# Patient Record
Sex: FEMALE | Race: WHITE | NOT HISPANIC OR LATINO | URBAN - METROPOLITAN AREA
[De-identification: names, ages, dates, MRNs, and addresses within clinical notes are randomized per-mention and may not be internally consistent; named-entity substitution may affect disease eponyms.]

---

## 2017-03-08 ENCOUNTER — EMERGENCY (EMERGENCY)
Facility: HOSPITAL | Age: 82
LOS: 1 days | Discharge: HOME | End: 2017-03-08
Attending: EMERGENCY MEDICINE | Admitting: EMERGENCY MEDICINE
Payer: MEDICARE

## 2017-03-08 VITALS
SYSTOLIC BLOOD PRESSURE: 105 MMHG | HEART RATE: 83 BPM | TEMPERATURE: 98 F | DIASTOLIC BLOOD PRESSURE: 81 MMHG | RESPIRATION RATE: 16 BRPM | OXYGEN SATURATION: 99 %

## 2017-03-08 DIAGNOSIS — M25.512 PAIN IN LEFT SHOULDER: ICD-10-CM

## 2017-03-08 DIAGNOSIS — E11.9 TYPE 2 DIABETES MELLITUS WITHOUT COMPLICATIONS: ICD-10-CM

## 2017-03-08 DIAGNOSIS — E78.5 HYPERLIPIDEMIA, UNSPECIFIED: ICD-10-CM

## 2017-03-08 DIAGNOSIS — I10 ESSENTIAL (PRIMARY) HYPERTENSION: ICD-10-CM

## 2017-03-08 DIAGNOSIS — Z79.82 LONG TERM (CURRENT) USE OF ASPIRIN: ICD-10-CM

## 2017-03-08 PROCEDURE — 99283 EMERGENCY DEPT VISIT LOW MDM: CPT

## 2017-03-08 RX ORDER — ACETAMINOPHEN 500 MG
650 TABLET ORAL ONCE
Qty: 0 | Refills: 0 | Status: COMPLETED | OUTPATIENT
Start: 2017-03-08 | End: 2017-03-08

## 2017-03-08 RX ORDER — DIAZEPAM 5 MG
5 TABLET ORAL ONCE
Qty: 0 | Refills: 0 | Status: DISCONTINUED | OUTPATIENT
Start: 2017-03-08 | End: 2017-03-08

## 2017-03-08 RX ADMIN — Medication 650 MILLIGRAM(S): at 23:38

## 2017-03-08 RX ADMIN — Medication 5 MILLIGRAM(S): at 23:38

## 2017-03-08 NOTE — ED PROVIDER NOTE - PHYSICAL EXAMINATION
PE: CONSTITUTIONAL: Well appearing, well nourished, in no apparent distress. ENMT: Airway patent, nasal mucosa clear, mouth with normal mucosa. HEAD: NCAT EYES: R eye opacified, EOMI CARDIAC: RRR, no m/r/g, no pedal edema RESPIRATORY: CTA b/l, no adventitious sounds GI: Abdomen non-distended, non-tender MSK: Spine appears normal, range of motion is not limited, posterior L deltoid TTP, no zoster or stepoffs, +spasm NEURO: CNII-XII grossly intact, 5/5 strength, full sensation all extremities, gait intact SKIN: No rash

## 2017-03-08 NOTE — ED PROVIDER NOTE - NS ED ROS FT
ROS: GENERAL: no fevers, no chills HEENT: no epistaxis, no eye pain, no ear, no throat pain CARDIAC: no chest pain, no shortness of breath PULM: no cough, no shortness of breath GI: no nausea, no vomiting, no diarrhea,  no abdominal pain, no hematemesis, no bright red blood per rectum : no dysuria, no hematuria EXTREMITIES: no arm pain, no leg pain, +back pain SKIN: no purpura, no petechiae NEURO: no headache, no neck pain, no loss of strength/sensation HEME: no easy bruising, no easy bleeding

## 2017-03-08 NOTE — ED PROVIDER NOTE - OBJECTIVE STATEMENT
82f pmhx HTN, DM, HLD, cataracts (R eye blind) p/w back pain. started several days ago, located posterior deltoid, worse with palpation and motion, much worse at 8pm tonight, moderately improved with ibuprofen. denies recent injury, fall, or strain. denies CP/SOB, nausea/sweats.

## 2017-03-08 NOTE — ED PROVIDER NOTE - ATTENDING CONTRIBUTION TO CARE
82f pmhx HTN, DM, HLD, cataracts (R eye blind) p/w back pain. started several days ago, located posterior deltoid, worse with palpation and motion, much worse at 8pm tonight, moderately improved with ibuprofen. denies recent injury, fall, or strain. denies CP/SOB, nausea/sweats. on PE, VSS, posterior L deltoid TTP, no zoster or stepoffs, +spasm. will give valium and tylenol, reassess. no imaging at this time, will not pursue cardiac etiology (MSK etiology suspected)

## 2017-03-08 NOTE — ED PROVIDER NOTE - CARE PLAN
Principal Discharge DX:	Shoulder pain, left Principal Discharge DX:	Shoulder pain, left  Instructions for follow-up, activity and diet:	REst, increase activity as tolerated. Return to the ER for any concerns such as shortness of breath dizziness or chest pain.  Take motrin or tylenol for pain, and valium 5 mg at night for pain. Follow up with your physician in the next week.

## 2017-03-08 NOTE — ED PROVIDER NOTE - PLAN OF CARE
REst, increase activity as tolerated. Return to the ER for any concerns such as shortness of breath dizziness or chest pain.  Take motrin or tylenol for pain, and valium 5 mg at night for pain. Follow up with your physician in the next week.

## 2017-03-08 NOTE — ED PROVIDER NOTE - PMH
Cataract    Dementia    Diabetes Mellitus, Type 2    Diabetic retinopathy    Glaucoma    HTN (Hypertension)    Hyperlipidemia

## 2017-03-08 NOTE — ED ADULT NURSE NOTE - OBJECTIVE STATEMENT
81 y/o female pt c/o left shoulder/back pain no traumatic. pt is ambualtory. pt denies CP/SOB/fever/ chills/ N/V/D.

## 2017-03-09 RX ORDER — DIAZEPAM 5 MG
1 TABLET ORAL
Qty: 3 | Refills: 0 | OUTPATIENT
Start: 2017-03-09 | End: 2017-03-12

## 2017-03-09 RX ORDER — DIAZEPAM 5 MG
1 TABLET ORAL
Qty: 3 | Refills: 0
Start: 2017-03-09

## 2017-05-28 ENCOUNTER — EMERGENCY (EMERGENCY)
Facility: HOSPITAL | Age: 82
LOS: 1 days | Discharge: HOME | End: 2017-05-28
Attending: EMERGENCY MEDICINE | Admitting: EMERGENCY MEDICINE
Payer: MEDICARE

## 2017-05-28 VITALS
RESPIRATION RATE: 18 BRPM | DIASTOLIC BLOOD PRESSURE: 83 MMHG | OXYGEN SATURATION: 99 % | SYSTOLIC BLOOD PRESSURE: 204 MMHG | HEART RATE: 74 BPM

## 2017-05-28 VITALS
RESPIRATION RATE: 20 BRPM | DIASTOLIC BLOOD PRESSURE: 68 MMHG | SYSTOLIC BLOOD PRESSURE: 238 MMHG | OXYGEN SATURATION: 98 % | TEMPERATURE: 98 F | HEART RATE: 82 BPM

## 2017-05-28 DIAGNOSIS — M54.6 PAIN IN THORACIC SPINE: ICD-10-CM

## 2017-05-28 DIAGNOSIS — Z79.82 LONG TERM (CURRENT) USE OF ASPIRIN: ICD-10-CM

## 2017-05-28 DIAGNOSIS — I10 ESSENTIAL (PRIMARY) HYPERTENSION: ICD-10-CM

## 2017-05-28 DIAGNOSIS — Z79.899 OTHER LONG TERM (CURRENT) DRUG THERAPY: ICD-10-CM

## 2017-05-28 DIAGNOSIS — E78.5 HYPERLIPIDEMIA, UNSPECIFIED: ICD-10-CM

## 2017-05-28 DIAGNOSIS — Z98.49 CATARACT EXTRACTION STATUS, UNSPECIFIED EYE: ICD-10-CM

## 2017-05-28 DIAGNOSIS — Z86.69 PERSONAL HISTORY OF OTHER DISEASES OF THE NERVOUS SYSTEM AND SENSE ORGANS: ICD-10-CM

## 2017-05-28 DIAGNOSIS — Z79.4 LONG TERM (CURRENT) USE OF INSULIN: ICD-10-CM

## 2017-05-28 DIAGNOSIS — E11.319 TYPE 2 DIABETES MELLITUS WITH UNSPECIFIED DIABETIC RETINOPATHY WITHOUT MACULAR EDEMA: ICD-10-CM

## 2017-05-28 DIAGNOSIS — Z98.890 OTHER SPECIFIED POSTPROCEDURAL STATES: ICD-10-CM

## 2017-05-28 PROCEDURE — 99283 EMERGENCY DEPT VISIT LOW MDM: CPT | Mod: GC

## 2017-05-28 PROCEDURE — 99284 EMERGENCY DEPT VISIT MOD MDM: CPT

## 2017-05-28 RX ORDER — DIAZEPAM 5 MG
2 TABLET ORAL ONCE
Qty: 0 | Refills: 0 | Status: DISCONTINUED | OUTPATIENT
Start: 2017-05-28 | End: 2017-05-28

## 2017-05-28 RX ORDER — IBUPROFEN 200 MG
600 TABLET ORAL ONCE
Qty: 0 | Refills: 0 | Status: COMPLETED | OUTPATIENT
Start: 2017-05-28 | End: 2017-05-28

## 2017-05-28 RX ORDER — LIDOCAINE 4 G/100G
1 CREAM TOPICAL ONCE
Qty: 0 | Refills: 0 | Status: COMPLETED | OUTPATIENT
Start: 2017-05-28 | End: 2017-05-28

## 2017-05-28 RX ADMIN — Medication 2 MILLIGRAM(S): at 22:54

## 2017-05-28 NOTE — ED ADULT NURSE REASSESSMENT NOTE - NS ED NURSE REASSESS COMMENT FT1
Pt reports and seems much more calm and with some pain relief. Pt is smiling, speaking with stafff. BP coming down. Pt will be discharged when BP is within acceptable range as per Dr. Alcanatra,

## 2017-05-28 NOTE — ED ADULT NURSE NOTE - OBJECTIVE STATEMENT
Pt presents to ED awake and alert, accompanied by her  d/t acute onset upper/mid back pain. Pt and  state pt was sitting down when she began having severe back pain. Pt and  denies fall or injury of any kind. Pt denies dysuria or fever or ever having a pain like this before. Pt has h/o HTN, HLD, DM, and dementia. Pt is A&Ox2 at baseline and at this time. BLLE have +2 pitting edema. Respirations even and unlabored. Pt is crying, hysterical and yelling out in pain. No injury or deformity noted to upper/mid back. Pt presents to ED awake and alert, accompanied by her  d/t acute onset upper/mid back pain. Pt and  state pt was sitting down when she began having severe back pain. Pt and  denies fall or injury of any kind. Pt denies dysuria or fever or ever having a pain like this before. Pt has h/o HTN, HLD, DM, and dementia. Pt is A&Ox2 at baseline and at this time. BLLE have +2 pitting edema. Respirations even and unlabored. Pt is crying, hysterical and yelling out in pain. No injury or deformity noted to upper/mid back. Pt has been taking Motrin with no relief.  states pt has had an issue with her shoulder in the past.

## 2017-05-28 NOTE — ED PROVIDER NOTE - PHYSICAL EXAMINATION
Gen: NAD  Eyes:  sclerae white, no icterus  ENT: Moist mucous membranes. No exudates  CV: RRR. Audible S1 and S2. No murmurs, rubs, gallops, S3, nor S4  Pulm: Clear to auscultation bilaterally. No wheezes, rales, or rhonchi  Abd: BS+, nondistended, No tenderness to palpation  Musculoskeletal:  No midline spinal TTP. + cervical paraspinal muscle strain  Skin: no lesions or scars noted  Neurologic: Oriented to person and place, CN grossly intact, 5/5 strength in all extrem

## 2017-12-15 ENCOUNTER — OUTPATIENT (OUTPATIENT)
Dept: OUTPATIENT SERVICES | Facility: HOSPITAL | Age: 82
LOS: 1 days | Discharge: HOME | End: 2017-12-15
Payer: MEDICARE

## 2017-12-15 PROCEDURE — 90792 PSYCH DIAG EVAL W/MED SRVCS: CPT

## 2017-12-18 DIAGNOSIS — F30.9 MANIC EPISODE, UNSPECIFIED: ICD-10-CM

## 2018-02-02 PROCEDURE — 99213 OFFICE O/P EST LOW 20 MIN: CPT

## 2018-05-11 PROCEDURE — 99214 OFFICE O/P EST MOD 30 MIN: CPT

## 2018-11-26 ENCOUNTER — APPOINTMENT (OUTPATIENT)
Dept: NEUROLOGY | Facility: CLINIC | Age: 83
End: 2018-11-26
Payer: MEDICARE

## 2018-11-26 VITALS
DIASTOLIC BLOOD PRESSURE: 65 MMHG | HEIGHT: 59 IN | SYSTOLIC BLOOD PRESSURE: 190 MMHG | HEART RATE: 76 BPM | WEIGHT: 125 LBS | BODY MASS INDEX: 25.2 KG/M2

## 2018-11-26 DIAGNOSIS — I49.9 CARDIAC ARRHYTHMIA, UNSPECIFIED: ICD-10-CM

## 2018-11-26 DIAGNOSIS — Z78.9 OTHER SPECIFIED HEALTH STATUS: ICD-10-CM

## 2018-11-26 DIAGNOSIS — H40.9 UNSPECIFIED GLAUCOMA: ICD-10-CM

## 2018-11-26 DIAGNOSIS — H54.7 UNSPECIFIED VISUAL LOSS: ICD-10-CM

## 2018-11-26 DIAGNOSIS — I67.9 CEREBROVASCULAR DISEASE, UNSPECIFIED: ICD-10-CM

## 2018-11-26 DIAGNOSIS — Z80.51 FAMILY HISTORY OF MALIGNANT NEOPLASM OF KIDNEY: ICD-10-CM

## 2018-11-26 DIAGNOSIS — I10 ESSENTIAL (PRIMARY) HYPERTENSION: ICD-10-CM

## 2018-11-26 DIAGNOSIS — H54.40 BLINDNESS, ONE EYE, UNSPECIFIED EYE: ICD-10-CM

## 2018-11-26 DIAGNOSIS — Z81.8 FAMILY HISTORY OF OTHER MENTAL AND BEHAVIORAL DISORDERS: ICD-10-CM

## 2018-11-26 PROCEDURE — 99205 OFFICE O/P NEW HI 60 MIN: CPT

## 2018-11-26 RX ORDER — DOCUSATE SODIUM 100 MG/1
100 CAPSULE ORAL TWICE DAILY
Refills: 0 | Status: ACTIVE | COMMUNITY
Start: 2018-11-26

## 2018-11-26 RX ORDER — MEMANTINE HYDROCHLORIDE 5 MG/1
5 TABLET ORAL TWICE DAILY
Refills: 0 | Status: DISCONTINUED | COMMUNITY
Start: 2018-11-26 | End: 2018-11-26

## 2018-11-26 RX ORDER — ASPIRIN 81 MG/1
81 TABLET, CHEWABLE ORAL
Refills: 3 | Status: ACTIVE | COMMUNITY
Start: 2018-11-26

## 2018-11-26 RX ORDER — LOVASTATIN 40 MG/1
40 TABLET ORAL DAILY
Refills: 0 | Status: ACTIVE | COMMUNITY
Start: 2018-11-26

## 2018-11-26 RX ORDER — AMIODARONE HYDROCHLORIDE 100 MG/1
100 TABLET ORAL DAILY
Refills: 0 | Status: ACTIVE | COMMUNITY
Start: 2018-11-26

## 2018-11-26 RX ORDER — ERGOCALCIFEROL 1.25 MG/1
1.25 MG CAPSULE, LIQUID FILLED ORAL
Refills: 0 | Status: ACTIVE | COMMUNITY
Start: 2018-11-26

## 2018-11-26 RX ORDER — HYDROCHLOROTHIAZIDE 25 MG/1
25 TABLET ORAL DAILY
Refills: 0 | Status: ACTIVE | COMMUNITY
Start: 2018-11-26

## 2018-11-29 ENCOUNTER — APPOINTMENT (OUTPATIENT)
Dept: NEUROLOGY | Facility: CLINIC | Age: 83
End: 2018-11-29
Payer: MEDICARE

## 2018-11-29 PROCEDURE — 93880 EXTRACRANIAL BILAT STUDY: CPT

## 2018-12-06 NOTE — ED ADULT NURSE NOTE - PMH
no
Cataract    Dementia    Diabetes Mellitus, Type 2    Diabetic retinopathy    Glaucoma    HTN (Hypertension)    Hyperlipidemia
n/a

## 2019-01-31 NOTE — ED PROVIDER NOTE - EXITCARE/DISCHARGE INSTRUCTIONS
Anesthesia Transfer of Care Note    Patient: Rachel Leal    Procedure(s) Performed: Procedure(s) (LRB):  EXCISION-CONDYLOMA (N/A)    Patient location: PACU    Anesthesia Type: MAC    Transport from OR: Transported from OR on 6-10 L/min O2 by face mask with adequate spontaneous ventilation    Post pain: adequate analgesia    Post assessment: no apparent anesthetic complications and tolerated procedure well    Post vital signs: stable    Level of consciousness: sedated    Nausea/Vomiting: no nausea/vomiting    Complications: none    Transfer of care protocol was followed      Last vitals:   Visit Vitals  /86   LMP 01/17/2012   Breastfeeding? No      Launch [employer] and print the 'Prescriptions from this Visit' Report

## 2019-02-01 ENCOUNTER — TRANSCRIPTION ENCOUNTER (OUTPATIENT)
Age: 84
End: 2019-02-01

## 2019-04-16 ENCOUNTER — APPOINTMENT (OUTPATIENT)
Dept: NEUROLOGY | Facility: CLINIC | Age: 84
End: 2019-04-16

## 2019-06-05 ENCOUNTER — RX RENEWAL (OUTPATIENT)
Age: 84
End: 2019-06-05

## 2019-06-17 ENCOUNTER — APPOINTMENT (OUTPATIENT)
Dept: NEUROLOGY | Facility: CLINIC | Age: 84
End: 2019-06-17
Payer: MEDICARE

## 2019-06-17 VITALS — DIASTOLIC BLOOD PRESSURE: 62 MMHG | HEART RATE: 66 BPM | SYSTOLIC BLOOD PRESSURE: 196 MMHG

## 2019-06-17 DIAGNOSIS — M48.00 SPINAL STENOSIS, SITE UNSPECIFIED: ICD-10-CM

## 2019-06-17 PROCEDURE — 99214 OFFICE O/P EST MOD 30 MIN: CPT

## 2019-06-17 NOTE — HISTORY OF PRESENT ILLNESS
[FreeTextEntry1] : HPI-Interval  [id number]:\par pt here for follow-up.\par Her speech has worsened, more confused.\par STM very poor.\par Difficulty with names of family members. She recognizes faces.\par She has daily sundowning, asks to go home at night. Currently using Seroquel 25mg BID only.\par per daughter, she was better with Namenda.\par ADL: needs prompting and help for washing, feeding and dressing\par IADL: very poor\par CDR: 2.5\par Appetite is fine, and sleeps well, though an have erratic days where she sleeps less.\par \par PMH:\par 83yo woman, here for evaluation of Dementia.\par Pt enters the room 30 minutes late, due to being "stuck" by the police and delay in registration in our office.\par \par HPI: 83yo RH woman, with HTN, HLD, cardiomyopathy, here with daughter, son and RIK for dementia.\par \par PMH: At about age 71, she started to have some memory issues, forgetting events, losing objects, burning food on the stove. \par Around 3y ago, she presented a worsening, temporally related to her 's illness. She was then hospitalized, and she had some agitation and agitation then.\par Over the years, she has worsened to the point of losing ADLs and IADLs. \par She had MRI brain in 2015, showing initial atrophy of FTP lobes, including mild hippocampal and precuneal atrophy.\par Currently, she lives with her  and son.\par ADL limited, except basic eating and washing. Poorer hygiene.\par \par She was seen by a Neurologist in [place], started on Namenda, kept at 5mg, BID, and then on Seroquel and MIitazapine to control agitation. \par Sleep: overall ok, wakes up a few times, to go to the bathroom. Some naps during the day.\par \par Appetite: no issues, maintained. Tends to eat a lot even out of meals. \par \par Motor symptoms: tends to fall at times, had couple of falls in old house with stairs. Of note, she has LBP and PMH of falls, and MRI C-spine showing moderate-severe C-spine stenosis. She has been gradually walking more and more unsteady. She has pain in LB and knees. She also had a fall with avulsion of hamstring tendon in the past. \par \par B/B: loses urine and bowel occasionally.\par \par Psychiatric symptoms: agitation, gets feisty with family at times. Stubborn, does not want to use support.\par \par ADL: needs prompting\par IADL: very poor\par CDR: 2.5\par \par Professional status: retired. \par \par PCP and other physicians:\par -PCP: \par Dr. Edwar Dickson MD\par Internist in [place], [place]\par Address: [address] [place] [address], [place] [address]\par Phone: [phone]\par -Neuro:\par Neurologist in [place].\par

## 2019-06-17 NOTE — ASSESSMENT
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\par Agitation is now mostly sundowning-related, with profound disorientation\par Gait: likely from spinal stenosis, chronic (see MRI from 2015).\par \par Plan:\par -Given daughter's impression that she was better with Namenda, we'll try to restart 7mg ER\par -cont Aricept 5mg in 1 week\par -will consider redistributing Seroquel during the day to improve compliance\par -PT for gait and balance training-though she has refused it all along.\par \par A thorough discussion was entertained with the patient/caregiver regarding the use of psychoactive medications, their possible benefits and AE profile, including the risk of cardiovascular complications.\par We discussed the benefits of being active, physically and mentally, and the need to to establish a routine in this respect.\par Driving abilities and firearms possession and use were discussed, in relation to progression of the cognitive decline, and the need to assess them periodically.\par Patient/caregiver understand and agree with the plan.\par

## 2019-06-17 NOTE — PHYSICAL EXAM
[General Appearance - Alert] : alert [General Appearance - In No Acute Distress] : in no acute distress [Impaired Insight] : insight and judgment were intact [Affect] : the affect was normal [Person] : oriented to person [Remote Intact] : remote memory intact [Concentration Intact] : normal concentrating ability [Naming Objects] : no difficulty naming common objects [Visual Intact] : visual attention was ~T not ~L decreased [Fluency] : fluency intact [Comprehension] : comprehension intact [Cranial Nerves Oculomotor (III)] : extraocular motion intact [Vocabulary] : adequate range of vocabulary [Cranial Nerves Optic (II)] : visual acuity intact bilaterally,  visual fields full to confrontation, pupils equal round and reactive to light [Cranial Nerves Trigeminal (V)] : facial sensation intact symmetrically [Cranial Nerves Facial (VII)] : face symmetrical [Cranial Nerves Glossopharyngeal (IX)] : tongue and palate midline [Cranial Nerves Vestibulocochlear (VIII)] : hearing was intact bilaterally [Cranial Nerves Accessory (XI - Cranial And Spinal)] : head turning and shoulder shrug symmetric [Cranial Nerves Hypoglossal (XII)] : there was no tongue deviation with protrusion [Motor Strength] : muscle strength was normal in all four extremities [Involuntary Movements] : no involuntary movements were seen [No Muscle Atrophy] : normal bulk in all four extremities [Sensation Tactile Decrease] : light touch was intact [Sensation Pain / Temperature Decrease] : pain and temperature was intact [Motor Handedness Right-Handed] : the patient is right hand dominant [Romberg's Sign] : a positive Romberg's sign was present [2+] : Brachioradialis left 2+ [3+] : Ankle jerk left 3+ [Extraocular Movements] : extraocular movements were intact [PERRL With Normal Accommodation] : pupils were equal in size, round, reactive to light, with normal accommodation [Outer Ear] : the ears and nose were normal in appearance [Neck Appearance] : the appearance of the neck was normal [Oropharynx] : the oropharynx was normal [Neck Cervical Mass (___cm)] : no neck mass was observed [Jugular Venous Distention Increased] : there was no jugular-venous distention [Thyroid Nodule] : there were no palpable thyroid nodules [Thyroid Diffuse Enlargement] : the thyroid was not enlarged [Auscultation Breath Sounds / Voice Sounds] : lungs were clear to auscultation bilaterally [Heart Rate And Rhythm] : heart rate was normal and rhythm regular [Heart Sounds Gallop] : no gallops [Heart Sounds] : normal S1 and S2 [Murmurs] : no murmurs [Heart Sounds Pericardial Friction Rub] : no pericardial rub [Arterial Pulses Carotid] : carotid pulses were normal with no bruits [Full Pulse] : the pedal pulses are present [Edema] : there was no peripheral edema [Bowel Sounds] : normal bowel sounds [Abdomen Soft] : soft [Abdomen Tenderness] : non-tender [Abdomen Mass (___ Cm)] : no abdominal mass palpated [No Spinal Tenderness] : no spinal tenderness [No CVA Tenderness] : no ~M costovertebral angle tenderness [Nail Clubbing] : no clubbing  or cyanosis of the fingernails [Abnormal Walk] : normal gait [Motor Tone] : muscle strength and tone were normal [Musculoskeletal - Swelling] : no joint swelling seen [Skin Color & Pigmentation] : normal skin color and pigmentation [Skin Turgor] : normal skin turgor [] : no rash [Limited Balance] : the patient's balance was impaired [Place] : disoriented to place [Time] : disoriented to time [Registration Intact] : recent registration memory impaired [Short Term Intact] : short term memory impaired [Writing A Sentence] : difficulty writing a sentence [Repeating Phrases] : difficulty repeating a phrase [Span Intact] : the attention span was decreased [Current Events] : inadequate knowledge of current events [Reading] : difficulty reading [Past History] : inadequate knowledge of personal past history [Hyperesthesia] : no hyperesthesia [Allodynia] : no ~T allodynia present [Dysesthesia] : no dysesthesia [Plantar Reflex Right Only] : normal on the right [Tremor] : no tremor present [Past-pointing] : there was no past-pointing [FreeTextEntry5] : + glabella [FreeTextEntry4] : Mental Status Exam\par MMSE very limited, <10.\par Exam mostly limited to stereotypical phrases, saying "family is ok, all ok"\par Presidents: 0/5\par Praxis: can imitate well, ideomotor and ideational very limited. [Plantar Reflex Left Only] : normal on the left [FreeTextEntry8] : cautioned stance and gait, tends to stand and walk with wide feet, hesitating, unstable. [FreeTextEntry6] : Paratonia in UE and LE.  [FreeTextEntry7] : Sensory exam n.a. [FreeTextEntry1] : paraspinal tenderness of muscles

## 2019-06-17 NOTE — DATA REVIEWED
[de-identified] : I have reviewed the MRI brain from 2015. [de-identified] : MRI C-Spsine 2015:\par IMPRESSION: Multilevel severe degenerative disc disease in the cervical \par spine , most pronounced at C2-C3 and C3-C4. \par C2-C3: Moderate central canal stenosis and mild spinal cord compression. \par Focal signal hyperintensity in the posterior cord may reflect chronic \par myelomalacia or spinal cord edema. If clinically warranted a contrast \par enhanced study may be performed to distinguish between these 2 entities. \par Moderate left foraminal stenosis. \par C3-C4: Moderate to severe central canal stenosis and moderate spinal cord \par compression. Severe bilateral foraminal stenosis. \par C5-C6: Moderate central canal stenosis. Moderate bilateral foraminal \par stenosis. \par C6-C7: Moderate central canal stenosis. \par T1-T2: Moderate bilateral foraminal stenosis.\par \par MRI brain 2015:\par EXAM: MRI BRAIN WO CON \par PROCEDURE DATE: Mar 25 2015 \par INTERPRETATION: EXAM: MRI of the brain without contrast. \par CLINICAL INDICATION: Balance problems and dementia. Patient reports \par dizziness that started 3 years ago. \par COMPARISON: No direct comparison. CT brain of 8/26/2013 was reviewed. \par TECHNIQUE: Brain MRI without contrast was performed. The examination \par consists of: \par Sagittal T1 FLAIR, axial T2, axial T1 FLAIR, axial T2 FLAIR, axial SWI, and \par axial T2 WI with ADC mapping \par FINDINGS: \par Diffusion weighted images demonstrate no acute territorial infarct. There \par is prominence of the ventricles and sulci likely on the basis of age-related \par atrophy. Ventricles are slightly prominent proportion to sulcal size. Foci \par of periventricular and subcortical T2 prolongation are present on FLAIR \par imaging. In a patient with history of hypertension, these foci most likely \par represent chronic microvascular disease. There is no extra-axial collection, \par mass or susceptibility artifact on spoiled gradient sequence to suggest \par hemorrhage. \par The cervicomedullary junction is unremarkable.The intracranial flow voids \par are normal in appearance. Spondylosis is seen in the visualized cervical \par spine with evidence of cord flattening at the C2-3 and C3-4 interspaces. The \par orbits, sella and suprasellar regions are unremarkable. The visualized \par paranasal sinuses and mastoids are clear. \par IMPRESSION: No acute territorial infarction, intracranial hemorrhage or mass \par effect. \par Age-related atrophy and chronic microvascular disease with ventricular \par prominence. \par GILMER ROBERTSON M.D., RADIOLOGY RESIDENT \par INDY CRANE M.D., ATTENDING RADIOLOGIST \par This examination was interpreted on: Mar 26 2015 9:36AM. This document \par has been electronically signed. Mar 26 2015 11:32AM\par

## 2019-08-21 ENCOUNTER — RX CHANGE (OUTPATIENT)
Age: 84
End: 2019-08-21

## 2019-09-12 RX ORDER — MEMANTINE HYDROCHLORIDE 7 MG/1
7 CAPSULE, EXTENDED RELEASE ORAL
Qty: 90 | Refills: 2 | Status: DISCONTINUED | COMMUNITY
Start: 2019-06-17 | End: 2019-09-12

## 2019-10-03 ENCOUNTER — RX RENEWAL (OUTPATIENT)
Age: 84
End: 2019-10-03

## 2019-10-29 ENCOUNTER — APPOINTMENT (OUTPATIENT)
Dept: NEUROLOGY | Facility: CLINIC | Age: 84
End: 2019-10-29
Payer: MEDICARE

## 2019-10-29 VITALS — HEART RATE: 65 BPM | SYSTOLIC BLOOD PRESSURE: 197 MMHG | DIASTOLIC BLOOD PRESSURE: 77 MMHG

## 2019-10-29 DIAGNOSIS — R45.1 RESTLESSNESS AND AGITATION: ICD-10-CM

## 2019-10-29 PROCEDURE — 99214 OFFICE O/P EST MOD 30 MIN: CPT

## 2019-10-29 RX ORDER — DONEPEZIL HYDROCHLORIDE 5 MG/1
5 TABLET ORAL DAILY
Qty: 30 | Refills: 0 | Status: DISCONTINUED | COMMUNITY
Start: 2018-11-26 | End: 2019-10-29

## 2019-10-29 NOTE — DATA REVIEWED
[de-identified] : MRI C-Spsine 2015:\par IMPRESSION: Multilevel severe degenerative disc disease in the cervical \par spine , most pronounced at C2-C3 and C3-C4. \par C2-C3: Moderate central canal stenosis and mild spinal cord compression. \par Focal signal hyperintensity in the posterior cord may reflect chronic \par myelomalacia or spinal cord edema. If clinically warranted a contrast \par enhanced study may be performed to distinguish between these 2 entities. \par Moderate left foraminal stenosis. \par C3-C4: Moderate to severe central canal stenosis and moderate spinal cord \par compression. Severe bilateral foraminal stenosis. \par C5-C6: Moderate central canal stenosis. Moderate bilateral foraminal \par stenosis. \par C6-C7: Moderate central canal stenosis. \par T1-T2: Moderate bilateral foraminal stenosis.\par \par MRI brain 2015:\par EXAM: MRI BRAIN WO CON \par PROCEDURE DATE: Mar 25 2015 \par INTERPRETATION: EXAM: MRI of the brain without contrast. \par CLINICAL INDICATION: Balance problems and dementia. Patient reports \par dizziness that started 3 years ago. \par COMPARISON: No direct comparison. CT brain of 8/26/2013 was reviewed. \par TECHNIQUE: Brain MRI without contrast was performed. The examination \par consists of: \par Sagittal T1 FLAIR, axial T2, axial T1 FLAIR, axial T2 FLAIR, axial SWI, and \par axial T2 WI with ADC mapping \par FINDINGS: \par Diffusion weighted images demonstrate no acute territorial infarct. There \par is prominence of the ventricles and sulci likely on the basis of age-related \par atrophy. Ventricles are slightly prominent proportion to sulcal size. Foci \par of periventricular and subcortical T2 prolongation are present on FLAIR \par imaging. In a patient with history of hypertension, these foci most likely \par represent chronic microvascular disease. There is no extra-axial collection, \par mass or susceptibility artifact on spoiled gradient sequence to suggest \par hemorrhage. \par The cervicomedullary junction is unremarkable.The intracranial flow voids \par are normal in appearance. Spondylosis is seen in the visualized cervical \par spine with evidence of cord flattening at the C2-3 and C3-4 interspaces. The \par orbits, sella and suprasellar regions are unremarkable. The visualized \par paranasal sinuses and mastoids are clear. \par IMPRESSION: No acute territorial infarction, intracranial hemorrhage or mass \par effect. \par Age-related atrophy and chronic microvascular disease with ventricular \par prominence. \par GILMER ROBERTSON M.D., RADIOLOGY RESIDENT \par INDY CARNE M.D., ATTENDING RADIOLOGIST \par This examination was interpreted on: Mar 26 2015 9:36AM. This document \par has been electronically signed. Mar 26 2015 11:32AM\par  [de-identified] : I have reviewed the MRI brain from 2015.

## 2019-10-29 NOTE — ASSESSMENT
[FreeTextEntry1] : Assessment:\par 84yo RH woman, with severe dementia, gait instability.\par She has been stable since last visit.\par Sundowning and sleep asre still an issue, but overall ok.\par Appetite limited now by lack of denture, will have to modify food. 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\par Agitation is now mostly sundowning-related\par Gait: likely from spinal stenosis, chronic (see MRI from 2015).\par \par Plan:\par -will consider redistributing Seroquel during the day to improve compliance, more needed at evenings, using 1 tab at 11am, 1 tab at 5pm and 1 tabQHS PRN\par -PT for gait and balance training-though she has refused it all along\par -continue Trazodone 50mg at night.\par \par A thorough discussion was entertained with the patient/caregiver regarding the use of psychoactive medications, their possible benefits and AE profile, including the risk of cardiovascular complications.\par We discussed the benefits of being active, physically and mentally, and the need to to establish a routine in this respect.\par Driving abilities and firearms possession and use were discussed, in relation to progression of the cognitive decline, and the need to assess them periodically.\par Patient/caregiver understand and agree with the plan.\par

## 2019-10-29 NOTE — PHYSICAL EXAM
[General Appearance - Alert] : alert [General Appearance - In No Acute Distress] : in no acute distress [Impaired Insight] : insight and judgment were intact [Affect] : the affect was normal [Person] : oriented to person [Remote Intact] : remote memory intact [Concentration Intact] : normal concentrating ability [Visual Intact] : visual attention was ~T not ~L decreased [Naming Objects] : no difficulty naming common objects [Fluency] : fluency intact [Comprehension] : comprehension intact [Vocabulary] : adequate range of vocabulary [Cranial Nerves Optic (II)] : visual acuity intact bilaterally,  visual fields full to confrontation, pupils equal round and reactive to light [Cranial Nerves Oculomotor (III)] : extraocular motion intact [Cranial Nerves Trigeminal (V)] : facial sensation intact symmetrically [Cranial Nerves Facial (VII)] : face symmetrical [Cranial Nerves Vestibulocochlear (VIII)] : hearing was intact bilaterally [Cranial Nerves Glossopharyngeal (IX)] : tongue and palate midline [Cranial Nerves Accessory (XI - Cranial And Spinal)] : head turning and shoulder shrug symmetric [Cranial Nerves Hypoglossal (XII)] : there was no tongue deviation with protrusion [Motor Strength] : muscle strength was normal in all four extremities [Involuntary Movements] : no involuntary movements were seen [No Muscle Atrophy] : normal bulk in all four extremities [Motor Handedness Right-Handed] : the patient is right hand dominant [Sensation Tactile Decrease] : light touch was intact [Sensation Pain / Temperature Decrease] : pain and temperature was intact [Romberg's Sign] : a positive Romberg's sign was present [Limited Balance] : the patient's balance was impaired [2+] : Brachioradialis left 2+ [3+] : Ankle jerk left 3+ [PERRL With Normal Accommodation] : pupils were equal in size, round, reactive to light, with normal accommodation [Extraocular Movements] : extraocular movements were intact [Outer Ear] : the ears and nose were normal in appearance [Oropharynx] : the oropharynx was normal [Neck Appearance] : the appearance of the neck was normal [Neck Cervical Mass (___cm)] : no neck mass was observed [Jugular Venous Distention Increased] : there was no jugular-venous distention [Thyroid Diffuse Enlargement] : the thyroid was not enlarged [Thyroid Nodule] : there were no palpable thyroid nodules [Auscultation Breath Sounds / Voice Sounds] : lungs were clear to auscultation bilaterally [Heart Rate And Rhythm] : heart rate was normal and rhythm regular [Heart Sounds] : normal S1 and S2 [Heart Sounds Gallop] : no gallops [Murmurs] : no murmurs [Heart Sounds Pericardial Friction Rub] : no pericardial rub [Arterial Pulses Carotid] : carotid pulses were normal with no bruits [Full Pulse] : the pedal pulses are present [Edema] : there was no peripheral edema [Bowel Sounds] : normal bowel sounds [Abdomen Soft] : soft [Abdomen Tenderness] : non-tender [Abdomen Mass (___ Cm)] : no abdominal mass palpated [No CVA Tenderness] : no ~M costovertebral angle tenderness [No Spinal Tenderness] : no spinal tenderness [Abnormal Walk] : normal gait [Nail Clubbing] : no clubbing  or cyanosis of the fingernails [Musculoskeletal - Swelling] : no joint swelling seen [Motor Tone] : muscle strength and tone were normal [Skin Color & Pigmentation] : normal skin color and pigmentation [Skin Turgor] : normal skin turgor [] : no rash [Place] : disoriented to place [Time] : disoriented to time [Short Term Intact] : short term memory impaired [Registration Intact] : recent registration memory impaired [Span Intact] : the attention span was decreased [Repeating Phrases] : difficulty repeating a phrase [Writing A Sentence] : difficulty writing a sentence [Reading] : difficulty reading [Current Events] : inadequate knowledge of current events [Past History] : inadequate knowledge of personal past history [Allodynia] : no ~T allodynia present [Dysesthesia] : no dysesthesia [Hyperesthesia] : no hyperesthesia [Past-pointing] : there was no past-pointing [Tremor] : no tremor present [Plantar Reflex Right Only] : normal on the right [Plantar Reflex Left Only] : normal on the left [FreeTextEntry4] : Mental Status Exam\par MMSE very limited, <10.\par Exam mostly limited to stereotypical phrases, saying "family is ok, all ok"\par Presidents: 0/5\par Praxis: can imitate well, ideomotor and ideational very limited. [FreeTextEntry5] : + glabella [FreeTextEntry6] : Paratonia in UE and LE.  [FreeTextEntry7] : Sensory exam n.a. [FreeTextEntry8] : cautioned stance and gait, tends to stand and walk with wide feet, hesitating, unstable. [FreeTextEntry1] : paraspinal tenderness of muscles Wound Care: Vaseline

## 2019-10-29 NOTE — HISTORY OF PRESENT ILLNESS
[FreeTextEntry1] : HPI-Interval Hx 20191029:\par Pt has been off Aricept and Namenda since September, due to weight loss.\par Also, she has refused to keep the denture, ans has reduced food intake.\par She has lost ore weight now and is around 10lbs.\par Sleep is mostly ok, on and off, but mostly fine. Uses Trazodone 50mg. \par No falls, gait still very cautioned. \par Quetiapine working well when agitated.\par \par HPI-Interval Hx 20190617:\par pt here for follow-up.\par Her speech has worsened, more confused.\par STM very poor.\par Difficulty with names of family members. She recognizes faces.\par She has daily sundowning, asks to go home at night. Currently using Seroquel 25mg BID only.\par per daughter, she was better with Namenda.\par ADL: needs prompting and help for washing, feeding and dressing\par IADL: very poor\par CDR: 2.5\par Appetite is fine, and sleeps well, though an have erratic days where she sleeps less.\par \par PMH:\par 83yo woman, here for evaluation of Dementia.\par Pt enters the room 30 minutes late, due to being "stuck" by the police and delay in registration in our office.\par \par HPI: 83yo RH woman, with HTN, HLD, cardiomyopathy, here with daughter, son and RIK for dementia.\par \par PMH: At about age 71, she started to have some memory issues, forgetting events, losing objects, burning food on the stove. \par Around 3y ago, she presented a worsening, temporally related to her 's illness. She was then hospitalized, and she had some agitation and agitation then.\par Over the years, she has worsened to the point of losing ADLs and IADLs. \par She had MRI brain in 2015, showing initial atrophy of FTP lobes, including mild hippocampal and precuneal atrophy.\par Currently, she lives with her  and son.\par ADL limited, except basic eating and washing. Poorer hygiene.\par \par She was seen by a Neurologist in [place], started on Namenda, kept at 5mg, BID, and then on Seroquel and MIitazapine to control agitation. \par Sleep: overall ok, wakes up a few times, to go to the bathroom. Some naps during the day.\par \par Appetite: no issues, maintained. Tends to eat a lot even out of meals. \par \par Motor symptoms: tends to fall at times, had couple of falls in old house with stairs. Of note, she has LBP and PMH of falls, and MRI C-spine showing moderate-severe C-spine stenosis. She has been gradually walking more and more unsteady. She has pain in LB and knees. She also had a fall with avulsion of hamstring tendon in the past. \par \par B/B: loses urine and bowel occasionally.\par \par Psychiatric symptoms: agitation, gets feisty with family at times. Stubborn, does not want to use support.\par \par ADL: needs prompting\par IADL: very poor\par CDR: 2.5\par \par Professional status: retired. \par \par PCP and other physicians:\par -PCP: \par Dr. Edwar Dickson MD\par Internist in [place], [place]\par Address: [address] [place] [address], [place] [address]\par Phone: [phone]\par -Neuro:\par Neurologist in [place].\par

## 2020-01-08 ENCOUNTER — RX RENEWAL (OUTPATIENT)
Age: 85
End: 2020-01-08

## 2020-03-12 NOTE — ED ADULT TRIAGE NOTE - CCCP TRG CHIEF CMPLNT
03/12/20 1139   Events/Summary/Plan   Events/Summary/Plan SpO2 check, IS   Vital Signs   Pulse 64   Respiration 16   Pulse Oximetry 91 %   $ Pulse Oximetry (Spot Check) Yes   Breath Sounds   RUL Breath Sounds Clear   RML Breath Sounds Crackles   RLL Breath Sounds Crackles   FARRUKH Breath Sounds Clear   LLL Breath Sounds Crackles   Secretions   Cough Congested;Non Productive   Oxygen   O2 Delivery Device Room air w/o2 available      pain, mid back

## 2020-05-13 ENCOUNTER — RX RENEWAL (OUTPATIENT)
Age: 85
End: 2020-05-13

## 2020-11-02 ENCOUNTER — RX RENEWAL (OUTPATIENT)
Age: 85
End: 2020-11-02

## 2020-12-01 ENCOUNTER — APPOINTMENT (OUTPATIENT)
Dept: NEUROLOGY | Facility: CLINIC | Age: 85
End: 2020-12-01

## 2020-12-08 ENCOUNTER — RX RENEWAL (OUTPATIENT)
Age: 85
End: 2020-12-08

## 2021-03-09 PROBLEM — Z00.00 ENCOUNTER FOR PREVENTIVE HEALTH EXAMINATION: Status: ACTIVE | Noted: 2021-03-09

## 2021-07-13 ENCOUNTER — RX RENEWAL (OUTPATIENT)
Age: 86
End: 2021-07-13

## 2021-10-19 ENCOUNTER — RX RENEWAL (OUTPATIENT)
Age: 86
End: 2021-10-19

## 2021-12-07 DIAGNOSIS — E11.9 TYPE 2 DIABETES MELLITUS W/OUT COMPLICATIONS: ICD-10-CM

## 2021-12-07 DIAGNOSIS — E09.43 DRUG OR CHEMICAL INDUCED DIABETES MELLITUS WITH NEUROLOGICAL COMPLICATIONS WITH DIABETIC AUTONOMIC (POLY)NEUROPATHY: ICD-10-CM

## 2021-12-09 ENCOUNTER — APPOINTMENT (OUTPATIENT)
Dept: NEPHROLOGY | Facility: CLINIC | Age: 86
End: 2021-12-09

## 2022-01-01 ENCOUNTER — TRANSCRIPTION ENCOUNTER (OUTPATIENT)
Age: 87
End: 2022-01-01

## 2022-01-01 ENCOUNTER — APPOINTMENT (OUTPATIENT)
Dept: NEUROLOGY | Facility: CLINIC | Age: 87
End: 2022-01-01

## 2022-01-01 ENCOUNTER — RX RENEWAL (OUTPATIENT)
Age: 87
End: 2022-01-01

## 2022-01-01 ENCOUNTER — INPATIENT (INPATIENT)
Facility: HOSPITAL | Age: 87
LOS: 3 days | Discharge: HOME HEALTH | DRG: 689 | End: 2022-08-10
Attending: STUDENT IN AN ORGANIZED HEALTH CARE EDUCATION/TRAINING PROGRAM | Admitting: STUDENT IN AN ORGANIZED HEALTH CARE EDUCATION/TRAINING PROGRAM
Payer: MEDICARE

## 2022-01-01 VITALS
DIASTOLIC BLOOD PRESSURE: 61 MMHG | TEMPERATURE: 98 F | OXYGEN SATURATION: 96 % | RESPIRATION RATE: 18 BRPM | SYSTOLIC BLOOD PRESSURE: 168 MMHG | HEART RATE: 65 BPM

## 2022-01-01 VITALS
HEART RATE: 89 BPM | RESPIRATION RATE: 20 BRPM | SYSTOLIC BLOOD PRESSURE: 146 MMHG | DIASTOLIC BLOOD PRESSURE: 109 MMHG | OXYGEN SATURATION: 96 %

## 2022-01-01 DIAGNOSIS — Z29.9 ENCOUNTER FOR PROPHYLACTIC MEASURES, UNSPECIFIED: ICD-10-CM

## 2022-01-01 DIAGNOSIS — I10 ESSENTIAL (PRIMARY) HYPERTENSION: ICD-10-CM

## 2022-01-01 DIAGNOSIS — N39.0 URINARY TRACT INFECTION, SITE NOT SPECIFIED: ICD-10-CM

## 2022-01-01 DIAGNOSIS — R91.8 OTHER NONSPECIFIC ABNORMAL FINDING OF LUNG FIELD: ICD-10-CM

## 2022-01-01 DIAGNOSIS — F03.90 UNSPECIFIED DEMENTIA, UNSPECIFIED SEVERITY, WITHOUT BEHAVIORAL DISTURBANCE, PSYCHOTIC DISTURBANCE, MOOD DISTURBANCE, AND ANXIETY: ICD-10-CM

## 2022-01-01 DIAGNOSIS — E04.2 NONTOXIC MULTINODULAR GOITER: ICD-10-CM

## 2022-01-01 DIAGNOSIS — E11.9 TYPE 2 DIABETES MELLITUS WITHOUT COMPLICATIONS: ICD-10-CM

## 2022-01-01 DIAGNOSIS — R41.82 ALTERED MENTAL STATUS, UNSPECIFIED: ICD-10-CM

## 2022-01-01 DIAGNOSIS — R29.810 FACIAL WEAKNESS: ICD-10-CM

## 2022-01-01 LAB
-  STREPTOCOCCUS SP. (NOT GRP A, B OR S PNEUMONIAE): SIGNIFICANT CHANGE UP
A1C WITH ESTIMATED AVERAGE GLUCOSE RESULT: 7.4 % — HIGH (ref 4–5.6)
A1C WITH ESTIMATED AVERAGE GLUCOSE RESULT: 7.4 % — HIGH (ref 4–5.6)
ALBUMIN SERPL ELPH-MCNC: 3.6 G/DL — SIGNIFICANT CHANGE UP (ref 3.3–5)
ALP SERPL-CCNC: 76 U/L — SIGNIFICANT CHANGE UP (ref 40–120)
ALT FLD-CCNC: 10 U/L — SIGNIFICANT CHANGE UP (ref 10–45)
ANION GAP SERPL CALC-SCNC: 11 MMOL/L — SIGNIFICANT CHANGE UP (ref 5–17)
ANION GAP SERPL CALC-SCNC: 11 MMOL/L — SIGNIFICANT CHANGE UP (ref 5–17)
ANION GAP SERPL CALC-SCNC: 12 MMOL/L — SIGNIFICANT CHANGE UP (ref 5–17)
ANION GAP SERPL CALC-SCNC: 13 MMOL/L — SIGNIFICANT CHANGE UP (ref 5–17)
APPEARANCE UR: CLEAR — SIGNIFICANT CHANGE UP
APTT BLD: 25 SEC — LOW (ref 27.5–35.5)
AST SERPL-CCNC: 20 U/L — SIGNIFICANT CHANGE UP (ref 10–40)
BACTERIA # UR AUTO: ABNORMAL
BASE EXCESS BLDV CALC-SCNC: 1.5 MMOL/L — SIGNIFICANT CHANGE UP (ref -2–2)
BASOPHILS # BLD AUTO: 0.04 K/UL — SIGNIFICANT CHANGE UP (ref 0–0.2)
BASOPHILS NFR BLD AUTO: 0.6 % — SIGNIFICANT CHANGE UP (ref 0–2)
BILIRUB SERPL-MCNC: 0.2 MG/DL — SIGNIFICANT CHANGE UP (ref 0.2–1.2)
BILIRUB UR-MCNC: NEGATIVE — SIGNIFICANT CHANGE UP
BUN SERPL-MCNC: 21 MG/DL — SIGNIFICANT CHANGE UP (ref 7–23)
BUN SERPL-MCNC: 26 MG/DL — HIGH (ref 7–23)
BUN SERPL-MCNC: 28 MG/DL — HIGH (ref 7–23)
BUN SERPL-MCNC: 30 MG/DL — HIGH (ref 7–23)
CA-I SERPL-SCNC: 1.19 MMOL/L — SIGNIFICANT CHANGE UP (ref 1.15–1.33)
CALCIUM SERPL-MCNC: 8.4 MG/DL — SIGNIFICANT CHANGE UP (ref 8.4–10.5)
CALCIUM SERPL-MCNC: 8.6 MG/DL — SIGNIFICANT CHANGE UP (ref 8.4–10.5)
CALCIUM SERPL-MCNC: 8.9 MG/DL — SIGNIFICANT CHANGE UP (ref 8.4–10.5)
CALCIUM SERPL-MCNC: 9.1 MG/DL — SIGNIFICANT CHANGE UP (ref 8.4–10.5)
CHLORIDE BLDV-SCNC: 105 MMOL/L — SIGNIFICANT CHANGE UP (ref 96–108)
CHLORIDE SERPL-SCNC: 102 MMOL/L — SIGNIFICANT CHANGE UP (ref 96–108)
CHLORIDE SERPL-SCNC: 103 MMOL/L — SIGNIFICANT CHANGE UP (ref 96–108)
CHOLEST SERPL-MCNC: 216 MG/DL — HIGH
CHOLEST SERPL-MCNC: 235 MG/DL — HIGH
CK MB BLD-MCNC: 2.7 % — SIGNIFICANT CHANGE UP (ref 0–3.5)
CK MB BLD-MCNC: 2.7 % — SIGNIFICANT CHANGE UP (ref 0–3.5)
CK MB CFR SERPL CALC: 5.6 NG/ML — HIGH (ref 0–3.8)
CK MB CFR SERPL CALC: 5.7 NG/ML — HIGH (ref 0–3.8)
CK SERPL-CCNC: 205 U/L — HIGH (ref 25–170)
CK SERPL-CCNC: 214 U/L — HIGH (ref 25–170)
CO2 BLDV-SCNC: 30 MMOL/L — HIGH (ref 22–26)
CO2 SERPL-SCNC: 22 MMOL/L — SIGNIFICANT CHANGE UP (ref 22–31)
CO2 SERPL-SCNC: 24 MMOL/L — SIGNIFICANT CHANGE UP (ref 22–31)
CO2 SERPL-SCNC: 26 MMOL/L — SIGNIFICANT CHANGE UP (ref 22–31)
CO2 SERPL-SCNC: 27 MMOL/L — SIGNIFICANT CHANGE UP (ref 22–31)
COLOR SPEC: COLORLESS — SIGNIFICANT CHANGE UP
CREAT SERPL-MCNC: 0.91 MG/DL — SIGNIFICANT CHANGE UP (ref 0.5–1.3)
CREAT SERPL-MCNC: 1.07 MG/DL — SIGNIFICANT CHANGE UP (ref 0.5–1.3)
CREAT SERPL-MCNC: 1.21 MG/DL — SIGNIFICANT CHANGE UP (ref 0.5–1.3)
CREAT SERPL-MCNC: 1.3 MG/DL — SIGNIFICANT CHANGE UP (ref 0.5–1.3)
CULTURE RESULTS: SIGNIFICANT CHANGE UP
DIFF PNL FLD: ABNORMAL
EGFR: 40 ML/MIN/1.73M2 — LOW
EGFR: 44 ML/MIN/1.73M2 — LOW
EGFR: 51 ML/MIN/1.73M2 — LOW
EGFR: 61 ML/MIN/1.73M2 — SIGNIFICANT CHANGE UP
EOSINOPHIL # BLD AUTO: 0.06 K/UL — SIGNIFICANT CHANGE UP (ref 0–0.5)
EOSINOPHIL NFR BLD AUTO: 0.9 % — SIGNIFICANT CHANGE UP (ref 0–6)
EPI CELLS # UR: 1 /HPF — SIGNIFICANT CHANGE UP
ESTIMATED AVERAGE GLUCOSE: 166 MG/DL — HIGH (ref 68–114)
ESTIMATED AVERAGE GLUCOSE: 166 MG/DL — HIGH (ref 68–114)
FLUAV AG NPH QL: SIGNIFICANT CHANGE UP
FLUBV AG NPH QL: SIGNIFICANT CHANGE UP
GAS PNL BLDV: 137 MMOL/L — SIGNIFICANT CHANGE UP (ref 136–145)
GAS PNL BLDV: SIGNIFICANT CHANGE UP
GAS PNL BLDV: SIGNIFICANT CHANGE UP
GLUCOSE BLDC GLUCOMTR-MCNC: 121 MG/DL — HIGH (ref 70–99)
GLUCOSE BLDC GLUCOMTR-MCNC: 141 MG/DL — HIGH (ref 70–99)
GLUCOSE BLDC GLUCOMTR-MCNC: 148 MG/DL — HIGH (ref 70–99)
GLUCOSE BLDC GLUCOMTR-MCNC: 148 MG/DL — HIGH (ref 70–99)
GLUCOSE BLDC GLUCOMTR-MCNC: 150 MG/DL — HIGH (ref 70–99)
GLUCOSE BLDC GLUCOMTR-MCNC: 170 MG/DL — HIGH (ref 70–99)
GLUCOSE BLDC GLUCOMTR-MCNC: 173 MG/DL — HIGH (ref 70–99)
GLUCOSE BLDC GLUCOMTR-MCNC: 179 MG/DL — HIGH (ref 70–99)
GLUCOSE BLDC GLUCOMTR-MCNC: 180 MG/DL — HIGH (ref 70–99)
GLUCOSE BLDC GLUCOMTR-MCNC: 186 MG/DL — HIGH (ref 70–99)
GLUCOSE BLDC GLUCOMTR-MCNC: 192 MG/DL — HIGH (ref 70–99)
GLUCOSE BLDC GLUCOMTR-MCNC: 203 MG/DL — HIGH (ref 70–99)
GLUCOSE BLDC GLUCOMTR-MCNC: 241 MG/DL — HIGH (ref 70–99)
GLUCOSE BLDV-MCNC: 240 MG/DL — HIGH (ref 70–99)
GLUCOSE SERPL-MCNC: 140 MG/DL — HIGH (ref 70–99)
GLUCOSE SERPL-MCNC: 189 MG/DL — HIGH (ref 70–99)
GLUCOSE SERPL-MCNC: 189 MG/DL — HIGH (ref 70–99)
GLUCOSE SERPL-MCNC: 203 MG/DL — HIGH (ref 70–99)
GLUCOSE UR QL: ABNORMAL
GRAM STN FLD: SIGNIFICANT CHANGE UP
HCO3 BLDV-SCNC: 28 MMOL/L — SIGNIFICANT CHANGE UP (ref 22–29)
HCT VFR BLD CALC: 33.8 % — LOW (ref 34.5–45)
HCT VFR BLD CALC: 34.8 % — SIGNIFICANT CHANGE UP (ref 34.5–45)
HCT VFR BLD CALC: 36.6 % — SIGNIFICANT CHANGE UP (ref 34.5–45)
HCT VFR BLD CALC: 38.9 % — SIGNIFICANT CHANGE UP (ref 34.5–45)
HCT VFR BLDA CALC: 35 % — SIGNIFICANT CHANGE UP (ref 34.5–46.5)
HDLC SERPL-MCNC: 49 MG/DL — LOW
HDLC SERPL-MCNC: 55 MG/DL — SIGNIFICANT CHANGE UP
HGB BLD CALC-MCNC: 11.7 G/DL — SIGNIFICANT CHANGE UP (ref 11.7–16.1)
HGB BLD-MCNC: 10.9 G/DL — LOW (ref 11.5–15.5)
HGB BLD-MCNC: 11.1 G/DL — LOW (ref 11.5–15.5)
HGB BLD-MCNC: 12 G/DL — SIGNIFICANT CHANGE UP (ref 11.5–15.5)
HGB BLD-MCNC: 12.3 G/DL — SIGNIFICANT CHANGE UP (ref 11.5–15.5)
HOROWITZ INDEX BLDV+IHG-RTO: SIGNIFICANT CHANGE UP
HYALINE CASTS # UR AUTO: 0 /LPF — SIGNIFICANT CHANGE UP (ref 0–2)
IMM GRANULOCYTES NFR BLD AUTO: 0.3 % — SIGNIFICANT CHANGE UP (ref 0–1.5)
INR BLD: 1.14 RATIO — SIGNIFICANT CHANGE UP (ref 0.88–1.16)
KETONES UR-MCNC: NEGATIVE — SIGNIFICANT CHANGE UP
LACTATE BLDV-MCNC: 1.5 MMOL/L — SIGNIFICANT CHANGE UP (ref 0.7–2)
LACTATE SERPL-SCNC: 1.1 MMOL/L — SIGNIFICANT CHANGE UP (ref 0.7–2)
LEGIONELLA AG UR QL: NEGATIVE — SIGNIFICANT CHANGE UP
LEUKOCYTE ESTERASE UR-ACNC: ABNORMAL
LIPID PNL WITH DIRECT LDL SERPL: 151 MG/DL — HIGH
LIPID PNL WITH DIRECT LDL SERPL: 162 MG/DL — HIGH
LYMPHOCYTES # BLD AUTO: 2.13 K/UL — SIGNIFICANT CHANGE UP (ref 1–3.3)
LYMPHOCYTES # BLD AUTO: 31.2 % — SIGNIFICANT CHANGE UP (ref 13–44)
MAGNESIUM SERPL-MCNC: 2.1 MG/DL — SIGNIFICANT CHANGE UP (ref 1.6–2.6)
MCHC RBC-ENTMCNC: 28.9 PG — SIGNIFICANT CHANGE UP (ref 27–34)
MCHC RBC-ENTMCNC: 28.9 PG — SIGNIFICANT CHANGE UP (ref 27–34)
MCHC RBC-ENTMCNC: 29.3 PG — SIGNIFICANT CHANGE UP (ref 27–34)
MCHC RBC-ENTMCNC: 29.4 PG — SIGNIFICANT CHANGE UP (ref 27–34)
MCHC RBC-ENTMCNC: 31.6 GM/DL — LOW (ref 32–36)
MCHC RBC-ENTMCNC: 31.9 GM/DL — LOW (ref 32–36)
MCHC RBC-ENTMCNC: 32.2 GM/DL — SIGNIFICANT CHANGE UP (ref 32–36)
MCHC RBC-ENTMCNC: 32.8 GM/DL — SIGNIFICANT CHANGE UP (ref 32–36)
MCV RBC AUTO: 89.5 FL — SIGNIFICANT CHANGE UP (ref 80–100)
MCV RBC AUTO: 89.7 FL — SIGNIFICANT CHANGE UP (ref 80–100)
MCV RBC AUTO: 91.3 FL — SIGNIFICANT CHANGE UP (ref 80–100)
MCV RBC AUTO: 92.1 FL — SIGNIFICANT CHANGE UP (ref 80–100)
METHOD TYPE: SIGNIFICANT CHANGE UP
MONOCYTES # BLD AUTO: 0.48 K/UL — SIGNIFICANT CHANGE UP (ref 0–0.9)
MONOCYTES NFR BLD AUTO: 7 % — SIGNIFICANT CHANGE UP (ref 2–14)
MRSA PCR RESULT.: SIGNIFICANT CHANGE UP
NEUTROPHILS # BLD AUTO: 4.1 K/UL — SIGNIFICANT CHANGE UP (ref 1.8–7.4)
NEUTROPHILS NFR BLD AUTO: 60 % — SIGNIFICANT CHANGE UP (ref 43–77)
NITRITE UR-MCNC: NEGATIVE — SIGNIFICANT CHANGE UP
NON HDL CHOLESTEROL: 167 MG/DL — HIGH
NON HDL CHOLESTEROL: 180 MG/DL — HIGH
NRBC # BLD: 0 /100 WBCS — SIGNIFICANT CHANGE UP (ref 0–0)
ORGANISM # SPEC MICROSCOPIC CNT: SIGNIFICANT CHANGE UP
ORGANISM # SPEC MICROSCOPIC CNT: SIGNIFICANT CHANGE UP
PCO2 BLDV: 52 MMHG — HIGH (ref 39–42)
PH BLDV: 7.34 — SIGNIFICANT CHANGE UP (ref 7.32–7.43)
PH UR: 7.5 — SIGNIFICANT CHANGE UP (ref 5–8)
PHOSPHATE SERPL-MCNC: 3.9 MG/DL — SIGNIFICANT CHANGE UP (ref 2.5–4.5)
PLATELET # BLD AUTO: 242 K/UL — SIGNIFICANT CHANGE UP (ref 150–400)
PLATELET # BLD AUTO: 249 K/UL — SIGNIFICANT CHANGE UP (ref 150–400)
PLATELET # BLD AUTO: 281 K/UL — SIGNIFICANT CHANGE UP (ref 150–400)
PLATELET # BLD AUTO: 299 K/UL — SIGNIFICANT CHANGE UP (ref 150–400)
PO2 BLDV: >20 MMHG — SIGNIFICANT CHANGE UP (ref 25–45)
POTASSIUM BLDV-SCNC: 4.4 MMOL/L — SIGNIFICANT CHANGE UP (ref 3.5–5.1)
POTASSIUM SERPL-MCNC: 3.7 MMOL/L — SIGNIFICANT CHANGE UP (ref 3.5–5.3)
POTASSIUM SERPL-MCNC: 4 MMOL/L — SIGNIFICANT CHANGE UP (ref 3.5–5.3)
POTASSIUM SERPL-MCNC: 4.2 MMOL/L — SIGNIFICANT CHANGE UP (ref 3.5–5.3)
POTASSIUM SERPL-MCNC: 4.6 MMOL/L — SIGNIFICANT CHANGE UP (ref 3.5–5.3)
POTASSIUM SERPL-SCNC: 3.7 MMOL/L — SIGNIFICANT CHANGE UP (ref 3.5–5.3)
POTASSIUM SERPL-SCNC: 4 MMOL/L — SIGNIFICANT CHANGE UP (ref 3.5–5.3)
POTASSIUM SERPL-SCNC: 4.2 MMOL/L — SIGNIFICANT CHANGE UP (ref 3.5–5.3)
POTASSIUM SERPL-SCNC: 4.6 MMOL/L — SIGNIFICANT CHANGE UP (ref 3.5–5.3)
PROCALCITONIN SERPL-MCNC: 0.07 NG/ML — SIGNIFICANT CHANGE UP (ref 0.02–0.1)
PROT SERPL-MCNC: 6.8 G/DL — SIGNIFICANT CHANGE UP (ref 6–8.3)
PROT UR-MCNC: ABNORMAL
PROTHROM AB SERPL-ACNC: 13.1 SEC — SIGNIFICANT CHANGE UP (ref 10.5–13.4)
RBC # BLD: 3.77 M/UL — LOW (ref 3.8–5.2)
RBC # BLD: 3.78 M/UL — LOW (ref 3.8–5.2)
RBC # BLD: 4.09 M/UL — SIGNIFICANT CHANGE UP (ref 3.8–5.2)
RBC # BLD: 4.26 M/UL — SIGNIFICANT CHANGE UP (ref 3.8–5.2)
RBC # FLD: 14.3 % — SIGNIFICANT CHANGE UP (ref 10.3–14.5)
RBC # FLD: 14.6 % — HIGH (ref 10.3–14.5)
RBC # FLD: 14.8 % — HIGH (ref 10.3–14.5)
RBC # FLD: 14.9 % — HIGH (ref 10.3–14.5)
RBC CASTS # UR COMP ASSIST: 4 /HPF — SIGNIFICANT CHANGE UP (ref 0–4)
RSV RNA NPH QL NAA+NON-PROBE: SIGNIFICANT CHANGE UP
S AUREUS DNA NOSE QL NAA+PROBE: SIGNIFICANT CHANGE UP
SAO2 % BLDV: 20 % — LOW (ref 67–88)
SARS-COV-2 RNA SPEC QL NAA+PROBE: SIGNIFICANT CHANGE UP
SODIUM SERPL-SCNC: 137 MMOL/L — SIGNIFICANT CHANGE UP (ref 135–145)
SODIUM SERPL-SCNC: 137 MMOL/L — SIGNIFICANT CHANGE UP (ref 135–145)
SODIUM SERPL-SCNC: 140 MMOL/L — SIGNIFICANT CHANGE UP (ref 135–145)
SODIUM SERPL-SCNC: 141 MMOL/L — SIGNIFICANT CHANGE UP (ref 135–145)
SP GR SPEC: 1.04 — HIGH (ref 1.01–1.02)
SPECIMEN SOURCE: SIGNIFICANT CHANGE UP
T4 FREE SERPL-MCNC: 1 NG/DL — SIGNIFICANT CHANGE UP (ref 0.9–1.8)
TRIGL SERPL-MCNC: 80 MG/DL — SIGNIFICANT CHANGE UP
TRIGL SERPL-MCNC: 88 MG/DL — SIGNIFICANT CHANGE UP
TROPONIN T, HIGH SENSITIVITY RESULT: 34 NG/L — SIGNIFICANT CHANGE UP (ref 0–51)
TROPONIN T, HIGH SENSITIVITY RESULT: 45 NG/L — SIGNIFICANT CHANGE UP (ref 0–51)
TROPONIN T, HIGH SENSITIVITY RESULT: 54 NG/L — HIGH (ref 0–51)
TSH SERPL-MCNC: 2.59 UIU/ML — SIGNIFICANT CHANGE UP (ref 0.27–4.2)
UROBILINOGEN FLD QL: NEGATIVE — SIGNIFICANT CHANGE UP
WBC # BLD: 6.47 K/UL — SIGNIFICANT CHANGE UP (ref 3.8–10.5)
WBC # BLD: 6.83 K/UL — SIGNIFICANT CHANGE UP (ref 3.8–10.5)
WBC # BLD: 7.69 K/UL — SIGNIFICANT CHANGE UP (ref 3.8–10.5)
WBC # BLD: 9.69 K/UL — SIGNIFICANT CHANGE UP (ref 3.8–10.5)
WBC # FLD AUTO: 6.47 K/UL — SIGNIFICANT CHANGE UP (ref 3.8–10.5)
WBC # FLD AUTO: 6.83 K/UL — SIGNIFICANT CHANGE UP (ref 3.8–10.5)
WBC # FLD AUTO: 7.69 K/UL — SIGNIFICANT CHANGE UP (ref 3.8–10.5)
WBC # FLD AUTO: 9.69 K/UL — SIGNIFICANT CHANGE UP (ref 3.8–10.5)
WBC UR QL: 16 /HPF — HIGH (ref 0–5)

## 2022-01-01 PROCEDURE — 99232 SBSQ HOSP IP/OBS MODERATE 35: CPT

## 2022-01-01 PROCEDURE — 99223 1ST HOSP IP/OBS HIGH 75: CPT

## 2022-01-01 PROCEDURE — 93010 ELECTROCARDIOGRAM REPORT: CPT

## 2022-01-01 PROCEDURE — 99239 HOSP IP/OBS DSCHRG MGMT >30: CPT

## 2022-01-01 PROCEDURE — 70498 CT ANGIOGRAPHY NECK: CPT | Mod: 26,MA

## 2022-01-01 PROCEDURE — 99233 SBSQ HOSP IP/OBS HIGH 50: CPT

## 2022-01-01 PROCEDURE — 70496 CT ANGIOGRAPHY HEAD: CPT | Mod: 26,MA

## 2022-01-01 PROCEDURE — 0042T: CPT | Mod: MA

## 2022-01-01 PROCEDURE — 71045 X-RAY EXAM CHEST 1 VIEW: CPT | Mod: 26

## 2022-01-01 PROCEDURE — 99285 EMERGENCY DEPT VISIT HI MDM: CPT | Mod: CS,GC

## 2022-01-01 PROCEDURE — 70551 MRI BRAIN STEM W/O DYE: CPT | Mod: 26

## 2022-01-01 PROCEDURE — 71250 CT THORAX DX C-: CPT | Mod: 26,MA

## 2022-01-01 RX ORDER — QUETIAPINE FUMARATE 200 MG/1
25 TABLET, FILM COATED ORAL DAILY
Refills: 0 | Status: DISCONTINUED | OUTPATIENT
Start: 2022-01-01 | End: 2022-01-01

## 2022-01-01 RX ORDER — LOSARTAN POTASSIUM 100 MG/1
1 TABLET, FILM COATED ORAL
Qty: 60 | Refills: 0
Start: 2022-01-01 | End: 2022-01-01

## 2022-01-01 RX ORDER — DEXTROSE 50 % IN WATER 50 %
25 SYRINGE (ML) INTRAVENOUS ONCE
Refills: 0 | Status: DISCONTINUED | OUTPATIENT
Start: 2022-01-01 | End: 2022-01-01

## 2022-01-01 RX ORDER — DIAZEPAM 5 MG
2 TABLET ORAL ONCE
Refills: 0 | Status: DISCONTINUED | OUTPATIENT
Start: 2022-01-01 | End: 2022-01-01

## 2022-01-01 RX ORDER — SODIUM CHLORIDE 9 MG/ML
1000 INJECTION INTRAMUSCULAR; INTRAVENOUS; SUBCUTANEOUS ONCE
Refills: 0 | Status: COMPLETED | OUTPATIENT
Start: 2022-01-01 | End: 2022-01-01

## 2022-01-01 RX ORDER — CEFTRIAXONE 500 MG/1
1000 INJECTION, POWDER, FOR SOLUTION INTRAMUSCULAR; INTRAVENOUS ONCE
Refills: 0 | Status: COMPLETED | OUTPATIENT
Start: 2022-01-01 | End: 2022-01-01

## 2022-01-01 RX ORDER — HEPARIN SODIUM 5000 [USP'U]/ML
5000 INJECTION INTRAVENOUS; SUBCUTANEOUS EVERY 12 HOURS
Refills: 0 | Status: DISCONTINUED | OUTPATIENT
Start: 2022-01-01 | End: 2022-01-01

## 2022-01-01 RX ORDER — SODIUM CHLORIDE 9 MG/ML
1000 INJECTION INTRAMUSCULAR; INTRAVENOUS; SUBCUTANEOUS
Refills: 0 | Status: DISCONTINUED | OUTPATIENT
Start: 2022-01-01 | End: 2022-01-01

## 2022-01-01 RX ORDER — ESCITALOPRAM OXALATE 10 MG/1
10 TABLET, FILM COATED ORAL DAILY
Refills: 0 | Status: DISCONTINUED | OUTPATIENT
Start: 2022-01-01 | End: 2022-01-01

## 2022-01-01 RX ORDER — ATORVASTATIN CALCIUM 80 MG/1
1 TABLET, FILM COATED ORAL
Qty: 30 | Refills: 0
Start: 2022-01-01 | End: 2022-01-01

## 2022-01-01 RX ORDER — GLUCAGON INJECTION, SOLUTION 0.5 MG/.1ML
1 INJECTION, SOLUTION SUBCUTANEOUS ONCE
Refills: 0 | Status: DISCONTINUED | OUTPATIENT
Start: 2022-01-01 | End: 2022-01-01

## 2022-01-01 RX ORDER — DIAZEPAM 5 MG
1 TABLET ORAL ONCE
Refills: 0 | Status: DISCONTINUED | OUTPATIENT
Start: 2022-01-01 | End: 2022-01-01

## 2022-01-01 RX ORDER — CEFTRIAXONE 500 MG/1
1000 INJECTION, POWDER, FOR SOLUTION INTRAMUSCULAR; INTRAVENOUS EVERY 24 HOURS
Refills: 0 | Status: DISCONTINUED | OUTPATIENT
Start: 2022-01-01 | End: 2022-01-01

## 2022-01-01 RX ORDER — QUETIAPINE FUMARATE 200 MG/1
50 TABLET, FILM COATED ORAL AT BEDTIME
Refills: 0 | Status: DISCONTINUED | OUTPATIENT
Start: 2022-01-01 | End: 2022-01-01

## 2022-01-01 RX ORDER — DEXTROSE 50 % IN WATER 50 %
15 SYRINGE (ML) INTRAVENOUS ONCE
Refills: 0 | Status: DISCONTINUED | OUTPATIENT
Start: 2022-01-01 | End: 2022-01-01

## 2022-01-01 RX ORDER — LINAGLIPTIN 5 MG/1
1 TABLET, FILM COATED ORAL
Qty: 30 | Refills: 0
Start: 2022-01-01 | End: 2022-01-01

## 2022-01-01 RX ORDER — ACETAMINOPHEN 500 MG
650 TABLET ORAL EVERY 6 HOURS
Refills: 0 | Status: DISCONTINUED | OUTPATIENT
Start: 2022-01-01 | End: 2022-01-01

## 2022-01-01 RX ORDER — HYDRALAZINE HCL 50 MG
10 TABLET ORAL ONCE
Refills: 0 | Status: COMPLETED | OUTPATIENT
Start: 2022-01-01 | End: 2022-01-01

## 2022-01-01 RX ORDER — SODIUM CHLORIDE 9 MG/ML
1000 INJECTION, SOLUTION INTRAVENOUS
Refills: 0 | Status: DISCONTINUED | OUTPATIENT
Start: 2022-01-01 | End: 2022-01-01

## 2022-01-01 RX ORDER — ASPIRIN/CALCIUM CARB/MAGNESIUM 324 MG
81 TABLET ORAL DAILY
Refills: 0 | Status: DISCONTINUED | OUTPATIENT
Start: 2022-01-01 | End: 2022-01-01

## 2022-01-01 RX ORDER — LANOLIN ALCOHOL/MO/W.PET/CERES
10 CREAM (GRAM) TOPICAL AT BEDTIME
Refills: 0 | Status: DISCONTINUED | OUTPATIENT
Start: 2022-01-01 | End: 2022-01-01

## 2022-01-01 RX ORDER — HYDRALAZINE HCL 50 MG
5 TABLET ORAL ONCE
Refills: 0 | Status: COMPLETED | OUTPATIENT
Start: 2022-01-01 | End: 2022-01-01

## 2022-01-01 RX ORDER — LOSARTAN POTASSIUM 100 MG/1
25 TABLET, FILM COATED ORAL ONCE
Refills: 0 | Status: COMPLETED | OUTPATIENT
Start: 2022-01-01 | End: 2022-01-01

## 2022-01-01 RX ORDER — INSULIN LISPRO 100/ML
VIAL (ML) SUBCUTANEOUS AT BEDTIME
Refills: 0 | Status: DISCONTINUED | OUTPATIENT
Start: 2022-01-01 | End: 2022-01-01

## 2022-01-01 RX ORDER — LOSARTAN POTASSIUM 100 MG/1
50 TABLET, FILM COATED ORAL
Refills: 0 | Status: DISCONTINUED | OUTPATIENT
Start: 2022-01-01 | End: 2022-01-01

## 2022-01-01 RX ORDER — INSULIN GLARGINE 100 [IU]/ML
6 INJECTION, SOLUTION SUBCUTANEOUS AT BEDTIME
Refills: 0 | Status: DISCONTINUED | OUTPATIENT
Start: 2022-01-01 | End: 2022-01-01

## 2022-01-01 RX ORDER — AMLODIPINE BESYLATE 2.5 MG/1
10 TABLET ORAL DAILY
Refills: 0 | Status: DISCONTINUED | OUTPATIENT
Start: 2022-01-01 | End: 2022-01-01

## 2022-01-01 RX ORDER — DEXTROSE 50 % IN WATER 50 %
12.5 SYRINGE (ML) INTRAVENOUS ONCE
Refills: 0 | Status: DISCONTINUED | OUTPATIENT
Start: 2022-01-01 | End: 2022-01-01

## 2022-01-01 RX ORDER — HYDRALAZINE HCL 50 MG
10 TABLET ORAL THREE TIMES A DAY
Refills: 0 | Status: DISCONTINUED | OUTPATIENT
Start: 2022-01-01 | End: 2022-01-01

## 2022-01-01 RX ORDER — AZITHROMYCIN 500 MG/1
500 TABLET, FILM COATED ORAL ONCE
Refills: 0 | Status: COMPLETED | OUTPATIENT
Start: 2022-01-01 | End: 2022-01-01

## 2022-01-01 RX ORDER — AMLODIPINE BESYLATE 2.5 MG/1
1 TABLET ORAL
Qty: 30 | Refills: 0
Start: 2022-01-01 | End: 2022-01-01

## 2022-01-01 RX ORDER — AZITHROMYCIN 500 MG/1
500 TABLET, FILM COATED ORAL EVERY 24 HOURS
Refills: 0 | Status: DISCONTINUED | OUTPATIENT
Start: 2022-01-01 | End: 2022-01-01

## 2022-01-01 RX ORDER — ATORVASTATIN CALCIUM 80 MG/1
80 TABLET, FILM COATED ORAL AT BEDTIME
Refills: 0 | Status: DISCONTINUED | OUTPATIENT
Start: 2022-01-01 | End: 2022-01-01

## 2022-01-01 RX ORDER — GLUCOSAMINE/MSM/CHONDROIT SULF 500-166.6
10 TABLET ORAL
Qty: 30 | Refills: 0 | Status: DISCONTINUED | COMMUNITY
Start: 2022-03-15 | End: 2022-01-01

## 2022-01-01 RX ORDER — QUETIAPINE 50 MG/1
50 TABLET, FILM COATED, EXTENDED RELEASE ORAL DAILY
Qty: 90 | Refills: 3 | Status: ACTIVE | COMMUNITY
Start: 2022-03-22 | End: 1900-01-01

## 2022-01-01 RX ORDER — INSULIN LISPRO 100/ML
VIAL (ML) SUBCUTANEOUS
Refills: 0 | Status: DISCONTINUED | OUTPATIENT
Start: 2022-01-01 | End: 2022-01-01

## 2022-01-01 RX ORDER — LOSARTAN POTASSIUM 100 MG/1
25 TABLET, FILM COATED ORAL DAILY
Refills: 0 | Status: DISCONTINUED | OUTPATIENT
Start: 2022-01-01 | End: 2022-01-01

## 2022-01-01 RX ORDER — HALOPERIDOL DECANOATE 100 MG/ML
0.5 INJECTION INTRAMUSCULAR ONCE
Refills: 0 | Status: COMPLETED | OUTPATIENT
Start: 2022-01-01 | End: 2022-01-01

## 2022-01-01 RX ORDER — LOSARTAN POTASSIUM 100 MG/1
25 TABLET, FILM COATED ORAL
Refills: 0 | Status: DISCONTINUED | OUTPATIENT
Start: 2022-01-01 | End: 2022-01-01

## 2022-01-01 RX ADMIN — ESCITALOPRAM OXALATE 10 MILLIGRAM(S): 10 TABLET, FILM COATED ORAL at 16:41

## 2022-01-01 RX ADMIN — AZITHROMYCIN 255 MILLIGRAM(S): 500 TABLET, FILM COATED ORAL at 21:16

## 2022-01-01 RX ADMIN — Medication 2: at 09:06

## 2022-01-01 RX ADMIN — SODIUM CHLORIDE 75 MILLILITER(S): 9 INJECTION INTRAMUSCULAR; INTRAVENOUS; SUBCUTANEOUS at 09:30

## 2022-01-01 RX ADMIN — QUETIAPINE FUMARATE 50 MILLIGRAM(S): 200 TABLET, FILM COATED ORAL at 21:33

## 2022-01-01 RX ADMIN — SODIUM CHLORIDE 75 MILLILITER(S): 9 INJECTION INTRAMUSCULAR; INTRAVENOUS; SUBCUTANEOUS at 05:09

## 2022-01-01 RX ADMIN — LOSARTAN POTASSIUM 50 MILLIGRAM(S): 100 TABLET, FILM COATED ORAL at 05:12

## 2022-01-01 RX ADMIN — AMLODIPINE BESYLATE 10 MILLIGRAM(S): 2.5 TABLET ORAL at 09:33

## 2022-01-01 RX ADMIN — Medication 1: at 11:32

## 2022-01-01 RX ADMIN — Medication 5 MILLIGRAM(S): at 20:42

## 2022-01-01 RX ADMIN — ATORVASTATIN CALCIUM 80 MILLIGRAM(S): 80 TABLET, FILM COATED ORAL at 21:32

## 2022-01-01 RX ADMIN — ESCITALOPRAM OXALATE 10 MILLIGRAM(S): 10 TABLET, FILM COATED ORAL at 12:05

## 2022-01-01 RX ADMIN — Medication 1: at 08:04

## 2022-01-01 RX ADMIN — ATORVASTATIN CALCIUM 80 MILLIGRAM(S): 80 TABLET, FILM COATED ORAL at 21:21

## 2022-01-01 RX ADMIN — HALOPERIDOL DECANOATE 0.5 MILLIGRAM(S): 100 INJECTION INTRAMUSCULAR at 03:36

## 2022-01-01 RX ADMIN — LOSARTAN POTASSIUM 25 MILLIGRAM(S): 100 TABLET, FILM COATED ORAL at 05:08

## 2022-01-01 RX ADMIN — LOSARTAN POTASSIUM 25 MILLIGRAM(S): 100 TABLET, FILM COATED ORAL at 18:43

## 2022-01-01 RX ADMIN — Medication 1: at 13:13

## 2022-01-01 RX ADMIN — Medication 10 MILLIGRAM(S): at 21:21

## 2022-01-01 RX ADMIN — Medication 1: at 16:48

## 2022-01-01 RX ADMIN — Medication 81 MILLIGRAM(S): at 11:14

## 2022-01-01 RX ADMIN — CEFTRIAXONE 100 MILLIGRAM(S): 500 INJECTION, POWDER, FOR SOLUTION INTRAMUSCULAR; INTRAVENOUS at 21:30

## 2022-01-01 RX ADMIN — CEFTRIAXONE 100 MILLIGRAM(S): 500 INJECTION, POWDER, FOR SOLUTION INTRAMUSCULAR; INTRAVENOUS at 20:43

## 2022-01-01 RX ADMIN — Medication 1 MILLIGRAM(S): at 17:02

## 2022-01-01 RX ADMIN — Medication 81 MILLIGRAM(S): at 11:34

## 2022-01-01 RX ADMIN — Medication 10 MILLIGRAM(S): at 20:48

## 2022-01-01 RX ADMIN — Medication 81 MILLIGRAM(S): at 12:05

## 2022-01-01 RX ADMIN — HEPARIN SODIUM 5000 UNIT(S): 5000 INJECTION INTRAVENOUS; SUBCUTANEOUS at 05:12

## 2022-01-01 RX ADMIN — Medication 10 MILLIGRAM(S): at 15:08

## 2022-01-01 RX ADMIN — QUETIAPINE FUMARATE 50 MILLIGRAM(S): 200 TABLET, FILM COATED ORAL at 21:21

## 2022-01-01 RX ADMIN — HEPARIN SODIUM 5000 UNIT(S): 5000 INJECTION INTRAVENOUS; SUBCUTANEOUS at 17:52

## 2022-01-01 RX ADMIN — Medication 1: at 16:29

## 2022-01-01 RX ADMIN — Medication 81 MILLIGRAM(S): at 16:41

## 2022-01-01 RX ADMIN — Medication 10 MILLIGRAM(S): at 05:12

## 2022-01-01 RX ADMIN — Medication 10 MILLIGRAM(S): at 21:32

## 2022-01-01 RX ADMIN — HEPARIN SODIUM 5000 UNIT(S): 5000 INJECTION INTRAVENOUS; SUBCUTANEOUS at 05:09

## 2022-01-01 RX ADMIN — ESCITALOPRAM OXALATE 10 MILLIGRAM(S): 10 TABLET, FILM COATED ORAL at 11:34

## 2022-01-01 RX ADMIN — LOSARTAN POTASSIUM 25 MILLIGRAM(S): 100 TABLET, FILM COATED ORAL at 10:19

## 2022-01-01 RX ADMIN — Medication 2 MILLIGRAM(S): at 17:03

## 2022-01-01 RX ADMIN — Medication 5 MILLIGRAM(S): at 09:31

## 2022-01-01 RX ADMIN — HEPARIN SODIUM 5000 UNIT(S): 5000 INJECTION INTRAVENOUS; SUBCUTANEOUS at 05:08

## 2022-01-01 RX ADMIN — ESCITALOPRAM OXALATE 10 MILLIGRAM(S): 10 TABLET, FILM COATED ORAL at 11:14

## 2022-01-01 RX ADMIN — AZITHROMYCIN 255 MILLIGRAM(S): 500 TABLET, FILM COATED ORAL at 22:24

## 2022-01-01 RX ADMIN — LOSARTAN POTASSIUM 25 MILLIGRAM(S): 100 TABLET, FILM COATED ORAL at 10:45

## 2022-01-01 RX ADMIN — HEPARIN SODIUM 5000 UNIT(S): 5000 INJECTION INTRAVENOUS; SUBCUTANEOUS at 16:40

## 2022-01-01 RX ADMIN — QUETIAPINE FUMARATE 50 MILLIGRAM(S): 200 TABLET, FILM COATED ORAL at 21:16

## 2022-01-01 RX ADMIN — CEFTRIAXONE 100 MILLIGRAM(S): 500 INJECTION, POWDER, FOR SOLUTION INTRAMUSCULAR; INTRAVENOUS at 21:27

## 2022-01-01 RX ADMIN — SODIUM CHLORIDE 1000 MILLILITER(S): 9 INJECTION INTRAMUSCULAR; INTRAVENOUS; SUBCUTANEOUS at 21:49

## 2022-01-01 RX ADMIN — HEPARIN SODIUM 5000 UNIT(S): 5000 INJECTION INTRAVENOUS; SUBCUTANEOUS at 05:04

## 2022-01-01 RX ADMIN — ATORVASTATIN CALCIUM 80 MILLIGRAM(S): 80 TABLET, FILM COATED ORAL at 21:16

## 2022-01-01 RX ADMIN — HEPARIN SODIUM 5000 UNIT(S): 5000 INJECTION INTRAVENOUS; SUBCUTANEOUS at 18:33

## 2022-01-01 RX ADMIN — Medication 10 MILLIGRAM(S): at 21:16

## 2022-01-01 RX ADMIN — LOSARTAN POTASSIUM 50 MILLIGRAM(S): 100 TABLET, FILM COATED ORAL at 17:52

## 2022-02-25 ENCOUNTER — NON-APPOINTMENT (OUTPATIENT)
Age: 87
End: 2022-02-25

## 2022-02-25 RX ORDER — LOSARTAN POTASSIUM 50 MG/1
50 TABLET, FILM COATED ORAL DAILY
Refills: 0 | Status: DISCONTINUED | COMMUNITY
Start: 2018-11-26 | End: 2022-02-25

## 2022-02-25 RX ORDER — QUETIAPINE FUMARATE 25 MG/1
25 TABLET ORAL
Qty: 90 | Refills: 3 | Status: DISCONTINUED | COMMUNITY
Start: 2018-11-26 | End: 2022-02-25

## 2022-03-08 RX ORDER — TRAZODONE HYDROCHLORIDE 50 MG/1
50 TABLET ORAL AT BEDTIME
Qty: 30 | Refills: 2 | Status: DISCONTINUED | COMMUNITY
Start: 2018-11-26 | End: 2022-03-08

## 2022-03-11 ENCOUNTER — NON-APPOINTMENT (OUTPATIENT)
Age: 87
End: 2022-03-11

## 2022-03-11 RX ORDER — OLANZAPINE 5 MG/1
5 TABLET, FILM COATED ORAL
Qty: 60 | Refills: 3 | Status: DISCONTINUED | COMMUNITY
Start: 2022-02-25 | End: 2022-03-11

## 2022-03-15 ENCOUNTER — APPOINTMENT (OUTPATIENT)
Dept: NEUROLOGY | Facility: CLINIC | Age: 87
End: 2022-03-15
Payer: MEDICARE

## 2022-03-15 PROCEDURE — 99443: CPT | Mod: 95

## 2022-03-22 ENCOUNTER — APPOINTMENT (OUTPATIENT)
Dept: NEUROLOGY | Facility: CLINIC | Age: 87
End: 2022-03-22
Payer: MEDICARE

## 2022-03-22 VITALS
HEIGHT: 59 IN | DIASTOLIC BLOOD PRESSURE: 92 MMHG | SYSTOLIC BLOOD PRESSURE: 187 MMHG | WEIGHT: 125 LBS | HEART RATE: 79 BPM | TEMPERATURE: 98.6 F | OXYGEN SATURATION: 98 % | BODY MASS INDEX: 25.2 KG/M2

## 2022-03-22 DIAGNOSIS — G47.00 INSOMNIA, UNSPECIFIED: ICD-10-CM

## 2022-03-22 DIAGNOSIS — F41.9 ANXIETY DISORDER, UNSPECIFIED: ICD-10-CM

## 2022-03-22 DIAGNOSIS — F32.A ANXIETY DISORDER, UNSPECIFIED: ICD-10-CM

## 2022-03-22 DIAGNOSIS — R27.0 ATAXIA, UNSPECIFIED: ICD-10-CM

## 2022-03-22 DIAGNOSIS — F02.80 ALZHEIMER'S DISEASE, UNSPECIFIED: ICD-10-CM

## 2022-03-22 DIAGNOSIS — Z87.898 PERSONAL HISTORY OF OTHER SPECIFIED CONDITIONS: ICD-10-CM

## 2022-03-22 DIAGNOSIS — G30.9 ALZHEIMER'S DISEASE, UNSPECIFIED: ICD-10-CM

## 2022-03-22 PROCEDURE — 99214 OFFICE O/P EST MOD 30 MIN: CPT

## 2022-03-22 RX ORDER — QUETIAPINE FUMARATE 25 MG/1
25 TABLET ORAL
Qty: 90 | Refills: 0 | Status: DISCONTINUED | COMMUNITY
Start: 2022-03-11 | End: 2022-03-22

## 2022-03-22 NOTE — DATA REVIEWED
[de-identified] : MRI C-Spsine 2015:\par IMPRESSION: Multilevel severe degenerative disc disease in the cervical \par spine , most pronounced at C2-C3 and C3-C4. \par C2-C3: Moderate central canal stenosis and mild spinal cord compression. \par Focal signal hyperintensity in the posterior cord may reflect chronic \par myelomalacia or spinal cord edema. If clinically warranted a contrast \par enhanced study may be performed to distinguish between these 2 entities. \par Moderate left foraminal stenosis. \par C3-C4: Moderate to severe central canal stenosis and moderate spinal cord \par compression. Severe bilateral foraminal stenosis. \par C5-C6: Moderate central canal stenosis. Moderate bilateral foraminal \par stenosis. \par C6-C7: Moderate central canal stenosis. \par T1-T2: Moderate bilateral foraminal stenosis.\par \par MRI brain 2015:\par EXAM: MRI BRAIN WO CON \par PROCEDURE DATE: Mar 25 2015 \par INTERPRETATION: EXAM: MRI of the brain without contrast. \par CLINICAL INDICATION: Balance problems and dementia. Patient reports \par dizziness that started 3 years ago. \par COMPARISON: No direct comparison. CT brain of 8/26/2013 was reviewed. \par TECHNIQUE: Brain MRI without contrast was performed. The examination \par consists of: \par Sagittal T1 FLAIR, axial T2, axial T1 FLAIR, axial T2 FLAIR, axial SWI, and \par axial T2 WI with ADC mapping \par FINDINGS: \par Diffusion weighted images demonstrate no acute territorial infarct. There \par is prominence of the ventricles and sulci likely on the basis of age-related \par atrophy. Ventricles are slightly prominent proportion to sulcal size. Foci \par of periventricular and subcortical T2 prolongation are present on FLAIR \par imaging. In a patient with history of hypertension, these foci most likely \par represent chronic microvascular disease. There is no extra-axial collection, \par mass or susceptibility artifact on spoiled gradient sequence to suggest \par hemorrhage. \par The cervicomedullary junction is unremarkable.The intracranial flow voids \par are normal in appearance. Spondylosis is seen in the visualized cervical \par spine with evidence of cord flattening at the C2-3 and C3-4 interspaces. The \par orbits, sella and suprasellar regions are unremarkable. The visualized \par paranasal sinuses and mastoids are clear. \par IMPRESSION: No acute territorial infarction, intracranial hemorrhage or mass \par effect. \par Age-related atrophy and chronic microvascular disease with ventricular \par prominence. \par GILMER ROBERTSON M.D., RADIOLOGY RESIDENT \par INDY CRANE M.D., ATTENDING RADIOLOGIST \par This examination was interpreted on: Mar 26 2015 9:36AM. This document \par has been electronically signed. Mar 26 2015 11:32AM\par  [de-identified] : I have reviewed the MRI brain from 2015.

## 2022-03-22 NOTE — PHYSICAL EXAM
[General Appearance - Alert] : alert [General Appearance - In No Acute Distress] : in no acute distress [Impaired Insight] : insight and judgment were intact [Affect] : the affect was normal [Cranial Nerves Optic (II)] : visual acuity intact bilaterally,  visual fields full to confrontation, pupils equal round and reactive to light [Cranial Nerves Oculomotor (III)] : extraocular motion intact [Cranial Nerves Trigeminal (V)] : facial sensation intact symmetrically [Cranial Nerves Facial (VII)] : face symmetrical [Cranial Nerves Vestibulocochlear (VIII)] : hearing was intact bilaterally [Cranial Nerves Glossopharyngeal (IX)] : tongue and palate midline [Cranial Nerves Accessory (XI - Cranial And Spinal)] : head turning and shoulder shrug symmetric [Cranial Nerves Hypoglossal (XII)] : there was no tongue deviation with protrusion [Motor Strength] : muscle strength was normal in all four extremities [Involuntary Movements] : no involuntary movements were seen [No Muscle Atrophy] : normal bulk in all four extremities [Motor Handedness Right-Handed] : the patient is right hand dominant [Sensation Tactile Decrease] : light touch was intact [Sensation Pain / Temperature Decrease] : pain and temperature was intact [Romberg's Sign] : a positive Romberg's sign was present [Limited Balance] : the patient's balance was impaired [2+] : Brachioradialis left 2+ [3+] : Ankle jerk left 3+ [PERRL With Normal Accommodation] : pupils were equal in size, round, reactive to light, with normal accommodation [Extraocular Movements] : extraocular movements were intact [Outer Ear] : the ears and nose were normal in appearance [Oropharynx] : the oropharynx was normal [Neck Appearance] : the appearance of the neck was normal [Neck Cervical Mass (___cm)] : no neck mass was observed [Jugular Venous Distention Increased] : there was no jugular-venous distention [Thyroid Diffuse Enlargement] : the thyroid was not enlarged [Thyroid Nodule] : there were no palpable thyroid nodules [Auscultation Breath Sounds / Voice Sounds] : lungs were clear to auscultation bilaterally [Heart Rate And Rhythm] : heart rate was normal and rhythm regular [Heart Sounds] : normal S1 and S2 [Heart Sounds Gallop] : no gallops [Murmurs] : no murmurs [Heart Sounds Pericardial Friction Rub] : no pericardial rub [Arterial Pulses Carotid] : carotid pulses were normal with no bruits [Full Pulse] : the pedal pulses are present [Edema] : there was no peripheral edema [Bowel Sounds] : normal bowel sounds [Abdomen Soft] : soft [Abdomen Tenderness] : non-tender [Abdomen Mass (___ Cm)] : no abdominal mass palpated [No CVA Tenderness] : no ~M costovertebral angle tenderness [No Spinal Tenderness] : no spinal tenderness [Nail Clubbing] : no clubbing  or cyanosis of the fingernails [Musculoskeletal - Swelling] : no joint swelling seen [Motor Tone] : muscle strength and tone were normal [Skin Color & Pigmentation] : normal skin color and pigmentation [Skin Turgor] : normal skin turgor [] : no rash [Person] : disoriented to person [Place] : disoriented to place [Time] : disoriented to time [Short Term Intact] : short term memory impaired [Remote Intact] : remote memory impaired [Registration Intact] : recent registration memory impaired [Span Intact] : the attention span was decreased [Concentration Intact] : a decrease in concentrating ability was observed [Visual Intact] : visual attention was ~T ~L decreased [Naming Objects] : difficulty naming common objects [Repeating Phrases] : difficulty repeating a phrase [Writing A Sentence] : difficulty writing a sentence [Fluency] : fluency not intact [Comprehension] : comprehension not intact [Reading] : difficulty reading [Current Events] : inadequate knowledge of current events [Past History] : inadequate knowledge of personal past history [Vocabulary] : inadequate range of vocabulary [Allodynia] : no ~T allodynia present [Dysesthesia] : no dysesthesia [Hyperesthesia] : no hyperesthesia [Past-pointing] : there was no past-pointing [Tremor] : no tremor present [Plantar Reflex Right Only] : normal on the right [Plantar Reflex Left Only] : normal on the left [FreeTextEntry4] : Mental Status Exam very limited, pt speaks non-sensical, mostly complaining. \par MMSE very limited, <10.\par Exam mostly limited to stereotypical phrases, saying "family is ok, all ok"\par Praxis: can imitate well, ideomotor and ideational very limited. [FreeTextEntry5] : + glabella [FreeTextEntry6] : Paratonia in UE and LE.  [FreeTextEntry7] : Sensory exam n.a. [FreeTextEntry8] : Cautioned stance and gait, can stand and walk only with support; circumduction of RLE and wide base. 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

## 2022-03-22 NOTE — HISTORY OF PRESENT ILLNESS
[FreeTextEntry1] : NO COVID.\par COVID VACCINE FULL.\par \par HPI-Interval Hx 20220322:\par Pt here with family.\par Since last seen, she has gradually worsened.\par She is more erratic, agitated, sleep is also erratic and she wants to eat all the times. \par Gait is more difficult, cannot walk by herself, needs support. \par \par HPI-Interval Hx 20191029:\par Pt has been off Aricept and Namenda since September, due to weight loss.\par Also, she has refused to keep the denture, ans has reduced food intake.\par She has lost ore weight now and is around 10lbs.\par Sleep is mostly ok, on and off, but mostly fine. Uses Trazodone 50mg. \par No falls, gait still very cautioned. \par Quetiapine working well when agitated.\par \par HPI-Interval Hx 20190617:\par pt here for follow-up.\par Her speech has worsened, more confused.\par STM very poor.\par Difficulty with names of family members. She recognizes faces.\par She has daily sundowning, asks to go home at night. Currently using Seroquel 25mg BID only.\par per daughter, she was better with Namenda.\par ADL: needs prompting and help for washing, feeding and dressing\par IADL: very poor\par CDR: 2.5\par Appetite is fine, and sleeps well, though an have erratic days where she sleeps less.\par \par PMH:\par 81yo woman, here for evaluation of Dementia.\par Pt enters the room 30 minutes late, due to being "stuck" by the police and delay in registration in our office.\par \par HPI: 81yo RH woman, with HTN, HLD, cardiomyopathy, here with daughter, son and RIK for dementia.\par \par PMH: At about age 71, she started to have some memory issues, forgetting events, losing objects, burning food on the stove. \par Around 3y ago, she presented a worsening, temporally related to her 's illness. She was then hospitalized, and she had some agitation and agitation then.\par Over the years, she has worsened to the point of losing ADLs and IADLs. \par She had MRI brain in 2015, showing initial atrophy of FTP lobes, including mild hippocampal and precuneal atrophy.\par Currently, she lives with her  and son.\par ADL limited, except basic eating and washing. Poorer hygiene.\par \par She was seen by a Neurologist in [place], started on Namenda, kept at 5mg, BID, and then on Seroquel and MIitazapine to control agitation. \par Sleep: overall ok, wakes up a few times, to go to the bathroom. Some naps during the day.\par \par Appetite: no issues, maintained. Tends to eat a lot even out of meals. \par \par Motor symptoms: tends to fall at times, had couple of falls in old house with stairs. Of note, she has LBP and PMH of falls, and MRI C-spine showing moderate-severe C-spine stenosis. She has been gradually walking more and more unsteady. She has pain in LB and knees. She also had a fall with avulsion of hamstring tendon in the past. \par \par B/B: loses urine and bowel occasionally.\par \par Psychiatric symptoms: agitation, gets feisty with family at times. Stubborn, does not want to use support.\par \par ADL: needs prompting\par IADL: very poor\par CDR: 2.5\par \par Professional status: retired. \par \par PCP and other physicians:\par -PCP: \par Dr. Edwar Dickson MD\par Internist in [place], [place]\par Address: [address] [place] [address], [place] [address]\par Phone: [phone]\par -Neuro:\par Neurologist in [place].\par

## 2022-03-22 NOTE — ASSESSMENT
[FreeTextEntry1] : Assessment:\par 85yo RH woman, with severe dementia, gait instability.\par Progression since last seen, very erratic.\par Gait suggests that she may have had a stroke in the interim.\par Pt would not go for a CTh.\par \par Impression:\par -LOAD, severe dementia\par -agitation, insomnia.\par \par Plan:\par -Try Belsomra 10mg\par -Seroquel 100mg ER daily\par \par A thorough discussion was entertained with the patient/caregiver regarding the use of psychoactive medications, their possible benefits and AE profile, including the risk of cardiovascular complications.\par We discussed the benefits of being active, physically and mentally, and the need to to establish a routine in this respect.\par Driving abilities and firearms possession and use were discussed, in relation to progression of the cognitive decline, and the need to assess them periodically.\par Patient/caregiver understand and agree with the plan.\par

## 2022-03-29 ENCOUNTER — RX RENEWAL (OUTPATIENT)
Age: 87
End: 2022-03-29

## 2022-03-29 RX ORDER — ESCITALOPRAM OXALATE 10 MG/1
10 TABLET ORAL DAILY
Qty: 90 | Refills: 3 | Status: ACTIVE | COMMUNITY
Start: 2022-03-08 | End: 1900-01-01

## 2022-04-18 RX ORDER — SUVOREXANT 10 MG/1
10 TABLET, FILM COATED ORAL
Qty: 30 | Refills: 0 | Status: DISCONTINUED | COMMUNITY
Start: 2022-03-22 | End: 2022-04-18

## 2022-08-06 NOTE — ED PROVIDER NOTE - ATTENDING CONTRIBUTION TO CARE
I, Manisha Zapata, performed a history and physical exam of the patient and discussed their management with the resident and /or advanced care provider. I reviewed the resident and /or ACP's note and agree with the documented findings and plan of care. I was present and available for all procedures.

## 2022-08-06 NOTE — ED PROVIDER NOTE - CARE PLAN
Principal Discharge DX:	Altered mental status  Secondary Diagnosis:	Interstitial pneumonia of both lungs  Secondary Diagnosis:	Acute UTI   1

## 2022-08-06 NOTE — H&P ADULT - ASSESSMENT
Patient is a 87y Female with PMH of DM, HTN, HLD, dementia (AAOx0, nonverbal) presenting to the ED w/ right sided facial droop now resolved, admitted to medicine for UTI and possible PNA.

## 2022-08-06 NOTE — H&P ADULT - NSHPPHYSICALEXAM_GEN_ALL_CORE
T(C): 37.7 (08-06-22 @ 21:54), Max: 37.7 (08-06-22 @ 21:54)  HR: 82 (08-06-22 @ 21:54) (82 - 89)  BP: 146/109 (08-06-22 @ 19:29) (146/109 - 146/109)  RR: 18 (08-06-22 @ 21:54) (18 - 20)  SpO2: 96% (08-06-22 @ 21:54) (96% - 96%)    Constitutional: laying in bed, mildly agitated, AAOx0, nonverbal  ENMT: atraumatic, normocephalic, no lymphadenopathy  Eyes: pupils equally round and reactive to light, extraocular muscles intact, no conjunctival injection  Cardio: regular rate and rhythm, normal S1/S2, no murmurs, rubs, or gallops  Respiratory: lungs clear to auscultation bilaterally, no rales, rhonchi, or wheezes  GI: soft, nontender, nondistended, BSx4  MSK: extremities atraumatic, no cyanosis or clubbing  Skin: warm, dry, no rashes or lesions  Neuro: not following commands, moving all extremities  Psych: alert and oriented x0, normal behavior, appropriate mood and affect

## 2022-08-06 NOTE — CONSULT NOTE ADULT - SUBJECTIVE AND OBJECTIVE BOX
Neurology  Consult Note  08-06-22    Name:  BRENNEN ISBELL; 87y (1935)    Chief Complaint: code stroke  HPI: 86 y/o with DM, HTN, dementia presented as code stroke for reported right hemiparesis and right facial droop. mRS at least a 3. Unknown LKW, no family at bedside, but per EMS, family told them couple of hours ago. NIHSS 18. On exam, although unable to follow commands, does move extremeties symmetrically with obvious focal deficits. Per EMS, family was concerned that patient was unresponsive, but in the ED, patient was awake, however not oriented, which EMS was told this is baseline for patient. Unable to obtain further hx given no family at bedside and patient is nonverbal.     Review of Systems:  As states in HPI.    Medications:  MEDICATIONS  (STANDING):  azithromycin  IVPB 500 milliGRAM(s) IV Intermittent once  cefTRIAXone   IVPB 1000 milliGRAM(s) IV Intermittent Once  sodium chloride 0.9% Bolus 1000 milliLiter(s) IV Bolus once    MEDICATIONS  (PRN):    Vitals:  T(C): --  HR: 89 (08-06-22 @ 19:29) (89 - 89)  BP: 146/109 (08-06-22 @ 19:29) (146/109 - 146/109)  RR: 20 (08-06-22 @ 19:29) (20 - 20)  SpO2: 96% (08-06-22 @ 19:29) (96% - 96%)    Labs:                        11.1   6.83  )-----------( 281      ( 06 Aug 2022 18:41 )             34.8     08-06    140  |  103  |  28<H>  ----------------------------<  189<H>  4.6   |  26  |  1.30    Ca    8.9      06 Aug 2022 18:41    TPro  6.8  /  Alb  3.6  /  TBili  0.2  /  DBili  x   /  AST  20  /  ALT  10  /  AlkPhos  76  08-06    CAPILLARY BLOOD GLUCOSE  210 (06 Aug 2022 18:43)      POCT Blood Glucose.: 210 mg/dL (06 Aug 2022 18:29)    LIVER FUNCTIONS - ( 06 Aug 2022 18:41 )  Alb: 3.6 g/dL / Pro: 6.8 g/dL / ALK PHOS: 76 U/L / ALT: 10 U/L / AST: 20 U/L / GGT: x             PT/INR - ( 06 Aug 2022 18:41 )   PT: 13.1 sec;   INR: 1.14 ratio         PTT - ( 06 Aug 2022 18:41 )  PTT:25.0 sec    PHYSICAL EXAMINATION:  General: Elderly confused female   Eyes: Conjunctiva and sclera clear.  Neurologic:  - Mental Status:  Awake, but not following commands. Responds to tactile and noxious stimuli. Keeps trying to get out of bed.  - Cranial Nerves II-XII:    II:  Visual fields are full to confrontation. blink to thread intact. Right surgical pupil. Left pupil 3 mm reactive   III, IV, VI:  Extraocular movements are intact without nystagmus.  V:  Not following commands   VII:  Face has a very subtle right nasolabial flattening.  VIII:  Not following commands   IX, X:  Uvula is midline   XI: Not following commands   XII:  Tongue protudes in the midline.  - Motor:  Strength grossly symmetric.  Normal muscle bulk and tone throughout.  - Reflexes:  3+ and symmetric at the biceps, triceps, brachioradialis, knees, and ankles.  Plantar responses flexor.  - Sensory:  Intact throughout to pin prick  - Coordination:  Not following commands   - Gait:   unable to assess given concern for fall     Radiology:  < from: CT Brain Stroke Protocol (08.06.22 @ 19:14) >  HEAD CT: No acute intracranial hemorrhage or acute territorial   infarction. Moderately large ventricles which may be secondary to Central   predominant volume loss versus communicating hydrocephalus. Chronic   microvascular ischemic changes.    CT PERFUSION demonstrated: No asymmetric or territorial perfusion   abnormality.    If symptoms persist consider follow up head CT or MRI, MRA  if no   contraindication.    CTA COW:  Patent intracranial circulation without flow limiting stenosis   or large vessel occlusion.    CTA NECK: Moderate stenosis at the right ICA origin.  Multinodular thyroid gland.  Partially imaged small right pleural effusion. Patchy groundglass   opacities in the right lung which may represent pulmonary edema. Other   infectious or inflammatory etiology is not entirely excluded.    < end of copied text >

## 2022-08-06 NOTE — H&P ADULT - PROBLEM SELECTOR PLAN 3
- CT chest: Nonspecific groundglass opacities and interlobular septal thickening. Findings may represent pulmonary edema, infection, or inflammation.  - RVP negative, COVID negative  - no hx of choking w/ food as per dtr  - c/w ceftriaxone and azithromycin  - f/u urine legionella  - f/u blood cx  - aspiration precautions  - bedside dysphagia screen before eating  - speech and swallow eval ordered - CT chest: Nonspecific groundglass opacities and interlobular septal thickening. Findings may represent pulmonary edema, infection, or inflammation.  - RVP negative, COVID negative  - no hx of choking w/ food as per dtr  - c/w ceftriaxone and azithromycin  - f/u urine legionella  - f/u blood cx  - aspiration precautions  - speech and swallow eval ordered  - NPO for now  - gentle IVF while NPO

## 2022-08-06 NOTE — ED PROVIDER NOTE - PROGRESS NOTE DETAILS
Manisha Zapata MD (Attending Physician):    87 year old female with PMHx HTN, Dementia aox1 presents for ALOC per family a "couple of hours" ago. Unknown LSN. Patient was in bed and not responsive and then unable to move right side and had slight right sided facial droop. Per family, normally very alert as her baseline. FSBS 139. On exam, moves left side and right lower extremity. No facial droop. Does not follow commands, awakens to sternal rub and some pain stimulation and hypertensive to 230s. Concern for ICH, malignancy, electrolyte abnormalities, infection (UTI, pneumonia, COVID), ACS, CVA, TIA. Plan: CT Head, CT A, labs, VBG, UA CRISTAL Blood MD - PGY-4: patient moving all limbs spontaneously, climbing out of bed requiring reorientation but seems to somewhat be near baseline of A&Ox1.

## 2022-08-06 NOTE — ED PROVIDER NOTE - OBJECTIVE STATEMENT
86yo F w/ history of DM, HTN, HLD coming in w/ decreased responsiveness and right sided weaknes.s LKM was "a couple of hours ago". History limited by EMS. Awaiting further collateral

## 2022-08-06 NOTE — ED PROVIDER NOTE - PHYSICAL EXAMINATION
GENERAL: ill appearing  HEAD: normocephalic, atraumatic  HEENT: normal conjunctiva, oral mucosa moist, uvula midline, no tonsilar exudates, neck supple, no JVD  CARDIAC: regular rate and rhythm, normal S1S2, no appreciable murmurs, 2+ pulses in UE/LE b/l  PULM: normal breath sounds, clear to ascultation bilaterally, no rales, rhonchi, wheezing  GI: abdomen nondistended, soft, nontender, no guarding, rebound tenderness  NEURO: responsive to pain; moving all four extremities spontaneously;  MSK: no peripheral edema  SKIN: well-perfused, extremities warm, no visible rashes

## 2022-08-06 NOTE — H&P ADULT - HISTORY OF PRESENT ILLNESS
Patient is a 87y Female with PMH of DM, HTN, HLD, dementia (AAOx0, nonverbal) presenting to the ED w/ right sided facial droop. History obtained from daughter at bedside. Patient lives with her  and an aide, is AAOx0 and nonverbal at baseline. This morning the aide found her with a right-sided facial droop and drooling out of the right side of her mouth. She also appeared to not be moving her right side as much as usual. She was reportedly normal a few hours before this. The daughter denies any unresponsiveness, and states the droop has now resolved and the patient is now at her mental baseline.    In the ED, Tmax 99.8, HR 82, /109, SpO2 96% on RA. Labs significant for WBC 6.8, Hgb 11.1, BUn 28, Cr 1.3, trop 34, lactate 2.3. UA w/ bacteria, LE, WBC. CXR shows b/l reticular opacities. CT head and CTA head/neck unrevealing. CT chest w/ diffuse GGO and septal thickening. Patient received ceftriaxone, azithromycin, and 1L NS. [employer] Counseling:  I discussed with the patient the risks of mycophenolate mofetil including but not limited to infection/immunosuppression, GI upset, hypokalemia, hypercholesterolemia, bone marrow suppression, lymphoproliferative disorders, malignancy, GI ulceration/bleed/perforation, colitis, interstitial lung disease, kidney failure, progressive multifocal leukoencephalopathy, and birth defects.  The patient understands that monitoring is required including a baseline creatinine and regular CBC testing. In addition, patient must alert us immediately if symptoms of infection or other concerning signs are noted.

## 2022-08-06 NOTE — H&P ADULT - PROBLEM SELECTOR PLAN 1
- resolved, patient at mental baseline as per daughter  - CT head: No acute intracranial hemorrhage or acute territorial infarction. Moderately large ventricles which may be secondary to Central predominant volume loss versus communicating hydrocephalus. Chronic microvascular ischemic changes.  - CT perfusion: No asymmetric or territorial perfusion abnormality.  - CTA Head/neck: CTA COW:  Patent intracranial circulation without flow limiting stenosis or large vessel occlusion. Moderate stenosis at the right ICA origin. Multinodular thyroid gland.  - neuro recs appreciated  - low grade-fever: treatment of UTI +/- PNA as below  - C/w aspirin and statin  - Telemetry   - MRI brain wo ordered

## 2022-08-06 NOTE — ED PROVIDER NOTE - CCCP TRG CHIEF CMPLNT
Rt side weak unresponsive The patient presents for a colonoscopy.  Today, he says his father had colon polyps age 35. His father had a colon in the first place due to rectal bleeding and a family history through his father ( of colon cancer age 40-45). The patient has a 25 year old brother

## 2022-08-06 NOTE — ED PROVIDER NOTE - CLINICAL SUMMARY MEDICAL DECISION MAKING FREE TEXT BOX
88yo F w/ history of DM, HTN, HLD coming in w/ decreased responsiveness and right sided weakness; stroke code activated

## 2022-08-06 NOTE — H&P ADULT - PROBLEM SELECTOR PLAN 4
- AAOx0, nonverbal at baseline. Currently at baseline as per daughter  - c/w escitalopram 10mg qd  - c/w home seroquel 50mg ER qd  - seroquel 25mg po qd PRN agitation  - on home rozerem 8mg po qhs. Not available as per pharmacy. Daughter will need to bring in

## 2022-08-06 NOTE — CONSULT NOTE ADULT - ASSESSMENT
86 y/o with DM, HTN, dementia presented as code stroke for reported right hemiparesis and right facial droop. mRS at least a 3. Unknown LKW, no family at bedside, but per EMS, family told them couple of hours ago. NIHSS 18. On exam, although unable to follow commands, does move extremeties symmetrically with obvious focal deficits. Per EMS, family was concerned that patient was unresponsive, but in the ED, patient was awake, however not oriented, which EMS was told this is baseline for patient. Unable to obtain further hx given no family at bedside and patient is nonverbal. Not a tpa candidate given no LKW. Not a thrombectomy candidate given no LVO.     Impression: AMS with unknown baseline likely 2/2 toxic metabolic encephalopathy. Could also have waxing/waning episodes iso dementia.     Recommendations:   F/u toxic metabolic work up  R/o infectious causes   Please obtain HgA1C and lipid panel  Lipitor 80 and titrate to LDL  <70  ASA 81 mg   Telemetry   MRI brain wo     Case discussed with stroke fellow under supervision of stroke attending    86 y/o with DM, HTN, dementia presented as code stroke for reported right hemiparesis and right facial droop. mRS at least a 3. Unknown LKW, no family at bedside, but per EMS, family told them couple of hours ago. NIHSS 18. On exam, although unable to follow commands, does move extremeties symmetrically with obvious focal deficits. Per EMS, family was concerned that patient was unresponsive, but in the ED, patient was awake, however not oriented, which EMS was told this is baseline for patient. Unable to obtain further hx given no family at bedside and patient is nonverbal. Not a tpa candidate given no LKW. Not a thrombectomy candidate given no LVO.     Impression: AMS with unknown baseline likely 2/2 toxic metabolic encephalopathy. Could also have waxing/waning episodes iso dementia.     Recommendations:   F/u toxic metabolic work up  R/o infectious causes   Please obtain HgA1C and lipid panel  Lipitor 80 and titrate to LDL  <70  ASA 81 mg   Telemetry   MRI brain wo     Case discussed with stroke fellow under supervision of stroke attending   Case discussed with attending Dr. Saul in AM who will attest.

## 2022-08-06 NOTE — H&P ADULT - PROBLEM SELECTOR PLAN 7
Diet: CC/DASH/Kosher  DVT Prophylaxis: HSQ  Full code Diet: NPO for now, CC/DASH/Kosher once approved by S&S  DVT Prophylaxis: HSQ  Full code Diet: NPO for now, CC/DASH/Kosher once approved by S&S  DVT Prophylaxis: HSQ  Full code    Discussed w/ ACP Sebas

## 2022-08-06 NOTE — H&P ADULT - PROBLEM SELECTOR PLAN 6
- not on meds  - low dose admelog correction scale TIDQAC and QHS  - check FSG before meals and QHS, or q6h while NPO  - F/u HgbA1c  - CC diet - not on meds  - low dose admelog correction scale TIDQAC and QHS  - check FSG before meals and QHS, or q6h while NPO  - F/u HgbA1c

## 2022-08-06 NOTE — ED PROVIDER NOTE - CONVERSATION DETAILS
I discussed with daughter and other family on the phone in regards to goals of care extensively. I discussed risks and benefits of intubation as well as resuscitation and at this time, patient is FULL CODE.

## 2022-08-06 NOTE — ED PROVIDER NOTE - SEVERE SEPSIS ALERT DETAILS
CRISTAL Blood MD:  I have seen and evaluated this patient and do not believe the patient is septic at this time.  I will continue to evaluate.

## 2022-08-07 NOTE — SWALLOW BEDSIDE ASSESSMENT ADULT - PHARYNGEAL PHASE
suspected; audible deglutition w/ less viscous textures which subsided w/ moderately thick liquids/Delayed pharyngeal swallow suspected; audible deglutition w/ less viscous textures which subsided w/ moderately thick liquids- delayed throat clearing w/ mildly thick liquids/Delayed pharyngeal swallow

## 2022-08-07 NOTE — SWALLOW BEDSIDE ASSESSMENT ADULT - DATE
07-Aug-2022 [Fever] : no fever [Chills] : no chills [Feeling Poorly] : not feeling poorly [Recent Weight Gain (___ Lbs)] : no recent weight gain [Recent Weight Loss (___ Lbs)] : no recent weight loss [Negative] : Gastrointestinal [[id number]] : Chronic Back pain

## 2022-08-07 NOTE — SWALLOW BEDSIDE ASSESSMENT ADULT - SWALLOW EVAL: DIAGNOSIS
Pt p/w AMS likely 2/2 toxic metabolic encephalopathy. Could also have waxing/waning episodes iso dementia. Oropharyngeal swallow profile p/w deficits in presence of dementia; reduced control of bolus w/ overt s/sx of aspiration w/ less viscous consistencies. Notable good appetite throughout this evaluation. Pt presents to the ED w/ right sided facial droop now resolved, admitted to medicine for UTI and possible PNA. Oropharyngeal swallow profile p/w deficits in presence of dementia; reduced control of bolus w/ overt s/sx of aspiration w/ less viscous consistencies. Notable good appetite throughout this evaluation.

## 2022-08-07 NOTE — SWALLOW BEDSIDE ASSESSMENT ADULT - NS SPL SWALLOW CLINIC TRIAL FT
Pt consumed approximately 8 oz of thick liquids- 4 oz of moderately thick and mildly thick; 1 ounce of puree via tsp. Did not offer cup or straw given impulsivity and confusion as pt attempting to place SLP hand in oral cavity as well as placing her blanket in oral cavity w/ attempts to masticate. Pt consumed approximately 6 oz of thick liquids- 4 oz of moderately thick and 2oz mildly thick; 1 ounce of puree via tsp. Did not offer cup or straw given impulsivity and confusion as pt attempting to place SLP hand in oral cavity as well as placing her blanket in oral cavity w/ attempts to masticate.

## 2022-08-07 NOTE — SWALLOW BEDSIDE ASSESSMENT ADULT - MUCOSAL QUALITY
did not appreciate mucositis or xerostomia on lingual surface   no active drooling or pooling of secretions

## 2022-08-07 NOTE — SWALLOW BEDSIDE ASSESSMENT ADULT - H & P REVIEW
yes Ms. Connor is an 87y Farsi speaking Female with PMH of DM, HTN, HLD, dementia presenting to the ED w/ right sided facial droop. Patient lives with her  and an aide, is AAOx0 and nonverbal at baseline. This morning the aide found her with a right-sided facial droop and drooling out of the right side of her mouth. She also appeared to not be moving her right side as much as usual. She was reportedly normal a few hours before this. The daughter denies any unresponsiveness and states the droop has now resolved and the patient is now at her mental baseline./yes

## 2022-08-07 NOTE — ED ADULT NURSE REASSESSMENT NOTE - NS ED NURSE REASSESS COMMENT FT1
attempting to contact MAR [place] [phone] twice regarding need for an alternative to 25 mg of Seroquel R/T pt's failed dysphagia and NPO status, no answer

## 2022-08-07 NOTE — PROGRESS NOTE ADULT - ASSESSMENT
87yoM w/ PMHx of DM, HTN, HLD, dementia (AAOx0, nonverbal at baseline) presenting to the ED w/ right sided facial droop (now resolved), admitted to medicine for UTI and possible PNA; r/o CVA workup underway, neuro following.

## 2022-08-07 NOTE — PROGRESS NOTE ADULT - SUBJECTIVE AND OBJECTIVE BOX
INCOMPLETE NOTE    Liliane Lopes M.D.  Division of Hospital Medicine  Available on TEAMS    Patient is a 87y old  Female who presents with a chief complaint of UTI (06 Aug 2022 23:45)    SUBJECTIVE / OVERNIGHT EVENTS: Patient seen and examined. AM home HTN medications held per nursing; SBPs elevated to 235; D/Bird IVF, requested home antihypertensive to be administered as patient is NPO except medications.    OBJECTIVE:  Vital Signs Last 24 Hrs  T(F): 98.8 (07 Aug 2022 08:01), Max: 99.8 (06 Aug 2022 21:54)  HR: 78 (07 Aug 2022 08:01) (75 - 89)  BP: 235/67 (07 Aug 2022 08:01) (117/77 - 235/67)  RR: 18 (07 Aug 2022 08:) (18 - 20)  SpO2: 95% (07 Aug 2022 08:01) (95% - 96%)    Parameters below as of 07 Aug 2022 08:01  Patient On (Oxygen Delivery Method): room air    Physical Examination:  GEN: thin man, laying in stretcher in NAD  PSYCH: A&Ox3, mood and affect appear appropriate   SKIN: intact, no e/o rash  NEURO: no focal neurologic deficits appreciated; CN II-XII intact, sensation intact B/L, motor 5/5 in all mm groups  EYES: PERRL, anicteric, EOMI, no vertical/horizontal nystagmus  HEAD: NC, AT  NECK: supple  RESPI: no accessory muscle use, B/L air entry, CTAB   CARDIO: regular rate/rhythm, no LE edema B/L  ABD: soft, NT, ND, +BS  EXT: patient able to move all extremities spontaneously  VASC: peripheral pulses palpated    Labs:  CAPILLARY BLOOD GLUCOSE  210 (06 Aug 2022 18:43)  POCT Blood Glucose.: 121 mg/dL (07 Aug 2022 09:03)  POCT Blood Glucose.: 210 mg/dL (06 Aug 2022 18:29)                        11.1   6.83  )-----------( 281      ( 06 Aug 2022 18:41 )             34.8     08-06  140  |  103  |  28<H>  ----------------------------<  189<H>  4.6   |  26  |  1.30    Ca    8.9      06 Aug 2022 18:41    TPro  6.8  /  Alb  3.6  /  TBili  0.2  /  DBili  x   /  AST  20  /  ALT  10  /  AlkPhos  76  08-    PT/INR - ( 06 Aug 2022 18:41 )   PT: 13.1 sec;   INR: 1.14 ratio    PTT - ( 06 Aug 2022 18:41 )  PTT:25.0 sec    Urinalysis Basic - ( 06 Aug 2022 22:03 )  Color: Colorless / Appearance: Clear / S.038 / pH: x  Gluc: x / Ketone: Negative  / Bili: Negative / Urobili: Negative   Blood: x / Protein: 100 mg/dL / Nitrite: Negative   Leuk Esterase: Small / RBC: 4 /hpf / WBC 16 /HPF   Sq Epi: x / Non Sq Epi: 1 /hpf / Bacteria: Moderate      Imaging Personally Reviewed:    Consultant(s) Notes Reviewed:   Care Discussed with Consultants/Other Providers:    MEDICATIONS  (STANDING):  aspirin enteric coated 81 milliGRAM(s) Oral daily  atorvastatin 80 milliGRAM(s) Oral at bedtime  azithromycin  IVPB 500 milliGRAM(s) IV Intermittent every 24 hours  cefTRIAXone   IVPB 1000 milliGRAM(s) IV Intermittent every 24 hours  dextrose 5%. 1000 milliLiter(s) (50 mL/Hr) IV Continuous <Continuous>  dextrose 5%. 1000 milliLiter(s) (100 mL/Hr) IV Continuous <Continuous>  dextrose 50% Injectable 25 Gram(s) IV Push once  dextrose 50% Injectable 12.5 Gram(s) IV Push once  dextrose 50% Injectable 25 Gram(s) IV Push once  escitalopram 10 milliGRAM(s) Oral daily  glucagon  Injectable 1 milliGRAM(s) IntraMuscular once  heparin   Injectable 5000 Unit(s) SubCutaneous every 12 hours  hydrALAZINE Injectable 5 milliGRAM(s) IV Push once  hydrochlorothiazide 12.5 milliGRAM(s) Oral daily  insulin lispro (ADMELOG) corrective regimen sliding scale   SubCutaneous three times a day before meals  insulin lispro (ADMELOG) corrective regimen sliding scale   SubCutaneous at bedtime  melatonin 10 milliGRAM(s) Oral at bedtime  QUEtiapine 50 milliGRAM(s) Oral at bedtime    MEDICATIONS  (PRN):  acetaminophen     Tablet .. 650 milliGRAM(s) Oral every 6 hours PRN Temp greater or equal to 38C (100.4F)  dextrose Oral Gel 15 Gram(s) Oral once PRN Blood Glucose LESS THAN 70 milliGRAM(s)/deciliter  QUEtiapine 25 milliGRAM(s) Oral daily PRN agitation   INCOMPLETE NOTE    Liliane Lopes M.D.  Division of Hospital Medicine  Available on TEAMS    Patient is a 87y old  Female who presents with a chief complaint of UTI (06 Aug 2022 23:45)    SUBJECTIVE / OVERNIGHT EVENTS: Patient seen and examined. AM home HTN medications held per nursing; SBPs elevated to 235 (from 114, 4hours prior); D/Bird IVF, requested home antihypertensive to be administered as patient is NPO except medications. Close hemodynamic monitoring in setting of r/o CVA and labile HTN -- avoid use IV Hydralazine given patient's age and already labile HTN.     OBJECTIVE:  Vital Signs Last 24 Hrs  T(F): 98.8 (07 Aug 2022 08:01), Max: 99.8 (06 Aug 2022 21:54)  HR: 78 (07 Aug 2022 08:01) (75 - 89)  BP: 235/67 (07 Aug 2022 08:01) (117/77 - 235/67)  RR: 18 (07 Aug 2022 08:01) (18 - 20)  SpO2: 95% (07 Aug 2022 08:01) (95% - 96%)    Parameters below as of 07 Aug 2022 08:01  Patient On (Oxygen Delivery Method): room air    Physical Examination:  GEN: thin man, laying in stretcher in NAD  PSYCH: A&Ox3, mood and affect appear appropriate   SKIN: intact, no e/o rash  NEURO: no focal neurologic deficits appreciated; CN II-XII intact, sensation intact B/L, motor 5/5 in all mm groups  EYES: PERRL, anicteric, EOMI, no vertical/horizontal nystagmus  HEAD: NC, AT  NECK: supple  RESPI: no accessory muscle use, B/L air entry, CTAB   CARDIO: regular rate/rhythm, no LE edema B/L  ABD: soft, NT, ND, +BS  EXT: patient able to move all extremities spontaneously  VASC: peripheral pulses palpated    Labs:  CAPILLARY BLOOD GLUCOSE  210 (06 Aug 2022 18:43)  POCT Blood Glucose.: 121 mg/dL (07 Aug 2022 09:03)  POCT Blood Glucose.: 210 mg/dL (06 Aug 2022 18:29)                        11.1   6.83  )-----------( 281      ( 06 Aug 2022 18:41 )             34.8     08-06  140  |  103  |  28<H>  ----------------------------<  189<H>  4.6   |  26  |  1.30    Ca    8.9      06 Aug 2022 18:41    TPro  6.8  /  Alb  3.6  /  TBili  0.2  /  DBili  x   /  AST  20  /  ALT  10  /  AlkPhos  76  08-06    PT/INR - ( 06 Aug 2022 18:41 )   PT: 13.1 sec;   INR: 1.14 ratio    PTT - ( 06 Aug 2022 18:41 )  PTT:25.0 sec    Urinalysis Basic - ( 06 Aug 2022 22:03 )  Color: Colorless / Appearance: Clear / S.038 / pH: x  Gluc: x / Ketone: Negative  / Bili: Negative / Urobili: Negative   Blood: x / Protein: 100 mg/dL / Nitrite: Negative   Leuk Esterase: Small / RBC: 4 /hpf / WBC 16 /HPF   Sq Epi: x / Non Sq Epi: 1 /hpf / Bacteria: Moderate      Imaging Personally Reviewed:    Consultant(s) Notes Reviewed:   Care Discussed with Consultants/Other Providers:    MEDICATIONS  (STANDING):  aspirin enteric coated 81 milliGRAM(s) Oral daily  atorvastatin 80 milliGRAM(s) Oral at bedtime  azithromycin  IVPB 500 milliGRAM(s) IV Intermittent every 24 hours  cefTRIAXone   IVPB 1000 milliGRAM(s) IV Intermittent every 24 hours  dextrose 5%. 1000 milliLiter(s) (50 mL/Hr) IV Continuous <Continuous>  dextrose 5%. 1000 milliLiter(s) (100 mL/Hr) IV Continuous <Continuous>  dextrose 50% Injectable 25 Gram(s) IV Push once  dextrose 50% Injectable 12.5 Gram(s) IV Push once  dextrose 50% Injectable 25 Gram(s) IV Push once  escitalopram 10 milliGRAM(s) Oral daily  glucagon  Injectable 1 milliGRAM(s) IntraMuscular once  heparin   Injectable 5000 Unit(s) SubCutaneous every 12 hours  hydrALAZINE Injectable 5 milliGRAM(s) IV Push once  hydrochlorothiazide 12.5 milliGRAM(s) Oral daily  insulin lispro (ADMELOG) corrective regimen sliding scale   SubCutaneous three times a day before meals  insulin lispro (ADMELOG) corrective regimen sliding scale   SubCutaneous at bedtime  melatonin 10 milliGRAM(s) Oral at bedtime  QUEtiapine 50 milliGRAM(s) Oral at bedtime    MEDICATIONS  (PRN):  acetaminophen     Tablet .. 650 milliGRAM(s) Oral every 6 hours PRN Temp greater or equal to 38C (100.4F)  dextrose Oral Gel 15 Gram(s) Oral once PRN Blood Glucose LESS THAN 70 milliGRAM(s)/deciliter  QUEtiapine 25 milliGRAM(s) Oral daily PRN agitation   INCOMPLETE NOTE    Liliane Lopes M.D.  Division of Hospital Medicine  Available on TEAMS    Patient is a 87y old  Female who presents with a chief complaint of UTI (06 Aug 2022 23:45)    SUBJECTIVE / OVERNIGHT EVENTS: Patient seen and examined. AM home HTN medications held per nursing; SBPs elevated to 235 (from 114, 4hours prior); D/Bird IVF, requested home antihypertensive to be administered as patient is NPO except medications. Close hemodynamic monitoring in setting of r/o CVA and labile HTN -- AVOID use IV Hydralazine given patient's age and already labile HTN.     OBJECTIVE:  Vital Signs Last 24 Hrs  T(F): 98.8 (07 Aug 2022 08:01), Max: 99.8 (06 Aug 2022 21:54)  HR: 78 (07 Aug 2022 08:01) (75 - 89)  BP: 235/67 (07 Aug 2022 08:01) (117/77 - 235/67)  RR: 18 (07 Aug 2022 08:01) (18 - 20)  SpO2: 95% (07 Aug 2022 08:01) (95% - 96%)    Parameters below as of 07 Aug 2022 08:01  Patient On (Oxygen Delivery Method): room air    Physical Examination:  GEN: thin man, laying in stretcher in NAD  PSYCH: A&Ox3, mood and affect appear appropriate   SKIN: intact, no e/o rash  NEURO: no focal neurologic deficits appreciated; CN II-XII intact, sensation intact B/L, motor 5/5 in all mm groups  EYES: PERRL, anicteric, EOMI, no vertical/horizontal nystagmus  HEAD: NC, AT  NECK: supple  RESPI: no accessory muscle use, B/L air entry, CTAB   CARDIO: regular rate/rhythm, no LE edema B/L  ABD: soft, NT, ND, +BS  EXT: patient able to move all extremities spontaneously  VASC: peripheral pulses palpated    Labs:  CAPILLARY BLOOD GLUCOSE  210 (06 Aug 2022 18:43)  POCT Blood Glucose.: 121 mg/dL (07 Aug 2022 09:03)  POCT Blood Glucose.: 210 mg/dL (06 Aug 2022 18:29)                        11.1   6.83  )-----------( 281      ( 06 Aug 2022 18:41 )             34.8     08-06  140  |  103  |  28<H>  ----------------------------<  189<H>  4.6   |  26  |  1.30    Ca    8.9      06 Aug 2022 18:41    TPro  6.8  /  Alb  3.6  /  TBili  0.2  /  DBili  x   /  AST  20  /  ALT  10  /  AlkPhos  76  08-06    PT/INR - ( 06 Aug 2022 18:41 )   PT: 13.1 sec;   INR: 1.14 ratio    PTT - ( 06 Aug 2022 18:41 )  PTT:25.0 sec    Urinalysis Basic - ( 06 Aug 2022 22:03 )  Color: Colorless / Appearance: Clear / S.038 / pH: x  Gluc: x / Ketone: Negative  / Bili: Negative / Urobili: Negative   Blood: x / Protein: 100 mg/dL / Nitrite: Negative   Leuk Esterase: Small / RBC: 4 /hpf / WBC 16 /HPF   Sq Epi: x / Non Sq Epi: 1 /hpf / Bacteria: Moderate      Imaging Personally Reviewed:    Consultant(s) Notes Reviewed:   Care Discussed with Consultants/Other Providers:    MEDICATIONS  (STANDING):  aspirin enteric coated 81 milliGRAM(s) Oral daily  atorvastatin 80 milliGRAM(s) Oral at bedtime  azithromycin  IVPB 500 milliGRAM(s) IV Intermittent every 24 hours  cefTRIAXone   IVPB 1000 milliGRAM(s) IV Intermittent every 24 hours  dextrose 5%. 1000 milliLiter(s) (50 mL/Hr) IV Continuous <Continuous>  dextrose 5%. 1000 milliLiter(s) (100 mL/Hr) IV Continuous <Continuous>  dextrose 50% Injectable 25 Gram(s) IV Push once  dextrose 50% Injectable 12.5 Gram(s) IV Push once  dextrose 50% Injectable 25 Gram(s) IV Push once  escitalopram 10 milliGRAM(s) Oral daily  glucagon  Injectable 1 milliGRAM(s) IntraMuscular once  heparin   Injectable 5000 Unit(s) SubCutaneous every 12 hours  hydrALAZINE Injectable 5 milliGRAM(s) IV Push once  hydrochlorothiazide 12.5 milliGRAM(s) Oral daily  insulin lispro (ADMELOG) corrective regimen sliding scale   SubCutaneous three times a day before meals  insulin lispro (ADMELOG) corrective regimen sliding scale   SubCutaneous at bedtime  melatonin 10 milliGRAM(s) Oral at bedtime  QUEtiapine 50 milliGRAM(s) Oral at bedtime    MEDICATIONS  (PRN):  acetaminophen     Tablet .. 650 milliGRAM(s) Oral every 6 hours PRN Temp greater or equal to 38C (100.4F)  dextrose Oral Gel 15 Gram(s) Oral once PRN Blood Glucose LESS THAN 70 milliGRAM(s)/deciliter  QUEtiapine 25 milliGRAM(s) Oral daily PRN agitation Liliane Lopes M.D.  Division of Hospital Medicine  Available on TEAMS    Patient is a 87y old  Female who presents with a chief complaint of UTI (06 Aug 2022 23:45)    SUBJECTIVE / OVERNIGHT EVENTS: Patient seen and examined. AM home HTN medications held per nursing; SBPs elevated to 235 (from 114, 4hours prior); D/Bird IVF, requested home antihypertensive to be administered as patient is NPO except medications. Close hemodynamic monitoring in setting of r/o CVA and labile HTN -- AVOID use IV Hydralazine given patient's age and already labile HTN.     OBJECTIVE:  Vital Signs Last 24 Hrs  T(F): 98.8 (07 Aug 2022 08:01), Max: 99.8 (06 Aug 2022 21:54)  HR: 78 (07 Aug 2022 08:01) (75 - 89)  BP: 235/67 (07 Aug 2022 08:01) (117/77 - 235/67)  RR: 18 (07 Aug 2022 08:01) (18 - 20)  SpO2: 95% (07 Aug 2022 08:01) (95% - 96%)    Parameters below as of 07 Aug 2022 08:01  Patient On (Oxygen Delivery Method): room air    Physical Examination:  GEN: elderly woman, laying in stretcher in NAD  PSYCH: A&Ox0, nonverbal at baseline, mood and affect appear mildly agitated  NEURO: unable to follow commands  EYES: PERRL, anicteric  NECK: supple, no e/o elevated JVP  RESPI: no accessory muscle use, B/L air entry, CTAB   CARDIO: regular rate/rhythm, no LE edema B/L  ABD: soft, NT, ND  EXT: patient able to move all extremities spontaneously  VASC: peripheral pulses palpated    Labs:  CAPILLARY BLOOD GLUCOSE  210 (06 Aug 2022 18:43)  POCT Blood Glucose.: 121 mg/dL (07 Aug 2022 09:03)  POCT Blood Glucose.: 210 mg/dL (06 Aug 2022 18:29)                        11.1   6.83  )-----------( 281      ( 06 Aug 2022 18:41 )             34.8     08-06  140  |  103  |  28<H>  ----------------------------<  189<H>  4.6   |  26  |  1.30    Ca    8.9      06 Aug 2022 18:41    TPro  6.8  /  Alb  3.6  /  TBili  0.2  /  DBili  x   /  AST  20  /  ALT  10  /  AlkPhos  76  08-06    PT/INR - ( 06 Aug 2022 18:41 )   PT: 13.1 sec;   INR: 1.14 ratio    PTT - ( 06 Aug 2022 18:41 )  PTT:25.0 sec    Urinalysis Basic - ( 06 Aug 2022 22:03 )  Color: Colorless / Appearance: Clear / S.038 / pH: x  Gluc: x / Ketone: Negative  / Bili: Negative / Urobili: Negative   Blood: x / Protein: 100 mg/dL / Nitrite: Negative   Leuk Esterase: Small / RBC: 4 /hpf / WBC 16 /HPF   Sq Epi: x / Non Sq Epi: 1 /hpf / Bacteria: Moderate    Consultant(s) Notes Reviewed: Neurology  Care Discussed with Consultants/Other Providers: KELLY Vargas    MEDICATIONS  (STANDING):  aspirin enteric coated 81 milliGRAM(s) Oral daily  atorvastatin 80 milliGRAM(s) Oral at bedtime  azithromycin  IVPB 500 milliGRAM(s) IV Intermittent every 24 hours  cefTRIAXone   IVPB 1000 milliGRAM(s) IV Intermittent every 24 hours  dextrose 5%. 1000 milliLiter(s) (50 mL/Hr) IV Continuous <Continuous>  dextrose 5%. 1000 milliLiter(s) (100 mL/Hr) IV Continuous <Continuous>  dextrose 50% Injectable 25 Gram(s) IV Push once  dextrose 50% Injectable 12.5 Gram(s) IV Push once  dextrose 50% Injectable 25 Gram(s) IV Push once  escitalopram 10 milliGRAM(s) Oral daily  glucagon  Injectable 1 milliGRAM(s) IntraMuscular once  heparin   Injectable 5000 Unit(s) SubCutaneous every 12 hours  hydrALAZINE Injectable 5 milliGRAM(s) IV Push once  hydrochlorothiazide 12.5 milliGRAM(s) Oral daily  insulin lispro (ADMELOG) corrective regimen sliding scale   SubCutaneous three times a day before meals  insulin lispro (ADMELOG) corrective regimen sliding scale   SubCutaneous at bedtime  melatonin 10 milliGRAM(s) Oral at bedtime  QUEtiapine 50 milliGRAM(s) Oral at bedtime    MEDICATIONS  (PRN):  acetaminophen     Tablet .. 650 milliGRAM(s) Oral every 6 hours PRN Temp greater or equal to 38C (100.4F)  dextrose Oral Gel 15 Gram(s) Oral once PRN Blood Glucose LESS THAN 70 milliGRAM(s)/deciliter  QUEtiapine 25 milliGRAM(s) Oral daily PRN agitation

## 2022-08-07 NOTE — SWALLOW BEDSIDE ASSESSMENT ADULT - SLP GENERAL OBSERVATIONS
Encountered in bed in ED, attempting to get OOB with legs over side rails. Repositioned for po trials. NAD. RA. Pt able to orient to feeding task w/ notable voracious appetite. Vocal quality functional.

## 2022-08-07 NOTE — SWALLOW BEDSIDE ASSESSMENT ADULT - COMMENTS
Continued history:     Swallow history: No family present. Pt is not a reliable historian. Continued history:   -Labs significant for WBC 6.8, Hgb 11.1, BUn 28, Cr 1.3, trop 34, lactate 2.3. UA w/ bacteria, LE, WBC.   -CXR shows b/l reticular opacities.     8/6:   -HEAD CT: No acute intracranial hemorrhage or acute territorial   infarction. 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 Chronic microvascular ischemic changes.    -CT PERFUSION demonstrated: No asymmetric or territorial perfusion abnormality.  -CTA COW:  Patent intracranial circulation without flow limiting stenosis or large vessel occlusion.  -CTA NECK: Moderate stenosis at the right ICA origin.Multinodular thyroid gland.Partially imaged small right pleural effusion. Patchy groundglass   opacities in the right lung which may represent pulmonary edema. Other infectious or inflammatory etiology is not entirely excluded.  -CT head and CTA head/neck unrevealing.   -CT chest w/ diffuse GGO and septal thickening.   -Patient received ceftriaxone, azithromycin, and 1L NS.    Swallow history: No family present. Pt is not a reliable historian. Continued history:   -Labs significant for WBC 6.8, Hgb 11.1, BUn 28, Cr 1.3, trop 34, lactate 2.3. UA w/ bacteria, LE, WBC.   -CXR shows b/l reticular opacities.   -Not a tpa candidate given no LKW. Not a thrombectomy candidate given no LVO.   8/6:   -HEAD CT: No acute intracranial hemorrhage or acute territorial   infarction. 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 Chronic microvascular ischemic changes.    -CT PERFUSION demonstrated: No asymmetric or territorial perfusion abnormality.  -CTA COW:  Patent intracranial circulation without flow limiting stenosis or large vessel occlusion.  -CTA NECK: Moderate stenosis at the right ICA origin.Multinodular thyroid gland.Partially imaged small right pleural effusion. Patchy groundglass   opacities in the right lung which may represent pulmonary edema. Other infectious or inflammatory etiology is not entirely excluded.  -CT head and CTA head/neck unrevealing.   -CT chest w/ diffuse GGO and septal thickening.   -Patient received ceftriaxone, azithromycin, and 1L NS.    Swallow history: No family present. Pt is not a reliable historian.

## 2022-08-08 NOTE — CONSULT NOTE ADULT - SUBJECTIVE AND OBJECTIVE BOX
CHIEF COMPLAINT:    HISTORY OF PRESENT ILLNESS:  87y Female well known to me from office with PMH of DM, HTN, HLD, dementia (AAOx0, nonverbal) presenting to the ED w/ right sided facial droop. History obtained from daughter by phone. Patient lives with her  and an aide, is AAOx0 and nonverbal at baseline. This morning the aide found her with a right-sided facial droop and drooling out of the right side of her mouth. She also appeared to not be moving her right side as much as usual. She was reportedly normal a few hours before this. The daughter denies any unresponsiveness, and states the droop has now resolved and the patient is now at her mental baseline.    In the ED, Tmax 99.8, HR 82, /109, SpO2 96% on RA. Labs significant for WBC 6.8, Hgb 11.1, BUn 28, Cr 1.3, trop 34, lactate 2.3. UA w/ bacteria, LE, WBC. CXR shows b/l reticular opacities. CT head and CTA head/neck unrevealing. CT chest w/ diffuse GGO and septal thickening. Patient received ceftriaxone, azithromycin, and 1L NS.      PAST MEDICAL & SURGICAL HISTORY:  Diabetes mellitus      HTN (hypertension)      HLD (hyperlipidemia)      No significant past surgical history              MEDICATIONS:  aspirin enteric coated 81 milliGRAM(s) Oral daily  heparin   Injectable 5000 Unit(s) SubCutaneous every 12 hours  hydrochlorothiazide 12.5 milliGRAM(s) Oral daily    azithromycin  IVPB 500 milliGRAM(s) IV Intermittent every 24 hours  cefTRIAXone   IVPB 1000 milliGRAM(s) IV Intermittent every 24 hours      acetaminophen     Tablet .. 650 milliGRAM(s) Oral every 6 hours PRN  escitalopram 10 milliGRAM(s) Oral daily  melatonin 10 milliGRAM(s) Oral at bedtime  QUEtiapine 25 milliGRAM(s) Oral daily PRN  QUEtiapine 50 milliGRAM(s) Oral at bedtime      atorvastatin 80 milliGRAM(s) Oral at bedtime  dextrose 50% Injectable 25 Gram(s) IV Push once  dextrose 50% Injectable 12.5 Gram(s) IV Push once  dextrose 50% Injectable 25 Gram(s) IV Push once  dextrose Oral Gel 15 Gram(s) Oral once PRN  glucagon  Injectable 1 milliGRAM(s) IntraMuscular once  insulin lispro (ADMELOG) corrective regimen sliding scale   SubCutaneous three times a day before meals  insulin lispro (ADMELOG) corrective regimen sliding scale   SubCutaneous at bedtime    dextrose 5%. 1000 milliLiter(s) IV Continuous <Continuous>  dextrose 5%. 1000 milliLiter(s) IV Continuous <Continuous>      FAMILY HISTORY:  No pertinent family history in first degree relatives        SOCIAL HISTORY:    [ ] Non-smoker  [ ] Smoker  [ ] Alcohol    Allergies    Allergy Status Unknown    Intolerances    	    REVIEW OF SYSTEMS:    [ ] All others negative	  [ XX] Unable to obtain    PHYSICAL EXAM:  T(C): 36.7 (08-08-22 @ 04:59), Max: 37.1 (08-07-22 @ 11:54)  HR: 64 (08-08-22 @ 04:59) (64 - 74)  BP: 183/61 (08-08-22 @ 04:59) (164/86 - 201/88)  RR: 18 (08-08-22 @ 04:59) (18 - 18)  SpO2: 96% (08-08-22 @ 04:59) (95% - 98%)  Wt(kg): --  I&O's Summary    07 Aug 2022 07:01  -  08 Aug 2022 07:00  --------------------------------------------------------  IN: 450 mL / OUT: 2150 mL / NET: -1700 mL        Appearance: NAD  HEENT:   dry oral mucosa, PERRL, EOMI	  Lymphatic: No lymphadenopathy  Cardiovascular: Normal S1 S2, No JVD, No murmurs, No edema  Respiratory: decreased bs  Psychiatry: A & O x 0  Gastrointestinal:  Soft, Non-tender, + BS	  Skin: No rashes, No ecchymoses, No cyanosis	  Neurologic: Non-focal  Extremities: Normal range of motion, No clubbing, cyanosis or edema  Vascular: Peripheral pulses palpable 2+ bilaterally    TELEMETRY: 	    ECG:  	  RADIOLOGY:    < from: CT Chest No Cont (08.06.22 @ 22:15) >    ACC: [id number] EXAM:  CT CHEST                          PROCEDURE DATE:  08/06/2022          INTERPRETATION:  CLINICAL INFORMATION: weakness, Pulmonary fibrosis   versus interstitial pneumonia.    COMPARISON: None.    CONTRAST/COMPLICATIONS:  IV Contrast: NONE  Oral Contrast: NONE  Complications: None reported at time of study completion    PROCEDURE:  CT scan of the chest was obtained without intravenous contrast.    FINDINGS:  Motion limited exam.    LYMPH NODES: No lymphadenopathy.    HEART/VASCULATURE: The heart is normal in size. No pericardial effusion.   There are aortic, aortic valve and mitral annular and coronary artery   calcifications.    AIRWAYS/LUNGS/PLEURA: Predominantly central lower lobe groundglass   opacities. Scattered interlobular septal thickening. Trace right pleural   effusion.    UPPER ABDOMEN: Nonobstructing bilateral renal calculi. Nonspecific   perinephric fat stranding.    BONES/SOFT TISSUES: Degenerative changes.    IMPRESSION:  Severely motion limited exam.    Nonspecific groundglass opacities and interlobular septal thickening.    Findings may represent pulmonary edema, infection, or inflammation.    --- End of Report ---           ROGER DAVIDSON MD; Resident Radiologist  This document has been electronically signed.  EVELIA MENDOZA MD; Attending Radiologist  This document has been electronically signed. Aug  6 2022 11:30PM    < end of copied text >      < from: Xray Chest 1 View- PORTABLE-Urgent (Xray Chest 1 View- PORTABLE-Urgent .) (08.06.22 @ 20:17) >  ACC: [id number] EXAM:  XR CHEST PORTABLE URGENT 1V                          PROCEDURE DATE:  08/06/2022          INTERPRETATION:  EXAMINATION: XR CHEST URGENT    CLINICAL INDICATION: Evaluate for pneumothorax    TECHNIQUE: Single frontal portable view of the chest from 8/6/2022 8:17 PM    COMPARISON: None.    FINDINGS:    The heart size is normal.  Bilateral reticular opacities.  There is no pneumothorax or pleural effusion.    IMPRESSION:  Bilateral reticular opacities, nonspecific    --- End ofReport ---          < end of copied text >    OTHER: 	  	  LABS:	 	    CARDIAC MARKERS:    Urinalysis (08.06.22 @ 22:03)   Glucose Qualitative, Urine: Trace   Blood, Urine: Small   pH Urine: 7.5   Color: Colorless   Urine Appearance: Clear   Bilirubin: Negative   Ketone - Urine: Negative   Specific Gravity: 1.038   Protein, Urine: 100 mg/dL   Urobilinogen: Negative   Nitrite: Negative   Leukocyte Esterase Concentration: Small                         12.0   9.69  )-----------( 299      ( 07 Aug 2022 09:39 )             36.6     08-07    141  |  102  |  21  ----------------------------<  140<H>  4.2   |  27  |  0.91    Ca    9.1      07 Aug 2022 09:39    TPro  6.8  /  Alb  3.6  /  TBili  0.2  /  DBili  x   /  AST  20  /  ALT  10  /  AlkPhos  76  08-06    proBNP:   Lipid Profile:   HgA1c:   TSH:

## 2022-08-08 NOTE — CONSULT NOTE ADULT - ASSESSMENT
Assessment  DMT2: 87y Female with DM T2 with hyperglycemia, A1C 7.4%, was not on any DM meds, now on insulin coverage, blood sugars trending within overall acceptable range, no hypoglycemic episodes, on puree diet.  Multinodular Thyroid: finding on imaging  Facial Droop: resolved, monitored, FU neuro  UTI: On IV ABx, monitored.        Marianne Ortiz MD  Cell: [phone]  Office: [phone]               Assessment  DMT2: 87y Female with DM T2 with hyperglycemia, A1C 7.4%, was not on any DM meds, now on insulin coverage, blood sugars trending within overall acceptable range, no hypoglycemic  episodes, on puree diet.  Multinodular Thyroid: finding on imaging  Facial Droop: resolved, monitored, FU neuro  UTI: On IV ABx, monitored.        Marianne Ortiz MD  Cell: [phone]  Office: [phone]

## 2022-08-08 NOTE — CONSULT NOTE ADULT - PROBLEM SELECTOR RECOMMENDATION 9
Will continue current insulin regimen for now. Will continue monitoring FS, log, and FU.   for compliance with consistent low-carb diet.
DC HCTZ   Start losartan 25 bid

## 2022-08-08 NOTE — CONSULT NOTE ADULT - TIME BILLING
Advanced care planning was discussed with family.  Advanced care planning forms were reviewed and discussed as appropriate.  Differential diagnosis and plan of care discussed with family after the evaluation.   Pain assessed and judicious use of narcotics when appropriate was discussed.  Importance of Fall prevention discussed.  Counseling on Smoking and Alcohol cessation was offered when appropriate.  Counseling on Diet, exercise, and medication compliance was done.

## 2022-08-08 NOTE — PROGRESS NOTE ADULT - SUBJECTIVE AND OBJECTIVE BOX
[facility] Division of Hospital Medicine  Chico Fisher MD  Available via MS Teams      SUBJECTIVE / OVERNIGHT EVENTS:  no acute events - unable to obtain ROS 2/2 baseline dementia     ADDITIONAL REVIEW OF SYSTEMS:  unable to obtain 2/2 baseline dementia     MEDICATIONS  (STANDING):  aspirin enteric coated 81 milliGRAM(s) Oral daily  atorvastatin 80 milliGRAM(s) Oral at bedtime  azithromycin  IVPB 500 milliGRAM(s) IV Intermittent every 24 hours  cefTRIAXone   IVPB 1000 milliGRAM(s) IV Intermittent every 24 hours  dextrose 5%. 1000 milliLiter(s) (50 mL/Hr) IV Continuous <Continuous>  dextrose 5%. 1000 milliLiter(s) (100 mL/Hr) IV Continuous <Continuous>  dextrose 50% Injectable 25 Gram(s) IV Push once  dextrose 50% Injectable 12.5 Gram(s) IV Push once  dextrose 50% Injectable 25 Gram(s) IV Push once  diazepam  Injectable 2 milliGRAM(s) IV Push once  escitalopram 10 milliGRAM(s) Oral daily  glucagon  Injectable 1 milliGRAM(s) IntraMuscular once  heparin   Injectable 5000 Unit(s) SubCutaneous every 12 hours  insulin lispro (ADMELOG) corrective regimen sliding scale   SubCutaneous three times a day before meals  insulin lispro (ADMELOG) corrective regimen sliding scale   SubCutaneous at bedtime  losartan 25 milliGRAM(s) Oral daily  melatonin 10 milliGRAM(s) Oral at bedtime  QUEtiapine 50 milliGRAM(s) Oral at bedtime    MEDICATIONS  (PRN):  acetaminophen     Tablet .. 650 milliGRAM(s) Oral every 6 hours PRN Temp greater or equal to 38C (100.4F)  dextrose Oral Gel 15 Gram(s) Oral once PRN Blood Glucose LESS THAN 70 milliGRAM(s)/deciliter  QUEtiapine 25 milliGRAM(s) Oral daily PRN agitation      I&O's Summary    07 Aug 2022 07:  -  08 Aug 2022 07:00  --------------------------------------------------------  IN: 450 mL / OUT: 2150 mL / NET: -1700 mL    08 Aug 2022 07:01  -  08 Aug 2022 12:32  --------------------------------------------------------  IN: 0 mL / OUT: 195 mL / NET: -195 mL        PHYSICAL EXAM:  Vital Signs Last 24 Hrs  T(C): 36.7 (08 Aug 2022 10:12), Max: 36.7 (08 Aug 2022 04:59)  T(F): 98 (08 Aug 2022 10:12), Max: 98 (08 Aug 2022 04:59)  HR: 64 (08 Aug 2022 10:12) (64 - 69)  BP: 154/62 (08 Aug 2022 10:19) (154/62 - 201/88)  BP(mean): --  RR: 18 (08 Aug 2022 10:12) (18 - 18)  SpO2: 97% (08 Aug 2022 10:12) (95% - 98%)    Parameters below as of 08 Aug 2022 10:12  Patient On (Oxygen Delivery Method): room air      PHYSICAL EXAM:  GENERAL: NAD, well-developed, awakes to name   HEAD:  Atraumatic, normocephalic  EYES:  R eye opacified, L sclera/conjunctiva clear   NECK: Supple, no JVD  CHEST/LUNG: Clear to auscultation bilaterally; no wheezing or rales  HEART: Regular rate and rhythm; no murmurs, no LE edema   ABDOMEN: Soft, nontender, nondistended; bowel sounds present  EXTREMITIES:  2+ Peripheral Pulses, no clubbing, cyanosis  PSYCH: alerts/awakes to name, unable to assess further   MUSCULOSKELETAL: no joint swelling or tenderness     LABS:                        12.0   9.69  )-----------( 299      ( 07 Aug 2022 09:39 )             36.6     08-    141  |  102  |  21  ----------------------------<  140<H>  4.2   |  27  |  0.91    Ca    9.1      07 Aug 2022 09:39    TPro  6.8  /  Alb  3.6  /  TBili  0.2  /  DBili  x   /  AST  20  /  ALT  10  /  AlkPhos  76  08-06    PT/INR - ( 06 Aug 2022 18:41 )   PT: 13.1 sec;   INR: 1.14 ratio         PTT - ( 06 Aug 2022 18:41 )  PTT:25.0 sec  CARDIAC MARKERS ( 07 Aug 2022 15:46 )  x     / x     / 214 U/L / x     / 5.7 ng/mL  CARDIAC MARKERS ( 07 Aug 2022 09:39 )  x     / x     / 205 U/L / x     / 5.6 ng/mL      Urinalysis Basic - ( 06 Aug 2022 22:03 )    Color: Colorless / Appearance: Clear / S.038 / pH: x  Gluc: x / Ketone: Negative  / Bili: Negative / Urobili: Negative   Blood: x / Protein: 100 mg/dL / Nitrite: Negative   Leuk Esterase: Small / RBC: 4 /hpf / WBC 16 /HPF   Sq Epi: x / Non Sq Epi: 1 /hpf / Bacteria: Moderate        Culture - Blood (collected 06 Aug 2022 21:45)  Source: .Blood Blood-Peripheral  Preliminary Report (08 Aug 2022 06:01):    No growth to date.    Culture - Blood (collected 06 Aug 2022 21:30)  Source: .Blood Blood-Peripheral  Preliminary Report (08 Aug 2022 06:01):    No growth to date.          RADIOLOGY & ADDITIONAL TESTS:  New Imaging Personally Reviewed Today:  New Electrocardiogram Personally Reviewed Today:  Other Results Reviewed Today:   Prior or Outpatient Records Reviewed Today with Summary:    COORDINATION OF CARE:  Consultant Communication and Details of Discussion (where applicable):

## 2022-08-08 NOTE — CONSULT NOTE ADULT - PROBLEM SELECTOR RECOMMENDATION 3
Chronic   on meds   aspiration and fall precautions
Continue management per Neuro/Primary team recommendations.

## 2022-08-08 NOTE — CONSULT NOTE ADULT - SUBJECTIVE AND OBJECTIVE BOX
HPI:  Patient is a 87y Female with PMH of DM, HTN, HLD, dementia (AAOx0, nonverbal) presenting to the ED w/ right sided facial droop. History obtained from daughter at bedside. Patient lives with her  and an aide, is AAOx0 and nonverbal at baseline. This morning the aide found her with a right-sided facial droop and drooling out of the right side of her mouth. She also appeared to not be moving her right side as much as usual. She was reportedly normal a few hours before this. The daughter denies any unresponsiveness, and states the droop has now resolved and the patient is now at her mental baseline.    In the ED, Tmax 99.8, HR 82, /109, SpO2 96% on RA. Labs significant for WBC 6.8, Hgb 11.1, BUn 28, Cr 1.3, trop 34, lactate 2.3. UA w/ bacteria, LE, WBC. CXR shows b/l reticular opacities. CT head and CTA head/neck unrevealing. CT chest w/ diffuse GGO and septal thickening. Patient received ceftriaxone, azithromycin, and 1L NS. (06 Aug 2022 23:45)    Patient has history of diabetes, A1C 7.4%, per chart-review was not taking any hypoglycemic agents.  Endo was consulted for glycemic control by her outpatient Endo Dr Dickson.    PAST MEDICAL & SURGICAL HISTORY:  Diabetes mellitus      HTN (hypertension)      HLD (hyperlipidemia)      No significant past surgical history          FAMILY HISTORY:  No pertinent family history in first degree relatives        Social History:    Outpatient Medications:    MEDICATIONS  (STANDING):  aspirin enteric coated 81 milliGRAM(s) Oral daily  atorvastatin 80 milliGRAM(s) Oral at bedtime  cefTRIAXone   IVPB 1000 milliGRAM(s) IV Intermittent every 24 hours  dextrose 5%. 1000 milliLiter(s) (50 mL/Hr) IV Continuous <Continuous>  dextrose 5%. 1000 milliLiter(s) (100 mL/Hr) IV Continuous <Continuous>  dextrose 50% Injectable 25 Gram(s) IV Push once  dextrose 50% Injectable 12.5 Gram(s) IV Push once  dextrose 50% Injectable 25 Gram(s) IV Push once  diazepam  Injectable 2 milliGRAM(s) IV Push once  escitalopram 10 milliGRAM(s) Oral daily  glucagon  Injectable 1 milliGRAM(s) IntraMuscular once  heparin   Injectable 5000 Unit(s) SubCutaneous every 12 hours  insulin lispro (ADMELOG) corrective regimen sliding scale   SubCutaneous three times a day before meals  insulin lispro (ADMELOG) corrective regimen sliding scale   SubCutaneous at bedtime  losartan 25 milliGRAM(s) Oral two times a day  melatonin 10 milliGRAM(s) Oral at bedtime  QUEtiapine 50 milliGRAM(s) Oral at bedtime    MEDICATIONS  (PRN):  acetaminophen     Tablet .. 650 milliGRAM(s) Oral every 6 hours PRN Temp greater or equal to 38C (100.4F)  dextrose Oral Gel 15 Gram(s) Oral once PRN Blood Glucose LESS THAN 70 milliGRAM(s)/deciliter  QUEtiapine 25 milliGRAM(s) Oral daily PRN agitation      Allergies    Allergy Status Unknown    Intolerances      Review of Systems:  UNABLE TO OBTAIN    PHYSICAL EXAM:  VITALS: T(C): 36.8 (08-08-22 @ 13:33)  T(F): 98.2 (08-08-22 @ 13:33), Max: 98.2 (08-08-22 @ 13:33)  HR: 60 (08-08-22 @ 13:33) (60 - 64)  BP: 141/77 (08-08-22 @ 13:33) (141/77 - 201/88)  RR:  (18 - 18)  SpO2:  (96% - 98%)  Wt(kg): --  GENERAL: NAD, well-groomed, well-developed  EYES: No proptosis, no lid lag  HEENT:  Atraumatic, Normocephalic  THYROID: Normal size, no palpable nodules  RESPIRATORY: Clear to auscultation bilaterally; No rales, rhonchi, wheezing  CARDIOVASCULAR: Si S2, No murmurs;  GI: Soft, non distended, normal bowel sounds  SKIN: Dry, intact, No rashes or lesions  MUSCULOSKELETAL: Has BL lower extremity edema.  NEURO:  no tremor, sensation decreased in feet BL,    POCT Blood Glucose.: 186 mg/dL (08-08-22 @ 11:29)  POCT Blood Glucose.: 148 mg/dL (08-08-22 @ 07:36)  POCT Blood Glucose.: 150 mg/dL (08-07-22 @ 21:21)  POCT Blood Glucose.: 173 mg/dL (08-07-22 @ 16:45)  POCT Blood Glucose.: 121 mg/dL (08-07-22 @ 09:03)  POCT Blood Glucose.: 210 mg/dL (08-06-22 @ 18:29)                            12.0   9.69  )-----------( 299      ( 07 Aug 2022 09:39 )             36.6       08-07    141  |  102  |  21  ----------------------------<  140<H>  4.2   |  27  |  0.91    eGFR: 61    Ca    9.1      08-07    TPro  6.8  /  Alb  3.6  /  TBili  0.2  /  DBili  x   /  AST  20  /  ALT  10  /  AlkPhos  76  08-06      Thyroid Function Tests:          08-08 Chol 216<H> Direct LDL -- LDL calculated 151<H> HDL 49<L> Trig 80, 08-07 Chol 235<H> Direct LDL -- LDL calculated 162<H> HDL 55 Trig 88    Radiology:              HPI:  Patient is a 87y Female with PMH of DM, HTN, HLD, dementia (AAOx0, nonverbal) presenting to the ED w/ right sided facial droop. History obtained from daughter at bedside. Patient lives with her  and an aide, is AAOx0 and nonverbal at baseline. This morning the aide found her with a right-sided facial droop and drooling out of the right side of her mouth. She also appeared to not be moving her right side as much as usual. She was reportedly normal a few hours before this. The daughter denies any unresponsiveness, and states the droop has now resolved and the patient is now at her mental baseline.    In the ED, Tmax 99.8, HR 82, /109, SpO2 96% on RA. Labs significant for WBC 6.8, Hgb 11.1, BUn 28, Cr 1.3, trop 34, lactate 2.3. UA w/ bacteria, LE, WBC. CXR shows b/l reticular opacities. CT head and CTA head/neck unrevealing. CT chest w/ diffuse GGO and septal thickening. Patient received ceftriaxone, azithromycin, and 1L NS. (06 Aug 2022 23:45)    Patient has history of diabetes, A1C 7.4%, per chart-review was not taking any hypoglycemic agents.  Endo was consulted for glycemic control by her outpatient Endo Dr Dickson.    PAST MEDICAL & SURGICAL HISTORY:  Diabetes mellitus      HTN (hypertension)      HLD (hyperlipidemia)      No significant past surgical history          FAMILY HISTORY:  No pertinent family history in first degree relatives        Social History:    Outpatient Medications:    MEDICATIONS  (STANDING):  aspirin enteric coated 81 milliGRAM(s) Oral daily  atorvastatin 80 milliGRAM(s) Oral at bedtime  cefTRIAXone   IVPB 1000 milliGRAM(s) IV Intermittent every 24 hours  dextrose 5%. 1000 milliLiter(s) (50 mL/Hr) IV Continuous <Continuous>  dextrose 5%. 1000 milliLiter(s) (100 mL/Hr) IV Continuous <Continuous>  dextrose 50% Injectable 25 Gram(s) IV Push once  dextrose 50% Injectable 12.5 Gram(s) IV Push once  dextrose 50% Injectable 25 Gram(s) IV Push once  diazepam  Injectable 2 milliGRAM(s) IV Push once  escitalopram 10 milliGRAM(s) Oral daily  glucagon  Injectable 1 milliGRAM(s) IntraMuscular once  heparin   Injectable 5000 Unit(s) SubCutaneous every 12 hours  insulin lispro (ADMELOG) corrective regimen sliding scale   SubCutaneous three times a day before meals  insulin lispro (ADMELOG) corrective regimen sliding scale   SubCutaneous at bedtime  losartan 25 milliGRAM(s) Oral two times a day  melatonin 10 milliGRAM(s) Oral at bedtime  QUEtiapine 50 milliGRAM(s) Oral at bedtime    MEDICATIONS  (PRN):  acetaminophen     Tablet .. 650 milliGRAM(s) Oral every 6 hours PRN Temp greater or equal to 38C (100.4F)  dextrose Oral Gel 15 Gram(s) Oral once PRN Blood Glucose LESS THAN 70 milliGRAM(s)/deciliter  QUEtiapine 25 milliGRAM(s) Oral daily PRN agitation      Allergies    Allergy Status Unknown    Intolerances      Review of Systems:  UNABLE TO OBTAIN    PHYSICAL EXAM:  VITALS: T(C): 36.8 (08-08-22 @ 13:33)  T(F): 98.2 (08-08-22 @ 13:33), Max: 98.2 (08-08-22 @ 13:33)  HR: 60 (08-08-22 @ 13:33) (60 - 64)  BP: 141/77 (08-08-22 @ 13:33) (141/77 - 201/88)  RR:  (18 - 18)  SpO2:  (96% - 98%)  Wt(kg): --  GENERAL: NAD, well-groomed, well-developed  EYES: No proptosis, no lid lag  HEENT:  Atraumatic, Normocephalic  THYROID: Normal size, no palpable nodules  RESPIRATORY: Clear to auscultation bilaterally; No rales, rhonchi, wheezing  CARDIOVASCULAR: Si S2, No murmurs;  GI: Soft, non distended, normal bowel sounds  SKIN: Dry, intact, No rashes or lesions  MUSCULOSKELETAL: Has BL lower extremity edema.  NEURO:  no tremor, sensation decreased in feet BL,    POCT Blood Glucose.: 186 mg/dL (08-08-22 @ 11:29)  POCT Blood Glucose.: 148 mg/dL (08-08-22 @ 07:36)  POCT Blood Glucose.: 150 mg/dL (08-07-22 @ 21:21)  POCT Blood Glucose.: 173 mg/dL (08-07-22 @ 16:45)  POCT Blood Glucose.: 121 mg/dL (08-07-22 @ 09:03)  POCT Blood Glucose.: 210 mg/dL (08-06-22 @ 18:29)                            12.0   9.69  )-----------( 299      ( 07 Aug 2022 09:39 )             36.6       08-07    141  |  102  |  21  ----------------------------<  140<H>  4.2   |  27  |  0.91    eGFR: 61    Ca    9.1      08-07    TPro  6.8  /  Alb  3.6  /  TBili  0.2  /  DBili  x   /  AST  20  /  ALT  10  /  AlkPhos  76  08-06      Thyroid Function Tests:          08-08 Chol 216<H> Direct LDL -- LDL calculated 151<H> HDL 49<L> Trig 80, 08-07 Chol 235<H> Direct LDL -- LDL calculated 162<H> HDL 55 Trig 88    Radiology:

## 2022-08-08 NOTE — CONSULT NOTE ADULT - ASSESSMENT
87y Female with PMH of DM, HTN, HLD, dementia (AAOx0, nonverbal) presenting to the ED w/ right sided facial droop now resolved, admitted to medicine for UTI and possible PNA.  Has been hypertensive

## 2022-08-09 NOTE — CONSULT NOTE ADULT - ASSESSMENT
86 y/o F PMHx dementia (AAOx0, non-verbal), DM, HTN, and HLD, presented with R sided facial droop. ID consulted for positive blood culture.     Afebrile, HD stable  WBC 7K today    Urinalysis (8/6):  16 WBCs  Blood Cultures (8/6):  strep spp 1/4 bottles (48 hours growth time)  CT Chest NC (8/6): lower lobe GGOs and interlobular septal thickening    Impression:  #Positive Blood Culture - strep spp in 1 bottle, represents procurement contaminant  #Abnormal CT Chest - GGOs on CT, but no clinical evidence of pneumonia  #Pyuria - no systemic signs of infection, unable to provide history     Recs:  INCOMPLETE NOTE. PENDING FINAL RECS 88 y/o F PMHx dementia (AAOx0, non-verbal), DM, HTN, and HLD, presented with R sided facial droop. ID consulted for positive blood culture.     Afebrile, HD stable  WBC 7K today    Urinalysis (8/6):  16 WBCs  Blood Cultures (8/6):  strep spp 1/4 bottles (48 hours growth time)  CT Chest NC (8/6): lower lobe GGOs and interlobular septal thickening    Impression:  #Positive Blood Culture - strep spp in 1 bottle, represents procurement contaminant  #Abnormal CT Chest - GGOs on CT, but no clinical evidence of pneumonia  #Pyuria - no systemic signs of infection, unable to provide history     Recs:  - d/c ceftriaxone. Monitor off antibiotics, low suspicion for active infection  - monitor for signs/symptoms of infection      Amol Nguyen MD  Infectious Disease Fellow  Available on [employer] Teams  Before 9AM or after 5PM: [phone]  88 y/o F PMHx dementia (AAOx0, non-verbal), DM, HTN, and HLD, presented with R sided facial droop. ID consulted for positive blood culture.     Afebrile, HD stable  WBC 7K today    Urinalysis (8/6):  16 WBCs  Blood Cultures (8/6):  strep spp 1/4 bottles (48 hours growth time)  CT Chest NC (8/6): lower lobe GGOs and interlobular septal thickening    Impression:  #Positive Blood Culture - strep spp in 1 bottle, represents procurement contaminant  #Abnormal CT Chest - GGOs on CT, but no clinical evidence of pneumonia  #Pyuria - no systemic signs of infection, unable to provide history; s/p 3 days of Ceftriaxone     Recs:  - d/c ceftriaxone. Monitor off antibiotics, low suspicion for active infection  - monitor for signs/symptoms of infection    Amol Nguyen MD  Infectious Disease Fellow  Available on [employer] Teams  Before 9AM or after 5PM: [phone]

## 2022-08-09 NOTE — DISCHARGE NOTE PROVIDER - CARE PROVIDERS DIRECT ADDRESSES
[email or web address],DirectAddress_Unknown,DirectAddress_Unknown

## 2022-08-09 NOTE — DISCHARGE NOTE PROVIDER - CARE PROVIDER_API CALL
Quinn Whitaker)  Neurology  [address], [address]  [place] [address]  Phone: [phone]  Fax: [phone]  Follow Up Time: 2 weeks    Edwar Dickson)  EndocrinologyMetabDiabetes; Internal Medicine  [address], [address]  [place] [address]  Phone: [phone]  Fax: [phone]  Follow Up Time: 2 weeks    Tuan Rico)  Cardiology; Internal Medicine  [address], [address]  [place] [address]  Phone: [phone]  Fax: [phone]  Follow Up Time: 1 week

## 2022-08-09 NOTE — PROVIDER CONTACT NOTE (OTHER) - ASSESSMENT
Patient nonverbal, agitated at times
elevated blood pressure, B/P 194/68 hr 62
pt is alert., calm, B/P 186/60 hr 60, repeat 184/61 hr 61.

## 2022-08-09 NOTE — CONSULT NOTE ADULT - ATTENDING COMMENTS
Patient seen and examined. Agree with above.
Patient presenting with facial droop  Blood culture with alpha hemolytic streptococcus   No leukocytosis, fever and procalcitonin not elevated  Favor procurement contaminant  Would monitor off of antibiotics  Repeat blood cultures sent today    I will continue to follow. Please feel free to contact me with any further questions.    Reynaldo Gambino M.D.  [facility] Division of Infectious Disease  8AM-5PM Monday - Friday: Available on [employer] Teams  After Hours and Holidays (or if no response on Microsoft Teams): Please contact the Infectious Diseases Office at [phone]

## 2022-08-09 NOTE — PROGRESS NOTE ADULT - SUBJECTIVE AND OBJECTIVE BOX
[facility] Division of Hospital Medicine  Chico Fisher MD  Available via MS Teams    SUBJECTIVE / OVERNIGHT EVENTS:  Attempted to speak to pt using Farsi  - alerts/awakens to name, but does not answer any questions. Unable to obtain ROS 2/2 baseline mental status     ADDITIONAL REVIEW OF SYSTEMS:  Unable to obtain ROS 2/2 baseline mental status     MEDICATIONS  (STANDING):  aspirin enteric coated 81 milliGRAM(s) Oral daily  atorvastatin 80 milliGRAM(s) Oral at bedtime  cefTRIAXone   IVPB 1000 milliGRAM(s) IV Intermittent every 24 hours  dextrose 5%. 1000 milliLiter(s) (50 mL/Hr) IV Continuous <Continuous>  dextrose 5%. 1000 milliLiter(s) (100 mL/Hr) IV Continuous <Continuous>  dextrose 50% Injectable 25 Gram(s) IV Push once  dextrose 50% Injectable 12.5 Gram(s) IV Push once  dextrose 50% Injectable 25 Gram(s) IV Push once  escitalopram 10 milliGRAM(s) Oral daily  glucagon  Injectable 1 milliGRAM(s) IntraMuscular once  heparin   Injectable 5000 Unit(s) SubCutaneous every 12 hours  insulin lispro (ADMELOG) corrective regimen sliding scale   SubCutaneous three times a day before meals  insulin lispro (ADMELOG) corrective regimen sliding scale   SubCutaneous at bedtime  losartan 50 milliGRAM(s) Oral two times a day  melatonin 10 milliGRAM(s) Oral at bedtime  QUEtiapine 50 milliGRAM(s) Oral at bedtime  sodium chloride 0.9%. 1000 milliLiter(s) (75 mL/Hr) IV Continuous <Continuous>    MEDICATIONS  (PRN):  acetaminophen     Tablet .. 650 milliGRAM(s) Oral every 6 hours PRN Temp greater or equal to 38C (100.4F)  dextrose Oral Gel 15 Gram(s) Oral once PRN Blood Glucose LESS THAN 70 milliGRAM(s)/deciliter  QUEtiapine 25 milliGRAM(s) Oral daily PRN agitation      I&O's Summary    08 Aug 2022 07:01  -  09 Aug 2022 07:00  --------------------------------------------------------  IN: 680 mL / OUT: 1960 mL / NET: -1280 mL    09 Aug 2022 07:01  -  09 Aug 2022 11:53  --------------------------------------------------------  IN: 665 mL / OUT: 0 mL / NET: 665 mL        PHYSICAL EXAM:  Vital Signs Last 24 Hrs  T(C): 36.6 (09 Aug 2022 09:34), Max: 36.8 (08 Aug 2022 13:33)  T(F): 97.8 (09 Aug 2022 09:34), Max: 98.2 (08 Aug 2022 13:33)  HR: 61 (09 Aug 2022 09:45) (59 - 74)  BP: 184/61 (09 Aug 2022 09:45) (141/77 - 189/67)  BP(mean): --  RR: 18 (09 Aug 2022 09:34) (17 - 18)  SpO2: 97% (09 Aug 2022 09:34) (95% - 98%)    Parameters below as of 09 Aug 2022 09:34  Patient On (Oxygen Delivery Method): room air      PHYSICAL EXAM:  GENERAL: NAD, well-developed  HEAD:  Atraumatic, normocephalic  EYES: conjunctiva and sclera clear  NECK: Supple, no JVD  CHEST/LUNG: Clear to auscultation bilaterally; no wheezing or rales  HEART: Regular rate and rhythm; no murmurs, no LE edema   ABDOMEN: Soft, nontender, nondistended; bowel sounds present  EXTREMITIES:  2+ Peripheral Pulses, no clubbing, cyanosis  PSYCH: alerts/awakes to name, unable to assess further     LABS:                        12.3   7.69  )-----------( 249      ( 09 Aug 2022 06:20 )             38.9     08-09    137  |  102  |  26<H>  ----------------------------<  189<H>  4.0   |  22  |  1.07    Ca    8.6      09 Aug 2022 06:20  Phos  3.9     08-09  Mg     2.1     08-09        CARDIAC MARKERS ( 07 Aug 2022 15:46 )  x     / x     / 214 U/L / x     / 5.7 ng/mL          Culture - Blood (collected 06 Aug 2022 21:45)  Source: .Blood Blood-Peripheral  Preliminary Report (08 Aug 2022 06:01):    No growth to date.    Culture - Blood (collected 06 Aug 2022 21:30)  Source: .Blood Blood-Peripheral  Gram Stain (08 Aug 2022 18:35):    Growth in anaerobic bottle: Gram Positive Cocci in Pairs and Chains  Preliminary Report (08 Aug 2022 18:36):    Growth in anaerobic bottle: Gram Positive Cocci in Pairs and Chains    ***Blood Panel PCR results on this specimen are available    approximately 3 hours after the Gram stain result.***    Gram stain, PCR, and/or culture results may not always    correspond due to difference in methodologies.    ************************************************************    This PCR assay was performed by multiplex PCR. This    Assay tests for 66 bacterial and resistance gene targets.    Please refer to the [facility] test directory    at [email or web address] for details.  Organism: Blood Culture PCR (08 Aug 2022 20:47)  Organism: Blood Culture PCR (08 Aug 2022 20:47)          RADIOLOGY & ADDITIONAL TESTS:  New Imaging Personally Reviewed Today:  New Electrocardiogram Personally Reviewed Today:  Other Results Reviewed Today:   Prior or Outpatient Records Reviewed Today with Summary:    COORDINATION OF CARE:  Consultant Communication and Details of Discussion (where applicable):

## 2022-08-09 NOTE — DISCHARGE NOTE PROVIDER - PROVIDER TOKENS
PROVIDER:[TOKEN:[[id number]],FOLLOWUP:[2 weeks]],PROVIDER:[TOKEN:[[id number]],FOLLOWUP:[2 weeks]],PROVIDER:[TOKEN:[[id number]],FOLLOWUP:[1 week]]

## 2022-08-09 NOTE — PATIENT PROFILE ADULT - FALL HARM RISK - HARM RISK INTERVENTIONS

## 2022-08-09 NOTE — PHYSICAL THERAPY INITIAL EVALUATION ADULT - RANGE OF MOTION EXAMINATION, REHAB EVAL
except bilateral shoulder flexion at least 90deg/bilateral upper extremity ROM was WFL (within functional limits)/bilateral lower extremity ROM was WFL (within functional limits)

## 2022-08-09 NOTE — PROGRESS NOTE ADULT - ASSESSMENT
Assessment  DMT2: 87y Female with DM T2 with hyperglycemia, A1C 7.4%, was not on any DM meds, now on insulin coverage, blood sugars trending within overall acceptable range, no hypoglycemic episodes, on puree diet, no acute events, DC planning.  Multinodular Thyroid: finding on imaging, TFTs WNL.  Facial Droop: resolved, monitored, FU neuro  UTI: On IV ABx, monitored.        Marianne Ortiz MD  Cell: [phone]  Office: [phone]               Assessment  DMT2: 87y Female with DM T2 with hyperglycemia, A1C 7.4%, was not on any DM meds, now on insulin coverage, blood sugars  trending within overall acceptable range, no hypoglycemic episodes, on puree diet, no acute events, DC planning.  Multinodular Thyroid: finding on imaging, TFTs WNL.  Facial Droop: resolved, monitored, FU neuro  UTI: On IV ABx, monitored.        Marianne Ortiz MD  Cell: [phone]  Office: [phone]

## 2022-08-09 NOTE — DISCHARGE NOTE PROVIDER - NSDCMRMEDTOKEN_GEN_ALL_CORE_FT
aspirin 81 mg oral delayed release tablet: 1 tab(s) orally once a day  escitalopram 10 mg oral tablet: 1 tab(s) orally once a day  hydroCHLOROthiazide 12.5 mg oral capsule: 1 cap(s) orally once a day  Melatonin 10 mg oral capsule: 1 cap(s) orally once a day (at bedtime)  Rozerem 8 mg oral tablet: 1 tab(s) orally once a day (at bedtime)  SEROquel XR 50 mg oral tablet, extended release: 1 tab(s) orally once a day   amLODIPine 10 mg oral tablet: 1 tab(s) orally once a day  aspirin 81 mg oral delayed release tablet: 1 tab(s) orally once a day  atorvastatin 80 mg oral tablet: 1 tab(s) orally once a day (at bedtime)  escitalopram 10 mg oral tablet: 1 tab(s) orally once a day  losartan 50 mg oral tablet: 1 tab(s) orally 2 times a day  Melatonin 10 mg oral capsule: 1 cap(s) orally once a day (at bedtime)  SEROquel XR 50 mg oral tablet, extended release: 1 tab(s) orally once a day   amLODIPine 10 mg oral tablet: 1 tab(s) orally once a day  aspirin 81 mg oral delayed release tablet: 1 tab(s) orally once a day  atorvastatin 80 mg oral tablet: 1 tab(s) orally once a day (at bedtime)  escitalopram 10 mg oral tablet: 1 tab(s) orally once a day  losartan 50 mg oral tablet: 1 tab(s) orally 2 times a day  Melatonin 10 mg oral capsule: 1 cap(s) orally once a day (at bedtime)  SEROquel XR 50 mg oral tablet, extended release: 1 tab(s) orally once a day  Tradjenta 5 mg oral tablet: 1 tab(s) orally once a day    amLODIPine 10 mg oral tablet: 1 tab(s) orally once a day  aspirin 81 mg oral delayed release tablet: 1 tab(s) orally once a day  atorvastatin 80 mg oral tablet: 1 tab(s) orally once a day (at bedtime)  escitalopram 10 mg oral tablet: 1 tab(s) orally once a day  losartan 50 mg oral tablet: 1 tab(s) orally 2 times a day  Melatonin 10 mg oral capsule: 1 cap(s) orally once a day (at bedtime)  SEROquel XR 50 mg oral tablet, extended release: 1 tab(s) orally once a day  Tradjenta 5 mg oral tablet: 1 tab(s) orally once a day   Tradjenta 5 mg oral tablet: 1 tab(s) orally once a day

## 2022-08-09 NOTE — PROGRESS NOTE ADULT - SUBJECTIVE AND OBJECTIVE BOX
Chief complaint  Patient is a 87y old  Female who presents with a chief complaint of UTI (09 Aug 2022 11:53)   Review of systems  NAD, no hypoglycemic episodes.    Labs and Fingersticks  CAPILLARY BLOOD GLUCOSE      POCT Blood Glucose.: 141 mg/dL (09 Aug 2022 11:33)  POCT Blood Glucose.: 179 mg/dL (09 Aug 2022 07:33)  POCT Blood Glucose.: 192 mg/dL (08 Aug 2022 21:01)  POCT Blood Glucose.: 180 mg/dL (08 Aug 2022 16:09)      Anion Gap, Serum: 13 (08-09 @ 06:20)      Calcium, Total Serum: 8.6 (08-09 @ 06:20)          08-09    137  |  102  |  26<H>  ----------------------------<  189<H>  4.0   |  22  |  1.07    Ca    8.6      09 Aug 2022 06:20  Phos  3.9     08-09  Mg     2.1     08-09                          12.3   7.69  )-----------( 249      ( 09 Aug 2022 06:20 )             38.9     Medications  MEDICATIONS  (STANDING):  aspirin enteric coated 81 milliGRAM(s) Oral daily  atorvastatin 80 milliGRAM(s) Oral at bedtime  dextrose 5%. 1000 milliLiter(s) (50 mL/Hr) IV Continuous <Continuous>  dextrose 5%. 1000 milliLiter(s) (100 mL/Hr) IV Continuous <Continuous>  dextrose 50% Injectable 25 Gram(s) IV Push once  dextrose 50% Injectable 12.5 Gram(s) IV Push once  dextrose 50% Injectable 25 Gram(s) IV Push once  escitalopram 10 milliGRAM(s) Oral daily  glucagon  Injectable 1 milliGRAM(s) IntraMuscular once  heparin   Injectable 5000 Unit(s) SubCutaneous every 12 hours  hydrALAZINE 10 milliGRAM(s) Oral three times a day  insulin lispro (ADMELOG) corrective regimen sliding scale   SubCutaneous three times a day before meals  insulin lispro (ADMELOG) corrective regimen sliding scale   SubCutaneous at bedtime  losartan 50 milliGRAM(s) Oral two times a day  melatonin 10 milliGRAM(s) Oral at bedtime  QUEtiapine 50 milliGRAM(s) Oral at bedtime      Physical Exam  Vital Signs Last 12 Hrs  T(F): 97.6 (08-09-22 @ 14:05), Max: 98.1 (08-09-22 @ 05:14)  HR: 62 (08-09-22 @ 14:05) (60 - 74)  BP: 194/68 (08-09-22 @ 14:05) (146/60 - 194/68)  BP(mean): --  RR: 18 (08-09-22 @ 14:05) (18 - 18)  SpO2: 99% (08-09-22 @ 14:05) (95% - 99%)           Chief complaint  Patient is a 87y old  Female who presents with a chief complaint of UTI (09 Aug 2022 11:53)   Review of systems  NAD, no hypoglycemic episodes.    Labs and Fingersticks  CAPILLARY BLOOD GLUCOSE      POCT Blood Glucose.: 141 mg/dL (09 Aug 2022 11:33)  POCT Blood Glucose.: 179 mg/dL (09 Aug 2022 07:33)  POCT Blood Glucose.: 192 mg/dL (08 Aug 2022 21:01)  POCT Blood Glucose.: 180 mg/dL (08 Aug 2022 16:09)      Anion Gap, Serum: 13 (08-09 @ 06:20)      Calcium, Total Serum: 8.6 (08-09 @ 06:20)          08-09    137  |  102  |  26<H>  ----------------------------<  189<H>  4.0   |  22  |  1.07    Ca    8.6      09 Aug 2022 06:20  Phos  3.9     08-09  Mg     2.1     08-09                          12.3   7.69  )-----------( 249      ( 09 Aug 2022 06:20 )             38.9     Medications  MEDICATIONS  (STANDING):  aspirin enteric coated 81 milliGRAM(s) Oral daily  atorvastatin 80 milliGRAM(s) Oral at bedtime  dextrose 5%. 1000 milliLiter(s) (50 mL/Hr) IV Continuous <Continuous>  dextrose 5%. 1000 milliLiter(s) (100 mL/Hr) IV Continuous <Continuous>  dextrose 50% Injectable 25 Gram(s) IV Push once  dextrose 50% Injectable 12.5 Gram(s) IV Push once  dextrose 50% Injectable 25 Gram(s) IV Push once  escitalopram 10 milliGRAM(s) Oral daily  glucagon  Injectable 1 milliGRAM(s) IntraMuscular once  heparin   Injectable 5000 Unit(s) SubCutaneous every 12 hours  hydrALAZINE 10 milliGRAM(s) Oral three times a day  insulin lispro (ADMELOG) corrective regimen sliding scale   SubCutaneous three times a day before meals  insulin lispro (ADMELOG) corrective regimen sliding scale   SubCutaneous at bedtime  losartan 50 milliGRAM(s) Oral two times a day  melatonin 10 milliGRAM(s) Oral at bedtime  QUEtiapine 50 milliGRAM(s) Oral at bedtime      Physical Exam  Vital Signs Last 12 Hrs  T(F): 97.6 (08-09-22 @ 14:05), Max: 98.1 (08-09-22 @ 05:14)  HR: 62 (08-09-22 @ 14:05) (60 - 74)  BP: 194/68 (08-09-22 @ 14:05) (146/60 - 194/68)  BP(mean): --  RR: 18 (08-09-22 @ 14:05) (18 - 18)  SpO2: 99% (08-09-22 @ 14:05) (95% - 99%)

## 2022-08-09 NOTE — CONSULT NOTE ADULT - SUBJECTIVE AND OBJECTIVE BOX
Patient is a 87y old  Female who presents with a chief complaint of UTI (09 Aug 2022 10:58)    HPI:  86 y/o F PMHx dementia (AAOx0, non-verbal), DM, HTN, and HLD, presented with R sided facial droop. Patient lives with her  and an aide, is AAOx0 and nonverbal at baseline. This morning the aide found her with a right-sided facial droop and drooling out of the right side of her mouth. She also appeared to not be moving her right side as much as usual. She was reportedly normal a few hours before this. The daughter denies any unresponsiveness, and states the droop has now resolved and the patient is now at her mental baseline.    In the ED, Tmax 99.8, HR 82, /109, SpO2 96% on RA. Labs significant for WBC 6.8, Hgb 11.1, BUn 28, Cr 1.3, trop 34, lactate 2.3. UA w/ bacteria, LE, WBC. CXR shows b/l reticular opacities. CT head and CTA head/neck unrevealing. CT chest w/ diffuse GGO and septal thickening. Patient received ceftriaxone, azithromycin, and 1L NS. (06 Aug 2022 23:45)    REVIEW OF SYSTEMS  [ x ] ROS unobtainable because:  unable to provide due to dementia  [  ] All other systems negative except as noted below    Constitutional:  [ ] fever [ ] chills  [ ] weight loss  [ ]night sweat  [ ]poor appetite/PO intake [ ]fatigue   Skin:  [ ] rash [ ] phlebitis	  Eyes: [ ] icterus [ ] pain  [ ] discharge	  ENMT: [ ] sore throat  [ ] thrush [ ] ulcers [ ] exudates [ ]anosmia  Respiratory: [ ] dyspnea [ ] hemoptysis [ ] cough [ ] sputum	  Cardiovascular:  [ ] chest pain [ ] palpitations [ ] edema	  Gastrointestinal:  [ ] nausea [ ] vomiting [ ] diarrhea [ ] constipation [ ] pain	  Genitourinary:  [ ] dysuria [ ] frequency [ ] hematuria [ ] discharge [ ] flank pain  [ ] incontinence  Musculoskeletal:  [ ] myalgias [ ] arthralgias [ ] arthritis  [ ] back pain  Neurological:  [ ] headache [ ] weakness [ ] seizures  [ ] confusion/altered mental status    prior hospital charts reviewed [V]  primary team notes reviewed [V]  other consultant notes reviewed [V]    PAST MEDICAL & SURGICAL HISTORY:  Diabetes mellitus  HTN (hypertension)  HLD (hyperlipidemia)    No significant past surgical history    FAMILY HISTORY:  No pertinent family history in first degree relatives    SOCIAL HISTORY:  Unable to provide due to dementia    Allergies  Allergy Status Unknown    ANTIMICROBIALS:  cefTRIAXone   IVPB 1000 every 24 hours    ANTIMICROBIALS (past 90 days):   MEDICATIONS  (STANDING):  azithromycin  IVPB   255 mL/Hr IV Intermittent (08-06-22 @ 22:24)    azithromycin  IVPB   255 mL/Hr IV Intermittent (08-07-22 @ 21:16)    cefTRIAXone   IVPB   100 mL/Hr IV Intermittent (08-08-22 @ 21:27)   100 mL/Hr IV Intermittent (08-07-22 @ 20:43)    cefTRIAXone   IVPB   100 mL/Hr IV Intermittent (08-06-22 @ 21:30)    MEDICATIONS  (STANDING):  acetaminophen     Tablet .. 650 every 6 hours PRN  aspirin enteric coated 81 daily  atorvastatin 80 at bedtime  dextrose 50% Injectable 25 once  dextrose 50% Injectable 12.5 once  dextrose 50% Injectable 25 once  dextrose Oral Gel 15 once PRN  escitalopram 10 daily  glucagon  Injectable 1 once  heparin   Injectable 5000 every 12 hours  insulin lispro (ADMELOG) corrective regimen sliding scale  three times a day before meals  insulin lispro (ADMELOG) corrective regimen sliding scale  at bedtime  losartan 50 two times a day  melatonin 10 at bedtime  QUEtiapine 25 daily PRN  QUEtiapine 50 at bedtime    VITALS:  Vital Signs Last 24 Hrs  T(F): 97.8 (08-09-22 @ 09:34), Max: 99.8 (08-06-22 @ 21:54)  Vital Signs Last 24 Hrs  HR: 61 (08-09-22 @ 09:45) (59 - 74)  BP: 184/61 (08-09-22 @ 09:45) (141/77 - 189/67)  RR: 18 (08-09-22 @ 09:34)  SpO2: 97% (08-09-22 @ 09:34) (95% - 98%)  Wt(kg): --    PHYSICAL EXAM:        Labs:                        12.3   7.69  )-----------( 249      ( 09 Aug 2022 06:20 )             38.9     08-09    137  |  102  |  26<H>  ----------------------------<  189<H>  4.0   |  22  |  1.07    Ca    8.6      09 Aug 2022 06:20  Phos  3.9     08-09  Mg     2.1     08-09      WBC Trend:  WBC Count: 7.69 (08-09-22 @ 06:20)  WBC Count: 9.69 (08-07-22 @ 09:39)  WBC Count: 6.83 (08-06-22 @ 18:41)    Auto Neutrophil #: 4.10 K/uL (08-06-22 @ 18:41)    Auto Eosinophil %: 0.9 % (08-06-22 @ 18:41)      MICROBIOLOGY:  MRSA PCR Result.: NotDetec (08-06-22 @ 23:06)    Culture - Blood (collected 06 Aug 2022 21:45)  Source: .Blood Blood-Peripheral  Preliminary Report:    No growth to date.    Culture - Blood (collected 06 Aug 2022 21:30)  Source: .Blood Blood-Peripheral  Preliminary Report:    Growth in anaerobic bottle: Gram Positive Cocci in Pairs and Chains    ***Blood Panel PCR results on this specimen are available    approximately 3 hours after the Gram stain result.***    Gram stain, PCR, and/or culture results may not always    correspond due to difference in methodologies.    ************************************************************    This PCR assay was performed by multiplex PCR. This    Assay tests for 66 bacterial and resistance gene targets.    Please refer to the [facility] test directory    at [email or web address] for details.  Organism: Blood Culture PCR  Organism: Blood Culture PCR    Sensitivities:      -  Streptococcus sp. (Not Grp A, B or S pneumoniae): Detec      Method Type: PCR    Legionella Antigen, Urine: Negative (08-06 @ 22:03)    RADIOLOGY:  imaging below personally reviewed    < from: CT Chest No Cont (08.06.22 @ 22:15) >  IMPRESSION:  Severely motion limited exam.  Nonspecific groundglass opacities and interlobular septal thickening.    Findings may represent pulmonary edema, infection, or inflammation.  < end of copied text >    < from: MR Head No Cont (08.08.22 @ 18:29) >  IMPRESSION:  There is no mass, intracranial hemorrhage, or acute infarction.  Central predominant volume loss versus communicating hydrocephalus.  Extensive chronic microvascular ischemic changes.  < end of copied text >     Patient is a 87y old  Female who presents with a chief complaint of UTI (09 Aug 2022 10:58)    HPI:  86 y/o F PMHx dementia (AAOx0, non-verbal), DM, HTN, and HLD, presented with R sided facial droop. Patient lives with her  and an aide, is AAOx0 and nonverbal at baseline. This morning the aide found her with a right-sided facial droop and drooling out of the right side of her mouth. She also appeared to not be moving her right side as much as usual. She was reportedly normal a few hours before this. The daughter denies any unresponsiveness, and states the droop has now resolved and the patient is now at her mental baseline.    In the ED, Tmax 99.8, HR 82, /109, SpO2 96% on RA. Labs significant for WBC 6.8, Hgb 11.1, BUn 28, Cr 1.3, trop 34, lactate 2.3. UA w/ bacteria, LE, WBC. CXR shows b/l reticular opacities. CT head and CTA head/neck unrevealing. CT chest w/ diffuse GGO and septal thickening. Patient received ceftriaxone, azithromycin, and 1L NS. (06 Aug 2022 23:45)    ID consulted for positive bcx growing strep spp in 1 bottle. Patient confused, unable to provide history.       REVIEW OF SYSTEMS  [ x ] ROS unobtainable because:  unable to provide due to dementia  [  ] All other systems negative except as noted below    Constitutional:  [ ] fever [ ] chills  [ ] weight loss  [ ]night sweat  [ ]poor appetite/PO intake [ ]fatigue   Skin:  [ ] rash [ ] phlebitis	  Eyes: [ ] icterus [ ] pain  [ ] discharge	  ENMT: [ ] sore throat  [ ] thrush [ ] ulcers [ ] exudates [ ]anosmia  Respiratory: [ ] dyspnea [ ] hemoptysis [ ] cough [ ] sputum	  Cardiovascular:  [ ] chest pain [ ] palpitations [ ] edema	  Gastrointestinal:  [ ] nausea [ ] vomiting [ ] diarrhea [ ] constipation [ ] pain	  Genitourinary:  [ ] dysuria [ ] frequency [ ] hematuria [ ] discharge [ ] flank pain  [ ] incontinence  Musculoskeletal:  [ ] myalgias [ ] arthralgias [ ] arthritis  [ ] back pain  Neurological:  [ ] headache [ ] weakness [ ] seizures  [ ] confusion/altered mental status    prior hospital charts reviewed [V]  primary team notes reviewed [V]  other consultant notes reviewed [V]    PAST MEDICAL & SURGICAL HISTORY:  Diabetes mellitus  HTN (hypertension)  HLD (hyperlipidemia)    No significant past surgical history    FAMILY HISTORY:  No pertinent family history in first degree relatives    SOCIAL HISTORY:  Unable to provide due to dementia    Allergies  Allergy Status Unknown    ANTIMICROBIALS:  cefTRIAXone   IVPB 1000 every 24 hours    ANTIMICROBIALS (past 90 days):   MEDICATIONS  (STANDING):  azithromycin  IVPB   255 mL/Hr IV Intermittent (08-06-22 @ 22:24)    azithromycin  IVPB   255 mL/Hr IV Intermittent (08-07-22 @ 21:16)    cefTRIAXone   IVPB   100 mL/Hr IV Intermittent (08-08-22 @ 21:27)   100 mL/Hr IV Intermittent (08-07-22 @ 20:43)    cefTRIAXone   IVPB   100 mL/Hr IV Intermittent (08-06-22 @ 21:30)    MEDICATIONS  (STANDING):  acetaminophen     Tablet .. 650 every 6 hours PRN  aspirin enteric coated 81 daily  atorvastatin 80 at bedtime  dextrose 50% Injectable 25 once  dextrose 50% Injectable 12.5 once  dextrose 50% Injectable 25 once  dextrose Oral Gel 15 once PRN  escitalopram 10 daily  glucagon  Injectable 1 once  heparin   Injectable 5000 every 12 hours  insulin lispro (ADMELOG) corrective regimen sliding scale  three times a day before meals  insulin lispro (ADMELOG) corrective regimen sliding scale  at bedtime  losartan 50 two times a day  melatonin 10 at bedtime  QUEtiapine 25 daily PRN  QUEtiapine 50 at bedtime    VITALS:  Vital Signs Last 24 Hrs  T(F): 97.8 (08-09-22 @ 09:34), Max: 99.8 (08-06-22 @ 21:54)  Vital Signs Last 24 Hrs  HR: 61 (08-09-22 @ 09:45) (59 - 74)  BP: 184/61 (08-09-22 @ 09:45) (141/77 - 189/67)  RR: 18 (08-09-22 @ 09:34)  SpO2: 97% (08-09-22 @ 09:34) (95% - 98%)  Wt(kg): --    PHYSICAL EXAM:  Constitutional: non-toxic, no distress  HEAD/EYES: anicteric  ENT:  supple, no mucositis  Cardiovascular:   +S1, S2  Respiratory:  clear BS bilaterally  GI:  soft, non-tender, +bowel sounds  :  no castillo, no CVA tenderness  Musculoskeletal:  no joint swelling  Neurologic: awake, AAOx0  Skin:  no rash, no sacral decub  Heme/Onc:  no cervical lymphadenopathy  Psychiatric:  calm      Labs:                        12.3   7.69  )-----------( 249      ( 09 Aug 2022 06:20 )             38.9     08-09    137  |  102  |  26<H>  ----------------------------<  189<H>  4.0   |  22  |  1.07    Ca    8.6      09 Aug 2022 06:20  Phos  3.9     08-09  Mg     2.1     08-09      WBC Trend:  WBC Count: 7.69 (08-09-22 @ 06:20)  WBC Count: 9.69 (08-07-22 @ 09:39)  WBC Count: 6.83 (08-06-22 @ 18:41)    Auto Neutrophil #: 4.10 K/uL (08-06-22 @ 18:41)    Auto Eosinophil %: 0.9 % (08-06-22 @ 18:41)      MICROBIOLOGY:  MRSA PCR Result.: NotDetec (08-06-22 @ 23:06)    Culture - Blood (collected 06 Aug 2022 21:45)  Source: .Blood Blood-Peripheral  Preliminary Report:    No growth to date.    Culture - Blood (collected 06 Aug 2022 21:30)  Source: .Blood Blood-Peripheral  Preliminary Report:    Growth in anaerobic bottle: Gram Positive Cocci in Pairs and Chains    ***Blood Panel PCR results on this specimen are available    approximately 3 hours after the Gram stain result.***    Gram stain, PCR, and/or culture results may not always    correspond due to difference in methodologies.    ************************************************************    This PCR assay was performed by multiplex PCR. This    Assay tests for 66 bacterial and resistance gene targets.    Please refer to the [facility] test directory    at [email or web address] for details.  Organism: Blood Culture PCR  Organism: Blood Culture PCR    Sensitivities:      -  Streptococcus sp. (Not Grp A, B or S pneumoniae): Detec      Method Type: PCR    Legionella Antigen, Urine: Negative (08-06 @ 22:03)    RADIOLOGY:  imaging below personally reviewed    < from: CT Chest No Cont (08.06.22 @ 22:15) >  IMPRESSION:  Severely motion limited exam.  Nonspecific groundglass opacities and interlobular septal thickening.    Findings may represent pulmonary edema, infection, or inflammation.  < end of copied text >    < from: MR Head No Cont (08.08.22 @ 18:29) >  IMPRESSION:  There is no mass, intracranial hemorrhage, or acute infarction.  Central predominant volume loss versus communicating hydrocephalus.  Extensive chronic microvascular ischemic changes.  < end of copied text >

## 2022-08-09 NOTE — PROGRESS NOTE ADULT - SUBJECTIVE AND OBJECTIVE BOX
Subjective: Patient seen and examined. No new events except as noted.     REVIEW OF SYSTEMS:    CONSTITUTIONAL: + weakness, fevers or chills  EYES/ENT: No visual changes;  No vertigo or throat pain   NECK: No pain or stiffness  RESPIRATORY: No cough, wheezing, hemoptysis; No shortness of breath  CARDIOVASCULAR: No chest pain or palpitations  GASTROINTESTINAL: No abdominal or epigastric pain. No nausea, vomiting, or hematemesis; No diarrhea or constipation. No melena or hematochezia.  GENITOURINARY: No dysuria, frequency or hematuria  NEUROLOGICAL: No numbness or weakness  SKIN: No itching, burning, rashes, or lesions   All other review of systems is negative unless indicated above.    MEDICATIONS:  MEDICATIONS  (STANDING):  aspirin enteric coated 81 milliGRAM(s) Oral daily  atorvastatin 80 milliGRAM(s) Oral at bedtime  cefTRIAXone   IVPB 1000 milliGRAM(s) IV Intermittent every 24 hours  dextrose 5%. 1000 milliLiter(s) (50 mL/Hr) IV Continuous <Continuous>  dextrose 5%. 1000 milliLiter(s) (100 mL/Hr) IV Continuous <Continuous>  dextrose 50% Injectable 25 Gram(s) IV Push once  dextrose 50% Injectable 12.5 Gram(s) IV Push once  dextrose 50% Injectable 25 Gram(s) IV Push once  escitalopram 10 milliGRAM(s) Oral daily  glucagon  Injectable 1 milliGRAM(s) IntraMuscular once  heparin   Injectable 5000 Unit(s) SubCutaneous every 12 hours  insulin lispro (ADMELOG) corrective regimen sliding scale   SubCutaneous three times a day before meals  insulin lispro (ADMELOG) corrective regimen sliding scale   SubCutaneous at bedtime  losartan 50 milliGRAM(s) Oral two times a day  melatonin 10 milliGRAM(s) Oral at bedtime  QUEtiapine 50 milliGRAM(s) Oral at bedtime  sodium chloride 0.9%. 1000 milliLiter(s) (75 mL/Hr) IV Continuous <Continuous>    PHYSICAL EXAM:  T(C): 36.6 (08-09-22 @ 09:34), Max: 36.8 (08-08-22 @ 13:33)  HR: 61 (08-09-22 @ 09:45) (59 - 74)  BP: 184/61 (08-09-22 @ 09:45) (141/77 - 189/67)  RR: 18 (08-09-22 @ 09:34) (17 - 18)  SpO2: 97% (08-09-22 @ 09:34) (95% - 98%)  Wt(kg): --  I&O's Summary    08 Aug 2022 07:01  -  09 Aug 2022 07:00  --------------------------------------------------------  IN: 680 mL / OUT: 1960 mL / NET: -1280 mL    09 Aug 2022 07:01  -  09 Aug 2022 10:58  --------------------------------------------------------  IN: 80 mL / OUT: 0 mL / NET: 80 mL    Appearance: NAD	  HEENT: Normal oral mucosa, PERRL, EOMI	  Lymphatic: No lymphadenopathy , no edema  Cardiovascular: Normal S1 S2, No JVD, No murmurs , Peripheral pulses palpable 2+ bilaterally  Respiratory: Decreased BS, normal effort 	  Gastrointestinal: Soft, Non-tender, + BS	  Skin: No rashes, No ecchymoses, No cyanosis, warm to touch  Musculoskeletal: Decreased ROM/Strength  Psychiatry: Nonverbal  Ext: No edema    LABS:    CARDIAC MARKERS:  CARDIAC MARKERS ( 07 Aug 2022 15:46 )  x     / x     / 214 U/L / x     / 5.7 ng/mL  CARDIAC MARKERS ( 07 Aug 2022 09:39 )  x     / x     / 205 U/L / x     / 5.6 ng/mL                        12.3   7.69  )-----------( 249      ( 09 Aug 2022 06:20 )             38.9     08-09    137  |  102  |  26<H>  ----------------------------<  189<H>  4.0   |  22  |  1.07    Ca    8.6      09 Aug 2022 06:20  Phos  3.9     08-09  Mg     2.1     08-09    proBNP:   Lipid Profile:   HgA1c:   TSH: Thyroid Stimulating Hormone, Serum: 2.59 uIU/mL (08-09 @ 06:20)    TELEMETRY: SR 50-70	    ECG:  	  RADIOLOGY:   DIAGNOSTIC TESTING:  [ ] Echocardiogram:  [ ]  Catheterization:  [ ] Stress Test:    OTHER:

## 2022-08-09 NOTE — DISCHARGE NOTE PROVIDER - HOSPITAL COURSE
87yoM w/ PMHx of DM, HTN, HLD, dementia (AAOx0, nonverbal at baseline) presenting to the ED w/ right sided facial droop (now resolved), admitted to medicine for UTI and possible PNA; r/o CVA workup underway, neuro following.      Problem/Plan - 1:  ·  Problem: Facial droop.   ·  Plan: - resolved, patient at mental baseline as per daughter   - CT head/ perfusion scan with no acute pathology to explain symptoms   - CTA Head/neck: CTA COW:  Patent intracranial circulation without flow limiting stenosis or large vessel occlusion. Moderate stenosis at the right ICA origin. Multinodular thyroid gland.  - MRI head with no acute pathology, +Extensive chronic microvascular ischemic changes.   - c/w ASA/high intensity statin  - low grade-fever: treatment of UTI +/- PNA as below.     Problem/Plan - 2:  ·  Problem: Acute UTI.   ·  Plan: - UA w/ LE, WBC, bacteria. low-grade fevers  - c/w ceftriaxone  - Ucs pending  - blood cultures +1/4 strep species -likley contaminated. seen by ID (8/9), d/augustus CTX. monitor off abx      Problem/Plan - 3:  ·  Problem: Opacity of lung on imaging study.   ·  Plan: - CT chest: Nonspecific groundglass opacities and interlobular septal thickening. Findings may represent pulmonary edema, infection, or inflammation. no hx of choking w/ food as per dtr  - RVP negative, COVID negative, legionella negative  - d/augustus ceftriaxone (8/6--8/9); d/augustus azithromycin  - aspiration precautions  - speech and swallow eval ordered - puree with mod thick liquids    -    Problem/Plan - 4:  ·  Problem: Dementia.   ·  Plan: - AAOx0, nonverbal at baseline. Currently at baseline as per daughter  - c/w escitalopram 10mg qd  - c/w home seroquel 50mg ER qd  - seroquel 25mg po qd PRN agitation  - on home rozerem 8mg po qhs. Not available as per pharmacy. Daughter will need to bring in.     Problem/Plan - 5:  ·  Problem: HTN (hypertension).   ·  Plan: BP better controlled today   cardiology following, will d/c HCTZ,  increase losartan to 50mg BID   titrate as needed, close BP monitoring.     Problem/Plan - 6:  ·  Problem: DM (diabetes mellitus).   ·  Plan: - not on meds  - low dose admelog correction scale TIDQAC and QHS  - check FSG before meals and QHS  - HgbA1c 7.4.     Problem/Plan - 7:  ·  Problem: Prophylactic measure.   ·  Plan: Diet: NPO for now, CC/DASH/Kosher once approved by [employer]  DVT Prophylaxis: HSQ  Full code.   87yoM w/ PMHx of DM, HTN, HLD, dementia (AAOx0, nonverbal at baseline) presenting to the ED w/ right sided facial droop (now resolved), admitted to medicine for UTI and possible PNA; r/o CVA workup underway, neuro following.      Problem/Plan - 1:  ·  Problem: Facial droop.   ·  Plan: - resolved, patient at mental baseline as per daughter   - CT head/ perfusion scan with no acute pathology to explain symptoms   - CTA Head/neck: CTA COW:  Patent intracranial circulation without flow limiting stenosis or large vessel occlusion. Moderate stenosis at the right ICA origin. Multinodular thyroid gland.  - MRI head with no acute pathology, +Extensive chronic microvascular ischemic changes.   - c/w ASA/high intensity statin  - low grade-fever: treatment of UTI +/- PNA as below.     Problem/Plan - 2:  ·  Problem: Acute UTI.   ·  Plan: - UA w/ LE, WBC, bacteria. low-grade fevers  - c/w ceftriaxone  - Ucs pending  - blood cultures +1/4 strep species -likley contaminated. seen by ID (8/9), d/augustus CTX. monitor off abx      Problem/Plan - 3:  ·  Problem: Opacity of lung on imaging study.   ·  Plan: - CT chest: Nonspecific groundglass opacities and interlobular septal thickening. Findings may represent pulmonary edema, infection, or inflammation. no hx of choking w/ food as per dtr  - RVP negative, COVID negative, legionella negative  - d/augustus ceftriaxone (8/6--8/9); d/augustus azithromycin  - aspiration precautions  - speech and swallow eval ordered - puree with mod thick liquids    -    Problem/Plan - 4:  ·  Problem: Dementia.   ·  Plan: - AAOx0, nonverbal at baseline. Currently at baseline as per daughter  - c/w escitalopram 10mg qd  - c/w home seroquel 50mg ER qd  - seroquel 25mg po qd PRN agitation  - on home rozerem 8mg po qhs. Not available as per pharmacy. Daughter will need to bring in.     Problem/Plan - 5:  ·  Problem: HTN (hypertension).   ·  Plan: BP better controlled today   cardiology following, will d/c HCTZ,  increase losartan to 50mg BID   titrate as needed, close BP monitoring.     Problem/Plan - 6:  ·  Problem: DM (diabetes mellitus).   ·  Plan: - not on meds  - low dose admelog correction scale TIDQAC and QHS  - check FSG before meals and QHS  - HgbA1c 7.4.     Problem/Plan - 7:  ·  Problem: Prophylactic measure.   ·  Plan: Diet: NPO for now, CC/DASH/Kosher once approved by [employer]  DVT Prophylaxis: HSQ  Full code    Discharged home with home care 87yoM w/ PMHx of DM, HTN, HLD, dementia (AAOx0, nonverbal at baseline) presenting to the ED w/ right sided facial droop (now resolved), admitted to medicine for UTI and possible PNA; r/o CVA workup underway, neuro following.      Problem/Plan - 1:  ·  Problem: Facial droop.   ·  Plan: - resolved, patient at mental baseline as per daughter   - CT head/ perfusion scan with no acute pathology to explain symptoms   - CTA Head/neck: CTA COW:  Patent intracranial circulation without flow limiting stenosis or large vessel occlusion. Moderate stenosis at the right ICA origin. Multinodular thyroid gland.  - MRI head with no acute pathology, +Extensive chronic microvascular ischemic changes.   - c/w ASA/high intensity statin  - low grade-fever: treatment of UTI +/- PNA as below.     Problem/Plan - 2:  ·  Problem: Acute UTI.   ·  Plan: - UA w/ LE, WBC, bacteria. low-grade fevers  - s/p ceftriaxone x 3 days, no further abx needed   - Ucs pending  - blood cultures +1/4 strep species -likley contaminated. seen by ID (8/9), d/augustus CTX. monitor off abx      Problem/Plan - 3:  ·  Problem: Opacity of lung on imaging study.   ·  Plan: - CT chest: Nonspecific groundglass opacities and interlobular septal thickening. Findings may represent pulmonary edema, infection, or inflammation. no hx of choking w/ food as per dtr  - RVP negative, COVID negative, legionella negative  - d/augustus ceftriaxone (8/6--8/9); d/augustus azithromycin  - aspiration precautions  - speech and swallow eval ordered - puree with mod thick liquids    -    Problem/Plan - 4:  ·  Problem: Dementia.   ·  Plan: - AAOx0, nonverbal at baseline. Currently at baseline as per daughter  - c/w escitalopram 10mg qd  - c/w home seroquel 50mg ER qd  - seroquel 25mg po qd PRN agitation  - on home rozerem 8mg po qhs. Not available as per pharmacy. Daughter will need to bring in.     Problem/Plan - 5:  ·  Problem: HTN (hypertension).   ·  Plan: BP better controlled today   cardiology following, will d/c HCTZ,  increase losartan to 50mg BID   titrate as needed, close BP monitoring.     Problem/Plan - 6:  ·  Problem: DM (diabetes mellitus).   ·  Plan:   - to start on tradjenta 5mg daily at home and f/u with Dr Dickson  - HgbA1c 7.4.  - pt also noted with multinodular thyroid - can f/u as outpt with Dr Dickson      Problem/Plan - 7:  ·  Problem: Prophylactic measure.   ·  Plan: Diet: NPO for now, CC/DASH/Kosher once approved by [employer]  DVT Prophylaxis: HSQ  Full code    Discharged home with home care

## 2022-08-09 NOTE — PHYSICAL THERAPY INITIAL EVALUATION ADULT - PERTINENT HX OF CURRENT PROBLEM, REHAB EVAL
PMHx: DM, HTN, HLD, dementia. presenting to ED with R sided facial droop. This morning aide found pt with a R-sided facial droop, drooling out of R side of mouth, appeared to not be moving R side as much as usual. She was reportedly normal a few hours before this. NIHSS 18. facial droop has resolved & pt is now at mental baseline. CXR: bilateral reticular opacities. CTH/CTA head/neck unrevealing. CT chest: diffuse groundglass opacities & septal thickening. +UTI, possible pna. hypertensive.    CT head: No acute ICH or acute territorial infarction. Moderately large ventricles which may be secondary to Central predominant volume loss versus communicating hydrocephalus. Chronic microvascular ischemic changes. CT chest: Nonspecific groundglass opacities and interlobular septal thickening. Findings may represent pulmonary edema, infection, or inflammation.

## 2022-08-09 NOTE — PROVIDER CONTACT NOTE (OTHER) - BACKGROUND
Patient admitted with dementia, rule out stroke
pt admitted with AMS, right side facial droop, dx UTI/PNA
pt admitted with altered mental status, right side facial droop. Dx UTI/PNA

## 2022-08-09 NOTE — DISCHARGE NOTE PROVIDER - NSDCCPCAREPLAN_GEN_ALL_CORE_FT
PRINCIPAL DISCHARGE DIAGNOSIS  Diagnosis: Altered mental status  Assessment and Plan of Treatment: with facial drooping.   now resolved. pt is at baseline mentation.   CT head and CTA neck unremarkable.   MRI head with no acute pathology, +Extensive chronic microvascular ischemic changes.   continue with aspirin and statin.   treated UTI/ pneumonia with azithromycin and ceftriaxone.   no signs and symptoms of infection at this time.         SECONDARY DISCHARGE DIAGNOSES  Diagnosis: Interstitial pneumonia of both lungs  Assessment and Plan of Treatment: started azithromycin and ceftriaxone.   now off antibiotic.   less likely aspirated pneumonia.   no signs and symptoms of infection, vs stable.    Diagnosis: Acute UTI  Assessment and Plan of Treatment: HOME CARE INSTRUCTIONS  f you were prescribed antibiotics, take them exactly as your caregiver instructs you. Finish the medication even if you feel better after you have only taken some of the medication.  Drink enough water and fluids to keep your urine clear or pale yellow.  Avoid caffeine, tea, and carbonated beverages. They tend to irritate your bladder.  Empty your bladder often. Avoid holding urine for long periods of time.  Empty your bladder before and after sexual intercourse.  After a bowel movement, women should cleanse from front to back. Use each tissue only once.  SEEK MEDICAL CARE IF:  You have back pain.  You develop a fever.  Your symptoms do not begin to resolve within 3 days.  SEEK IMMEDIATE MEDICAL CARE IF:  You have severe back pain or lower abdominal pain.  You develop chills.  You have nausea or vomiting.  You have continued burning or discomfort with urination.      Diagnosis: Dementia  Assessment and Plan of Treatment: AAOx0, nonverbal at baseline.   continue with escitalopram 10mg daily, seroquel 50mg ER daily, Seroquel 25mg daily as needed for agitation, and rozerem 8mg at bedtime.       Diagnosis: Hypertension  Assessment and Plan of Treatment: Low salt diet  Activity as tolerated.  Take all medication as prescribed.  Follow up with your medical doctor for routine blood pressure monitoring at your next visit.  Notify your doctor if you have any of the following symptoms:   Dizziness, Lightheadedness, Blurry vision, Headache, Chest pain, Shortness of breath      Diagnosis: DM2 (diabetes mellitus, type 2)  Assessment and Plan of Treatment: HgA1C this admission 7.4.  Make sure you get your HgA1c checked every three months.  It's important not to skip any meals.  Keep a log of your blood glucose results and always take it with you to your doctor appointments.  Keep a list of your current medications including injectables and over the counter medications and bring this medication list with you to all your doctor appointments.  If you have not seen your opthalmologist this year call for appointment.  Check your feet daily for redness, sores, or openings. Do not self treat. If no improvement in two days call your primary care physician for an appointment.  Low blood sugar (hypoglycemia) is a blood sugar below 70mg/dl. Check your blood sugar if you feel signs/symptoms of hypoglycemia. If your blood sugar is below 70 take 15 grams of carbohydrates (ex 4 oz of apple juice, 3-4 glucosr tablets, or 4-6 oz of regular soda) wait 15 minutes and repeat blood sugar to make sure it comes up above 70.  If your blood sugar is above 70 and you are due for a meal, have a meal.  If you are not due for a meal have a snack.  This snack helps keeps your blood sugar at a safe range.       PRINCIPAL DISCHARGE DIAGNOSIS  Diagnosis: Altered mental status  Assessment and Plan of Treatment: with facial drooping.   now resolved. pt is at baseline mentation.   CT head and CTA neck unremarkable.   MRI head with no acute pathology, +Extensive chronic microvascular ischemic changes.   continue with aspirin and statin.   treated UTI/ pneumonia with azithromycin and ceftriaxone.   no signs and symptoms of infection at this time.         SECONDARY DISCHARGE DIAGNOSES  Diagnosis: Interstitial pneumonia of both lungs  Assessment and Plan of Treatment: started azithromycin and ceftriaxone.   now off antibiotic.   less likely aspirated pneumonia.   no signs and symptoms of infection, vs stable.    Diagnosis: Acute UTI  Assessment and Plan of Treatment: HOME CARE INSTRUCTIONS  f you were prescribed antibiotics, take them exactly as your caregiver instructs you. Finish the medication even if you feel better after you have only taken some of the medication.  Drink enough water and fluids to keep your urine clear or pale yellow.  Avoid caffeine, tea, and carbonated beverages. They tend to irritate your bladder.  Empty your bladder often. Avoid holding urine for long periods of time.  Empty your bladder before and after sexual intercourse.  After a bowel movement, women should cleanse from front to back. Use each tissue only once.  SEEK MEDICAL CARE IF:  You have back pain.  You develop a fever.  Your symptoms do not begin to resolve within 3 days.  SEEK IMMEDIATE MEDICAL CARE IF:  You have severe back pain or lower abdominal pain.  You develop chills.  You have nausea or vomiting.  You have continued burning or discomfort with urination.      Diagnosis: Dementia  Assessment and Plan of Treatment: AAOx0, nonverbal at baseline.   continue with escitalopram 10mg daily, seroquel 50mg ER daily, Seroquel 25mg daily as needed for agitation, and rozerem 8mg at bedtime.       Diagnosis: Hypertension  Assessment and Plan of Treatment: Low salt diet  Activity as tolerated.  Take all medication as prescribed.  Follow up with your medical doctor for routine blood pressure monitoring at your next visit.  Notify your doctor if you have any of the following symptoms:   Dizziness, Lightheadedness, Blurry vision, Headache, Chest pain, Shortness of breath      Diagnosis: DM2 (diabetes mellitus, type 2)  Assessment and Plan of Treatment: HgA1C this admission 7.4. You were started on tradjenta 5mg daily. Please follow up with Dr Dickson.  Make sure you get your HgA1c checked every three months.  It's important not to skip any meals.  Keep a log of your blood glucose results and always take it with you to your doctor appointments.  Keep a list of your current medications including injectables and over the counter medications and bring this medication list with you to all your doctor appointments.  If you have not seen your opthalmologist this year call for appointment.  Check your feet daily for redness, sores, or openings. Do not self treat. If no improvement in two days call your primary care physician for an appointment.  Low blood sugar (hypoglycemia) is a blood sugar below 70mg/dl. Check your blood sugar if you feel signs/symptoms of hypoglycemia. If your blood sugar is below 70 take 15 grams of carbohydrates (ex 4 oz of apple juice, 3-4 glucosr tablets, or 4-6 oz of regular soda) wait 15 minutes and repeat blood sugar to make sure it comes up above 70.  If your blood sugar is above 70 and you are due for a meal, have a meal.  If you are not due for a meal have a snack.  This snack helps keeps your blood sugar at a safe range.      Diagnosis: Multinodular thyroid  Assessment and Plan of Treatment: You were noted to have multinodular thyroid on imaging - please follow up with Dr Dickson.

## 2022-08-09 NOTE — PHYSICAL THERAPY INITIAL EVALUATION ADULT - ADDITIONAL COMMENTS
per  note: pt resided at home with her spouse (3 steps to enter, 0 steps inside), AAOx0 & nonverbal at baseline, 24hr/7day aide services (shared with her spouse). Pt used a walker +1 person assist to ambulate. Pt requires assistance with adl's and showering. Pt has a shower chair and shower grab bars. Pt also has a hospital bed

## 2022-08-10 NOTE — PROGRESS NOTE ADULT - PROBLEM SELECTOR PLAN 1
- resolved, patient at mental baseline as per daughter overnight  - CT head: No acute intracranial hemorrhage or acute territorial infarction. Moderately large ventricles which may be secondary to Central predominant volume loss versus communicating hydrocephalus. Chronic microvascular ischemic changes.  - CT perfusion: No asymmetric or territorial perfusion abnormality.  - CTA Head/neck: CTA COW:  Patent intracranial circulation without flow limiting stenosis or large vessel occlusion. Moderate stenosis at the right ICA origin. Multinodular thyroid gland.  - neuro recs appreciated -- MRI pending, ASA/high intensity statin, f/u A1C/lipid profile  - low grade-fever: treatment of UTI +/- PNA as below  - C/w aspirin and statin  - Telemetry   - MRI brain wo ordered
c/w losartan 50mg PO BID  added amlodipine 10mg PO daily   continue to monitor BPs
Will continue current insulin regimen for now. Will continue monitoring FS, log, and FU.  Low-carb diet as outpatient, FU Endo Dr Dickson.
- resolved, patient at mental baseline as per daughter overnight  - CT head/ perfusion scan with no acute pathology to explain symptoms   - CTA Head/neck: CTA COW:  Patent intracranial circulation without flow limiting stenosis or large vessel occlusion. Moderate stenosis at the right ICA origin. Multinodular thyroid gland.  - neuro following, recs appreciated -- MRI head pending, c/w ASA/high intensity statin  - low grade-fever: treatment of UTI +/- PNA as below  - C/w aspirin and statin  - Telemetry   - MRI brain wo ordered
c/w losartan 50mg PO BID  continue to monitor BPs
Will start Lantus 6u at bedtime and continue coverage scale. Will continue monitoring FS, log, and FU.  Low-carb diet as outpatient, FU Endo Dr Dickson.
- resolved, patient at mental baseline as per daughter   - CT head/ perfusion scan with no acute pathology to explain symptoms   - CTA Head/neck: CTA COW:  Patent intracranial circulation without flow limiting stenosis or large vessel occlusion. Moderate stenosis at the right ICA origin. Multinodular thyroid gland.  - MRI head with no acute pathology, +Extensive chronic microvascular ischemic changes.   - c/w ASA/high intensity statin  - low grade-fever: treatment of UTI +/- PNA as below
- resolved, patient at mental baseline as per daughter   - CT head/ perfusion scan with no acute pathology to explain symptoms   - CTA Head/neck: CTA COW:  Patent intracranial circulation without flow limiting stenosis or large vessel occlusion. Moderate stenosis at the right ICA origin. Multinodular thyroid gland.  - MRI head with no acute pathology, +Extensive chronic microvascular ischemic changes.   - c/w ASA/high intensity statin  - low grade-fever: treatment of UTI +/- PNA as below

## 2022-08-10 NOTE — PROGRESS NOTE ADULT - PROBLEM SELECTOR PROBLEM 5
HTN (hypertension)
HTN (hypertension)
Dementia
HTN (hypertension)
DM (diabetes mellitus)
Dementia
HTN (hypertension)
DM (diabetes mellitus)

## 2022-08-10 NOTE — PROGRESS NOTE ADULT - PROBLEM SELECTOR PROBLEM 1
Facial droop
DM (diabetes mellitus)
Facial droop
HTN (hypertension)
DM (diabetes mellitus)
Facial droop
HTN (hypertension)
Facial droop

## 2022-08-10 NOTE — PROGRESS NOTE ADULT - PROBLEM SELECTOR PROBLEM 2
Acute UTI
Multinodular thyroid
Facial droop
Multinodular thyroid
Acute UTI
Facial droop
Acute UTI
Acute UTI

## 2022-08-10 NOTE — PROGRESS NOTE ADULT - SUBJECTIVE AND OBJECTIVE BOX
Subjective: Patient seen and examined. No new events except as noted.     REVIEW OF SYSTEMS:  Unable to obtain.     MEDICATIONS:  MEDICATIONS  (STANDING):  amLODIPine   Tablet 10 milliGRAM(s) Oral daily  aspirin enteric coated 81 milliGRAM(s) Oral daily  atorvastatin 80 milliGRAM(s) Oral at bedtime  dextrose 5%. 1000 milliLiter(s) (50 mL/Hr) IV Continuous <Continuous>  dextrose 5%. 1000 milliLiter(s) (100 mL/Hr) IV Continuous <Continuous>  dextrose 50% Injectable 25 Gram(s) IV Push once  dextrose 50% Injectable 12.5 Gram(s) IV Push once  dextrose 50% Injectable 25 Gram(s) IV Push once  escitalopram 10 milliGRAM(s) Oral daily  glucagon  Injectable 1 milliGRAM(s) IntraMuscular once  heparin   Injectable 5000 Unit(s) SubCutaneous every 12 hours  insulin glargine Injectable (LANTUS) 6 Unit(s) SubCutaneous at bedtime  insulin lispro (ADMELOG) corrective regimen sliding scale   SubCutaneous three times a day before meals  insulin lispro (ADMELOG) corrective regimen sliding scale   SubCutaneous at bedtime  losartan 50 milliGRAM(s) Oral two times a day  melatonin 10 milliGRAM(s) Oral at bedtime  QUEtiapine 50 milliGRAM(s) Oral at bedtime    PHYSICAL EXAM:  T(C): 36.8 (08-10-22 @ 11:45), Max: 37.1 (08-10-22 @ 05:30)  HR: 65 (08-10-22 @ 11:45) (62 - 82)  BP: 168/61 (08-10-22 @ 11:45) (155/65 - 194/68)  RR: 18 (08-10-22 @ 11:45) (18 - 19)  SpO2: 96% (08-10-22 @ 11:45) (96% - 99%)  Wt(kg): --  I&O's Summary    09 Aug 2022 07:01  -  10 Aug 2022 07:00  --------------------------------------------------------  IN: 1655 mL / OUT: 500 mL / NET: 1155 mL    10 Aug 2022 07:01  -  10 Aug 2022 11:48  --------------------------------------------------------  IN: 120 mL / OUT: 0 mL / NET: 120 mL    Appearance: NAD  HEENT: dry oral mucosa, PERRL, EOMI	  Lymphatic: No lymphadenopathy , no edema  Cardiovascular: Normal S1 S2, No JVD, No murmurs , Peripheral pulses palpable 2+ bilaterally  Respiratory: Decreased BS, normal effort 	  Gastrointestinal: Soft, Non-tender, + BS	  Skin: No rashes, No ecchymoses, No cyanosis, warm to touch  Musculoskeletal: Decreased ROM/Strength  Psychiatry:  Sleeping  Ext: No edema    LABS:    CARDIAC MARKERS:  CARDIAC MARKERS ( 07 Aug 2022 15:46 )  x     / x     / 214 U/L / x     / 5.7 ng/mL                        10.9   6.47  )-----------( 242      ( 10 Aug 2022 07:36 )             33.8     08-10    137  |  102  |  30<H>  ----------------------------<  203<H>  3.7   |  24  |  1.21    Ca    8.4      10 Aug 2022 07:36  Phos  3.9     08-09  Mg     2.1     08-09    proBNP:   Lipid Profile:   HgA1c:   TSH:     TELEMETRY: 	    ECG:  	  RADIOLOGY:   DIAGNOSTIC TESTING:  [ ] Echocardiogram:  [ ]  Catheterization:  [ ] Stress Test:    OTHER:

## 2022-08-10 NOTE — PROGRESS NOTE ADULT - NS ATTEND AMEND GEN_ALL_CORE FT
Patient seen and examined today at bedside. Chart, labs, vitals, radiology reviewed. Above H&P reviewed and edited where appropriate. Agree with history and physical exam. Agree with assessment and plan. I reviewed the overnight course of events and discussed the care with the patient and their family  35 minutes spent on total encounter of which more than fifty percent of the encounter was spent counseling and/or coordinating care by the attending physician.
Patient seen and examined today at bedside. Chart, labs, vitals, radiology reviewed. Above H&P reviewed and edited where appropriate. Agree with history and physical exam. Agree with assessment and plan. I reviewed the overnight course of events and discussed the care with the patient and their family  35 minutes spent on total encounter of which more than fifty percent of the encounter was spent counseling and/or coordinating care by the attending physician.

## 2022-08-10 NOTE — CHART NOTE - NSCHARTNOTEFT_GEN_A_CORE
Left a message for patient in regards to follow up care with callback information.
Ms. Connor is an 87y Farsi speaking Female with PMH dementia presenting to the ED w/ right sided facial droop (now resolved), admitted to medicine for UTI and possible PNA; r/o CVA workup underway, neuro following.   -CXR shows b/l reticular opacities.   -Not a tpa candidate given no LKW. Not a thrombectomy candidate given no LVO.   -8/6: HEAD CT: No acute intracranial hemorrhage or acute territorial infarction. Moderately large ventricles which may be secondary to Central predominant volume loss versus communicating hydrocephalus. Chronic microvascular ischemic changes.      Swallow history: No family present. Pt is not a reliable historian. Pt was seen in the ED and placed on puree and moderately thick liquids.     TODAY, Pt was seen for re-evaluation of swallow function and to ensure tolerance vs advancement of textures. RA. NAD. Utilized Merged with [facility]  services. As per  unable to comprehend patients verbalizations despite intermittent moments of speech output. She remained pleasant and cooperative with frequent smiling throughout this intervention. Offered thin liquids x 1ounce, mildly and moderately thick liquids via cup and soft/bite sized solids. No dentures present; dental state remains scattered. Pt consumed x12 ounces of liquids w/ this SLP w/ pt frequently gesturing for more liquids. She was able to hold the cup herself w/ intermittent assistance w/ placement in hand. Orientation/reception to feeding task was functional. Prolonged mastication/manipulation present with poor bolus control, trace anterior leakage w/ solids and moderate leakage w/ thin liquids. Pharyngeal phase c/b suspected mild latency in the swallow function, however no s/sx with thin liquids concern for aspiration given poor control of the bolus anteriorly w/ audible deglutition. No overt s/sx w/ all thick liquids over exhaustive trials. Can not r/o silent aspiration at bedside.  Pt presents with an deanna-pharyngeal dysphagia. Purposeful proactive rounding reinforced and 5 Ps addressed. Pt left in no distress.     Dysphagia risk factors include reduced dental state, AMS, dementia, and current swallow profile.     GOAL- Pt to tolerate recommended textures during course w/ no s/sx of aspiration     Recommendations;   1. Mildly thick liquids w/ purees   2. 1:1 w/ meals   3. Encourage self feeding w/ liquids via cup   4. No straw   5. Medication crushed w/ applesauce   6. Monitor tolerance   7. Aspiration precautions   Will remain on consult at this time   Encourage GOC-discussion to ensure modification of textures align w/ patient/family wishes    Sign posted at [facility]     Nidia Bourgeois MS CCC-SLP PREFER TEAMS
Notified by RN that patient's BP elevated at 201/86 (electronically).  Instructed RN to repeat BP manually and was 168/88.  Patient asymptomatic reporting no complaints.  1x dose IVP Hydralazine 5mg ordered.  Will follow up post administration BP and will continue to monitor BP overnight.  Will endorse to the primary team in the AM.      Thu Stovall PA-C  Department of Medicine  Spectra [phone]
Patient seen and examined at bedside with attending and neurology team. Please refer to attending attestation of neurology consult note from the day prior for final recommendations.
Neurology    Briefly, patient RK is a 86 y/o with DM, HTN, dementia presented as code stroke for reported R right hemiparesis and right facial droop. mRS at least a 3. On prior neuro exam, unable to follow commands, does move extremeties symmetrically with obvious focal deficits. Impression: AMS with unknown baseline likely 2/2 toxic metabolic encephalopathy. Currently being treated for UTI +/- PNA. Could also have waxing/waning episodes iso dementia. Obtained MRI to evaluate for stroke and no evidence of acute infarct. Has extensive chronic microvascular changes, and prior known dementia for which patient is followed outpatient with Dr Whitaker. Should follow up after medical issues are managed inpatient for further dementia care. Inpatient neurology service will sign off if no further questions.       MR Head No Cont (08.08.22 @ 18:29)  PROCEDURE DATE:  08/08/2022    COMPARISON: CT head 8/6/2022    FINDINGS:  The ventricles are disproportionately  prominent with respect to the   sulci and cisternal spaces which may be secondary to central predominant   volume loss versus communicating hydrocephalus.   There are extensive patchy and confluent areas of T2 hyperintensity   within the periventricular and subcortical white matter which are non   specific and may be related to severe chronic microvascular ischemic   changes.There is no evidence of mass, acute intracranial hemorrhage, or   acute infarction. There is no extra axial collection. No midline shift or   other significant mass effect is noted.    There is normal flow-void within major cranial vessels suggestive of   their patency.    There has been prior bilateral cataract surgery.. The paranasal sinuses   are predominantly clear.. The mastoid air cells are predominantly clear.    Right subcutaneous preauricular lipoma measuring 3.2 x 1.6 cm.    IMPRESSION:  There is no mass, intracranial hemorrhage, or acute infarction.  Central predominant volume loss versus communicating hydrocephalus.  Extensive chronic microvascular ischemic changes.    --- End of Report --
Notified by RN of Trop 54. seen and examined. pt confused and restless, unable to elicit history      Vital Signs Last 24 Hrs  T(C): 36.3 (07 Aug 2022 13:19), Max: 37.7 (06 Aug 2022 21:54)  T(F): 97.4 (07 Aug 2022 13:19), Max: 99.8 (06 Aug 2022 21:54)  HR: 69 (07 Aug 2022 13:19) (69 - 89)  BP: 187/78 (07 Aug 2022 13:19) (117/77 - 235/67)  BP(mean): --  RR: 18 (07 Aug 2022 13:19) (18 - 20)  SpO2: 95% (07 Aug 2022 13:19) (95% - 96%)    Parameters below as of 07 Aug 2022 13:19  Patient On (Oxygen Delivery Method): room air        Physical Exam:  General: WN/WD NAD  Neurology: Alert and awake, TEJADA  Head:  Normocephalic, atraumatic  Respiratory: CTA B/L  CV: RRR, S1S2, no murmur  Abdominal: Soft, NT, ND no palpable mass  MSK: No edema, + peripheral pulses, FROM all 4 extremity  Labs:                          12.0   9.69  )-----------( 299      ( 07 Aug 2022 09:39 )             36.6     08-07    141  |  102  |  21  ----------------------------<  140<H>  4.2   |  27  |  0.91    Ca    9.1      07 Aug 2022 09:39    TPro  6.8  /  Alb  3.6  /  TBili  0.2  /  DBili  x   /  AST  20  /  ALT  10  /  AlkPhos  76  08-06    CARDIAC MARKERS ( 07 Aug 2022 15:46 )  x     / x     / 214 U/L / x     / 5.7 ng/mL      Assessment & Plan:    Patient is a 87y Female with PMH of DM, HTN, HLD, dementia (AAOx0, nonverbal) presenting to the ED w/ right sided facial droop.   In the ED, Tmax 99.8, HR 82, /109, SpO2 96% on RA. Labs significant for WBC 6.8, Hgb 11.1, BUn 28, Cr 1.3, trop 34, lactate 2.3. UA w/ bacteria, LE, WBC. CXR shows b/l reticular opacities. CT head and CTA head/neck unrevealing. CT chest w/ diffuse GGO and septal thickening. Patient received ceftriaxone, azithromycin, and 1L NS. (06 Aug 2022 23:45). Notified by RN of elevated Trop ? demand ischemia from HTN urgency  earlier    > 12 lead EKG ( no significant st/t changes )  > Trend CE  > follow up ECHO  > Discussed with attending    Teresita Villaseñor ANP-BC  #[id number]

## 2022-08-10 NOTE — PROGRESS NOTE ADULT - PROBLEM SELECTOR PLAN 3
- CT chest: Nonspecific groundglass opacities and interlobular septal thickening. Findings may represent pulmonary edema, infection, or inflammation.  - RVP negative, COVID negative, legionella negative, blood cx NGTD   - no hx of choking w/ food as per dtr  - c/w ceftriaxone (8/6--); will d/c azithromycin  - aspiration precautions  - speech and swallow eval ordered - puree with mod thick liquids
AMS with unknown baseline likely 2/2 toxic metabolic encephalopathy    - Could also have waxing/waning episodes iso dementia  aspiration and fall precautions
Suggest to continue medications, monitoring, FU primary team recommendations.
AMS with unknown baseline likely 2/2 toxic metabolic encephalopathy    - Could also have waxing/waning episodes iso dementia  aspiration and fall precautions
- CT chest: Nonspecific groundglass opacities and interlobular septal thickening. Findings may represent pulmonary edema, infection, or inflammation.  - RVP negative, COVID negative  - no hx of choking w/ food as per dtr  - c/w ceftriaxone and azithromycin  - f/u urine legionella  - f/u blood cx  - aspiration precautions  - speech and swallow eval ordered  - NPO for now  - gentle IVF while NPO
Suggest to continue medications, monitoring, FU primary team recommendations.
- CT chest: Nonspecific groundglass opacities and interlobular septal thickening. Findings may represent pulmonary edema, infection, or inflammation. no hx of choking w/ food as per dtr  - RVP negative, COVID negative, legionella negative  - c/w ceftriaxone (8/6--); will d/c azithromycin  - aspiration precautions  - speech and swallow eval ordered - puree with mod thick liquids    - pt noted with 1/4 Bcx + strep species - likely contaminant, no further abx needed as per ID, recs appreciated
- CT chest: Nonspecific groundglass opacities and interlobular septal thickening. Findings may represent pulmonary edema, infection, or inflammation. no hx of choking w/ food as per dtr  - RVP negative, COVID negative, legionella negative  - c/w ceftriaxone (8/6--); will d/c azithromycin  - aspiration precautions  - speech and swallow eval ordered - puree with mod thick liquids    - pt noted with 1/4 Bcx + strep species; suspect contaminant, will check repeat Bcx, c/w ceftriaxone for now, ID consulted

## 2022-08-10 NOTE — PROGRESS NOTE ADULT - SUBJECTIVE AND OBJECTIVE BOX
Chief complaint  Patient is a 87y old  Female who presents with a chief complaint of UTI (10 Aug 2022 11:58)   Review of systems  Patient in bed, looks comfortable, no hypoglycemic episodes.    Labs and Fingersticks  CAPILLARY BLOOD GLUCOSE      POCT Blood Glucose.: 170 mg/dL (10 Aug 2022 12:46)  POCT Blood Glucose.: 203 mg/dL (10 Aug 2022 08:49)  POCT Blood Glucose.: 241 mg/dL (09 Aug 2022 21:29)  POCT Blood Glucose.: 148 mg/dL (09 Aug 2022 16:08)      Anion Gap, Serum: 11 (08-10 @ 07:36)  Anion Gap, Serum: 13 (08-09 @ 06:20)      Calcium, Total Serum: 8.4 (08-10 @ 07:36)  Calcium, Total Serum: 8.6 (08-09 @ 06:20)          08-10    137  |  102  |  30<H>  ----------------------------<  203<H>  3.7   |  24  |  1.21    Ca    8.4      10 Aug 2022 07:36  Phos  3.9     08-09  Mg     2.1     08-09                          10.9   6.47  )-----------( 242      ( 10 Aug 2022 07:36 )             33.8     Medications  MEDICATIONS  (STANDING):  amLODIPine   Tablet 10 milliGRAM(s) Oral daily  aspirin enteric coated 81 milliGRAM(s) Oral daily  atorvastatin 80 milliGRAM(s) Oral at bedtime  dextrose 5%. 1000 milliLiter(s) (50 mL/Hr) IV Continuous <Continuous>  dextrose 5%. 1000 milliLiter(s) (100 mL/Hr) IV Continuous <Continuous>  dextrose 50% Injectable 25 Gram(s) IV Push once  dextrose 50% Injectable 12.5 Gram(s) IV Push once  dextrose 50% Injectable 25 Gram(s) IV Push once  escitalopram 10 milliGRAM(s) Oral daily  glucagon  Injectable 1 milliGRAM(s) IntraMuscular once  heparin   Injectable 5000 Unit(s) SubCutaneous every 12 hours  insulin glargine Injectable (LANTUS) 6 Unit(s) SubCutaneous at bedtime  insulin lispro (ADMELOG) corrective regimen sliding scale   SubCutaneous three times a day before meals  insulin lispro (ADMELOG) corrective regimen sliding scale   SubCutaneous at bedtime  losartan 50 milliGRAM(s) Oral two times a day  melatonin 10 milliGRAM(s) Oral at bedtime  QUEtiapine 50 milliGRAM(s) Oral at bedtime      Physical Exam  General: Patient comfortable in bed  Vital Signs Last 12 Hrs  T(F): 98.2 (08-10-22 @ 11:45), Max: 98.7 (08-10-22 @ 05:30)  HR: 65 (08-10-22 @ 11:45) (65 - 82)  BP: 168/61 (08-10-22 @ 11:45) (156/89 - 186/64)  BP(mean): --  RR: 18 (08-10-22 @ 11:45) (18 - 18)  SpO2: 96% (08-10-22 @ 11:45) (96% - 96%)  Neck: No palpable thyroid nodules.  CVS: S1S2, No murmurs  Respiratory: No wheezing, no crepitations  GI: Abdomen soft, bowel sounds positive  Musculoskeletal:  edema lower extremities.   Skin: No skin rashes, no ecchymosis    Diagnostics             Chief complaint  Patient is a 87y old  Female who presents with a chief complaint of UTI (10 Aug 2022 11:58)   Review of systems  Patient in bed, looks comfortable, no hypoglycemic episodes.    Labs and Fingersticks  CAPILLARY BLOOD GLUCOSE      POCT Blood Glucose.: 170 mg/dL (10 Aug 2022 12:46)  POCT Blood Glucose.: 203 mg/dL (10 Aug 2022 08:49)  POCT Blood Glucose.: 241 mg/dL (09 Aug 2022 21:29)  POCT Blood Glucose.: 148 mg/dL (09 Aug 2022 16:08)      Anion Gap, Serum: 11 (08-10 @ 07:36)  Anion Gap, Serum: 13 (08-09 @ 06:20)      Calcium, Total Serum: 8.4 (08-10 @ 07:36)  Calcium, Total Serum: 8.6 (08-09 @ 06:20)          08-10    137  |  102  |  30<H>  ----------------------------<  203<H>  3.7   |  24  |  1.21    Ca    8.4      10 Aug 2022 07:36  Phos  3.9     08-09  Mg     2.1     08-09                          10.9   6.47  )-----------( 242      ( 10 Aug 2022 07:36 )             33.8     Medications  MEDICATIONS  (STANDING):  amLODIPine   Tablet 10 milliGRAM(s) Oral daily  aspirin enteric coated 81 milliGRAM(s) Oral daily  atorvastatin 80 milliGRAM(s) Oral at bedtime  dextrose 5%. 1000 milliLiter(s) (50 mL/Hr) IV Continuous <Continuous>  dextrose 5%. 1000 milliLiter(s) (100 mL/Hr) IV Continuous <Continuous>  dextrose 50% Injectable 25 Gram(s) IV Push once  dextrose 50% Injectable 12.5 Gram(s) IV Push once  dextrose 50% Injectable 25 Gram(s) IV Push once  escitalopram 10 milliGRAM(s) Oral daily  glucagon  Injectable 1 milliGRAM(s) IntraMuscular once  heparin   Injectable 5000 Unit(s) SubCutaneous every 12 hours  insulin glargine Injectable (LANTUS) 6 Unit(s) SubCutaneous at bedtime  insulin lispro (ADMELOG) corrective regimen sliding scale   SubCutaneous three times a day before meals  insulin lispro (ADMELOG) corrective regimen sliding scale   SubCutaneous at bedtime  losartan 50 milliGRAM(s) Oral two times a day  melatonin 10 milliGRAM(s) Oral at bedtime  QUEtiapine 50 milliGRAM(s) Oral at bedtime      Physical Exam  General: Patient comfortable in bed  Vital Signs Last 12 Hrs  T(F): 98.2 (08-10-22 @ 11:45), Max: 98.7 (08-10-22 @ 05:30)  HR: 65 (08-10-22 @ 11:45) (65 - 82)  BP: 168/61 (08-10-22 @ 11:45) (156/89 - 186/64)  BP(mean): --  RR: 18 (08-10-22 @ 11:45) (18 - 18)  SpO2: 96% (08-10-22 @ 11:45) (96% - 96%)  Neck: No palpable thyroid nodules.  CVS: S1S2, No murmurs  Respiratory: No wheezing, no crepitations  GI: Abdomen soft, bowel sounds positive  Musculoskeletal:  edema lower extremities.   Skin: No skin rashes, no ecchymosis    Diagnostics

## 2022-08-10 NOTE — PROGRESS NOTE ADULT - PROBLEM SELECTOR PLAN 6
- not on meds  - low dose admelog correction scale TIDQAC and QHS  - check FSG before meals and QHS  - HgbA1c 7.4
- not on meds  - low dose admelog correction scale TIDQAC and QHS  - check FSG before meals and QHS, or q6h while NPO  - F/u HgbA1c
- not on meds  - low dose admelog correction scale TIDQAC and QHS  - check FSG before meals and QHS  - HgbA1c 7.4
- not on meds  - low dose admelog correction scale TIDQAC and QHS  - check FSG before meals and QHS  - HgbA1c 7.4

## 2022-08-10 NOTE — PROGRESS NOTE ADULT - PROBLEM SELECTOR PLAN 4
UA w/ LE, WBC, bacteria. low-grade fevers    - IV antibiotics - Ceftriaxone
- AAOx0, nonverbal at baseline. Currently at baseline as per daughter  - c/w escitalopram 10mg qd  - c/w home seroquel 50mg ER qd  - seroquel 25mg po qd PRN agitation  - on home rozerem 8mg po qhs. Not available as per pharmacy. Daughter will need to bring in
- AAOx0, nonverbal at baseline. Currently at baseline as per daughter  - c/w escitalopram 10mg qd  - c/w home seroquel 50mg ER qd  - seroquel 25mg po qd PRN agitation  - on home rozerem 8mg po qhs. Not available as per pharmacy. Daughter will need to bring in
Suggest to continue medications, monitoring, FU primary team recommendations.
Suggest to continue medications, monitoring, FU primary team recommendations.
UA w/ LE, WBC, bacteria. low-grade fevers    - IV antibiotics - Ceftriaxone
- AAOx0, nonverbal at baseline. Currently at baseline as per daughter  - c/w escitalopram 10mg qd  - c/w home seroquel 50mg ER qd  - seroquel 25mg po qd PRN agitation  - on home rozerem 8mg po qhs. Not available as per pharmacy. Daughter will need to bring in
- AAOx0, nonverbal at baseline. Currently at baseline as per daughter  - c/w escitalopram 10mg qd  - c/w home seroquel 50mg ER qd  - seroquel 25mg po qd PRN agitation  - on home rozerem 8mg po qhs. Not available as per pharmacy. Daughter will need to bring in

## 2022-08-10 NOTE — PROGRESS NOTE ADULT - PROBLEM SELECTOR PROBLEM 3
Facial droop
Opacity of lung on imaging study
Facial droop
Dementia
Opacity of lung on imaging study
Dementia
Opacity of lung on imaging study
Opacity of lung on imaging study

## 2022-08-10 NOTE — PROGRESS NOTE ADULT - PROBLEM SELECTOR PLAN 7
Diet: NPO for now, CC/DASH/Kosher once approved by [facility]  DVT Prophylaxis: HSQ  Full code
Surgery

## 2022-08-10 NOTE — PROGRESS NOTE ADULT - SUBJECTIVE AND OBJECTIVE BOX
[facility] Division of Hospital Medicine  Chico Fisher MD  Available via MS Teams      SUBJECTIVE / OVERNIGHT EVENTS:  no acute events per nursing staff. unable to obtain ROS 2/2 baseline dementia.     ADDITIONAL REVIEW OF SYSTEMS:  unable to obtain ROS 2/2 baseline dementia.     MEDICATIONS  (STANDING):  amLODIPine   Tablet 10 milliGRAM(s) Oral daily  aspirin enteric coated 81 milliGRAM(s) Oral daily  atorvastatin 80 milliGRAM(s) Oral at bedtime  dextrose 5%. 1000 milliLiter(s) (50 mL/Hr) IV Continuous <Continuous>  dextrose 5%. 1000 milliLiter(s) (100 mL/Hr) IV Continuous <Continuous>  dextrose 50% Injectable 25 Gram(s) IV Push once  dextrose 50% Injectable 12.5 Gram(s) IV Push once  dextrose 50% Injectable 25 Gram(s) IV Push once  escitalopram 10 milliGRAM(s) Oral daily  glucagon  Injectable 1 milliGRAM(s) IntraMuscular once  heparin   Injectable 5000 Unit(s) SubCutaneous every 12 hours  insulin glargine Injectable (LANTUS) 6 Unit(s) SubCutaneous at bedtime  insulin lispro (ADMELOG) corrective regimen sliding scale   SubCutaneous three times a day before meals  insulin lispro (ADMELOG) corrective regimen sliding scale   SubCutaneous at bedtime  losartan 50 milliGRAM(s) Oral two times a day  melatonin 10 milliGRAM(s) Oral at bedtime  QUEtiapine 50 milliGRAM(s) Oral at bedtime    MEDICATIONS  (PRN):  acetaminophen     Tablet .. 650 milliGRAM(s) Oral every 6 hours PRN Temp greater or equal to 38C (100.4F)  dextrose Oral Gel 15 Gram(s) Oral once PRN Blood Glucose LESS THAN 70 milliGRAM(s)/deciliter  QUEtiapine 25 milliGRAM(s) Oral daily PRN agitation      I&O's Summary    09 Aug 2022 07:01  -  10 Aug 2022 07:00  --------------------------------------------------------  IN: 1655 mL / OUT: 500 mL / NET: 1155 mL    10 Aug 2022 07:01  -  10 Aug 2022 11:58  --------------------------------------------------------  IN: 120 mL / OUT: 0 mL / NET: 120 mL        PHYSICAL EXAM:  Vital Signs Last 24 Hrs  T(C): 36.8 (10 Aug 2022 11:45), Max: 37.1 (10 Aug 2022 05:30)  T(F): 98.2 (10 Aug 2022 11:45), Max: 98.7 (10 Aug 2022 05:30)  HR: 65 (10 Aug 2022 11:45) (62 - 82)  BP: 168/61 (10 Aug 2022 11:45) (155/65 - 194/68)  BP(mean): --  RR: 18 (10 Aug 2022 11:45) (18 - 19)  SpO2: 96% (10 Aug 2022 11:45) (96% - 99%)    Parameters below as of 10 Aug 2022 11:45  Patient On (Oxygen Delivery Method): room air      PHYSICAL EXAM:  GENERAL: NAD, well-developed  HEAD:  Atraumatic, normocephalic  EYES: conjunctiva and sclera clear  NECK: Supple, no JVD  CHEST/LUNG: Clear to auscultation bilaterally; no wheezing or rales  HEART: Regular rate and rhythm; no murmurs, no LE edema   ABDOMEN: Soft, nontender, nondistended; bowel sounds present  EXTREMITIES:  2+ Peripheral Pulses, no clubbing, cyanosis      LABS:                        10.9   6.47  )-----------( 242      ( 10 Aug 2022 07:36 )             33.8     08-10    137  |  102  |  30<H>  ----------------------------<  203<H>  3.7   |  24  |  1.21    Ca    8.4      10 Aug 2022 07:36  Phos  3.9     08-09  Mg     2.1     08-09                    RADIOLOGY & ADDITIONAL TESTS:  New Imaging Personally Reviewed Today:  New Electrocardiogram Personally Reviewed Today:  Other Results Reviewed Today:   Prior or Outpatient Records Reviewed Today with Summary:    COORDINATION OF CARE:  Consultant Communication and Details of Discussion (where applicable):

## 2022-08-10 NOTE — PROGRESS NOTE ADULT - ASSESSMENT
Assessment  DMT2: 87y Female with DM T2 with hyperglycemia, A1C 7.4%, was not on any DM meds, now on insulin coverage, blood sugars are running slightly high and not at target, no hypoglycemic episodes, on puree diet, no acute events, DC planning.  Multinodular Thyroid: finding on imaging, TFTs WNL.  Facial Droop: resolved, monitored, FU neuro  UTI: On IV ABx, monitored.        Marianne Ortiz MD  Cell: [phone]  Office: [phone]               Assessment  DMT2: 87y Female with DM T2 with hyperglycemia, A1C 7.4%, was not on any DM meds, now on insulin coverage, blood sugars are running slightly high and not at target, no hypoglycemic episodes, on puree diet, no  acute events, DC planning.  Multinodular Thyroid: finding on imaging, TFTs WNL.  Facial Droop: resolved, monitored, FU neuro  UTI: On IV ABx, monitored.        Marianne Ortiz MD  Cell: [phone]  Office: [phone]

## 2022-08-10 NOTE — DISCHARGE NOTE NURSING/CASE MANAGEMENT/SOCIAL WORK - PATIENT PORTAL LINK FT
You can access the FollowMyHealth Patient Portal offered by [facility] by registering at the following website: [email or web address]. By joining [facility]’s FollowMyHealth portal, you will also be able to view your health information using other applications (apps) compatible with our system.

## 2022-08-10 NOTE — PROGRESS NOTE ADULT - PROBLEM SELECTOR PLAN 2
FU outpatient Endo Dr Dickson for monitoring.
FU outpatient Endo Dr Dickson for monitoring.
- UA w/ LE, WBC, bacteria. low-grade fevers  - s/p ceftriaxone x 3d - off abx per ID   - blood cultures +1/4 strep species - likely contaminant, no further abx needed as per ID, recs appreciated
- UA w/ LE, WBC, bacteria. low-grade fevers  - c/w ceftriaxone  - f/u Ucx and blood cx
- UA w/ LE, WBC, bacteria. low-grade fevers  - c/w ceftriaxone  - f/u Ucx and blood cx
- UA w/ LE, WBC, bacteria. low-grade fevers  - c/w ceftriaxone  - Ucs pending  - blood cultures +1/4 strep species - plan as below
s/p MRI H - no acute findings  ASA/Statin  neuro consulted
s/p MRI H - no acute findings  ASA/Statin  neuro consulted

## 2022-08-10 NOTE — PROGRESS NOTE ADULT - NSPROGADDITIONALINFOA_GEN_ALL_CORE
pt stable for discharge - 50 minutes spent coordinating discharge home     plan d/w ACP Kasi
plan d/w KELLY Bush

## 2022-08-10 NOTE — PROGRESS NOTE ADULT - PROBLEM SELECTOR PLAN 5
BP better controlled today   cardiology following, will d/c HCTZ,  increase losartan to 50mg BID   titrate as needed, close BP monitoring
RISS
No acute issues; Continue monitoring.
uncontrolled HTN this AM with SBP>180s  cardiology following, will d/c HCTZ and start losartan 25mg BID   titrate as needed, close BP monitoring
- c/w home HCTZ  - monitor hemodynamics closely given this AM's hypertension to SBPs of 230s in setting of AM HCTZ being held and labile BPs (SBP 4hours prior was in 110s)
No acute issues; Continue monitoring.
RISS
BP better controlled today   cardiology following, will d/c HCTZ,  increase losartan to 50mg BID   titrate as needed, close BP monitoring

## 2022-10-05 NOTE — PATIENT PROFILE ADULT - NSPROPTRIGHTNOTIFYOBTAINDET_GEN_A_NUR
"  Flores Adamson presented for a  Medicare AWV and comprehensive Health Risk Assessment today. The following components were reviewed and updated:    Medical history  Family History  Social history  Allergies and Current Medications  Health Risk Assessment  Health Maintenance  Care Team         ** See Completed Assessments for Annual Wellness Visit within the encounter summary.**         The following assessments were completed:  Living Situation  CAGE  Depression Screening  Timed Get Up and Go  Whisper Test  Cognitive Function Screening      Nutrition Screening  ADL Screening  PAQ Screening    Review for Opioid Screening: Patient does not have rx for Opioids.  Review for Substance Use Disorders: Patient does not use substance.    Vitals:    10/05/22 1107   BP: 110/75   Pulse: 85   Temp: 97.9 °F (36.6 °C)   SpO2: 98%   Weight: 82.6 kg (182 lb)   Height: 5' 2" (1.575 m)     Body mass index is 33.29 kg/m².  Physical Exam  Vitals reviewed.   HENT:      Head: Normocephalic.   Eyes:      Pupils: Pupils are equal, round, and reactive to light.   Cardiovascular:      Rate and Rhythm: Normal rate and regular rhythm.      Heart sounds: Normal heart sounds.   Pulmonary:      Effort: Pulmonary effort is normal.      Breath sounds: Normal breath sounds.   Abdominal:      General: Bowel sounds are normal.      Palpations: Abdomen is soft.   Musculoskeletal:         General: Normal range of motion.      Cervical back: Normal range of motion.      Right lower leg: No edema.      Left lower leg: No edema.   Skin:     General: Skin is warm and dry.   Neurological:      Mental Status: She is alert and oriented to person, place, and time.   Psychiatric:         Behavior: Behavior normal.             Diagnoses and health risks identified today and associated recommendations/orders:    1. Encounter for preventive health examination  - Above assessments completed. Preventive measures and health maintenance reviewed with " patient.  -discussed overdue vaccines    2. Polymyalgia rheumatica  Stable, followed by Rheumatology    3. Atherosclerosis of native coronary artery without angina pectoris, unspecified whether native or transplanted heart  4. Dyslipidemia  Stable, followed by Cardiology  -on statin, asa    5. Acquired hypothyroidism  Stable, followed by PCP  -on levothyroxine    6. Gastroesophageal reflux disease, unspecified whether esophagitis present  Stable, followed by PCP  -on famotidine and pantoprazole    7. Vertigo  Stable, followed by PCP  -on meclizine prn    8. Osteopenia, unspecified  Stable, followed by PCP  -on Sharath/D    Provided Flores with a 5-10 year written screening schedule and personal prevention plan. Recommendations were developed using the USPSTF age appropriate recommendations. Education, counseling, and referrals were provided as needed. After Visit Summary printed and given to patient which includes a list of additional screenings\tests needed.    Follow up in about 1 year (around 10/5/2023) for your next annual wellness visit.    Concepción Penn NP    I offered to discuss advanced care planning, including how to pick a person who would make decisions for you if you were unable to make them for yourself, called a health care power of , and what kind of decisions you might make such as use of life sustaining treatments such as ventilators and tube feeding when faced with a life limiting illness recorded on a living will that they will need to know. (How you want to be cared for as you near the end of your natural life)     X Patient is interested in learning more about how to make advanced directives.  I provided them paperwork and offered to discuss this with them.   pt is cognitively impaired, aphasic, unable to assess

## 2022-10-10 NOTE — PROGRESS NOTE ADULT - ASSESSMENT
87y Female with PMH of DM, HTN, HLD, dementia (AAOx0, nonverbal) presenting to the ED w/ right sided facial droop now resolved, admitted to medicine for UTI and possible PNA.  Has been hypertensive      Action 4: Continue Detail Level: Zone Continue Regimen: Patient has tried Otzela for 3 months

## 2023-01-01 ENCOUNTER — TRANSCRIPTION ENCOUNTER (OUTPATIENT)
Age: 88
End: 2023-01-01

## 2023-01-01 ENCOUNTER — APPOINTMENT (OUTPATIENT)
Dept: CARE COORDINATION | Facility: HOME HEALTH | Age: 88
End: 2023-01-01
Payer: MEDICARE

## 2023-01-01 ENCOUNTER — RX RENEWAL (OUTPATIENT)
Age: 88
End: 2023-01-01

## 2023-01-01 ENCOUNTER — INPATIENT (INPATIENT)
Facility: HOSPITAL | Age: 88
LOS: 24 days | DRG: 283 | End: 2023-05-17
Attending: INTERNAL MEDICINE | Admitting: STUDENT IN AN ORGANIZED HEALTH CARE EDUCATION/TRAINING PROGRAM
Payer: MEDICARE

## 2023-01-01 ENCOUNTER — INPATIENT (INPATIENT)
Facility: HOSPITAL | Age: 88
LOS: 3 days | Discharge: HOME HEALTH | DRG: 871 | End: 2023-02-02
Attending: STUDENT IN AN ORGANIZED HEALTH CARE EDUCATION/TRAINING PROGRAM | Admitting: HOSPITALIST
Payer: MEDICARE

## 2023-01-01 VITALS
RESPIRATION RATE: 18 BRPM | OXYGEN SATURATION: 97 % | DIASTOLIC BLOOD PRESSURE: 66 MMHG | HEART RATE: 81 BPM | SYSTOLIC BLOOD PRESSURE: 114 MMHG

## 2023-01-01 VITALS
TEMPERATURE: 99 F | SYSTOLIC BLOOD PRESSURE: 204 MMHG | RESPIRATION RATE: 24 BRPM | OXYGEN SATURATION: 94 % | HEART RATE: 102 BPM | WEIGHT: 130.07 LBS | DIASTOLIC BLOOD PRESSURE: 67 MMHG

## 2023-01-01 VITALS
RESPIRATION RATE: 18 BRPM | SYSTOLIC BLOOD PRESSURE: 109 MMHG | OXYGEN SATURATION: 97 % | DIASTOLIC BLOOD PRESSURE: 76 MMHG | HEART RATE: 87 BPM

## 2023-01-01 VITALS
RESPIRATION RATE: 16 BRPM | HEART RATE: 66 BPM | OXYGEN SATURATION: 100 % | TEMPERATURE: 98.9 F | SYSTOLIC BLOOD PRESSURE: 140 MMHG | DIASTOLIC BLOOD PRESSURE: 80 MMHG

## 2023-01-01 VITALS — HEART RATE: 105 BPM | OXYGEN SATURATION: 97 %

## 2023-01-01 DIAGNOSIS — F03.C0 UNSPECIFIED DEMENTIA, SEVERE, WITHOUT BEHAVIORAL DISTURBANCE, PSYCHOTIC DISTURBANCE, MOOD DISTURBANCE, AND ANXIETY: ICD-10-CM

## 2023-01-01 DIAGNOSIS — Z29.9 ENCOUNTER FOR PROPHYLACTIC MEASURES, UNSPECIFIED: ICD-10-CM

## 2023-01-01 DIAGNOSIS — I50.33 ACUTE ON CHRONIC DIASTOLIC (CONGESTIVE) HEART FAILURE: ICD-10-CM

## 2023-01-01 DIAGNOSIS — E43 UNSPECIFIED SEVERE PROTEIN-CALORIE MALNUTRITION: ICD-10-CM

## 2023-01-01 DIAGNOSIS — I26.99 OTHER PULMONARY EMBOLISM WITHOUT ACUTE COR PULMONALE: ICD-10-CM

## 2023-01-01 DIAGNOSIS — N18.9 CHRONIC KIDNEY DISEASE, UNSPECIFIED: ICD-10-CM

## 2023-01-01 DIAGNOSIS — R13.10 DYSPHAGIA, UNSPECIFIED: ICD-10-CM

## 2023-01-01 DIAGNOSIS — Z98.890 OTHER SPECIFIED POSTPROCEDURAL STATES: Chronic | ICD-10-CM

## 2023-01-01 DIAGNOSIS — J18.9 PNEUMONIA, UNSPECIFIED ORGANISM: ICD-10-CM

## 2023-01-01 DIAGNOSIS — E11.9 TYPE 2 DIABETES MELLITUS WITHOUT COMPLICATIONS: ICD-10-CM

## 2023-01-01 DIAGNOSIS — N17.9 ACUTE KIDNEY FAILURE, UNSPECIFIED: ICD-10-CM

## 2023-01-01 DIAGNOSIS — J69.0 PNEUMONITIS DUE TO INHALATION OF FOOD AND VOMIT: ICD-10-CM

## 2023-01-01 DIAGNOSIS — K59.00 CONSTIPATION, UNSPECIFIED: ICD-10-CM

## 2023-01-01 DIAGNOSIS — R06.02 SHORTNESS OF BREATH: ICD-10-CM

## 2023-01-01 DIAGNOSIS — I10 ESSENTIAL (PRIMARY) HYPERTENSION: ICD-10-CM

## 2023-01-01 DIAGNOSIS — F03.90 UNSPECIFIED DEMENTIA, UNSPECIFIED SEVERITY, WITHOUT BEHAVIORAL DISTURBANCE, PSYCHOTIC DISTURBANCE, MOOD DISTURBANCE, AND ANXIETY: ICD-10-CM

## 2023-01-01 DIAGNOSIS — I48.91 UNSPECIFIED ATRIAL FIBRILLATION: ICD-10-CM

## 2023-01-01 DIAGNOSIS — Z71.89 OTHER SPECIFIED COUNSELING: ICD-10-CM

## 2023-01-01 DIAGNOSIS — E11.9 TYPE 2 DIABETES MELLITUS W/OUT COMPLICATIONS: ICD-10-CM

## 2023-01-01 DIAGNOSIS — R77.8 OTHER SPECIFIED ABNORMALITIES OF PLASMA PROTEINS: ICD-10-CM

## 2023-01-01 DIAGNOSIS — E87.6 HYPOKALEMIA: ICD-10-CM

## 2023-01-01 DIAGNOSIS — J96.01 ACUTE RESPIRATORY FAILURE WITH HYPOXIA: ICD-10-CM

## 2023-01-01 DIAGNOSIS — R60.0 LOCALIZED EDEMA: ICD-10-CM

## 2023-01-01 DIAGNOSIS — F03.918 UNSPECIFIED DEMENTIA, UNSPECIFIED SEVERITY, WITH OTHER BEHAVIORAL DISTURBANCE: ICD-10-CM

## 2023-01-01 DIAGNOSIS — E78.5 HYPERLIPIDEMIA, UNSPECIFIED: ICD-10-CM

## 2023-01-01 DIAGNOSIS — G47.00 INSOMNIA, UNSPECIFIED: ICD-10-CM

## 2023-01-01 DIAGNOSIS — F03.90 UNSPECIFIED DEMENTIA WITHOUT BEHAVIORAL DISTURBANCE: ICD-10-CM

## 2023-01-01 DIAGNOSIS — D64.9 ANEMIA, UNSPECIFIED: ICD-10-CM

## 2023-01-01 DIAGNOSIS — E11.65 TYPE 2 DIABETES MELLITUS WITH HYPERGLYCEMIA: ICD-10-CM

## 2023-01-01 DIAGNOSIS — A41.9 SEPSIS, UNSPECIFIED ORGANISM: ICD-10-CM

## 2023-01-01 LAB
-  AMIKACIN: SIGNIFICANT CHANGE UP
-  AMOXICILLIN/CLAVULANIC ACID: SIGNIFICANT CHANGE UP
-  AMPICILLIN/SULBACTAM: SIGNIFICANT CHANGE UP
-  AMPICILLIN: SIGNIFICANT CHANGE UP
-  AMPICILLIN: SIGNIFICANT CHANGE UP
-  AZTREONAM: SIGNIFICANT CHANGE UP
-  CEFAZOLIN: SIGNIFICANT CHANGE UP
-  CEFEPIME: SIGNIFICANT CHANGE UP
-  CEFOXITIN: SIGNIFICANT CHANGE UP
-  CEFTRIAXONE: SIGNIFICANT CHANGE UP
-  CEFUROXIME: SIGNIFICANT CHANGE UP
-  CIPROFLOXACIN: SIGNIFICANT CHANGE UP
-  CIPROFLOXACIN: SIGNIFICANT CHANGE UP
-  ERTAPENEM: SIGNIFICANT CHANGE UP
-  GENTAMICIN: SIGNIFICANT CHANGE UP
-  IMIPENEM: SIGNIFICANT CHANGE UP
-  LEVOFLOXACIN: SIGNIFICANT CHANGE UP
-  LEVOFLOXACIN: SIGNIFICANT CHANGE UP
-  MEROPENEM: SIGNIFICANT CHANGE UP
-  NITROFURANTOIN: SIGNIFICANT CHANGE UP
-  NITROFURANTOIN: SIGNIFICANT CHANGE UP
-  PIPERACILLIN/TAZOBACTAM: SIGNIFICANT CHANGE UP
-  TETRACYCLINE: SIGNIFICANT CHANGE UP
-  TOBRAMYCIN: SIGNIFICANT CHANGE UP
-  TRIMETHOPRIM/SULFAMETHOXAZOLE: SIGNIFICANT CHANGE UP
-  VANCOMYCIN: SIGNIFICANT CHANGE UP
A1C WITH ESTIMATED AVERAGE GLUCOSE RESULT: 7.1 % — HIGH (ref 4–5.6)
A1C WITH ESTIMATED AVERAGE GLUCOSE RESULT: 7.4 % — HIGH (ref 4–5.6)
ALBUMIN SERPL ELPH-MCNC: 3.5 G/DL — SIGNIFICANT CHANGE UP (ref 3.3–5)
ALBUMIN SERPL ELPH-MCNC: 3.6 G/DL — SIGNIFICANT CHANGE UP (ref 3.3–5)
ALBUMIN SERPL ELPH-MCNC: 3.7 G/DL — SIGNIFICANT CHANGE UP (ref 3.3–5)
ALBUMIN SERPL ELPH-MCNC: 3.7 G/DL — SIGNIFICANT CHANGE UP (ref 3.3–5)
ALBUMIN SERPL ELPH-MCNC: 3.9 G/DL — SIGNIFICANT CHANGE UP (ref 3.3–5)
ALBUMIN SERPL ELPH-MCNC: 3.9 G/DL — SIGNIFICANT CHANGE UP (ref 3.3–5)
ALBUMIN SERPL ELPH-MCNC: 4.3 G/DL — SIGNIFICANT CHANGE UP (ref 3.3–5)
ALP SERPL-CCNC: 108 U/L — SIGNIFICANT CHANGE UP (ref 40–120)
ALP SERPL-CCNC: 67 U/L — SIGNIFICANT CHANGE UP (ref 40–120)
ALP SERPL-CCNC: 69 U/L — SIGNIFICANT CHANGE UP (ref 40–120)
ALP SERPL-CCNC: 77 U/L — SIGNIFICANT CHANGE UP (ref 40–120)
ALP SERPL-CCNC: 84 U/L — SIGNIFICANT CHANGE UP (ref 40–120)
ALP SERPL-CCNC: 89 U/L — SIGNIFICANT CHANGE UP (ref 40–120)
ALP SERPL-CCNC: 96 U/L — SIGNIFICANT CHANGE UP (ref 40–120)
ALT FLD-CCNC: 20 U/L — SIGNIFICANT CHANGE UP (ref 10–45)
ALT FLD-CCNC: 21 U/L — SIGNIFICANT CHANGE UP (ref 10–45)
ALT FLD-CCNC: 22 U/L — SIGNIFICANT CHANGE UP (ref 10–45)
ALT FLD-CCNC: 24 U/L — SIGNIFICANT CHANGE UP (ref 10–45)
ALT FLD-CCNC: 25 U/L — SIGNIFICANT CHANGE UP (ref 10–45)
ALT FLD-CCNC: 27 U/L — SIGNIFICANT CHANGE UP (ref 10–45)
ALT FLD-CCNC: 29 U/L — SIGNIFICANT CHANGE UP (ref 10–45)
ANION GAP SERPL CALC-SCNC: 10 MMOL/L — SIGNIFICANT CHANGE UP (ref 5–17)
ANION GAP SERPL CALC-SCNC: 10 MMOL/L — SIGNIFICANT CHANGE UP (ref 5–17)
ANION GAP SERPL CALC-SCNC: 11 MMOL/L — SIGNIFICANT CHANGE UP (ref 5–17)
ANION GAP SERPL CALC-SCNC: 12 MMOL/L — SIGNIFICANT CHANGE UP (ref 5–17)
ANION GAP SERPL CALC-SCNC: 13 MMOL/L — SIGNIFICANT CHANGE UP (ref 5–17)
ANION GAP SERPL CALC-SCNC: 14 MMOL/L — SIGNIFICANT CHANGE UP (ref 5–17)
ANION GAP SERPL CALC-SCNC: 15 MMOL/L — SIGNIFICANT CHANGE UP (ref 5–17)
ANION GAP SERPL CALC-SCNC: 16 MMOL/L — SIGNIFICANT CHANGE UP (ref 5–17)
ANION GAP SERPL CALC-SCNC: 16 MMOL/L — SIGNIFICANT CHANGE UP (ref 5–17)
ANION GAP SERPL CALC-SCNC: 17 MMOL/L — SIGNIFICANT CHANGE UP (ref 5–17)
ANION GAP SERPL CALC-SCNC: 18 MMOL/L — HIGH (ref 5–17)
ANION GAP SERPL CALC-SCNC: 19 MMOL/L — HIGH (ref 5–17)
ANION GAP SERPL CALC-SCNC: 20 MMOL/L — HIGH (ref 5–17)
ANION GAP SERPL CALC-SCNC: 20 MMOL/L — HIGH (ref 5–17)
ANION GAP SERPL CALC-SCNC: 21 MMOL/L — HIGH (ref 5–17)
ANION GAP SERPL CALC-SCNC: 22 MMOL/L — HIGH (ref 5–17)
ANION GAP SERPL CALC-SCNC: 23 MMOL/L — HIGH (ref 5–17)
ANION GAP SERPL CALC-SCNC: 26 MMOL/L — HIGH (ref 5–17)
ANION GAP SERPL CALC-SCNC: 27 MMOL/L — HIGH (ref 5–17)
APPEARANCE UR: CLEAR — SIGNIFICANT CHANGE UP
APTT BLD: 27.3 SEC — LOW (ref 27.5–35.5)
APTT BLD: 30.5 SEC — SIGNIFICANT CHANGE UP (ref 27.5–35.5)
AST SERPL-CCNC: 17 U/L — SIGNIFICANT CHANGE UP (ref 10–40)
AST SERPL-CCNC: 22 U/L — SIGNIFICANT CHANGE UP (ref 10–40)
AST SERPL-CCNC: 26 U/L — SIGNIFICANT CHANGE UP (ref 10–40)
AST SERPL-CCNC: 27 U/L — SIGNIFICANT CHANGE UP (ref 10–40)
AST SERPL-CCNC: 31 U/L — SIGNIFICANT CHANGE UP (ref 10–40)
AST SERPL-CCNC: 36 U/L — SIGNIFICANT CHANGE UP (ref 10–40)
AST SERPL-CCNC: 39 U/L — SIGNIFICANT CHANGE UP (ref 10–40)
B-OH-BUTYR SERPL-SCNC: 0.8 MMOL/L — HIGH
BACTERIA # UR AUTO: NEGATIVE — SIGNIFICANT CHANGE UP
BASE EXCESS BLDV CALC-SCNC: -1.1 MMOL/L — SIGNIFICANT CHANGE UP (ref -2–3)
BASE EXCESS BLDV CALC-SCNC: -3.2 MMOL/L — LOW (ref -2–3)
BASE EXCESS BLDV CALC-SCNC: -5.4 MMOL/L — LOW (ref -2–3)
BASE EXCESS BLDV CALC-SCNC: -5.6 MMOL/L — LOW (ref -2–3)
BASE EXCESS BLDV CALC-SCNC: 0.1 MMOL/L — SIGNIFICANT CHANGE UP (ref -2–3)
BASE EXCESS BLDV CALC-SCNC: 2.2 MMOL/L — SIGNIFICANT CHANGE UP (ref -2–3)
BASE EXCESS BLDV CALC-SCNC: 4.2 MMOL/L — HIGH (ref -2–3)
BASOPHILS # BLD AUTO: 0.01 K/UL — SIGNIFICANT CHANGE UP (ref 0–0.2)
BASOPHILS # BLD AUTO: 0.01 K/UL — SIGNIFICANT CHANGE UP (ref 0–0.2)
BASOPHILS # BLD AUTO: 0.02 K/UL — SIGNIFICANT CHANGE UP (ref 0–0.2)
BASOPHILS # BLD AUTO: 0.02 K/UL — SIGNIFICANT CHANGE UP (ref 0–0.2)
BASOPHILS # BLD AUTO: 0.05 K/UL — SIGNIFICANT CHANGE UP (ref 0–0.2)
BASOPHILS # BLD AUTO: 0.07 K/UL — SIGNIFICANT CHANGE UP (ref 0–0.2)
BASOPHILS NFR BLD AUTO: 0.1 % — SIGNIFICANT CHANGE UP (ref 0–2)
BASOPHILS NFR BLD AUTO: 0.4 % — SIGNIFICANT CHANGE UP (ref 0–2)
BASOPHILS NFR BLD AUTO: 0.4 % — SIGNIFICANT CHANGE UP (ref 0–2)
BILIRUB SERPL-MCNC: 0.2 MG/DL — SIGNIFICANT CHANGE UP (ref 0.2–1.2)
BILIRUB SERPL-MCNC: 0.3 MG/DL — SIGNIFICANT CHANGE UP (ref 0.2–1.2)
BILIRUB SERPL-MCNC: 0.4 MG/DL — SIGNIFICANT CHANGE UP (ref 0.2–1.2)
BILIRUB UR-MCNC: NEGATIVE — SIGNIFICANT CHANGE UP
BLD GP AB SCN SERPL QL: NEGATIVE — SIGNIFICANT CHANGE UP
BUN SERPL-MCNC: 101 MG/DL — HIGH (ref 7–23)
BUN SERPL-MCNC: 103 MG/DL — HIGH (ref 7–23)
BUN SERPL-MCNC: 104 MG/DL — HIGH (ref 7–23)
BUN SERPL-MCNC: 108 MG/DL — HIGH (ref 7–23)
BUN SERPL-MCNC: 110 MG/DL — HIGH (ref 7–23)
BUN SERPL-MCNC: 110 MG/DL — HIGH (ref 7–23)
BUN SERPL-MCNC: 111 MG/DL — HIGH (ref 7–23)
BUN SERPL-MCNC: 112 MG/DL — HIGH (ref 7–23)
BUN SERPL-MCNC: 113 MG/DL — HIGH (ref 7–23)
BUN SERPL-MCNC: 115 MG/DL — HIGH (ref 7–23)
BUN SERPL-MCNC: 115 MG/DL — HIGH (ref 7–23)
BUN SERPL-MCNC: 116 MG/DL — HIGH (ref 7–23)
BUN SERPL-MCNC: 122 MG/DL — HIGH (ref 7–23)
BUN SERPL-MCNC: 123 MG/DL — HIGH (ref 7–23)
BUN SERPL-MCNC: 124 MG/DL — HIGH (ref 7–23)
BUN SERPL-MCNC: 129 MG/DL — HIGH (ref 7–23)
BUN SERPL-MCNC: 130 MG/DL — HIGH (ref 7–23)
BUN SERPL-MCNC: 130 MG/DL — HIGH (ref 7–23)
BUN SERPL-MCNC: 27 MG/DL — HIGH (ref 7–23)
BUN SERPL-MCNC: 30 MG/DL — HIGH (ref 7–23)
BUN SERPL-MCNC: 33 MG/DL — HIGH (ref 7–23)
BUN SERPL-MCNC: 40 MG/DL — HIGH (ref 7–23)
BUN SERPL-MCNC: 51 MG/DL — HIGH (ref 7–23)
BUN SERPL-MCNC: 53 MG/DL — HIGH (ref 7–23)
BUN SERPL-MCNC: 66 MG/DL — HIGH (ref 7–23)
BUN SERPL-MCNC: 70 MG/DL — HIGH (ref 7–23)
BUN SERPL-MCNC: 77 MG/DL — HIGH (ref 7–23)
BUN SERPL-MCNC: 78 MG/DL — HIGH (ref 7–23)
BUN SERPL-MCNC: 83 MG/DL — HIGH (ref 7–23)
BUN SERPL-MCNC: 84 MG/DL — HIGH (ref 7–23)
BUN SERPL-MCNC: 84 MG/DL — HIGH (ref 7–23)
BUN SERPL-MCNC: 88 MG/DL — HIGH (ref 7–23)
BUN SERPL-MCNC: 88 MG/DL — HIGH (ref 7–23)
BUN SERPL-MCNC: 90 MG/DL — HIGH (ref 7–23)
CA-I SERPL-SCNC: 1.17 MMOL/L — SIGNIFICANT CHANGE UP (ref 1.15–1.33)
CA-I SERPL-SCNC: 1.18 MMOL/L — SIGNIFICANT CHANGE UP (ref 1.15–1.33)
CA-I SERPL-SCNC: 1.19 MMOL/L — SIGNIFICANT CHANGE UP (ref 1.15–1.33)
CA-I SERPL-SCNC: 1.2 MMOL/L — SIGNIFICANT CHANGE UP (ref 1.15–1.33)
CA-I SERPL-SCNC: 1.21 MMOL/L — SIGNIFICANT CHANGE UP (ref 1.15–1.33)
CA-I SERPL-SCNC: 1.21 MMOL/L — SIGNIFICANT CHANGE UP (ref 1.15–1.33)
CALCIUM SERPL-MCNC: 7.9 MG/DL — LOW (ref 8.4–10.5)
CALCIUM SERPL-MCNC: 8 MG/DL — LOW (ref 8.4–10.5)
CALCIUM SERPL-MCNC: 8.2 MG/DL — LOW (ref 8.4–10.5)
CALCIUM SERPL-MCNC: 8.4 MG/DL — SIGNIFICANT CHANGE UP (ref 8.4–10.5)
CALCIUM SERPL-MCNC: 8.5 MG/DL — SIGNIFICANT CHANGE UP (ref 8.4–10.5)
CALCIUM SERPL-MCNC: 8.6 MG/DL — SIGNIFICANT CHANGE UP (ref 8.4–10.5)
CALCIUM SERPL-MCNC: 8.7 MG/DL — SIGNIFICANT CHANGE UP (ref 8.4–10.5)
CALCIUM SERPL-MCNC: 8.8 MG/DL — SIGNIFICANT CHANGE UP (ref 8.4–10.5)
CALCIUM SERPL-MCNC: 8.8 MG/DL — SIGNIFICANT CHANGE UP (ref 8.4–10.5)
CALCIUM SERPL-MCNC: 8.9 MG/DL — SIGNIFICANT CHANGE UP (ref 8.4–10.5)
CALCIUM SERPL-MCNC: 9 MG/DL — SIGNIFICANT CHANGE UP (ref 8.4–10.5)
CALCIUM SERPL-MCNC: 9 MG/DL — SIGNIFICANT CHANGE UP (ref 8.4–10.5)
CALCIUM SERPL-MCNC: 9.1 MG/DL — SIGNIFICANT CHANGE UP (ref 8.4–10.5)
CALCIUM SERPL-MCNC: 9.2 MG/DL — SIGNIFICANT CHANGE UP (ref 8.4–10.5)
CALCIUM SERPL-MCNC: 9.3 MG/DL — SIGNIFICANT CHANGE UP (ref 8.4–10.5)
CALCIUM SERPL-MCNC: 9.4 MG/DL — SIGNIFICANT CHANGE UP (ref 8.4–10.5)
CALCIUM SERPL-MCNC: 9.5 MG/DL — SIGNIFICANT CHANGE UP (ref 8.4–10.5)
CALCIUM SERPL-MCNC: 9.7 MG/DL — SIGNIFICANT CHANGE UP (ref 8.4–10.5)
CHLORIDE BLDV-SCNC: 103 MMOL/L — SIGNIFICANT CHANGE UP (ref 96–108)
CHLORIDE BLDV-SCNC: 104 MMOL/L — SIGNIFICANT CHANGE UP (ref 96–108)
CHLORIDE BLDV-SCNC: 106 MMOL/L — SIGNIFICANT CHANGE UP (ref 96–108)
CHLORIDE BLDV-SCNC: 107 MMOL/L — SIGNIFICANT CHANGE UP (ref 96–108)
CHLORIDE SERPL-SCNC: 101 MMOL/L — SIGNIFICANT CHANGE UP (ref 96–108)
CHLORIDE SERPL-SCNC: 102 MMOL/L — SIGNIFICANT CHANGE UP (ref 96–108)
CHLORIDE SERPL-SCNC: 102 MMOL/L — SIGNIFICANT CHANGE UP (ref 96–108)
CHLORIDE SERPL-SCNC: 103 MMOL/L — SIGNIFICANT CHANGE UP (ref 96–108)
CHLORIDE SERPL-SCNC: 104 MMOL/L — SIGNIFICANT CHANGE UP (ref 96–108)
CHLORIDE SERPL-SCNC: 105 MMOL/L — SIGNIFICANT CHANGE UP (ref 96–108)
CHLORIDE SERPL-SCNC: 105 MMOL/L — SIGNIFICANT CHANGE UP (ref 96–108)
CHLORIDE SERPL-SCNC: 106 MMOL/L — SIGNIFICANT CHANGE UP (ref 96–108)
CHLORIDE SERPL-SCNC: 106 MMOL/L — SIGNIFICANT CHANGE UP (ref 96–108)
CHLORIDE SERPL-SCNC: 107 MMOL/L — SIGNIFICANT CHANGE UP (ref 96–108)
CHLORIDE SERPL-SCNC: 108 MMOL/L — SIGNIFICANT CHANGE UP (ref 96–108)
CHLORIDE SERPL-SCNC: 109 MMOL/L — HIGH (ref 96–108)
CHLORIDE SERPL-SCNC: 110 MMOL/L — HIGH (ref 96–108)
CHLORIDE SERPL-SCNC: 115 MMOL/L — HIGH (ref 96–108)
CO2 BLDV-SCNC: 20 MMOL/L — LOW (ref 22–26)
CO2 BLDV-SCNC: 21 MMOL/L — LOW (ref 22–26)
CO2 BLDV-SCNC: 23 MMOL/L — SIGNIFICANT CHANGE UP (ref 22–26)
CO2 BLDV-SCNC: 27 MMOL/L — HIGH (ref 22–26)
CO2 BLDV-SCNC: 28 MMOL/L — HIGH (ref 22–26)
CO2 BLDV-SCNC: 29 MMOL/L — HIGH (ref 22–26)
CO2 BLDV-SCNC: 30 MMOL/L — HIGH (ref 22–26)
CO2 SERPL-SCNC: 13 MMOL/L — LOW (ref 22–31)
CO2 SERPL-SCNC: 16 MMOL/L — LOW (ref 22–31)
CO2 SERPL-SCNC: 16 MMOL/L — LOW (ref 22–31)
CO2 SERPL-SCNC: 17 MMOL/L — LOW (ref 22–31)
CO2 SERPL-SCNC: 18 MMOL/L — LOW (ref 22–31)
CO2 SERPL-SCNC: 18 MMOL/L — LOW (ref 22–31)
CO2 SERPL-SCNC: 19 MMOL/L — LOW (ref 22–31)
CO2 SERPL-SCNC: 19 MMOL/L — LOW (ref 22–31)
CO2 SERPL-SCNC: 20 MMOL/L — LOW (ref 22–31)
CO2 SERPL-SCNC: 20 MMOL/L — LOW (ref 22–31)
CO2 SERPL-SCNC: 21 MMOL/L — LOW (ref 22–31)
CO2 SERPL-SCNC: 22 MMOL/L — SIGNIFICANT CHANGE UP (ref 22–31)
CO2 SERPL-SCNC: 23 MMOL/L — SIGNIFICANT CHANGE UP (ref 22–31)
CO2 SERPL-SCNC: 24 MMOL/L — SIGNIFICANT CHANGE UP (ref 22–31)
CO2 SERPL-SCNC: 25 MMOL/L — SIGNIFICANT CHANGE UP (ref 22–31)
CO2 SERPL-SCNC: 26 MMOL/L — SIGNIFICANT CHANGE UP (ref 22–31)
CO2 SERPL-SCNC: 26 MMOL/L — SIGNIFICANT CHANGE UP (ref 22–31)
CO2 SERPL-SCNC: 27 MMOL/L — SIGNIFICANT CHANGE UP (ref 22–31)
CO2 SERPL-SCNC: 28 MMOL/L — SIGNIFICANT CHANGE UP (ref 22–31)
COLOR SPEC: COLORLESS — SIGNIFICANT CHANGE UP
COLOR SPEC: SIGNIFICANT CHANGE UP
COLOR SPEC: SIGNIFICANT CHANGE UP
CREAT ?TM UR-MCNC: 12 MG/DL — SIGNIFICANT CHANGE UP
CREAT SERPL-MCNC: 0.85 MG/DL — SIGNIFICANT CHANGE UP (ref 0.5–1.3)
CREAT SERPL-MCNC: 0.86 MG/DL — SIGNIFICANT CHANGE UP (ref 0.5–1.3)
CREAT SERPL-MCNC: 0.89 MG/DL — SIGNIFICANT CHANGE UP (ref 0.5–1.3)
CREAT SERPL-MCNC: 0.93 MG/DL — SIGNIFICANT CHANGE UP (ref 0.5–1.3)
CREAT SERPL-MCNC: 1.04 MG/DL — SIGNIFICANT CHANGE UP (ref 0.5–1.3)
CREAT SERPL-MCNC: 1.16 MG/DL — SIGNIFICANT CHANGE UP (ref 0.5–1.3)
CREAT SERPL-MCNC: 1.55 MG/DL — HIGH (ref 0.5–1.3)
CREAT SERPL-MCNC: 1.61 MG/DL — HIGH (ref 0.5–1.3)
CREAT SERPL-MCNC: 1.68 MG/DL — HIGH (ref 0.5–1.3)
CREAT SERPL-MCNC: 1.68 MG/DL — HIGH (ref 0.5–1.3)
CREAT SERPL-MCNC: 1.7 MG/DL — HIGH (ref 0.5–1.3)
CREAT SERPL-MCNC: 1.79 MG/DL — HIGH (ref 0.5–1.3)
CREAT SERPL-MCNC: 1.81 MG/DL — HIGH (ref 0.5–1.3)
CREAT SERPL-MCNC: 1.94 MG/DL — HIGH (ref 0.5–1.3)
CREAT SERPL-MCNC: 2.35 MG/DL — HIGH (ref 0.5–1.3)
CREAT SERPL-MCNC: 2.41 MG/DL — HIGH (ref 0.5–1.3)
CREAT SERPL-MCNC: 2.66 MG/DL — HIGH (ref 0.5–1.3)
CREAT SERPL-MCNC: 2.97 MG/DL — HIGH (ref 0.5–1.3)
CREAT SERPL-MCNC: 3.07 MG/DL — HIGH (ref 0.5–1.3)
CREAT SERPL-MCNC: 3.13 MG/DL — HIGH (ref 0.5–1.3)
CREAT SERPL-MCNC: 3.21 MG/DL — HIGH (ref 0.5–1.3)
CREAT SERPL-MCNC: 3.3 MG/DL — HIGH (ref 0.5–1.3)
CREAT SERPL-MCNC: 3.3 MG/DL — HIGH (ref 0.5–1.3)
CREAT SERPL-MCNC: 3.31 MG/DL — HIGH (ref 0.5–1.3)
CREAT SERPL-MCNC: 3.37 MG/DL — HIGH (ref 0.5–1.3)
CREAT SERPL-MCNC: 3.47 MG/DL — HIGH (ref 0.5–1.3)
CREAT SERPL-MCNC: 3.75 MG/DL — HIGH (ref 0.5–1.3)
CREAT SERPL-MCNC: 3.87 MG/DL — HIGH (ref 0.5–1.3)
CREAT SERPL-MCNC: 4.08 MG/DL — HIGH (ref 0.5–1.3)
CREAT SERPL-MCNC: 4.26 MG/DL — HIGH (ref 0.5–1.3)
CREAT SERPL-MCNC: 4.53 MG/DL — HIGH (ref 0.5–1.3)
CREAT SERPL-MCNC: 4.56 MG/DL — HIGH (ref 0.5–1.3)
CREAT SERPL-MCNC: 4.67 MG/DL — HIGH (ref 0.5–1.3)
CREAT SERPL-MCNC: 4.75 MG/DL — HIGH (ref 0.5–1.3)
CULTURE RESULTS: NO GROWTH — SIGNIFICANT CHANGE UP
CULTURE RESULTS: SIGNIFICANT CHANGE UP
D DIMER BLD IA.RAPID-MCNC: 1118 NG/ML DDU — HIGH
DIFF PNL FLD: ABNORMAL
EGFR: 10 ML/MIN/1.73M2 — LOW
EGFR: 10 ML/MIN/1.73M2 — LOW
EGFR: 11 ML/MIN/1.73M2 — LOW
EGFR: 11 ML/MIN/1.73M2 — LOW
EGFR: 12 ML/MIN/1.73M2 — LOW
EGFR: 13 ML/MIN/1.73M2 — LOW
EGFR: 14 ML/MIN/1.73M2 — LOW
EGFR: 14 ML/MIN/1.73M2 — LOW
EGFR: 15 ML/MIN/1.73M2 — LOW
EGFR: 17 ML/MIN/1.73M2 — LOW
EGFR: 19 ML/MIN/1.73M2 — LOW
EGFR: 20 ML/MIN/1.73M2 — LOW
EGFR: 25 ML/MIN/1.73M2 — LOW
EGFR: 27 ML/MIN/1.73M2 — LOW
EGFR: 27 ML/MIN/1.73M2 — LOW
EGFR: 29 ML/MIN/1.73M2 — LOW
EGFR: 31 ML/MIN/1.73M2 — LOW
EGFR: 32 ML/MIN/1.73M2 — LOW
EGFR: 46 ML/MIN/1.73M2 — LOW
EGFR: 52 ML/MIN/1.73M2 — LOW
EGFR: 59 ML/MIN/1.73M2 — LOW
EGFR: 63 ML/MIN/1.73M2 — SIGNIFICANT CHANGE UP
EGFR: 65 ML/MIN/1.73M2 — SIGNIFICANT CHANGE UP
EGFR: 66 ML/MIN/1.73M2 — SIGNIFICANT CHANGE UP
EGFR: 8 ML/MIN/1.73M2 — LOW
EGFR: 9 ML/MIN/1.73M2 — LOW
EOSINOPHIL # BLD AUTO: 0 K/UL — SIGNIFICANT CHANGE UP (ref 0–0.5)
EOSINOPHIL # BLD AUTO: 0 K/UL — SIGNIFICANT CHANGE UP (ref 0–0.5)
EOSINOPHIL # BLD AUTO: 0.01 K/UL — SIGNIFICANT CHANGE UP (ref 0–0.5)
EOSINOPHIL # BLD AUTO: 0.01 K/UL — SIGNIFICANT CHANGE UP (ref 0–0.5)
EOSINOPHIL # BLD AUTO: 0.02 K/UL — SIGNIFICANT CHANGE UP (ref 0–0.5)
EOSINOPHIL # BLD AUTO: 0.05 K/UL — SIGNIFICANT CHANGE UP (ref 0–0.5)
EOSINOPHIL NFR BLD AUTO: 0 % — SIGNIFICANT CHANGE UP (ref 0–6)
EOSINOPHIL NFR BLD AUTO: 0 % — SIGNIFICANT CHANGE UP (ref 0–6)
EOSINOPHIL NFR BLD AUTO: 0.1 % — SIGNIFICANT CHANGE UP (ref 0–6)
EOSINOPHIL NFR BLD AUTO: 0.3 % — SIGNIFICANT CHANGE UP (ref 0–6)
EPI CELLS # UR: 0 /HPF — SIGNIFICANT CHANGE UP
EPI CELLS # UR: 1 /HPF — SIGNIFICANT CHANGE UP
EPI CELLS # UR: 3 /HPF — SIGNIFICANT CHANGE UP
ESTIMATED AVERAGE GLUCOSE: 157 MG/DL — HIGH (ref 68–114)
ESTIMATED AVERAGE GLUCOSE: 166 MG/DL — HIGH (ref 68–114)
FLUAV AG NPH QL: SIGNIFICANT CHANGE UP
FLUBV AG NPH QL: SIGNIFICANT CHANGE UP
FOLATE SERPL-MCNC: >20 NG/ML — SIGNIFICANT CHANGE UP
GAS PNL BLDA: SIGNIFICANT CHANGE UP
GAS PNL BLDA: SIGNIFICANT CHANGE UP
GAS PNL BLDV: 136 MMOL/L — SIGNIFICANT CHANGE UP (ref 136–145)
GAS PNL BLDV: 137 MMOL/L — SIGNIFICANT CHANGE UP (ref 136–145)
GAS PNL BLDV: 138 MMOL/L — SIGNIFICANT CHANGE UP (ref 136–145)
GAS PNL BLDV: 139 MMOL/L — SIGNIFICANT CHANGE UP (ref 136–145)
GAS PNL BLDV: 140 MMOL/L — SIGNIFICANT CHANGE UP (ref 136–145)
GAS PNL BLDV: 144 MMOL/L — SIGNIFICANT CHANGE UP (ref 136–145)
GAS PNL BLDV: SIGNIFICANT CHANGE UP
GLUCOSE BLDC GLUCOMTR-MCNC: 103 MG/DL — HIGH (ref 70–99)
GLUCOSE BLDC GLUCOMTR-MCNC: 104 MG/DL — HIGH (ref 70–99)
GLUCOSE BLDC GLUCOMTR-MCNC: 111 MG/DL — HIGH (ref 70–99)
GLUCOSE BLDC GLUCOMTR-MCNC: 117 MG/DL — HIGH (ref 70–99)
GLUCOSE BLDC GLUCOMTR-MCNC: 118 MG/DL — HIGH (ref 70–99)
GLUCOSE BLDC GLUCOMTR-MCNC: 118 MG/DL — HIGH (ref 70–99)
GLUCOSE BLDC GLUCOMTR-MCNC: 119 MG/DL — HIGH (ref 70–99)
GLUCOSE BLDC GLUCOMTR-MCNC: 120 MG/DL — HIGH (ref 70–99)
GLUCOSE BLDC GLUCOMTR-MCNC: 135 MG/DL — HIGH (ref 70–99)
GLUCOSE BLDC GLUCOMTR-MCNC: 135 MG/DL — HIGH (ref 70–99)
GLUCOSE BLDC GLUCOMTR-MCNC: 137 MG/DL — HIGH (ref 70–99)
GLUCOSE BLDC GLUCOMTR-MCNC: 139 MG/DL — HIGH (ref 70–99)
GLUCOSE BLDC GLUCOMTR-MCNC: 145 MG/DL — HIGH (ref 70–99)
GLUCOSE BLDC GLUCOMTR-MCNC: 149 MG/DL — HIGH (ref 70–99)
GLUCOSE BLDC GLUCOMTR-MCNC: 151 MG/DL — HIGH (ref 70–99)
GLUCOSE BLDC GLUCOMTR-MCNC: 152 MG/DL — HIGH (ref 70–99)
GLUCOSE BLDC GLUCOMTR-MCNC: 153 MG/DL — HIGH (ref 70–99)
GLUCOSE BLDC GLUCOMTR-MCNC: 160 MG/DL — HIGH (ref 70–99)
GLUCOSE BLDC GLUCOMTR-MCNC: 164 MG/DL — HIGH (ref 70–99)
GLUCOSE BLDC GLUCOMTR-MCNC: 166 MG/DL — HIGH (ref 70–99)
GLUCOSE BLDC GLUCOMTR-MCNC: 167 MG/DL — HIGH (ref 70–99)
GLUCOSE BLDC GLUCOMTR-MCNC: 177 MG/DL — HIGH (ref 70–99)
GLUCOSE BLDC GLUCOMTR-MCNC: 177 MG/DL — HIGH (ref 70–99)
GLUCOSE BLDC GLUCOMTR-MCNC: 182 MG/DL — HIGH (ref 70–99)
GLUCOSE BLDC GLUCOMTR-MCNC: 183 MG/DL — HIGH (ref 70–99)
GLUCOSE BLDC GLUCOMTR-MCNC: 183 MG/DL — HIGH (ref 70–99)
GLUCOSE BLDC GLUCOMTR-MCNC: 184 MG/DL — HIGH (ref 70–99)
GLUCOSE BLDC GLUCOMTR-MCNC: 186 MG/DL — HIGH (ref 70–99)
GLUCOSE BLDC GLUCOMTR-MCNC: 195 MG/DL — HIGH (ref 70–99)
GLUCOSE BLDC GLUCOMTR-MCNC: 196 MG/DL — HIGH (ref 70–99)
GLUCOSE BLDC GLUCOMTR-MCNC: 197 MG/DL — HIGH (ref 70–99)
GLUCOSE BLDC GLUCOMTR-MCNC: 200 MG/DL — HIGH (ref 70–99)
GLUCOSE BLDC GLUCOMTR-MCNC: 202 MG/DL — HIGH (ref 70–99)
GLUCOSE BLDC GLUCOMTR-MCNC: 205 MG/DL — HIGH (ref 70–99)
GLUCOSE BLDC GLUCOMTR-MCNC: 206 MG/DL — HIGH (ref 70–99)
GLUCOSE BLDC GLUCOMTR-MCNC: 207 MG/DL — HIGH (ref 70–99)
GLUCOSE BLDC GLUCOMTR-MCNC: 207 MG/DL — HIGH (ref 70–99)
GLUCOSE BLDC GLUCOMTR-MCNC: 210 MG/DL — HIGH (ref 70–99)
GLUCOSE BLDC GLUCOMTR-MCNC: 210 MG/DL — HIGH (ref 70–99)
GLUCOSE BLDC GLUCOMTR-MCNC: 214 MG/DL — HIGH (ref 70–99)
GLUCOSE BLDC GLUCOMTR-MCNC: 217 MG/DL — HIGH (ref 70–99)
GLUCOSE BLDC GLUCOMTR-MCNC: 217 MG/DL — HIGH (ref 70–99)
GLUCOSE BLDC GLUCOMTR-MCNC: 218 MG/DL — HIGH (ref 70–99)
GLUCOSE BLDC GLUCOMTR-MCNC: 220 MG/DL — HIGH (ref 70–99)
GLUCOSE BLDC GLUCOMTR-MCNC: 221 MG/DL — HIGH (ref 70–99)
GLUCOSE BLDC GLUCOMTR-MCNC: 221 MG/DL — HIGH (ref 70–99)
GLUCOSE BLDC GLUCOMTR-MCNC: 230 MG/DL — HIGH (ref 70–99)
GLUCOSE BLDC GLUCOMTR-MCNC: 231 MG/DL — HIGH (ref 70–99)
GLUCOSE BLDC GLUCOMTR-MCNC: 234 MG/DL — HIGH (ref 70–99)
GLUCOSE BLDC GLUCOMTR-MCNC: 234 MG/DL — HIGH (ref 70–99)
GLUCOSE BLDC GLUCOMTR-MCNC: 239 MG/DL — HIGH (ref 70–99)
GLUCOSE BLDC GLUCOMTR-MCNC: 239 MG/DL — HIGH (ref 70–99)
GLUCOSE BLDC GLUCOMTR-MCNC: 242 MG/DL — HIGH (ref 70–99)
GLUCOSE BLDC GLUCOMTR-MCNC: 243 MG/DL — HIGH (ref 70–99)
GLUCOSE BLDC GLUCOMTR-MCNC: 246 MG/DL — HIGH (ref 70–99)
GLUCOSE BLDC GLUCOMTR-MCNC: 246 MG/DL — HIGH (ref 70–99)
GLUCOSE BLDC GLUCOMTR-MCNC: 252 MG/DL — HIGH (ref 70–99)
GLUCOSE BLDC GLUCOMTR-MCNC: 254 MG/DL — HIGH (ref 70–99)
GLUCOSE BLDC GLUCOMTR-MCNC: 255 MG/DL — HIGH (ref 70–99)
GLUCOSE BLDC GLUCOMTR-MCNC: 256 MG/DL — HIGH (ref 70–99)
GLUCOSE BLDC GLUCOMTR-MCNC: 256 MG/DL — HIGH (ref 70–99)
GLUCOSE BLDC GLUCOMTR-MCNC: 259 MG/DL — HIGH (ref 70–99)
GLUCOSE BLDC GLUCOMTR-MCNC: 259 MG/DL — HIGH (ref 70–99)
GLUCOSE BLDC GLUCOMTR-MCNC: 260 MG/DL — HIGH (ref 70–99)
GLUCOSE BLDC GLUCOMTR-MCNC: 267 MG/DL — HIGH (ref 70–99)
GLUCOSE BLDC GLUCOMTR-MCNC: 268 MG/DL — HIGH (ref 70–99)
GLUCOSE BLDC GLUCOMTR-MCNC: 268 MG/DL — HIGH (ref 70–99)
GLUCOSE BLDC GLUCOMTR-MCNC: 273 MG/DL — HIGH (ref 70–99)
GLUCOSE BLDC GLUCOMTR-MCNC: 274 MG/DL — HIGH (ref 70–99)
GLUCOSE BLDC GLUCOMTR-MCNC: 275 MG/DL — HIGH (ref 70–99)
GLUCOSE BLDC GLUCOMTR-MCNC: 277 MG/DL — HIGH (ref 70–99)
GLUCOSE BLDC GLUCOMTR-MCNC: 277 MG/DL — HIGH (ref 70–99)
GLUCOSE BLDC GLUCOMTR-MCNC: 281 MG/DL — HIGH (ref 70–99)
GLUCOSE BLDC GLUCOMTR-MCNC: 289 MG/DL — HIGH (ref 70–99)
GLUCOSE BLDC GLUCOMTR-MCNC: 290 MG/DL — HIGH (ref 70–99)
GLUCOSE BLDC GLUCOMTR-MCNC: 296 MG/DL — HIGH (ref 70–99)
GLUCOSE BLDC GLUCOMTR-MCNC: 303 MG/DL — HIGH (ref 70–99)
GLUCOSE BLDC GLUCOMTR-MCNC: 304 MG/DL — HIGH (ref 70–99)
GLUCOSE BLDC GLUCOMTR-MCNC: 305 MG/DL — HIGH (ref 70–99)
GLUCOSE BLDC GLUCOMTR-MCNC: 307 MG/DL — HIGH (ref 70–99)
GLUCOSE BLDC GLUCOMTR-MCNC: 310 MG/DL — HIGH (ref 70–99)
GLUCOSE BLDC GLUCOMTR-MCNC: 314 MG/DL — HIGH (ref 70–99)
GLUCOSE BLDC GLUCOMTR-MCNC: 322 MG/DL — HIGH (ref 70–99)
GLUCOSE BLDC GLUCOMTR-MCNC: 323 MG/DL — HIGH (ref 70–99)
GLUCOSE BLDC GLUCOMTR-MCNC: 324 MG/DL — HIGH (ref 70–99)
GLUCOSE BLDC GLUCOMTR-MCNC: 324 MG/DL — HIGH (ref 70–99)
GLUCOSE BLDC GLUCOMTR-MCNC: 326 MG/DL — HIGH (ref 70–99)
GLUCOSE BLDC GLUCOMTR-MCNC: 330 MG/DL — HIGH (ref 70–99)
GLUCOSE BLDC GLUCOMTR-MCNC: 330 MG/DL — HIGH (ref 70–99)
GLUCOSE BLDC GLUCOMTR-MCNC: 334 MG/DL — HIGH (ref 70–99)
GLUCOSE BLDC GLUCOMTR-MCNC: 343 MG/DL — HIGH (ref 70–99)
GLUCOSE BLDC GLUCOMTR-MCNC: 344 MG/DL — HIGH (ref 70–99)
GLUCOSE BLDC GLUCOMTR-MCNC: 346 MG/DL — HIGH (ref 70–99)
GLUCOSE BLDC GLUCOMTR-MCNC: 358 MG/DL — HIGH (ref 70–99)
GLUCOSE BLDC GLUCOMTR-MCNC: 360 MG/DL — HIGH (ref 70–99)
GLUCOSE BLDC GLUCOMTR-MCNC: 364 MG/DL — HIGH (ref 70–99)
GLUCOSE BLDC GLUCOMTR-MCNC: 375 MG/DL — HIGH (ref 70–99)
GLUCOSE BLDC GLUCOMTR-MCNC: 377 MG/DL — HIGH (ref 70–99)
GLUCOSE BLDC GLUCOMTR-MCNC: 378 MG/DL — HIGH (ref 70–99)
GLUCOSE BLDC GLUCOMTR-MCNC: 381 MG/DL — HIGH (ref 70–99)
GLUCOSE BLDC GLUCOMTR-MCNC: 388 MG/DL — HIGH (ref 70–99)
GLUCOSE BLDC GLUCOMTR-MCNC: 393 MG/DL — HIGH (ref 70–99)
GLUCOSE BLDC GLUCOMTR-MCNC: 403 MG/DL — HIGH (ref 70–99)
GLUCOSE BLDC GLUCOMTR-MCNC: 412 MG/DL — HIGH (ref 70–99)
GLUCOSE BLDC GLUCOMTR-MCNC: 422 MG/DL — HIGH (ref 70–99)
GLUCOSE BLDC GLUCOMTR-MCNC: 423 MG/DL — HIGH (ref 70–99)
GLUCOSE BLDC GLUCOMTR-MCNC: 428 MG/DL — HIGH (ref 70–99)
GLUCOSE BLDC GLUCOMTR-MCNC: 437 MG/DL — HIGH (ref 70–99)
GLUCOSE BLDC GLUCOMTR-MCNC: 462 MG/DL — CRITICAL HIGH (ref 70–99)
GLUCOSE BLDC GLUCOMTR-MCNC: 467 MG/DL — CRITICAL HIGH (ref 70–99)
GLUCOSE BLDC GLUCOMTR-MCNC: 484 MG/DL — CRITICAL HIGH (ref 70–99)
GLUCOSE BLDC GLUCOMTR-MCNC: 504 MG/DL — CRITICAL HIGH (ref 70–99)
GLUCOSE BLDC GLUCOMTR-MCNC: 512 MG/DL — CRITICAL HIGH (ref 70–99)
GLUCOSE BLDC GLUCOMTR-MCNC: 527 MG/DL — CRITICAL HIGH (ref 70–99)
GLUCOSE BLDC GLUCOMTR-MCNC: 72 MG/DL — SIGNIFICANT CHANGE UP (ref 70–99)
GLUCOSE BLDC GLUCOMTR-MCNC: 73 MG/DL — SIGNIFICANT CHANGE UP (ref 70–99)
GLUCOSE BLDC GLUCOMTR-MCNC: 76 MG/DL — SIGNIFICANT CHANGE UP (ref 70–99)
GLUCOSE BLDC GLUCOMTR-MCNC: 81 MG/DL — SIGNIFICANT CHANGE UP (ref 70–99)
GLUCOSE BLDC GLUCOMTR-MCNC: >600 MG/DL — CRITICAL HIGH (ref 70–99)
GLUCOSE BLDV-MCNC: 262 MG/DL — HIGH (ref 70–99)
GLUCOSE BLDV-MCNC: 265 MG/DL — HIGH (ref 70–99)
GLUCOSE BLDV-MCNC: 268 MG/DL — HIGH (ref 70–99)
GLUCOSE BLDV-MCNC: 338 MG/DL — HIGH (ref 70–99)
GLUCOSE BLDV-MCNC: 404 MG/DL — HIGH (ref 70–99)
GLUCOSE BLDV-MCNC: 451 MG/DL — CRITICAL HIGH (ref 70–99)
GLUCOSE SERPL-MCNC: 117 MG/DL — HIGH (ref 70–99)
GLUCOSE SERPL-MCNC: 124 MG/DL — HIGH (ref 70–99)
GLUCOSE SERPL-MCNC: 129 MG/DL — HIGH (ref 70–99)
GLUCOSE SERPL-MCNC: 146 MG/DL — HIGH (ref 70–99)
GLUCOSE SERPL-MCNC: 159 MG/DL — HIGH (ref 70–99)
GLUCOSE SERPL-MCNC: 169 MG/DL — HIGH (ref 70–99)
GLUCOSE SERPL-MCNC: 171 MG/DL — HIGH (ref 70–99)
GLUCOSE SERPL-MCNC: 171 MG/DL — HIGH (ref 70–99)
GLUCOSE SERPL-MCNC: 178 MG/DL — HIGH (ref 70–99)
GLUCOSE SERPL-MCNC: 185 MG/DL — HIGH (ref 70–99)
GLUCOSE SERPL-MCNC: 185 MG/DL — HIGH (ref 70–99)
GLUCOSE SERPL-MCNC: 194 MG/DL — HIGH (ref 70–99)
GLUCOSE SERPL-MCNC: 197 MG/DL — HIGH (ref 70–99)
GLUCOSE SERPL-MCNC: 199 MG/DL — HIGH (ref 70–99)
GLUCOSE SERPL-MCNC: 199 MG/DL — HIGH (ref 70–99)
GLUCOSE SERPL-MCNC: 201 MG/DL — HIGH (ref 70–99)
GLUCOSE SERPL-MCNC: 204 MG/DL — HIGH (ref 70–99)
GLUCOSE SERPL-MCNC: 208 MG/DL — HIGH (ref 70–99)
GLUCOSE SERPL-MCNC: 237 MG/DL — HIGH (ref 70–99)
GLUCOSE SERPL-MCNC: 237 MG/DL — HIGH (ref 70–99)
GLUCOSE SERPL-MCNC: 239 MG/DL — HIGH (ref 70–99)
GLUCOSE SERPL-MCNC: 240 MG/DL — HIGH (ref 70–99)
GLUCOSE SERPL-MCNC: 261 MG/DL — HIGH (ref 70–99)
GLUCOSE SERPL-MCNC: 261 MG/DL — HIGH (ref 70–99)
GLUCOSE SERPL-MCNC: 264 MG/DL — HIGH (ref 70–99)
GLUCOSE SERPL-MCNC: 266 MG/DL — HIGH (ref 70–99)
GLUCOSE SERPL-MCNC: 267 MG/DL — HIGH (ref 70–99)
GLUCOSE SERPL-MCNC: 271 MG/DL — HIGH (ref 70–99)
GLUCOSE SERPL-MCNC: 291 MG/DL — HIGH (ref 70–99)
GLUCOSE SERPL-MCNC: 354 MG/DL — HIGH (ref 70–99)
GLUCOSE SERPL-MCNC: 397 MG/DL — HIGH (ref 70–99)
GLUCOSE SERPL-MCNC: 442 MG/DL — HIGH (ref 70–99)
GLUCOSE SERPL-MCNC: 473 MG/DL — CRITICAL HIGH (ref 70–99)
GLUCOSE SERPL-MCNC: 86 MG/DL — SIGNIFICANT CHANGE UP (ref 70–99)
GLUCOSE UR QL: ABNORMAL
GLUCOSE UR QL: ABNORMAL
GLUCOSE UR QL: NEGATIVE — SIGNIFICANT CHANGE UP
HCO3 BLDV-SCNC: 19 MMOL/L — LOW (ref 22–29)
HCO3 BLDV-SCNC: 20 MMOL/L — LOW (ref 22–29)
HCO3 BLDV-SCNC: 22 MMOL/L — SIGNIFICANT CHANGE UP (ref 22–29)
HCO3 BLDV-SCNC: 25 MMOL/L — SIGNIFICANT CHANGE UP (ref 22–29)
HCO3 BLDV-SCNC: 26 MMOL/L — SIGNIFICANT CHANGE UP (ref 22–29)
HCO3 BLDV-SCNC: 27 MMOL/L — SIGNIFICANT CHANGE UP (ref 22–29)
HCO3 BLDV-SCNC: 28 MMOL/L — SIGNIFICANT CHANGE UP (ref 22–29)
HCT VFR BLD CALC: 22 % — LOW (ref 34.5–45)
HCT VFR BLD CALC: 22.2 % — LOW (ref 34.5–45)
HCT VFR BLD CALC: 23.1 % — LOW (ref 34.5–45)
HCT VFR BLD CALC: 23.6 % — LOW (ref 34.5–45)
HCT VFR BLD CALC: 24 % — LOW (ref 34.5–45)
HCT VFR BLD CALC: 24.4 % — LOW (ref 34.5–45)
HCT VFR BLD CALC: 25 % — LOW (ref 34.5–45)
HCT VFR BLD CALC: 25.3 % — LOW (ref 34.5–45)
HCT VFR BLD CALC: 25.8 % — LOW (ref 34.5–45)
HCT VFR BLD CALC: 25.8 % — LOW (ref 34.5–45)
HCT VFR BLD CALC: 26.6 % — LOW (ref 34.5–45)
HCT VFR BLD CALC: 26.8 % — LOW (ref 34.5–45)
HCT VFR BLD CALC: 26.8 % — LOW (ref 34.5–45)
HCT VFR BLD CALC: 27.1 % — LOW (ref 34.5–45)
HCT VFR BLD CALC: 27.3 % — LOW (ref 34.5–45)
HCT VFR BLD CALC: 27.6 % — LOW (ref 34.5–45)
HCT VFR BLD CALC: 28.1 % — LOW (ref 34.5–45)
HCT VFR BLD CALC: 28.1 % — LOW (ref 34.5–45)
HCT VFR BLD CALC: 28.5 % — LOW (ref 34.5–45)
HCT VFR BLD CALC: 28.8 % — LOW (ref 34.5–45)
HCT VFR BLD CALC: 28.8 % — LOW (ref 34.5–45)
HCT VFR BLD CALC: 29.1 % — LOW (ref 34.5–45)
HCT VFR BLD CALC: 30.8 % — LOW (ref 34.5–45)
HCT VFR BLD CALC: 32.1 % — LOW (ref 34.5–45)
HCT VFR BLD CALC: 32.3 % — LOW (ref 34.5–45)
HCT VFR BLD CALC: 32.6 % — LOW (ref 34.5–45)
HCT VFR BLD CALC: 33.2 % — LOW (ref 34.5–45)
HCT VFR BLDA CALC: 21 % — CRITICAL LOW (ref 34.5–46.5)
HCT VFR BLDA CALC: 29 % — LOW (ref 34.5–46.5)
HCT VFR BLDA CALC: 32 % — LOW (ref 34.5–46.5)
HCT VFR BLDA CALC: 32 % — LOW (ref 34.5–46.5)
HGB BLD CALC-MCNC: 10.5 G/DL — LOW (ref 11.7–16.1)
HGB BLD CALC-MCNC: 10.8 G/DL — LOW (ref 11.7–16.1)
HGB BLD CALC-MCNC: 7.1 G/DL — LOW (ref 11.7–16.1)
HGB BLD CALC-MCNC: 9.5 G/DL — LOW (ref 11.7–16.1)
HGB BLD CALC-MCNC: 9.5 G/DL — LOW (ref 11.7–16.1)
HGB BLD CALC-MCNC: 9.7 G/DL — LOW (ref 11.7–16.1)
HGB BLD-MCNC: 10.3 G/DL — LOW (ref 11.5–15.5)
HGB BLD-MCNC: 10.4 G/DL — LOW (ref 11.5–15.5)
HGB BLD-MCNC: 10.4 G/DL — LOW (ref 11.5–15.5)
HGB BLD-MCNC: 10.7 G/DL — LOW (ref 11.5–15.5)
HGB BLD-MCNC: 7.2 G/DL — LOW (ref 11.5–15.5)
HGB BLD-MCNC: 7.4 G/DL — LOW (ref 11.5–15.5)
HGB BLD-MCNC: 7.5 G/DL — LOW (ref 11.5–15.5)
HGB BLD-MCNC: 7.8 G/DL — LOW (ref 11.5–15.5)
HGB BLD-MCNC: 8 G/DL — LOW (ref 11.5–15.5)
HGB BLD-MCNC: 8.2 G/DL — LOW (ref 11.5–15.5)
HGB BLD-MCNC: 8.3 G/DL — LOW (ref 11.5–15.5)
HGB BLD-MCNC: 8.4 G/DL — LOW (ref 11.5–15.5)
HGB BLD-MCNC: 8.4 G/DL — LOW (ref 11.5–15.5)
HGB BLD-MCNC: 8.7 G/DL — LOW (ref 11.5–15.5)
HGB BLD-MCNC: 8.7 G/DL — LOW (ref 11.5–15.5)
HGB BLD-MCNC: 8.9 G/DL — LOW (ref 11.5–15.5)
HGB BLD-MCNC: 9.1 G/DL — LOW (ref 11.5–15.5)
HGB BLD-MCNC: 9.1 G/DL — LOW (ref 11.5–15.5)
HGB BLD-MCNC: 9.2 G/DL — LOW (ref 11.5–15.5)
HGB BLD-MCNC: 9.3 G/DL — LOW (ref 11.5–15.5)
HGB BLD-MCNC: 9.6 G/DL — LOW (ref 11.5–15.5)
HOROWITZ INDEX BLDV+IHG-RTO: 60 — SIGNIFICANT CHANGE UP
HYALINE CASTS # UR AUTO: 0 /LPF — SIGNIFICANT CHANGE UP (ref 0–2)
HYALINE CASTS # UR AUTO: 1 /LPF — SIGNIFICANT CHANGE UP (ref 0–2)
HYALINE CASTS # UR AUTO: 3 /LPF — HIGH (ref 0–2)
IMM GRANULOCYTES NFR BLD AUTO: 0.4 % — SIGNIFICANT CHANGE UP (ref 0–0.9)
IMM GRANULOCYTES NFR BLD AUTO: 0.5 % — SIGNIFICANT CHANGE UP (ref 0–0.9)
IMM GRANULOCYTES NFR BLD AUTO: 0.6 % — SIGNIFICANT CHANGE UP (ref 0–0.9)
IMM GRANULOCYTES NFR BLD AUTO: 0.6 % — SIGNIFICANT CHANGE UP (ref 0–0.9)
IMM GRANULOCYTES NFR BLD AUTO: 0.8 % — SIGNIFICANT CHANGE UP (ref 0–0.9)
IMM GRANULOCYTES NFR BLD AUTO: 2.1 % — HIGH (ref 0–0.9)
INR BLD: 1.08 RATIO — SIGNIFICANT CHANGE UP (ref 0.88–1.16)
INR BLD: 1.12 RATIO — SIGNIFICANT CHANGE UP (ref 0.88–1.16)
INR BLD: 1.14 RATIO — SIGNIFICANT CHANGE UP (ref 0.88–1.16)
INR BLD: 1.14 RATIO — SIGNIFICANT CHANGE UP (ref 0.88–1.16)
INR BLD: 1.18 RATIO — HIGH (ref 0.88–1.16)
KETONES UR-MCNC: NEGATIVE — SIGNIFICANT CHANGE UP
LACTATE BLDV-MCNC: 1.4 MMOL/L — SIGNIFICANT CHANGE UP (ref 0.5–2)
LACTATE BLDV-MCNC: 1.7 MMOL/L — SIGNIFICANT CHANGE UP (ref 0.5–2)
LACTATE BLDV-MCNC: 2 MMOL/L — SIGNIFICANT CHANGE UP (ref 0.5–2)
LACTATE BLDV-MCNC: 2.1 MMOL/L — HIGH (ref 0.5–2)
LACTATE BLDV-MCNC: 2.7 MMOL/L — HIGH (ref 0.5–2)
LACTATE BLDV-MCNC: 3.3 MMOL/L — HIGH (ref 0.5–2)
LACTATE SERPL-SCNC: 1.1 MMOL/L — SIGNIFICANT CHANGE UP (ref 0.5–2)
LACTATE SERPL-SCNC: 1.3 MMOL/L — SIGNIFICANT CHANGE UP (ref 0.5–2)
LACTATE SERPL-SCNC: 1.4 MMOL/L — SIGNIFICANT CHANGE UP (ref 0.5–2)
LACTATE SERPL-SCNC: 2.5 MMOL/L — HIGH (ref 0.5–2)
LACTATE SERPL-SCNC: 2.6 MMOL/L — HIGH (ref 0.5–2)
LEGIONELLA AG UR QL: NEGATIVE — SIGNIFICANT CHANGE UP
LEUKOCYTE ESTERASE UR-ACNC: NEGATIVE — SIGNIFICANT CHANGE UP
LYMPHOCYTES # BLD AUTO: 0.68 K/UL — LOW (ref 1–3.3)
LYMPHOCYTES # BLD AUTO: 0.8 K/UL — LOW (ref 1–3.3)
LYMPHOCYTES # BLD AUTO: 0.86 K/UL — LOW (ref 1–3.3)
LYMPHOCYTES # BLD AUTO: 1.3 K/UL — SIGNIFICANT CHANGE UP (ref 1–3.3)
LYMPHOCYTES # BLD AUTO: 1.42 K/UL — SIGNIFICANT CHANGE UP (ref 1–3.3)
LYMPHOCYTES # BLD AUTO: 14.1 % — SIGNIFICANT CHANGE UP (ref 13–44)
LYMPHOCYTES # BLD AUTO: 2.8 K/UL — SIGNIFICANT CHANGE UP (ref 1–3.3)
LYMPHOCYTES # BLD AUTO: 5.2 % — LOW (ref 13–44)
LYMPHOCYTES # BLD AUTO: 7.5 % — LOW (ref 13–44)
LYMPHOCYTES # BLD AUTO: 9 % — LOW (ref 13–44)
LYMPHOCYTES # BLD AUTO: 9.6 % — LOW (ref 13–44)
LYMPHOCYTES # BLD AUTO: 9.9 % — LOW (ref 13–44)
MAGNESIUM SERPL-MCNC: 2.1 MG/DL — SIGNIFICANT CHANGE UP (ref 1.6–2.6)
MAGNESIUM SERPL-MCNC: 2.3 MG/DL — SIGNIFICANT CHANGE UP (ref 1.6–2.6)
MAGNESIUM SERPL-MCNC: 2.4 MG/DL — SIGNIFICANT CHANGE UP (ref 1.6–2.6)
MAGNESIUM SERPL-MCNC: 2.6 MG/DL — SIGNIFICANT CHANGE UP (ref 1.6–2.6)
MAGNESIUM SERPL-MCNC: 2.7 MG/DL — HIGH (ref 1.6–2.6)
MAGNESIUM SERPL-MCNC: 2.8 MG/DL — HIGH (ref 1.6–2.6)
MCHC RBC-ENTMCNC: 29.7 PG — SIGNIFICANT CHANGE UP (ref 27–34)
MCHC RBC-ENTMCNC: 29.8 PG — SIGNIFICANT CHANGE UP (ref 27–34)
MCHC RBC-ENTMCNC: 29.9 PG — SIGNIFICANT CHANGE UP (ref 27–34)
MCHC RBC-ENTMCNC: 30 PG — SIGNIFICANT CHANGE UP (ref 27–34)
MCHC RBC-ENTMCNC: 30.1 PG — SIGNIFICANT CHANGE UP (ref 27–34)
MCHC RBC-ENTMCNC: 30.2 PG — SIGNIFICANT CHANGE UP (ref 27–34)
MCHC RBC-ENTMCNC: 30.3 PG — SIGNIFICANT CHANGE UP (ref 27–34)
MCHC RBC-ENTMCNC: 30.5 PG — SIGNIFICANT CHANGE UP (ref 27–34)
MCHC RBC-ENTMCNC: 30.7 PG — SIGNIFICANT CHANGE UP (ref 27–34)
MCHC RBC-ENTMCNC: 30.7 PG — SIGNIFICANT CHANGE UP (ref 27–34)
MCHC RBC-ENTMCNC: 30.8 GM/DL — LOW (ref 32–36)
MCHC RBC-ENTMCNC: 30.8 PG — SIGNIFICANT CHANGE UP (ref 27–34)
MCHC RBC-ENTMCNC: 30.9 GM/DL — LOW (ref 32–36)
MCHC RBC-ENTMCNC: 31 GM/DL — LOW (ref 32–36)
MCHC RBC-ENTMCNC: 31.2 GM/DL — LOW (ref 32–36)
MCHC RBC-ENTMCNC: 31.3 GM/DL — LOW (ref 32–36)
MCHC RBC-ENTMCNC: 31.6 GM/DL — LOW (ref 32–36)
MCHC RBC-ENTMCNC: 31.8 GM/DL — LOW (ref 32–36)
MCHC RBC-ENTMCNC: 31.8 GM/DL — LOW (ref 32–36)
MCHC RBC-ENTMCNC: 31.9 GM/DL — LOW (ref 32–36)
MCHC RBC-ENTMCNC: 32 GM/DL — SIGNIFICANT CHANGE UP (ref 32–36)
MCHC RBC-ENTMCNC: 32.1 GM/DL — SIGNIFICANT CHANGE UP (ref 32–36)
MCHC RBC-ENTMCNC: 32.1 GM/DL — SIGNIFICANT CHANGE UP (ref 32–36)
MCHC RBC-ENTMCNC: 32.2 GM/DL — SIGNIFICANT CHANGE UP (ref 32–36)
MCHC RBC-ENTMCNC: 32.2 GM/DL — SIGNIFICANT CHANGE UP (ref 32–36)
MCHC RBC-ENTMCNC: 32.4 GM/DL — SIGNIFICANT CHANGE UP (ref 32–36)
MCHC RBC-ENTMCNC: 32.5 GM/DL — SIGNIFICANT CHANGE UP (ref 32–36)
MCHC RBC-ENTMCNC: 32.7 GM/DL — SIGNIFICANT CHANGE UP (ref 32–36)
MCHC RBC-ENTMCNC: 33 GM/DL — SIGNIFICANT CHANGE UP (ref 32–36)
MCHC RBC-ENTMCNC: 33.1 GM/DL — SIGNIFICANT CHANGE UP (ref 32–36)
MCHC RBC-ENTMCNC: 33.6 GM/DL — SIGNIFICANT CHANGE UP (ref 32–36)
MCV RBC AUTO: 100 FL — SIGNIFICANT CHANGE UP (ref 80–100)
MCV RBC AUTO: 89.4 FL — SIGNIFICANT CHANGE UP (ref 80–100)
MCV RBC AUTO: 92 FL — SIGNIFICANT CHANGE UP (ref 80–100)
MCV RBC AUTO: 92.4 FL — SIGNIFICANT CHANGE UP (ref 80–100)
MCV RBC AUTO: 92.6 FL — SIGNIFICANT CHANGE UP (ref 80–100)
MCV RBC AUTO: 92.7 FL — SIGNIFICANT CHANGE UP (ref 80–100)
MCV RBC AUTO: 92.8 FL — SIGNIFICANT CHANGE UP (ref 80–100)
MCV RBC AUTO: 92.9 FL — SIGNIFICANT CHANGE UP (ref 80–100)
MCV RBC AUTO: 93.1 FL — SIGNIFICANT CHANGE UP (ref 80–100)
MCV RBC AUTO: 93.3 FL — SIGNIFICANT CHANGE UP (ref 80–100)
MCV RBC AUTO: 93.9 FL — SIGNIFICANT CHANGE UP (ref 80–100)
MCV RBC AUTO: 93.9 FL — SIGNIFICANT CHANGE UP (ref 80–100)
MCV RBC AUTO: 94.7 FL — SIGNIFICANT CHANGE UP (ref 80–100)
MCV RBC AUTO: 94.8 FL — SIGNIFICANT CHANGE UP (ref 80–100)
MCV RBC AUTO: 95 FL — SIGNIFICANT CHANGE UP (ref 80–100)
MCV RBC AUTO: 95.1 FL — SIGNIFICANT CHANGE UP (ref 80–100)
MCV RBC AUTO: 95.1 FL — SIGNIFICANT CHANGE UP (ref 80–100)
MCV RBC AUTO: 95.2 FL — SIGNIFICANT CHANGE UP (ref 80–100)
MCV RBC AUTO: 95.6 FL — SIGNIFICANT CHANGE UP (ref 80–100)
MCV RBC AUTO: 95.7 FL — SIGNIFICANT CHANGE UP (ref 80–100)
MCV RBC AUTO: 95.7 FL — SIGNIFICANT CHANGE UP (ref 80–100)
MCV RBC AUTO: 96 FL — SIGNIFICANT CHANGE UP (ref 80–100)
MCV RBC AUTO: 96.4 FL — SIGNIFICANT CHANGE UP (ref 80–100)
MCV RBC AUTO: 96.5 FL — SIGNIFICANT CHANGE UP (ref 80–100)
MCV RBC AUTO: 96.8 FL — SIGNIFICANT CHANGE UP (ref 80–100)
MCV RBC AUTO: 97.3 FL — SIGNIFICANT CHANGE UP (ref 80–100)
MCV RBC AUTO: 98.5 FL — SIGNIFICANT CHANGE UP (ref 80–100)
METHOD TYPE: SIGNIFICANT CHANGE UP
METHOD TYPE: SIGNIFICANT CHANGE UP
MONOCYTES # BLD AUTO: 0.36 K/UL — SIGNIFICANT CHANGE UP (ref 0–0.9)
MONOCYTES # BLD AUTO: 0.49 K/UL — SIGNIFICANT CHANGE UP (ref 0–0.9)
MONOCYTES # BLD AUTO: 0.52 K/UL — SIGNIFICANT CHANGE UP (ref 0–0.9)
MONOCYTES # BLD AUTO: 0.64 K/UL — SIGNIFICANT CHANGE UP (ref 0–0.9)
MONOCYTES # BLD AUTO: 0.72 K/UL — SIGNIFICANT CHANGE UP (ref 0–0.9)
MONOCYTES # BLD AUTO: 0.94 K/UL — HIGH (ref 0–0.9)
MONOCYTES NFR BLD AUTO: 3.1 % — SIGNIFICANT CHANGE UP (ref 2–14)
MONOCYTES NFR BLD AUTO: 3.6 % — SIGNIFICANT CHANGE UP (ref 2–14)
MONOCYTES NFR BLD AUTO: 4.1 % — SIGNIFICANT CHANGE UP (ref 2–14)
MONOCYTES NFR BLD AUTO: 4.7 % — SIGNIFICANT CHANGE UP (ref 2–14)
MONOCYTES NFR BLD AUTO: 5.7 % — SIGNIFICANT CHANGE UP (ref 2–14)
MONOCYTES NFR BLD AUTO: 6.1 % — SIGNIFICANT CHANGE UP (ref 2–14)
MRSA PCR RESULT.: SIGNIFICANT CHANGE UP
NEUTROPHILS # BLD AUTO: 11.43 K/UL — HIGH (ref 1.8–7.4)
NEUTROPHILS # BLD AUTO: 13.32 K/UL — HIGH (ref 1.8–7.4)
NEUTROPHILS # BLD AUTO: 13.74 K/UL — HIGH (ref 1.8–7.4)
NEUTROPHILS # BLD AUTO: 16.05 K/UL — HIGH (ref 1.8–7.4)
NEUTROPHILS # BLD AUTO: 7.44 K/UL — HIGH (ref 1.8–7.4)
NEUTROPHILS # BLD AUTO: 7.79 K/UL — HIGH (ref 1.8–7.4)
NEUTROPHILS NFR BLD AUTO: 81 % — HIGH (ref 43–77)
NEUTROPHILS NFR BLD AUTO: 84.7 % — HIGH (ref 43–77)
NEUTROPHILS NFR BLD AUTO: 85.3 % — HIGH (ref 43–77)
NEUTROPHILS NFR BLD AUTO: 85.8 % — HIGH (ref 43–77)
NEUTROPHILS NFR BLD AUTO: 86.4 % — HIGH (ref 43–77)
NEUTROPHILS NFR BLD AUTO: 87.4 % — HIGH (ref 43–77)
NITRITE UR-MCNC: NEGATIVE — SIGNIFICANT CHANGE UP
NRBC # BLD: 0 /100 WBCS — SIGNIFICANT CHANGE UP (ref 0–0)
NT-PROBNP SERPL-SCNC: 4203 PG/ML — HIGH (ref 0–300)
NT-PROBNP SERPL-SCNC: 4508 PG/ML — HIGH (ref 0–300)
NT-PROBNP SERPL-SCNC: HIGH PG/ML (ref 0–300)
ORGANISM # SPEC MICROSCOPIC CNT: SIGNIFICANT CHANGE UP
PCO2 BLDV: 34 MMHG — LOW (ref 39–42)
PCO2 BLDV: 39 MMHG — SIGNIFICANT CHANGE UP (ref 39–42)
PCO2 BLDV: 39 MMHG — SIGNIFICANT CHANGE UP (ref 39–42)
PCO2 BLDV: 40 MMHG — SIGNIFICANT CHANGE UP (ref 39–42)
PCO2 BLDV: 43 MMHG — HIGH (ref 39–42)
PCO2 BLDV: 44 MMHG — HIGH (ref 39–42)
PCO2 BLDV: 56 MMHG — HIGH (ref 39–42)
PH BLDV: 7.28 — LOW (ref 7.32–7.43)
PH BLDV: 7.32 — SIGNIFICANT CHANGE UP (ref 7.32–7.43)
PH BLDV: 7.36 — SIGNIFICANT CHANGE UP (ref 7.32–7.43)
PH BLDV: 7.36 — SIGNIFICANT CHANGE UP (ref 7.32–7.43)
PH BLDV: 7.38 — SIGNIFICANT CHANGE UP (ref 7.32–7.43)
PH BLDV: 7.4 — SIGNIFICANT CHANGE UP (ref 7.32–7.43)
PH BLDV: 7.46 — HIGH (ref 7.32–7.43)
PH UR: 5.5 — SIGNIFICANT CHANGE UP (ref 5–8)
PH UR: 6 — SIGNIFICANT CHANGE UP (ref 5–8)
PH UR: 6.5 — SIGNIFICANT CHANGE UP (ref 5–8)
PHOSPHATE SERPL-MCNC: 3 MG/DL — SIGNIFICANT CHANGE UP (ref 2.5–4.5)
PHOSPHATE SERPL-MCNC: 3.4 MG/DL — SIGNIFICANT CHANGE UP (ref 2.5–4.5)
PHOSPHATE SERPL-MCNC: 3.5 MG/DL — SIGNIFICANT CHANGE UP (ref 2.5–4.5)
PHOSPHATE SERPL-MCNC: 4.2 MG/DL — SIGNIFICANT CHANGE UP (ref 2.5–4.5)
PHOSPHATE SERPL-MCNC: 4.5 MG/DL — SIGNIFICANT CHANGE UP (ref 2.5–4.5)
PHOSPHATE SERPL-MCNC: 4.8 MG/DL — HIGH (ref 2.5–4.5)
PHOSPHATE SERPL-MCNC: 5.1 MG/DL — HIGH (ref 2.5–4.5)
PHOSPHATE SERPL-MCNC: 5.2 MG/DL — HIGH (ref 2.5–4.5)
PHOSPHATE SERPL-MCNC: 5.5 MG/DL — HIGH (ref 2.5–4.5)
PHOSPHATE SERPL-MCNC: 6.6 MG/DL — HIGH (ref 2.5–4.5)
PLATELET # BLD AUTO: 179 K/UL — SIGNIFICANT CHANGE UP (ref 150–400)
PLATELET # BLD AUTO: 193 K/UL — SIGNIFICANT CHANGE UP (ref 150–400)
PLATELET # BLD AUTO: 195 K/UL — SIGNIFICANT CHANGE UP (ref 150–400)
PLATELET # BLD AUTO: 210 K/UL — SIGNIFICANT CHANGE UP (ref 150–400)
PLATELET # BLD AUTO: 219 K/UL — SIGNIFICANT CHANGE UP (ref 150–400)
PLATELET # BLD AUTO: 220 K/UL — SIGNIFICANT CHANGE UP (ref 150–400)
PLATELET # BLD AUTO: 221 K/UL — SIGNIFICANT CHANGE UP (ref 150–400)
PLATELET # BLD AUTO: 223 K/UL — SIGNIFICANT CHANGE UP (ref 150–400)
PLATELET # BLD AUTO: 225 K/UL — SIGNIFICANT CHANGE UP (ref 150–400)
PLATELET # BLD AUTO: 225 K/UL — SIGNIFICANT CHANGE UP (ref 150–400)
PLATELET # BLD AUTO: 227 K/UL — SIGNIFICANT CHANGE UP (ref 150–400)
PLATELET # BLD AUTO: 234 K/UL — SIGNIFICANT CHANGE UP (ref 150–400)
PLATELET # BLD AUTO: 237 K/UL — SIGNIFICANT CHANGE UP (ref 150–400)
PLATELET # BLD AUTO: 239 K/UL — SIGNIFICANT CHANGE UP (ref 150–400)
PLATELET # BLD AUTO: 240 K/UL — SIGNIFICANT CHANGE UP (ref 150–400)
PLATELET # BLD AUTO: 242 K/UL — SIGNIFICANT CHANGE UP (ref 150–400)
PLATELET # BLD AUTO: 246 K/UL — SIGNIFICANT CHANGE UP (ref 150–400)
PLATELET # BLD AUTO: 250 K/UL — SIGNIFICANT CHANGE UP (ref 150–400)
PLATELET # BLD AUTO: 256 K/UL — SIGNIFICANT CHANGE UP (ref 150–400)
PLATELET # BLD AUTO: 262 K/UL — SIGNIFICANT CHANGE UP (ref 150–400)
PLATELET # BLD AUTO: 265 K/UL — SIGNIFICANT CHANGE UP (ref 150–400)
PLATELET # BLD AUTO: 266 K/UL — SIGNIFICANT CHANGE UP (ref 150–400)
PLATELET # BLD AUTO: 272 K/UL — SIGNIFICANT CHANGE UP (ref 150–400)
PLATELET # BLD AUTO: 296 K/UL — SIGNIFICANT CHANGE UP (ref 150–400)
PLATELET # BLD AUTO: 297 K/UL — SIGNIFICANT CHANGE UP (ref 150–400)
PLATELET # BLD AUTO: 310 K/UL — SIGNIFICANT CHANGE UP (ref 150–400)
PLATELET # BLD AUTO: 320 K/UL — SIGNIFICANT CHANGE UP (ref 150–400)
PO2 BLDV: 34 MMHG — SIGNIFICANT CHANGE UP (ref 25–45)
PO2 BLDV: 39 MMHG — SIGNIFICANT CHANGE UP (ref 25–45)
PO2 BLDV: 45 MMHG — SIGNIFICANT CHANGE UP (ref 25–45)
PO2 BLDV: 45 MMHG — SIGNIFICANT CHANGE UP (ref 25–45)
PO2 BLDV: 46 MMHG — HIGH (ref 25–45)
PO2 BLDV: 52 MMHG — HIGH (ref 25–45)
PO2 BLDV: 61 MMHG — HIGH (ref 25–45)
POTASSIUM BLDV-SCNC: 2.8 MMOL/L — CRITICAL LOW (ref 3.5–5.1)
POTASSIUM BLDV-SCNC: 3.6 MMOL/L — SIGNIFICANT CHANGE UP (ref 3.5–5.1)
POTASSIUM BLDV-SCNC: 3.8 MMOL/L — SIGNIFICANT CHANGE UP (ref 3.5–5.1)
POTASSIUM BLDV-SCNC: 3.8 MMOL/L — SIGNIFICANT CHANGE UP (ref 3.5–5.1)
POTASSIUM BLDV-SCNC: 4.2 MMOL/L — SIGNIFICANT CHANGE UP (ref 3.5–5.1)
POTASSIUM BLDV-SCNC: 4.3 MMOL/L — SIGNIFICANT CHANGE UP (ref 3.5–5.1)
POTASSIUM SERPL-MCNC: 2.4 MMOL/L — CRITICAL LOW (ref 3.5–5.3)
POTASSIUM SERPL-MCNC: 2.7 MMOL/L — CRITICAL LOW (ref 3.5–5.3)
POTASSIUM SERPL-MCNC: 2.7 MMOL/L — CRITICAL LOW (ref 3.5–5.3)
POTASSIUM SERPL-MCNC: 3.2 MMOL/L — LOW (ref 3.5–5.3)
POTASSIUM SERPL-MCNC: 3.2 MMOL/L — LOW (ref 3.5–5.3)
POTASSIUM SERPL-MCNC: 3.3 MMOL/L — LOW (ref 3.5–5.3)
POTASSIUM SERPL-MCNC: 3.4 MMOL/L — LOW (ref 3.5–5.3)
POTASSIUM SERPL-MCNC: 3.5 MMOL/L — SIGNIFICANT CHANGE UP (ref 3.5–5.3)
POTASSIUM SERPL-MCNC: 3.6 MMOL/L — SIGNIFICANT CHANGE UP (ref 3.5–5.3)
POTASSIUM SERPL-MCNC: 3.7 MMOL/L — SIGNIFICANT CHANGE UP (ref 3.5–5.3)
POTASSIUM SERPL-MCNC: 3.8 MMOL/L — SIGNIFICANT CHANGE UP (ref 3.5–5.3)
POTASSIUM SERPL-MCNC: 3.9 MMOL/L — SIGNIFICANT CHANGE UP (ref 3.5–5.3)
POTASSIUM SERPL-MCNC: 4 MMOL/L — SIGNIFICANT CHANGE UP (ref 3.5–5.3)
POTASSIUM SERPL-MCNC: 4.1 MMOL/L — SIGNIFICANT CHANGE UP (ref 3.5–5.3)
POTASSIUM SERPL-MCNC: 4.2 MMOL/L — SIGNIFICANT CHANGE UP (ref 3.5–5.3)
POTASSIUM SERPL-MCNC: 4.2 MMOL/L — SIGNIFICANT CHANGE UP (ref 3.5–5.3)
POTASSIUM SERPL-MCNC: 4.4 MMOL/L — SIGNIFICANT CHANGE UP (ref 3.5–5.3)
POTASSIUM SERPL-MCNC: 4.5 MMOL/L — SIGNIFICANT CHANGE UP (ref 3.5–5.3)
POTASSIUM SERPL-MCNC: 4.5 MMOL/L — SIGNIFICANT CHANGE UP (ref 3.5–5.3)
POTASSIUM SERPL-MCNC: 4.7 MMOL/L — SIGNIFICANT CHANGE UP (ref 3.5–5.3)
POTASSIUM SERPL-MCNC: 4.7 MMOL/L — SIGNIFICANT CHANGE UP (ref 3.5–5.3)
POTASSIUM SERPL-MCNC: 4.8 MMOL/L — SIGNIFICANT CHANGE UP (ref 3.5–5.3)
POTASSIUM SERPL-MCNC: 4.8 MMOL/L — SIGNIFICANT CHANGE UP (ref 3.5–5.3)
POTASSIUM SERPL-MCNC: 5.2 MMOL/L — SIGNIFICANT CHANGE UP (ref 3.5–5.3)
POTASSIUM SERPL-SCNC: 2.4 MMOL/L — CRITICAL LOW (ref 3.5–5.3)
POTASSIUM SERPL-SCNC: 2.7 MMOL/L — CRITICAL LOW (ref 3.5–5.3)
POTASSIUM SERPL-SCNC: 2.7 MMOL/L — CRITICAL LOW (ref 3.5–5.3)
POTASSIUM SERPL-SCNC: 3.2 MMOL/L — LOW (ref 3.5–5.3)
POTASSIUM SERPL-SCNC: 3.2 MMOL/L — LOW (ref 3.5–5.3)
POTASSIUM SERPL-SCNC: 3.3 MMOL/L — LOW (ref 3.5–5.3)
POTASSIUM SERPL-SCNC: 3.4 MMOL/L — LOW (ref 3.5–5.3)
POTASSIUM SERPL-SCNC: 3.5 MMOL/L — SIGNIFICANT CHANGE UP (ref 3.5–5.3)
POTASSIUM SERPL-SCNC: 3.6 MMOL/L — SIGNIFICANT CHANGE UP (ref 3.5–5.3)
POTASSIUM SERPL-SCNC: 3.7 MMOL/L — SIGNIFICANT CHANGE UP (ref 3.5–5.3)
POTASSIUM SERPL-SCNC: 3.8 MMOL/L — SIGNIFICANT CHANGE UP (ref 3.5–5.3)
POTASSIUM SERPL-SCNC: 3.9 MMOL/L — SIGNIFICANT CHANGE UP (ref 3.5–5.3)
POTASSIUM SERPL-SCNC: 4 MMOL/L — SIGNIFICANT CHANGE UP (ref 3.5–5.3)
POTASSIUM SERPL-SCNC: 4.1 MMOL/L — SIGNIFICANT CHANGE UP (ref 3.5–5.3)
POTASSIUM SERPL-SCNC: 4.2 MMOL/L — SIGNIFICANT CHANGE UP (ref 3.5–5.3)
POTASSIUM SERPL-SCNC: 4.2 MMOL/L — SIGNIFICANT CHANGE UP (ref 3.5–5.3)
POTASSIUM SERPL-SCNC: 4.4 MMOL/L — SIGNIFICANT CHANGE UP (ref 3.5–5.3)
POTASSIUM SERPL-SCNC: 4.5 MMOL/L — SIGNIFICANT CHANGE UP (ref 3.5–5.3)
POTASSIUM SERPL-SCNC: 4.5 MMOL/L — SIGNIFICANT CHANGE UP (ref 3.5–5.3)
POTASSIUM SERPL-SCNC: 4.7 MMOL/L — SIGNIFICANT CHANGE UP (ref 3.5–5.3)
POTASSIUM SERPL-SCNC: 4.7 MMOL/L — SIGNIFICANT CHANGE UP (ref 3.5–5.3)
POTASSIUM SERPL-SCNC: 4.8 MMOL/L — SIGNIFICANT CHANGE UP (ref 3.5–5.3)
POTASSIUM SERPL-SCNC: 4.8 MMOL/L — SIGNIFICANT CHANGE UP (ref 3.5–5.3)
POTASSIUM SERPL-SCNC: 5.2 MMOL/L — SIGNIFICANT CHANGE UP (ref 3.5–5.3)
PROT ?TM UR-MCNC: 65 MG/DL — HIGH (ref 0–12)
PROT SERPL-MCNC: 6.3 G/DL — SIGNIFICANT CHANGE UP (ref 6–8.3)
PROT SERPL-MCNC: 6.6 G/DL — SIGNIFICANT CHANGE UP (ref 6–8.3)
PROT SERPL-MCNC: 6.9 G/DL — SIGNIFICANT CHANGE UP (ref 6–8.3)
PROT SERPL-MCNC: 6.9 G/DL — SIGNIFICANT CHANGE UP (ref 6–8.3)
PROT SERPL-MCNC: 7 G/DL — SIGNIFICANT CHANGE UP (ref 6–8.3)
PROT SERPL-MCNC: 7.5 G/DL — SIGNIFICANT CHANGE UP (ref 6–8.3)
PROT SERPL-MCNC: 7.7 G/DL — SIGNIFICANT CHANGE UP (ref 6–8.3)
PROT UR-MCNC: ABNORMAL
PROT/CREAT UR-RTO: 5.4 RATIO — HIGH (ref 0–0.2)
PROTHROM AB SERPL-ACNC: 12.4 SEC — SIGNIFICANT CHANGE UP (ref 10.5–13.4)
PROTHROM AB SERPL-ACNC: 12.9 SEC — SIGNIFICANT CHANGE UP (ref 10.5–13.4)
PROTHROM AB SERPL-ACNC: 13.1 SEC — SIGNIFICANT CHANGE UP (ref 10.5–13.4)
PROTHROM AB SERPL-ACNC: 13.3 SEC — SIGNIFICANT CHANGE UP (ref 10.5–13.4)
PROTHROM AB SERPL-ACNC: 13.7 SEC — HIGH (ref 10.5–13.4)
RAPID RVP RESULT: SIGNIFICANT CHANGE UP
RBC # BLD: 2.39 M/UL — LOW (ref 3.8–5.2)
RBC # BLD: 2.46 M/UL — LOW (ref 3.8–5.2)
RBC # BLD: 2.49 M/UL — LOW (ref 3.8–5.2)
RBC # BLD: 2.51 M/UL — LOW (ref 3.8–5.2)
RBC # BLD: 2.51 M/UL — LOW (ref 3.8–5.2)
RBC # BLD: 2.53 M/UL — LOW (ref 3.8–5.2)
RBC # BLD: 2.59 M/UL — LOW (ref 3.8–5.2)
RBC # BLD: 2.62 M/UL — LOW (ref 3.8–5.2)
RBC # BLD: 2.63 M/UL — LOW (ref 3.8–5.2)
RBC # BLD: 2.71 M/UL — LOW (ref 3.8–5.2)
RBC # BLD: 2.77 M/UL — LOW (ref 3.8–5.2)
RBC # BLD: 2.77 M/UL — LOW (ref 3.8–5.2)
RBC # BLD: 2.8 M/UL — LOW (ref 3.8–5.2)
RBC # BLD: 2.87 M/UL — LOW (ref 3.8–5.2)
RBC # BLD: 2.91 M/UL — LOW (ref 3.8–5.2)
RBC # BLD: 2.96 M/UL — LOW (ref 3.8–5.2)
RBC # BLD: 2.98 M/UL — LOW (ref 3.8–5.2)
RBC # BLD: 3 M/UL — LOW (ref 3.8–5.2)
RBC # BLD: 3.02 M/UL — LOW (ref 3.8–5.2)
RBC # BLD: 3.03 M/UL — LOW (ref 3.8–5.2)
RBC # BLD: 3.04 M/UL — LOW (ref 3.8–5.2)
RBC # BLD: 3.04 M/UL — LOW (ref 3.8–5.2)
RBC # BLD: 3.22 M/UL — LOW (ref 3.8–5.2)
RBC # BLD: 3.44 M/UL — LOW (ref 3.8–5.2)
RBC # BLD: 3.44 M/UL — LOW (ref 3.8–5.2)
RBC # BLD: 3.47 M/UL — LOW (ref 3.8–5.2)
RBC # BLD: 3.49 M/UL — LOW (ref 3.8–5.2)
RBC # FLD: 13 % — SIGNIFICANT CHANGE UP (ref 10.3–14.5)
RBC # FLD: 13.1 % — SIGNIFICANT CHANGE UP (ref 10.3–14.5)
RBC # FLD: 13.2 % — SIGNIFICANT CHANGE UP (ref 10.3–14.5)
RBC # FLD: 13.3 % — SIGNIFICANT CHANGE UP (ref 10.3–14.5)
RBC # FLD: 13.3 % — SIGNIFICANT CHANGE UP (ref 10.3–14.5)
RBC # FLD: 13.4 % — SIGNIFICANT CHANGE UP (ref 10.3–14.5)
RBC # FLD: 13.9 % — SIGNIFICANT CHANGE UP (ref 10.3–14.5)
RBC # FLD: 14 % — SIGNIFICANT CHANGE UP (ref 10.3–14.5)
RBC # FLD: 14.1 % — SIGNIFICANT CHANGE UP (ref 10.3–14.5)
RBC # FLD: 14.2 % — SIGNIFICANT CHANGE UP (ref 10.3–14.5)
RBC # FLD: 14.2 % — SIGNIFICANT CHANGE UP (ref 10.3–14.5)
RBC # FLD: 14.3 % — SIGNIFICANT CHANGE UP (ref 10.3–14.5)
RBC # FLD: 14.3 % — SIGNIFICANT CHANGE UP (ref 10.3–14.5)
RBC CASTS # UR COMP ASSIST: 4 /HPF — SIGNIFICANT CHANGE UP (ref 0–4)
RBC CASTS # UR COMP ASSIST: 5 /HPF — HIGH (ref 0–4)
RBC CASTS # UR COMP ASSIST: 6 /HPF — HIGH (ref 0–4)
RH IG SCN BLD-IMP: POSITIVE — SIGNIFICANT CHANGE UP
RSV RNA NPH QL NAA+NON-PROBE: SIGNIFICANT CHANGE UP
S AUREUS DNA NOSE QL NAA+PROBE: SIGNIFICANT CHANGE UP
S PNEUM AG UR QL: NEGATIVE — SIGNIFICANT CHANGE UP
SAO2 % BLDV: 46.1 % — LOW (ref 67–88)
SAO2 % BLDV: 65.7 % — LOW (ref 67–88)
SAO2 % BLDV: 75.4 % — SIGNIFICANT CHANGE UP (ref 67–88)
SAO2 % BLDV: 77.6 % — SIGNIFICANT CHANGE UP (ref 67–88)
SAO2 % BLDV: 85.4 % — SIGNIFICANT CHANGE UP (ref 67–88)
SAO2 % BLDV: 86.2 % — SIGNIFICANT CHANGE UP (ref 67–88)
SAO2 % BLDV: 92.6 % — HIGH (ref 67–88)
SARS-COV-2 RNA SPEC QL NAA+PROBE: SIGNIFICANT CHANGE UP
SODIUM SERPL-SCNC: 137 MMOL/L — SIGNIFICANT CHANGE UP (ref 135–145)
SODIUM SERPL-SCNC: 138 MMOL/L — SIGNIFICANT CHANGE UP (ref 135–145)
SODIUM SERPL-SCNC: 139 MMOL/L — SIGNIFICANT CHANGE UP (ref 135–145)
SODIUM SERPL-SCNC: 139 MMOL/L — SIGNIFICANT CHANGE UP (ref 135–145)
SODIUM SERPL-SCNC: 140 MMOL/L — SIGNIFICANT CHANGE UP (ref 135–145)
SODIUM SERPL-SCNC: 141 MMOL/L — SIGNIFICANT CHANGE UP (ref 135–145)
SODIUM SERPL-SCNC: 142 MMOL/L — SIGNIFICANT CHANGE UP (ref 135–145)
SODIUM SERPL-SCNC: 143 MMOL/L — SIGNIFICANT CHANGE UP (ref 135–145)
SODIUM SERPL-SCNC: 144 MMOL/L — SIGNIFICANT CHANGE UP (ref 135–145)
SODIUM SERPL-SCNC: 145 MMOL/L — SIGNIFICANT CHANGE UP (ref 135–145)
SODIUM SERPL-SCNC: 146 MMOL/L — HIGH (ref 135–145)
SODIUM SERPL-SCNC: 146 MMOL/L — HIGH (ref 135–145)
SODIUM SERPL-SCNC: 147 MMOL/L — HIGH (ref 135–145)
SODIUM SERPL-SCNC: 148 MMOL/L — HIGH (ref 135–145)
SODIUM SERPL-SCNC: 150 MMOL/L — HIGH (ref 135–145)
SODIUM SERPL-SCNC: 151 MMOL/L — HIGH (ref 135–145)
SODIUM SERPL-SCNC: 152 MMOL/L — HIGH (ref 135–145)
SODIUM SERPL-SCNC: 157 MMOL/L — HIGH (ref 135–145)
SP GR SPEC: 1.01 — SIGNIFICANT CHANGE UP (ref 1.01–1.02)
SP GR SPEC: 1.02 — SIGNIFICANT CHANGE UP (ref 1.01–1.02)
SP GR SPEC: 1.02 — SIGNIFICANT CHANGE UP (ref 1.01–1.02)
SPECIMEN SOURCE: SIGNIFICANT CHANGE UP
TROPONIN T, HIGH SENSITIVITY RESULT: 62 NG/L — HIGH (ref 0–51)
TROPONIN T, HIGH SENSITIVITY RESULT: 63 NG/L — HIGH (ref 0–51)
TROPONIN T, HIGH SENSITIVITY RESULT: 693 NG/L — HIGH (ref 0–51)
TROPONIN T, HIGH SENSITIVITY RESULT: 71 NG/L — HIGH (ref 0–51)
TROPONIN T, HIGH SENSITIVITY RESULT: 818 NG/L — HIGH (ref 0–51)
TROPONIN T, HIGH SENSITIVITY RESULT: 85 NG/L — HIGH (ref 0–51)
TSH SERPL-MCNC: 0.68 UIU/ML — SIGNIFICANT CHANGE UP (ref 0.27–4.2)
UROBILINOGEN FLD QL: NEGATIVE — SIGNIFICANT CHANGE UP
VIT B12 SERPL-MCNC: 1246 PG/ML — HIGH (ref 232–1245)
WBC # BLD: 10.14 K/UL — SIGNIFICANT CHANGE UP (ref 3.8–10.5)
WBC # BLD: 10.39 K/UL — SIGNIFICANT CHANGE UP (ref 3.8–10.5)
WBC # BLD: 11.07 K/UL — HIGH (ref 3.8–10.5)
WBC # BLD: 11.08 K/UL — HIGH (ref 3.8–10.5)
WBC # BLD: 11.14 K/UL — HIGH (ref 3.8–10.5)
WBC # BLD: 11.55 K/UL — HIGH (ref 3.8–10.5)
WBC # BLD: 11.94 K/UL — HIGH (ref 3.8–10.5)
WBC # BLD: 13.18 K/UL — HIGH (ref 3.8–10.5)
WBC # BLD: 13.51 K/UL — HIGH (ref 3.8–10.5)
WBC # BLD: 13.62 K/UL — HIGH (ref 3.8–10.5)
WBC # BLD: 14.79 K/UL — HIGH (ref 3.8–10.5)
WBC # BLD: 14.98 K/UL — HIGH (ref 3.8–10.5)
WBC # BLD: 15.42 K/UL — HIGH (ref 3.8–10.5)
WBC # BLD: 15.73 K/UL — HIGH (ref 3.8–10.5)
WBC # BLD: 16.16 K/UL — HIGH (ref 3.8–10.5)
WBC # BLD: 17.6 K/UL — HIGH (ref 3.8–10.5)
WBC # BLD: 18.25 K/UL — HIGH (ref 3.8–10.5)
WBC # BLD: 18.67 K/UL — HIGH (ref 3.8–10.5)
WBC # BLD: 18.9 K/UL — HIGH (ref 3.8–10.5)
WBC # BLD: 19.67 K/UL — HIGH (ref 3.8–10.5)
WBC # BLD: 19.81 K/UL — HIGH (ref 3.8–10.5)
WBC # BLD: 22.81 K/UL — HIGH (ref 3.8–10.5)
WBC # BLD: 24.03 K/UL — HIGH (ref 3.8–10.5)
WBC # BLD: 25.25 K/UL — HIGH (ref 3.8–10.5)
WBC # BLD: 7.95 K/UL — SIGNIFICANT CHANGE UP (ref 3.8–10.5)
WBC # BLD: 8.72 K/UL — SIGNIFICANT CHANGE UP (ref 3.8–10.5)
WBC # BLD: 9.08 K/UL — SIGNIFICANT CHANGE UP (ref 3.8–10.5)
WBC # FLD AUTO: 10.14 K/UL — SIGNIFICANT CHANGE UP (ref 3.8–10.5)
WBC # FLD AUTO: 10.39 K/UL — SIGNIFICANT CHANGE UP (ref 3.8–10.5)
WBC # FLD AUTO: 11.07 K/UL — HIGH (ref 3.8–10.5)
WBC # FLD AUTO: 11.08 K/UL — HIGH (ref 3.8–10.5)
WBC # FLD AUTO: 11.14 K/UL — HIGH (ref 3.8–10.5)
WBC # FLD AUTO: 11.55 K/UL — HIGH (ref 3.8–10.5)
WBC # FLD AUTO: 11.94 K/UL — HIGH (ref 3.8–10.5)
WBC # FLD AUTO: 13.18 K/UL — HIGH (ref 3.8–10.5)
WBC # FLD AUTO: 13.51 K/UL — HIGH (ref 3.8–10.5)
WBC # FLD AUTO: 13.62 K/UL — HIGH (ref 3.8–10.5)
WBC # FLD AUTO: 14.79 K/UL — HIGH (ref 3.8–10.5)
WBC # FLD AUTO: 14.98 K/UL — HIGH (ref 3.8–10.5)
WBC # FLD AUTO: 15.42 K/UL — HIGH (ref 3.8–10.5)
WBC # FLD AUTO: 15.73 K/UL — HIGH (ref 3.8–10.5)
WBC # FLD AUTO: 16.16 K/UL — HIGH (ref 3.8–10.5)
WBC # FLD AUTO: 17.6 K/UL — HIGH (ref 3.8–10.5)
WBC # FLD AUTO: 18.25 K/UL — HIGH (ref 3.8–10.5)
WBC # FLD AUTO: 18.67 K/UL — HIGH (ref 3.8–10.5)
WBC # FLD AUTO: 18.9 K/UL — HIGH (ref 3.8–10.5)
WBC # FLD AUTO: 19.67 K/UL — HIGH (ref 3.8–10.5)
WBC # FLD AUTO: 19.81 K/UL — HIGH (ref 3.8–10.5)
WBC # FLD AUTO: 22.81 K/UL — HIGH (ref 3.8–10.5)
WBC # FLD AUTO: 24.03 K/UL — HIGH (ref 3.8–10.5)
WBC # FLD AUTO: 25.25 K/UL — HIGH (ref 3.8–10.5)
WBC # FLD AUTO: 7.95 K/UL — SIGNIFICANT CHANGE UP (ref 3.8–10.5)
WBC # FLD AUTO: 8.72 K/UL — SIGNIFICANT CHANGE UP (ref 3.8–10.5)
WBC # FLD AUTO: 9.08 K/UL — SIGNIFICANT CHANGE UP (ref 3.8–10.5)
WBC UR QL: 0 /HPF — SIGNIFICANT CHANGE UP (ref 0–5)
WBC UR QL: 2 /HPF — SIGNIFICANT CHANGE UP (ref 0–5)
WBC UR QL: 6 /HPF — HIGH (ref 0–5)

## 2023-01-01 PROCEDURE — 87040 BLOOD CULTURE FOR BACTERIA: CPT

## 2023-01-01 PROCEDURE — 96374 THER/PROPH/DIAG INJ IV PUSH: CPT

## 2023-01-01 PROCEDURE — 92526 ORAL FUNCTION THERAPY: CPT

## 2023-01-01 PROCEDURE — 71250 CT THORAX DX C-: CPT | Mod: 26

## 2023-01-01 PROCEDURE — 99233 SBSQ HOSP IP/OBS HIGH 50: CPT

## 2023-01-01 PROCEDURE — 99222 1ST HOSP IP/OBS MODERATE 55: CPT

## 2023-01-01 PROCEDURE — 99497 ADVNCD CARE PLAN 30 MIN: CPT

## 2023-01-01 PROCEDURE — 83605 ASSAY OF LACTIC ACID: CPT

## 2023-01-01 PROCEDURE — 82010 KETONE BODYS QUAN: CPT

## 2023-01-01 PROCEDURE — 99239 HOSP IP/OBS DSCHRG MGMT >30: CPT

## 2023-01-01 PROCEDURE — 82607 VITAMIN B-12: CPT

## 2023-01-01 PROCEDURE — 71045 X-RAY EXAM CHEST 1 VIEW: CPT | Mod: 26

## 2023-01-01 PROCEDURE — 74176 CT ABD & PELVIS W/O CONTRAST: CPT | Mod: 26

## 2023-01-01 PROCEDURE — 74177 CT ABD & PELVIS W/CONTRAST: CPT | Mod: 26

## 2023-01-01 PROCEDURE — 99232 SBSQ HOSP IP/OBS MODERATE 35: CPT

## 2023-01-01 PROCEDURE — 87086 URINE CULTURE/COLONY COUNT: CPT

## 2023-01-01 PROCEDURE — 76770 US EXAM ABDO BACK WALL COMP: CPT

## 2023-01-01 PROCEDURE — 83036 HEMOGLOBIN GLYCOSYLATED A1C: CPT

## 2023-01-01 PROCEDURE — 99291 CRITICAL CARE FIRST HOUR: CPT | Mod: CS

## 2023-01-01 PROCEDURE — 82962 GLUCOSE BLOOD TEST: CPT

## 2023-01-01 PROCEDURE — 74177 CT ABD & PELVIS W/CONTRAST: CPT | Mod: MA

## 2023-01-01 PROCEDURE — 82803 BLOOD GASES ANY COMBINATION: CPT

## 2023-01-01 PROCEDURE — 49440 PLACE GASTROSTOMY TUBE PERC: CPT

## 2023-01-01 PROCEDURE — 99285 EMERGENCY DEPT VISIT HI MDM: CPT | Mod: CS

## 2023-01-01 PROCEDURE — 83735 ASSAY OF MAGNESIUM: CPT

## 2023-01-01 PROCEDURE — 84295 ASSAY OF SERUM SODIUM: CPT

## 2023-01-01 PROCEDURE — 92610 EVALUATE SWALLOWING FUNCTION: CPT

## 2023-01-01 PROCEDURE — 84132 ASSAY OF SERUM POTASSIUM: CPT

## 2023-01-01 PROCEDURE — 71045 X-RAY EXAM CHEST 1 VIEW: CPT

## 2023-01-01 PROCEDURE — 71275 CT ANGIOGRAPHY CHEST: CPT | Mod: 26

## 2023-01-01 PROCEDURE — 76770 US EXAM ABDO BACK WALL COMP: CPT | Mod: 26

## 2023-01-01 PROCEDURE — 99232 SBSQ HOSP IP/OBS MODERATE 35: CPT | Mod: GC

## 2023-01-01 PROCEDURE — 85610 PROTHROMBIN TIME: CPT

## 2023-01-01 PROCEDURE — 93970 EXTREMITY STUDY: CPT

## 2023-01-01 PROCEDURE — 93306 TTE W/DOPPLER COMPLETE: CPT | Mod: 26

## 2023-01-01 PROCEDURE — 96372 THER/PROPH/DIAG INJ SC/IM: CPT

## 2023-01-01 PROCEDURE — 82746 ASSAY OF FOLIC ACID SERUM: CPT

## 2023-01-01 PROCEDURE — 80053 COMPREHEN METABOLIC PANEL: CPT

## 2023-01-01 PROCEDURE — 0225U NFCT DS DNA&RNA 21 SARSCOV2: CPT

## 2023-01-01 PROCEDURE — 93306 TTE W/DOPPLER COMPLETE: CPT

## 2023-01-01 PROCEDURE — 94640 AIRWAY INHALATION TREATMENT: CPT

## 2023-01-01 PROCEDURE — 96375 TX/PRO/DX INJ NEW DRUG ADDON: CPT

## 2023-01-01 PROCEDURE — 44360 SMALL BOWEL ENDOSCOPY: CPT | Mod: GC

## 2023-01-01 PROCEDURE — 82947 ASSAY GLUCOSE BLOOD QUANT: CPT

## 2023-01-01 PROCEDURE — 71275 CT ANGIOGRAPHY CHEST: CPT | Mod: MA

## 2023-01-01 PROCEDURE — 87186 SC STD MICRODIL/AGAR DIL: CPT

## 2023-01-01 PROCEDURE — 82565 ASSAY OF CREATININE: CPT

## 2023-01-01 PROCEDURE — 80048 BASIC METABOLIC PNL TOTAL CA: CPT

## 2023-01-01 PROCEDURE — 84100 ASSAY OF PHOSPHORUS: CPT

## 2023-01-01 PROCEDURE — 31505 DIAGNOSTIC LARYNGOSCOPY: CPT

## 2023-01-01 PROCEDURE — 74176 CT ABD & PELVIS W/O CONTRAST: CPT

## 2023-01-01 PROCEDURE — 85027 COMPLETE CBC AUTOMATED: CPT

## 2023-01-01 PROCEDURE — 99495 TRANSJ CARE MGMT MOD F2F 14D: CPT

## 2023-01-01 PROCEDURE — 93971 EXTREMITY STUDY: CPT | Mod: 26,RT

## 2023-01-01 PROCEDURE — 93970 EXTREMITY STUDY: CPT | Mod: 26

## 2023-01-01 PROCEDURE — 86850 RBC ANTIBODY SCREEN: CPT

## 2023-01-01 PROCEDURE — C1725: CPT

## 2023-01-01 PROCEDURE — 85379 FIBRIN DEGRADATION QUANT: CPT

## 2023-01-01 PROCEDURE — 84443 ASSAY THYROID STIM HORMONE: CPT

## 2023-01-01 PROCEDURE — 99223 1ST HOSP IP/OBS HIGH 75: CPT | Mod: GC

## 2023-01-01 PROCEDURE — 85730 THROMBOPLASTIN TIME PARTIAL: CPT

## 2023-01-01 PROCEDURE — 99223 1ST HOSP IP/OBS HIGH 75: CPT

## 2023-01-01 PROCEDURE — C1769: CPT

## 2023-01-01 PROCEDURE — 93971 EXTREMITY STUDY: CPT

## 2023-01-01 PROCEDURE — 82435 ASSAY OF BLOOD CHLORIDE: CPT

## 2023-01-01 PROCEDURE — 85025 COMPLETE CBC W/AUTO DIFF WBC: CPT

## 2023-01-01 PROCEDURE — 93010 ELECTROCARDIOGRAM REPORT: CPT | Mod: 77

## 2023-01-01 PROCEDURE — 84484 ASSAY OF TROPONIN QUANT: CPT

## 2023-01-01 PROCEDURE — 99291 CRITICAL CARE FIRST HOUR: CPT

## 2023-01-01 PROCEDURE — 36600 WITHDRAWAL OF ARTERIAL BLOOD: CPT

## 2023-01-01 PROCEDURE — 85014 HEMATOCRIT: CPT

## 2023-01-01 PROCEDURE — 71250 CT THORAX DX C-: CPT

## 2023-01-01 PROCEDURE — 36415 COLL VENOUS BLD VENIPUNCTURE: CPT

## 2023-01-01 PROCEDURE — 81001 URINALYSIS AUTO W/SCOPE: CPT

## 2023-01-01 PROCEDURE — 93010 ELECTROCARDIOGRAM REPORT: CPT

## 2023-01-01 PROCEDURE — 87640 STAPH A DNA AMP PROBE: CPT

## 2023-01-01 PROCEDURE — C1729: CPT

## 2023-01-01 PROCEDURE — 87641 MR-STAPH DNA AMP PROBE: CPT

## 2023-01-01 PROCEDURE — 86901 BLOOD TYPING SEROLOGIC RH(D): CPT

## 2023-01-01 PROCEDURE — 85018 HEMOGLOBIN: CPT

## 2023-01-01 PROCEDURE — 82330 ASSAY OF CALCIUM: CPT

## 2023-01-01 PROCEDURE — 86900 BLOOD TYPING SEROLOGIC ABO: CPT

## 2023-01-01 PROCEDURE — 87077 CULTURE AEROBIC IDENTIFY: CPT

## 2023-01-01 PROCEDURE — 83880 ASSAY OF NATRIURETIC PEPTIDE: CPT

## 2023-01-01 PROCEDURE — 93005 ELECTROCARDIOGRAM TRACING: CPT

## 2023-01-01 RX ORDER — SODIUM CHLORIDE 9 MG/ML
1000 INJECTION INTRAMUSCULAR; INTRAVENOUS; SUBCUTANEOUS
Refills: 0 | Status: DISCONTINUED | OUTPATIENT
Start: 2023-01-01 | End: 2023-01-01

## 2023-01-01 RX ORDER — QUETIAPINE FUMARATE 200 MG/1
50 TABLET, FILM COATED ORAL DAILY
Refills: 0 | Status: DISCONTINUED | OUTPATIENT
Start: 2023-01-01 | End: 2023-01-01

## 2023-01-01 RX ORDER — ASPIRIN/CALCIUM CARB/MAGNESIUM 324 MG
81 TABLET ORAL DAILY
Refills: 0 | Status: DISCONTINUED | OUTPATIENT
Start: 2023-01-01 | End: 2023-01-01

## 2023-01-01 RX ORDER — AMLODIPINE BESYLATE 2.5 MG/1
10 TABLET ORAL DAILY
Refills: 0 | Status: DISCONTINUED | OUTPATIENT
Start: 2023-01-01 | End: 2023-01-01

## 2023-01-01 RX ORDER — HEPARIN SODIUM 5000 [USP'U]/ML
5000 INJECTION INTRAVENOUS; SUBCUTANEOUS EVERY 12 HOURS
Refills: 0 | Status: DISCONTINUED | OUTPATIENT
Start: 2023-01-01 | End: 2023-01-01

## 2023-01-01 RX ORDER — AZITHROMYCIN 500 MG/1
500 TABLET, FILM COATED ORAL ONCE
Refills: 0 | Status: COMPLETED | OUTPATIENT
Start: 2023-01-01 | End: 2023-01-01

## 2023-01-01 RX ORDER — CEFEPIME 1 G/1
INJECTION, POWDER, FOR SOLUTION INTRAMUSCULAR; INTRAVENOUS
Refills: 0 | Status: DISCONTINUED | OUTPATIENT
Start: 2023-01-01 | End: 2023-01-01

## 2023-01-01 RX ORDER — HYDRALAZINE HCL 50 MG
20 TABLET ORAL EVERY 8 HOURS
Refills: 0 | Status: DISCONTINUED | OUTPATIENT
Start: 2023-01-01 | End: 2023-01-01

## 2023-01-01 RX ORDER — POTASSIUM CHLORIDE 20 MEQ
40 PACKET (EA) ORAL ONCE
Refills: 0 | Status: COMPLETED | OUTPATIENT
Start: 2023-01-01 | End: 2023-01-01

## 2023-01-01 RX ORDER — INSULIN LISPRO 100/ML
VIAL (ML) SUBCUTANEOUS EVERY 6 HOURS
Refills: 0 | Status: DISCONTINUED | OUTPATIENT
Start: 2023-01-01 | End: 2023-01-01

## 2023-01-01 RX ORDER — ACETAMINOPHEN 500 MG
650 TABLET ORAL EVERY 6 HOURS
Refills: 0 | Status: DISCONTINUED | OUTPATIENT
Start: 2023-01-01 | End: 2023-01-01

## 2023-01-01 RX ORDER — CEFTRIAXONE 500 MG/1
1000 INJECTION, POWDER, FOR SOLUTION INTRAMUSCULAR; INTRAVENOUS EVERY 24 HOURS
Refills: 0 | Status: DISCONTINUED | OUTPATIENT
Start: 2023-01-01 | End: 2023-01-01

## 2023-01-01 RX ORDER — DEXTROSE 50 % IN WATER 50 %
15 SYRINGE (ML) INTRAVENOUS ONCE
Refills: 0 | Status: DISCONTINUED | OUTPATIENT
Start: 2023-01-01 | End: 2023-01-01

## 2023-01-01 RX ORDER — CARVEDILOL PHOSPHATE 80 MG/1
6.25 CAPSULE, EXTENDED RELEASE ORAL EVERY 12 HOURS
Refills: 0 | Status: DISCONTINUED | OUTPATIENT
Start: 2023-01-01 | End: 2023-01-01

## 2023-01-01 RX ORDER — AZITHROMYCIN 500 MG/1
500 TABLET, FILM COATED ORAL EVERY 24 HOURS
Refills: 0 | Status: DISCONTINUED | OUTPATIENT
Start: 2023-01-01 | End: 2023-01-01

## 2023-01-01 RX ORDER — POLYETHYLENE GLYCOL 3350 17 G/17G
17 POWDER, FOR SOLUTION ORAL
Refills: 0 | Status: DISCONTINUED | OUTPATIENT
Start: 2023-01-01 | End: 2023-01-01

## 2023-01-01 RX ORDER — THIAMINE MONONITRATE (VIT B1) 100 MG
100 TABLET ORAL DAILY
Refills: 0 | Status: DISCONTINUED | OUTPATIENT
Start: 2023-01-01 | End: 2023-01-01

## 2023-01-01 RX ORDER — QUETIAPINE FUMARATE 50 MG/1
50 TABLET ORAL AT BEDTIME
Refills: 0 | Status: ACTIVE | COMMUNITY
Start: 2023-01-01

## 2023-01-01 RX ORDER — INSULIN LISPRO 100/ML
2 VIAL (ML) SUBCUTANEOUS
Refills: 0 | Status: DISCONTINUED | OUTPATIENT
Start: 2023-01-01 | End: 2023-01-01

## 2023-01-01 RX ORDER — HUMAN INSULIN 100 [IU]/ML
5 INJECTION, SUSPENSION SUBCUTANEOUS EVERY 8 HOURS
Refills: 0 | Status: DISCONTINUED | OUTPATIENT
Start: 2023-01-01 | End: 2023-01-01

## 2023-01-01 RX ORDER — INSULIN LISPRO 100/ML
2 VIAL (ML) SUBCUTANEOUS EVERY 6 HOURS
Refills: 0 | Status: DISCONTINUED | OUTPATIENT
Start: 2023-01-01 | End: 2023-01-01

## 2023-01-01 RX ORDER — SODIUM CHLORIDE 9 MG/ML
1000 INJECTION, SOLUTION INTRAVENOUS
Refills: 0 | Status: DISCONTINUED | OUTPATIENT
Start: 2023-01-01 | End: 2023-01-01

## 2023-01-01 RX ORDER — RAMELTEON 8 MG
1 TABLET ORAL
Qty: 0 | Refills: 0 | DISCHARGE

## 2023-01-01 RX ORDER — ESCITALOPRAM OXALATE 10 MG/1
1 TABLET, FILM COATED ORAL
Qty: 0 | Refills: 0 | DISCHARGE

## 2023-01-01 RX ORDER — GLUCAGON INJECTION, SOLUTION 0.5 MG/.1ML
1 INJECTION, SOLUTION SUBCUTANEOUS ONCE
Refills: 0 | Status: DISCONTINUED | OUTPATIENT
Start: 2023-01-01 | End: 2023-01-01

## 2023-01-01 RX ORDER — METOPROLOL TARTRATE 50 MG
2.5 TABLET ORAL ONCE
Refills: 0 | Status: COMPLETED | OUTPATIENT
Start: 2023-01-01 | End: 2023-01-01

## 2023-01-01 RX ORDER — AZITHROMYCIN 500 MG/1
TABLET, FILM COATED ORAL
Refills: 0 | Status: DISCONTINUED | OUTPATIENT
Start: 2023-01-01 | End: 2023-01-01

## 2023-01-01 RX ORDER — HYDRALAZINE HCL 50 MG
25 TABLET ORAL EVERY 8 HOURS
Refills: 0 | Status: DISCONTINUED | OUTPATIENT
Start: 2023-01-01 | End: 2023-01-01

## 2023-01-01 RX ORDER — CARVEDILOL PHOSPHATE 80 MG/1
12.5 CAPSULE, EXTENDED RELEASE ORAL EVERY 12 HOURS
Refills: 0 | Status: DISCONTINUED | OUTPATIENT
Start: 2023-01-01 | End: 2023-01-01

## 2023-01-01 RX ORDER — INSULIN GLARGINE 100 [IU]/ML
6 INJECTION, SOLUTION SUBCUTANEOUS ONCE
Refills: 0 | Status: COMPLETED | OUTPATIENT
Start: 2023-01-01 | End: 2023-01-01

## 2023-01-01 RX ORDER — INSULIN LISPRO 100/ML
VIAL (ML) SUBCUTANEOUS
Refills: 0 | Status: DISCONTINUED | OUTPATIENT
Start: 2023-01-01 | End: 2023-01-01

## 2023-01-01 RX ORDER — ENOXAPARIN SODIUM 100 MG/ML
40 INJECTION SUBCUTANEOUS ONCE
Refills: 0 | Status: DISCONTINUED | OUTPATIENT
Start: 2023-01-01 | End: 2023-01-01

## 2023-01-01 RX ORDER — METOPROLOL TARTRATE 50 MG
5 TABLET ORAL EVERY 6 HOURS
Refills: 0 | Status: DISCONTINUED | OUTPATIENT
Start: 2023-01-01 | End: 2023-01-01

## 2023-01-01 RX ORDER — HUMAN INSULIN 100 [IU]/ML
9 INJECTION, SUSPENSION SUBCUTANEOUS EVERY 6 HOURS
Refills: 0 | Status: DISCONTINUED | OUTPATIENT
Start: 2023-01-01 | End: 2023-01-01

## 2023-01-01 RX ORDER — FUROSEMIDE 40 MG
40 TABLET ORAL ONCE
Refills: 0 | Status: COMPLETED | OUTPATIENT
Start: 2023-01-01 | End: 2023-01-01

## 2023-01-01 RX ORDER — SENNA PLUS 8.6 MG/1
2 TABLET ORAL AT BEDTIME
Refills: 0 | Status: DISCONTINUED | OUTPATIENT
Start: 2023-01-01 | End: 2023-01-01

## 2023-01-01 RX ORDER — BIMATOPROST 0.3 MG/ML
1 SOLUTION/ DROPS OPHTHALMIC
Refills: 0 | DISCHARGE

## 2023-01-01 RX ORDER — ESCITALOPRAM OXALATE 10 MG/1
1 TABLET, FILM COATED ORAL
Qty: 30 | Refills: 0
Start: 2023-01-01 | End: 2023-01-01

## 2023-01-01 RX ORDER — GLUCAGON INJECTION, SOLUTION 0.5 MG/.1ML
1000 INJECTION, SOLUTION SUBCUTANEOUS ONCE
Refills: 0 | Status: DISCONTINUED | OUTPATIENT
Start: 2023-01-01 | End: 2023-01-01

## 2023-01-01 RX ORDER — LANOLIN ALCOHOL/MO/W.PET/CERES
3 CREAM (GRAM) TOPICAL AT BEDTIME
Refills: 0 | Status: DISCONTINUED | OUTPATIENT
Start: 2023-01-01 | End: 2023-01-01

## 2023-01-01 RX ORDER — HALOPERIDOL DECANOATE 100 MG/ML
1 INJECTION INTRAMUSCULAR ONCE
Refills: 0 | Status: DISCONTINUED | OUTPATIENT
Start: 2023-01-01 | End: 2023-01-01

## 2023-01-01 RX ORDER — METOPROLOL TARTRATE 50 MG
12.5 TABLET ORAL
Refills: 0 | Status: DISCONTINUED | OUTPATIENT
Start: 2023-01-01 | End: 2023-01-01

## 2023-01-01 RX ORDER — LATANOPROST 0.05 MG/ML
1 SOLUTION/ DROPS OPHTHALMIC; TOPICAL AT BEDTIME
Refills: 0 | Status: DISCONTINUED | OUTPATIENT
Start: 2023-01-01 | End: 2023-01-01

## 2023-01-01 RX ORDER — HUMAN INSULIN 100 [IU]/ML
6 INJECTION, SUSPENSION SUBCUTANEOUS EVERY 6 HOURS
Refills: 0 | Status: DISCONTINUED | OUTPATIENT
Start: 2023-01-01 | End: 2023-01-01

## 2023-01-01 RX ORDER — POTASSIUM CHLORIDE 20 MEQ
10 PACKET (EA) ORAL
Refills: 0 | Status: DISCONTINUED | OUTPATIENT
Start: 2023-01-01 | End: 2023-01-01

## 2023-01-01 RX ORDER — ENOXAPARIN SODIUM 100 MG/ML
30 INJECTION SUBCUTANEOUS EVERY 24 HOURS
Refills: 0 | Status: DISCONTINUED | OUTPATIENT
Start: 2023-01-01 | End: 2023-01-01

## 2023-01-01 RX ORDER — AMLODIPINE BESYLATE 10 MG/1
10 TABLET ORAL DAILY
Refills: 0 | Status: ACTIVE | COMMUNITY
Start: 2023-01-01

## 2023-01-01 RX ORDER — INSULIN GLARGINE 100 [IU]/ML
14 INJECTION, SOLUTION SUBCUTANEOUS AT BEDTIME
Refills: 0 | Status: DISCONTINUED | OUTPATIENT
Start: 2023-01-01 | End: 2023-01-01

## 2023-01-01 RX ORDER — METOPROLOL TARTRATE 50 MG
12.5 TABLET ORAL ONCE
Refills: 0 | Status: COMPLETED | OUTPATIENT
Start: 2023-01-01 | End: 2023-01-01

## 2023-01-01 RX ORDER — ASPIRIN/CALCIUM CARB/MAGNESIUM 324 MG
324 TABLET ORAL ONCE
Refills: 0 | Status: COMPLETED | OUTPATIENT
Start: 2023-01-01 | End: 2023-01-01

## 2023-01-01 RX ORDER — IPRATROPIUM/ALBUTEROL SULFATE 18-103MCG
3 AEROSOL WITH ADAPTER (GRAM) INHALATION ONCE
Refills: 0 | Status: COMPLETED | OUTPATIENT
Start: 2023-01-01 | End: 2023-01-01

## 2023-01-01 RX ORDER — METOPROLOL TARTRATE 50 MG
2.5 TABLET ORAL EVERY 6 HOURS
Refills: 0 | Status: DISCONTINUED | OUTPATIENT
Start: 2023-01-01 | End: 2023-01-01

## 2023-01-01 RX ORDER — ATORVASTATIN CALCIUM 80 MG/1
80 TABLET, FILM COATED ORAL AT BEDTIME
Refills: 0 | Status: DISCONTINUED | OUTPATIENT
Start: 2023-01-01 | End: 2023-01-01

## 2023-01-01 RX ORDER — ALLOPURINOL 300 MG
1 TABLET ORAL
Qty: 0 | Refills: 0 | DISCHARGE

## 2023-01-01 RX ORDER — ATORVASTATIN CALCIUM 80 MG/1
80 TABLET, FILM COATED ORAL
Refills: 0 | Status: ACTIVE | COMMUNITY
Start: 2023-01-01

## 2023-01-01 RX ORDER — BUMETANIDE 0.25 MG/ML
1 INJECTION INTRAMUSCULAR; INTRAVENOUS
Refills: 0 | Status: DISCONTINUED | OUTPATIENT
Start: 2023-01-01 | End: 2023-01-01

## 2023-01-01 RX ORDER — INSULIN LISPRO 100/ML
1 VIAL (ML) SUBCUTANEOUS
Qty: 0 | Refills: 0 | DISCHARGE

## 2023-01-01 RX ORDER — LANOLIN ALCOHOL/MO/W.PET/CERES
1 CREAM (GRAM) TOPICAL
Qty: 0 | Refills: 0 | DISCHARGE

## 2023-01-01 RX ORDER — DEXTROSE 50 % IN WATER 50 %
25 SYRINGE (ML) INTRAVENOUS ONCE
Refills: 0 | Status: DISCONTINUED | OUTPATIENT
Start: 2023-01-01 | End: 2023-01-01

## 2023-01-01 RX ORDER — INSULIN LISPRO 100/ML
VIAL (ML) SUBCUTANEOUS AT BEDTIME
Refills: 0 | Status: DISCONTINUED | OUTPATIENT
Start: 2023-01-01 | End: 2023-01-01

## 2023-01-01 RX ORDER — LOSARTAN POTASSIUM 100 MG/1
1 TABLET, FILM COATED ORAL
Qty: 0 | Refills: 0 | DISCHARGE

## 2023-01-01 RX ORDER — AMLODIPINE BESYLATE 2.5 MG/1
1 TABLET ORAL
Qty: 0 | Refills: 0 | DISCHARGE

## 2023-01-01 RX ORDER — ASPIRIN 81 MG/1
81 TABLET ORAL DAILY
Refills: 0 | Status: ACTIVE | COMMUNITY
Start: 2023-01-01

## 2023-01-01 RX ORDER — BUMETANIDE 0.25 MG/ML
2 INJECTION INTRAMUSCULAR; INTRAVENOUS ONCE
Refills: 0 | Status: COMPLETED | OUTPATIENT
Start: 2023-01-01 | End: 2023-01-01

## 2023-01-01 RX ORDER — HYDROCHLOROTHIAZIDE 25 MG
1 TABLET ORAL
Qty: 0 | Refills: 0 | DISCHARGE

## 2023-01-01 RX ORDER — ONDANSETRON 8 MG/1
4 TABLET, FILM COATED ORAL ONCE
Refills: 0 | Status: DISCONTINUED | OUTPATIENT
Start: 2023-01-01 | End: 2023-01-01

## 2023-01-01 RX ORDER — INSULIN LISPRO 100 [IU]/ML
100 INJECTION, SOLUTION INTRAVENOUS; SUBCUTANEOUS 3 TIMES DAILY
Refills: 0 | Status: ACTIVE | COMMUNITY
Start: 2023-01-01

## 2023-01-01 RX ORDER — INSULIN GLARGINE 100 [IU]/ML
5 INJECTION, SOLUTION SUBCUTANEOUS AT BEDTIME
Refills: 0 | Status: DISCONTINUED | OUTPATIENT
Start: 2023-01-01 | End: 2023-01-01

## 2023-01-01 RX ORDER — BUMETANIDE 0.25 MG/ML
2 INJECTION INTRAMUSCULAR; INTRAVENOUS DAILY
Refills: 0 | Status: DISCONTINUED | OUTPATIENT
Start: 2023-01-01 | End: 2023-01-01

## 2023-01-01 RX ORDER — POTASSIUM CHLORIDE 20 MEQ
10 PACKET (EA) ORAL ONCE
Refills: 0 | Status: COMPLETED | OUTPATIENT
Start: 2023-01-01 | End: 2023-01-01

## 2023-01-01 RX ORDER — DEXTROSE 50 % IN WATER 50 %
25 SYRINGE (ML) INTRAVENOUS ONCE
Refills: 0 | Status: COMPLETED | OUTPATIENT
Start: 2023-01-01 | End: 2023-01-01

## 2023-01-01 RX ORDER — ASPIRIN/CALCIUM CARB/MAGNESIUM 324 MG
1 TABLET ORAL
Qty: 0 | Refills: 0 | DISCHARGE

## 2023-01-01 RX ORDER — FUROSEMIDE 40 MG
20 TABLET ORAL ONCE
Refills: 0 | Status: COMPLETED | OUTPATIENT
Start: 2023-01-01 | End: 2023-01-01

## 2023-01-01 RX ORDER — ATORVASTATIN CALCIUM 80 MG/1
1 TABLET, FILM COATED ORAL
Qty: 0 | Refills: 0 | DISCHARGE

## 2023-01-01 RX ORDER — POTASSIUM CHLORIDE 20 MEQ
20 PACKET (EA) ORAL
Refills: 0 | Status: COMPLETED | OUTPATIENT
Start: 2023-01-01 | End: 2023-01-01

## 2023-01-01 RX ORDER — POTASSIUM CHLORIDE 20 MEQ
10 PACKET (EA) ORAL
Refills: 0 | Status: COMPLETED | OUTPATIENT
Start: 2023-01-01 | End: 2023-01-01

## 2023-01-01 RX ORDER — AMLODIPINE BESYLATE 2.5 MG/1
1 TABLET ORAL
Qty: 0 | Refills: 0 | DISCHARGE
Start: 2023-01-01

## 2023-01-01 RX ORDER — QUETIAPINE FUMARATE 200 MG/1
1 TABLET, FILM COATED ORAL
Qty: 0 | Refills: 0 | DISCHARGE

## 2023-01-01 RX ORDER — RAMELTEON 8 MG/1
8 TABLET ORAL
Refills: 0 | Status: ACTIVE | COMMUNITY
Start: 2023-01-01

## 2023-01-01 RX ORDER — LOSARTAN POTASSIUM 100 MG/1
100 TABLET, FILM COATED ORAL DAILY
Refills: 0 | Status: DISCONTINUED | OUTPATIENT
Start: 2023-01-01 | End: 2023-01-01

## 2023-01-01 RX ORDER — LOSARTAN POTASSIUM 100 MG/1
1 TABLET, FILM COATED ORAL
Refills: 0 | DISCHARGE

## 2023-01-01 RX ORDER — METRONIDAZOLE 500 MG
500 TABLET ORAL EVERY 8 HOURS
Refills: 0 | Status: DISCONTINUED | OUTPATIENT
Start: 2023-01-01 | End: 2023-01-01

## 2023-01-01 RX ORDER — INSULIN LISPRO 100/ML
6 VIAL (ML) SUBCUTANEOUS ONCE
Refills: 0 | Status: COMPLETED | OUTPATIENT
Start: 2023-01-01 | End: 2023-01-01

## 2023-01-01 RX ORDER — CEFEPIME 1 G/1
2000 INJECTION, POWDER, FOR SOLUTION INTRAMUSCULAR; INTRAVENOUS ONCE
Refills: 0 | Status: COMPLETED | OUTPATIENT
Start: 2023-01-01 | End: 2023-01-01

## 2023-01-01 RX ORDER — INSULIN LISPRO 100/ML
4 VIAL (ML) SUBCUTANEOUS ONCE
Refills: 0 | Status: COMPLETED | OUTPATIENT
Start: 2023-01-01 | End: 2023-01-01

## 2023-01-01 RX ORDER — HYDRALAZINE HCL 50 MG
10 TABLET ORAL ONCE
Refills: 0 | Status: COMPLETED | OUTPATIENT
Start: 2023-01-01 | End: 2023-01-01

## 2023-01-01 RX ORDER — INSULIN GLARGINE 100 [IU]/ML
3 INJECTION, SOLUTION SUBCUTANEOUS AT BEDTIME
Refills: 0 | Status: DISCONTINUED | OUTPATIENT
Start: 2023-01-01 | End: 2023-01-01

## 2023-01-01 RX ORDER — DEXTROSE 50 % IN WATER 50 %
12.5 SYRINGE (ML) INTRAVENOUS ONCE
Refills: 0 | Status: DISCONTINUED | OUTPATIENT
Start: 2023-01-01 | End: 2023-01-01

## 2023-01-01 RX ORDER — LOSARTAN POTASSIUM 100 MG/1
100 TABLET, FILM COATED ORAL DAILY
Refills: 0 | Status: ACTIVE | COMMUNITY
Start: 2023-01-01

## 2023-01-01 RX ORDER — RAMELTEON 8 MG/1
8 TABLET ORAL
Qty: 30 | Refills: 0 | Status: ACTIVE | COMMUNITY
Start: 2022-03-23

## 2023-01-01 RX ORDER — INSULIN LISPRO 100/ML
3 VIAL (ML) SUBCUTANEOUS EVERY 6 HOURS
Refills: 0 | Status: DISCONTINUED | OUTPATIENT
Start: 2023-01-01 | End: 2023-01-01

## 2023-01-01 RX ORDER — INSULIN LISPRO 100/ML
0 VIAL (ML) SUBCUTANEOUS
Refills: 0 | DISCHARGE

## 2023-01-01 RX ORDER — ONDANSETRON 8 MG/1
4 TABLET, FILM COATED ORAL EVERY 8 HOURS
Refills: 0 | Status: DISCONTINUED | OUTPATIENT
Start: 2023-01-01 | End: 2023-01-01

## 2023-01-01 RX ORDER — METOPROLOL TARTRATE 50 MG
5 TABLET ORAL ONCE
Refills: 0 | Status: COMPLETED | OUTPATIENT
Start: 2023-01-01 | End: 2023-01-01

## 2023-01-01 RX ORDER — POTASSIUM CHLORIDE 20 MEQ
10 PACKET (EA) ORAL EVERY 4 HOURS
Refills: 0 | Status: COMPLETED | OUTPATIENT
Start: 2023-01-01 | End: 2023-01-01

## 2023-01-01 RX ORDER — FUROSEMIDE 40 MG
40 TABLET ORAL DAILY
Refills: 0 | Status: DISCONTINUED | OUTPATIENT
Start: 2023-01-01 | End: 2023-01-01

## 2023-01-01 RX ORDER — ATORVASTATIN CALCIUM 80 MG/1
1 TABLET, FILM COATED ORAL
Refills: 0 | DISCHARGE

## 2023-01-01 RX ORDER — HUMAN INSULIN 100 [IU]/ML
7 INJECTION, SUSPENSION SUBCUTANEOUS EVERY 6 HOURS
Refills: 0 | Status: DISCONTINUED | OUTPATIENT
Start: 2023-01-01 | End: 2023-01-01

## 2023-01-01 RX ORDER — ACETAMINOPHEN 500 MG
650 TABLET ORAL ONCE
Refills: 0 | Status: COMPLETED | OUTPATIENT
Start: 2023-01-01 | End: 2023-01-01

## 2023-01-01 RX ORDER — BUMETANIDE 0.25 MG/ML
2 INJECTION INTRAMUSCULAR; INTRAVENOUS
Qty: 20 | Refills: 0 | Status: DISCONTINUED | OUTPATIENT
Start: 2023-01-01 | End: 2023-01-01

## 2023-01-01 RX ORDER — HYDROCHLOROTHIAZIDE 12.5 MG/1
12.5 CAPSULE ORAL DAILY
Refills: 0 | Status: ACTIVE | COMMUNITY
Start: 2023-01-01

## 2023-01-01 RX ORDER — INSULIN GLARGINE 100 [IU]/ML
6 INJECTION, SOLUTION SUBCUTANEOUS AT BEDTIME
Refills: 0 | Status: DISCONTINUED | OUTPATIENT
Start: 2023-01-01 | End: 2023-01-01

## 2023-01-01 RX ORDER — INSULIN LISPRO 100/ML
4 VIAL (ML) SUBCUTANEOUS
Refills: 0 | Status: DISCONTINUED | OUTPATIENT
Start: 2023-01-01 | End: 2023-01-01

## 2023-01-01 RX ORDER — HYDRALAZINE HCL 50 MG
10 TABLET ORAL EVERY 8 HOURS
Refills: 0 | Status: DISCONTINUED | OUTPATIENT
Start: 2023-01-01 | End: 2023-01-01

## 2023-01-01 RX ORDER — RAMELTEON 8 MG
1 TABLET ORAL
Refills: 0 | DISCHARGE

## 2023-01-01 RX ORDER — POTASSIUM CHLORIDE 20 MEQ
20 PACKET (EA) ORAL ONCE
Refills: 0 | Status: COMPLETED | OUTPATIENT
Start: 2023-01-01 | End: 2023-01-01

## 2023-01-01 RX ORDER — HYDRALAZINE HCL 50 MG
7.5 TABLET ORAL EVERY 6 HOURS
Refills: 0 | Status: DISCONTINUED | OUTPATIENT
Start: 2023-01-01 | End: 2023-01-01

## 2023-01-01 RX ORDER — INSULIN GLARGINE 100 [IU]/ML
8 INJECTION, SOLUTION SUBCUTANEOUS AT BEDTIME
Refills: 0 | Status: DISCONTINUED | OUTPATIENT
Start: 2023-01-01 | End: 2023-01-01

## 2023-01-01 RX ORDER — CEFEPIME 1 G/1
2000 INJECTION, POWDER, FOR SOLUTION INTRAMUSCULAR; INTRAVENOUS EVERY 24 HOURS
Refills: 0 | Status: DISCONTINUED | OUTPATIENT
Start: 2023-01-01 | End: 2023-01-01

## 2023-01-01 RX ORDER — CEFTRIAXONE 500 MG/1
1000 INJECTION, POWDER, FOR SOLUTION INTRAMUSCULAR; INTRAVENOUS ONCE
Refills: 0 | Status: COMPLETED | OUTPATIENT
Start: 2023-01-01 | End: 2023-01-01

## 2023-01-01 RX ORDER — ENOXAPARIN SODIUM 100 MG/ML
40 INJECTION SUBCUTANEOUS EVERY 24 HOURS
Refills: 0 | Status: DISCONTINUED | OUTPATIENT
Start: 2023-01-01 | End: 2023-01-01

## 2023-01-01 RX ORDER — HYDROCHLOROTHIAZIDE 25 MG
1 TABLET ORAL
Refills: 0 | DISCHARGE

## 2023-01-01 RX ORDER — HALOPERIDOL DECANOATE 100 MG/ML
1 INJECTION INTRAMUSCULAR ONCE
Refills: 0 | Status: COMPLETED | OUTPATIENT
Start: 2023-01-01 | End: 2023-01-01

## 2023-01-01 RX ORDER — INSULIN LISPRO 100/ML
4 VIAL (ML) SUBCUTANEOUS ONCE
Refills: 0 | Status: DISCONTINUED | OUTPATIENT
Start: 2023-01-01 | End: 2023-01-01

## 2023-01-01 RX ORDER — BRIMONIDINE TARTRATE, TIMOLOL MALEATE 2; 5 MG/ML; MG/ML
1 SOLUTION/ DROPS OPHTHALMIC
Refills: 0 | DISCHARGE

## 2023-01-01 RX ORDER — CHLORHEXIDINE GLUCONATE 213 G/1000ML
1 SOLUTION TOPICAL DAILY
Refills: 0 | Status: DISCONTINUED | OUTPATIENT
Start: 2023-01-01 | End: 2023-01-01

## 2023-01-01 RX ORDER — LISINOPRIL 2.5 MG/1
25 TABLET ORAL
Qty: 0 | Refills: 0 | DISCHARGE

## 2023-01-01 RX ORDER — PIPERACILLIN AND TAZOBACTAM 4; .5 G/20ML; G/20ML
3.38 INJECTION, POWDER, LYOPHILIZED, FOR SOLUTION INTRAVENOUS ONCE
Refills: 0 | Status: COMPLETED | OUTPATIENT
Start: 2023-01-01 | End: 2023-01-01

## 2023-01-01 RX ORDER — ENOXAPARIN SODIUM 100 MG/ML
30 INJECTION SUBCUTANEOUS ONCE
Refills: 0 | Status: COMPLETED | OUTPATIENT
Start: 2023-01-01 | End: 2023-01-01

## 2023-01-01 RX ORDER — HYDRALAZINE HCL 50 MG
5 TABLET ORAL EVERY 6 HOURS
Refills: 0 | Status: DISCONTINUED | OUTPATIENT
Start: 2023-01-01 | End: 2023-01-01

## 2023-01-01 RX ORDER — BUMETANIDE 0.25 MG/ML
0.5 INJECTION INTRAMUSCULAR; INTRAVENOUS
Qty: 20 | Refills: 0 | Status: DISCONTINUED | OUTPATIENT
Start: 2023-01-01 | End: 2023-01-01

## 2023-01-01 RX ORDER — CEFEPIME 1 G/1
1000 INJECTION, POWDER, FOR SOLUTION INTRAMUSCULAR; INTRAVENOUS EVERY 24 HOURS
Refills: 0 | Status: DISCONTINUED | OUTPATIENT
Start: 2023-01-01 | End: 2023-01-01

## 2023-01-01 RX ORDER — SODIUM CHLORIDE 9 MG/ML
500 INJECTION INTRAMUSCULAR; INTRAVENOUS; SUBCUTANEOUS ONCE
Refills: 0 | Status: COMPLETED | OUTPATIENT
Start: 2023-01-01 | End: 2023-01-01

## 2023-01-01 RX ADMIN — Medication 2 UNIT(S): at 12:53

## 2023-01-01 RX ADMIN — LATANOPROST 1 DROP(S): 0.05 SOLUTION/ DROPS OPHTHALMIC; TOPICAL at 21:46

## 2023-01-01 RX ADMIN — CEFTRIAXONE 100 MILLIGRAM(S): 500 INJECTION, POWDER, FOR SOLUTION INTRAMUSCULAR; INTRAVENOUS at 23:48

## 2023-01-01 RX ADMIN — Medication 12: at 13:04

## 2023-01-01 RX ADMIN — Medication 2.5 MILLIGRAM(S): at 22:29

## 2023-01-01 RX ADMIN — AZITHROMYCIN 255 MILLIGRAM(S): 500 TABLET, FILM COATED ORAL at 02:43

## 2023-01-01 RX ADMIN — Medication 12.5 MILLIGRAM(S): at 05:39

## 2023-01-01 RX ADMIN — LATANOPROST 1 DROP(S): 0.05 SOLUTION/ DROPS OPHTHALMIC; TOPICAL at 22:52

## 2023-01-01 RX ADMIN — Medication 4: at 00:18

## 2023-01-01 RX ADMIN — Medication 100 MILLIEQUIVALENT(S): at 17:13

## 2023-01-01 RX ADMIN — HEPARIN SODIUM 5000 UNIT(S): 5000 INJECTION INTRAVENOUS; SUBCUTANEOUS at 06:54

## 2023-01-01 RX ADMIN — Medication 5 MILLIGRAM(S): at 11:28

## 2023-01-01 RX ADMIN — Medication 4: at 14:23

## 2023-01-01 RX ADMIN — HEPARIN SODIUM 5000 UNIT(S): 5000 INJECTION INTRAVENOUS; SUBCUTANEOUS at 18:15

## 2023-01-01 RX ADMIN — HALOPERIDOL DECANOATE 1 MILLIGRAM(S): 100 INJECTION INTRAMUSCULAR at 01:09

## 2023-01-01 RX ADMIN — Medication 1 TABLET(S): at 12:57

## 2023-01-01 RX ADMIN — Medication 2: at 12:14

## 2023-01-01 RX ADMIN — Medication 100 MILLIGRAM(S): at 00:22

## 2023-01-01 RX ADMIN — Medication 100 MILLIGRAM(S): at 11:24

## 2023-01-01 RX ADMIN — BUMETANIDE 2 MILLIGRAM(S): 0.25 INJECTION INTRAMUSCULAR; INTRAVENOUS at 06:16

## 2023-01-01 RX ADMIN — Medication 3 MILLILITER(S): at 22:33

## 2023-01-01 RX ADMIN — Medication 3: at 14:48

## 2023-01-01 RX ADMIN — Medication 1 TABLET(S): at 12:13

## 2023-01-01 RX ADMIN — Medication 7.5 MILLIGRAM(S): at 11:27

## 2023-01-01 RX ADMIN — Medication 4: at 06:07

## 2023-01-01 RX ADMIN — LATANOPROST 1 DROP(S): 0.05 SOLUTION/ DROPS OPHTHALMIC; TOPICAL at 22:03

## 2023-01-01 RX ADMIN — Medication 7.5 MILLIGRAM(S): at 06:27

## 2023-01-01 RX ADMIN — AMLODIPINE BESYLATE 10 MILLIGRAM(S): 2.5 TABLET ORAL at 06:12

## 2023-01-01 RX ADMIN — POLYETHYLENE GLYCOL 3350 17 GRAM(S): 17 POWDER, FOR SOLUTION ORAL at 17:36

## 2023-01-01 RX ADMIN — Medication 3: at 06:16

## 2023-01-01 RX ADMIN — Medication 4: at 06:30

## 2023-01-01 RX ADMIN — Medication 4: at 13:42

## 2023-01-01 RX ADMIN — Medication 2 UNIT(S): at 23:35

## 2023-01-01 RX ADMIN — Medication 1 TABLET(S): at 09:58

## 2023-01-01 RX ADMIN — Medication 40 MILLIGRAM(S): at 14:23

## 2023-01-01 RX ADMIN — Medication 6: at 08:45

## 2023-01-01 RX ADMIN — Medication 81 MILLIGRAM(S): at 12:26

## 2023-01-01 RX ADMIN — BUMETANIDE 2 MILLIGRAM(S): 0.25 INJECTION INTRAMUSCULAR; INTRAVENOUS at 05:50

## 2023-01-01 RX ADMIN — HEPARIN SODIUM 5000 UNIT(S): 5000 INJECTION INTRAVENOUS; SUBCUTANEOUS at 17:41

## 2023-01-01 RX ADMIN — BUMETANIDE 10 MG/HR: 0.25 INJECTION INTRAMUSCULAR; INTRAVENOUS at 05:03

## 2023-01-01 RX ADMIN — Medication 4: at 12:53

## 2023-01-01 RX ADMIN — ENOXAPARIN SODIUM 30 MILLIGRAM(S): 100 INJECTION SUBCUTANEOUS at 05:11

## 2023-01-01 RX ADMIN — CARVEDILOL PHOSPHATE 6.25 MILLIGRAM(S): 80 CAPSULE, EXTENDED RELEASE ORAL at 13:02

## 2023-01-01 RX ADMIN — CARVEDILOL PHOSPHATE 6.25 MILLIGRAM(S): 80 CAPSULE, EXTENDED RELEASE ORAL at 06:31

## 2023-01-01 RX ADMIN — HUMAN INSULIN 9 UNIT(S): 100 INJECTION, SUSPENSION SUBCUTANEOUS at 07:03

## 2023-01-01 RX ADMIN — Medication 1 TABLET(S): at 13:49

## 2023-01-01 RX ADMIN — ENOXAPARIN SODIUM 40 MILLIGRAM(S): 100 INJECTION SUBCUTANEOUS at 05:08

## 2023-01-01 RX ADMIN — Medication 100 MILLIGRAM(S): at 13:13

## 2023-01-01 RX ADMIN — Medication 2: at 08:46

## 2023-01-01 RX ADMIN — Medication 100 MILLIGRAM(S): at 12:08

## 2023-01-01 RX ADMIN — Medication 81 MILLIGRAM(S): at 12:04

## 2023-01-01 RX ADMIN — Medication 1 TABLET(S): at 12:11

## 2023-01-01 RX ADMIN — ATORVASTATIN CALCIUM 80 MILLIGRAM(S): 80 TABLET, FILM COATED ORAL at 21:35

## 2023-01-01 RX ADMIN — LATANOPROST 1 DROP(S): 0.05 SOLUTION/ DROPS OPHTHALMIC; TOPICAL at 22:55

## 2023-01-01 RX ADMIN — CHLORHEXIDINE GLUCONATE 1 APPLICATION(S): 213 SOLUTION TOPICAL at 12:25

## 2023-01-01 RX ADMIN — Medication 4 UNIT(S): at 09:10

## 2023-01-01 RX ADMIN — SENNA PLUS 2 TABLET(S): 8.6 TABLET ORAL at 21:28

## 2023-01-01 RX ADMIN — Medication 3 UNIT(S): at 06:56

## 2023-01-01 RX ADMIN — Medication 81 MILLIGRAM(S): at 13:30

## 2023-01-01 RX ADMIN — Medication 20 MILLIGRAM(S): at 14:34

## 2023-01-01 RX ADMIN — Medication 2: at 00:22

## 2023-01-01 RX ADMIN — BUMETANIDE 1 MILLIGRAM(S): 0.25 INJECTION INTRAMUSCULAR; INTRAVENOUS at 05:17

## 2023-01-01 RX ADMIN — Medication 6 UNIT(S): at 09:34

## 2023-01-01 RX ADMIN — ENOXAPARIN SODIUM 30 MILLIGRAM(S): 100 INJECTION SUBCUTANEOUS at 05:10

## 2023-01-01 RX ADMIN — Medication 5 MILLIGRAM(S): at 23:56

## 2023-01-01 RX ADMIN — SODIUM CHLORIDE 500 MILLILITER(S): 9 INJECTION INTRAMUSCULAR; INTRAVENOUS; SUBCUTANEOUS at 22:00

## 2023-01-01 RX ADMIN — Medication 2.5 MILLIGRAM(S): at 17:15

## 2023-01-01 RX ADMIN — Medication 1: at 13:01

## 2023-01-01 RX ADMIN — Medication 12.5 MILLIGRAM(S): at 05:08

## 2023-01-01 RX ADMIN — Medication 1: at 21:00

## 2023-01-01 RX ADMIN — Medication 20 MILLIGRAM(S): at 13:07

## 2023-01-01 RX ADMIN — HEPARIN SODIUM 5000 UNIT(S): 5000 INJECTION INTRAVENOUS; SUBCUTANEOUS at 05:25

## 2023-01-01 RX ADMIN — Medication 324 MILLIGRAM(S): at 13:25

## 2023-01-01 RX ADMIN — CARVEDILOL PHOSPHATE 6.25 MILLIGRAM(S): 80 CAPSULE, EXTENDED RELEASE ORAL at 17:41

## 2023-01-01 RX ADMIN — Medication 5 MILLIGRAM(S): at 06:58

## 2023-01-01 RX ADMIN — HUMAN INSULIN 7 UNIT(S): 100 INJECTION, SUSPENSION SUBCUTANEOUS at 02:05

## 2023-01-01 RX ADMIN — HUMAN INSULIN 7 UNIT(S): 100 INJECTION, SUSPENSION SUBCUTANEOUS at 09:15

## 2023-01-01 RX ADMIN — HUMAN INSULIN 7 UNIT(S): 100 INJECTION, SUSPENSION SUBCUTANEOUS at 05:26

## 2023-01-01 RX ADMIN — ATORVASTATIN CALCIUM 80 MILLIGRAM(S): 80 TABLET, FILM COATED ORAL at 22:27

## 2023-01-01 RX ADMIN — PIPERACILLIN AND TAZOBACTAM 200 GRAM(S): 4; .5 INJECTION, POWDER, LYOPHILIZED, FOR SOLUTION INTRAVENOUS at 23:29

## 2023-01-01 RX ADMIN — HEPARIN SODIUM 5000 UNIT(S): 5000 INJECTION INTRAVENOUS; SUBCUTANEOUS at 06:12

## 2023-01-01 RX ADMIN — SODIUM CHLORIDE 20 MILLILITER(S): 9 INJECTION, SOLUTION INTRAVENOUS at 13:26

## 2023-01-01 RX ADMIN — CHLORHEXIDINE GLUCONATE 1 APPLICATION(S): 213 SOLUTION TOPICAL at 11:22

## 2023-01-01 RX ADMIN — BUMETANIDE 2 MILLIGRAM(S): 0.25 INJECTION INTRAMUSCULAR; INTRAVENOUS at 11:03

## 2023-01-01 RX ADMIN — Medication 5 MILLIGRAM(S): at 13:38

## 2023-01-01 RX ADMIN — HEPARIN SODIUM 5000 UNIT(S): 5000 INJECTION INTRAVENOUS; SUBCUTANEOUS at 06:33

## 2023-01-01 RX ADMIN — LATANOPROST 1 DROP(S): 0.05 SOLUTION/ DROPS OPHTHALMIC; TOPICAL at 22:27

## 2023-01-01 RX ADMIN — SENNA PLUS 2 TABLET(S): 8.6 TABLET ORAL at 22:28

## 2023-01-01 RX ADMIN — Medication 5 MILLIGRAM(S): at 06:07

## 2023-01-01 RX ADMIN — CEFEPIME 100 MILLIGRAM(S): 1 INJECTION, POWDER, FOR SOLUTION INTRAMUSCULAR; INTRAVENOUS at 16:40

## 2023-01-01 RX ADMIN — Medication 2.5 MILLIGRAM(S): at 05:51

## 2023-01-01 RX ADMIN — Medication 20 MILLIGRAM(S): at 06:12

## 2023-01-01 RX ADMIN — CARVEDILOL PHOSPHATE 6.25 MILLIGRAM(S): 80 CAPSULE, EXTENDED RELEASE ORAL at 06:12

## 2023-01-01 RX ADMIN — Medication 3 MILLILITER(S): at 08:41

## 2023-01-01 RX ADMIN — Medication 100 MILLIGRAM(S): at 13:50

## 2023-01-01 RX ADMIN — Medication 1: at 08:01

## 2023-01-01 RX ADMIN — HEPARIN SODIUM 5000 UNIT(S): 5000 INJECTION INTRAVENOUS; SUBCUTANEOUS at 05:51

## 2023-01-01 RX ADMIN — Medication 5 MILLIGRAM(S): at 23:04

## 2023-01-01 RX ADMIN — Medication 2: at 17:21

## 2023-01-01 RX ADMIN — HEPARIN SODIUM 5000 UNIT(S): 5000 INJECTION INTRAVENOUS; SUBCUTANEOUS at 17:26

## 2023-01-01 RX ADMIN — Medication 100 MILLIEQUIVALENT(S): at 12:17

## 2023-01-01 RX ADMIN — Medication 4: at 17:06

## 2023-01-01 RX ADMIN — Medication 2.5 MILLIGRAM(S): at 06:33

## 2023-01-01 RX ADMIN — Medication 1: at 13:13

## 2023-01-01 RX ADMIN — LATANOPROST 1 DROP(S): 0.05 SOLUTION/ DROPS OPHTHALMIC; TOPICAL at 21:42

## 2023-01-01 RX ADMIN — INSULIN GLARGINE 6 UNIT(S): 100 INJECTION, SOLUTION SUBCUTANEOUS at 12:52

## 2023-01-01 RX ADMIN — Medication 5 MILLIGRAM(S): at 00:41

## 2023-01-01 RX ADMIN — HALOPERIDOL DECANOATE 1 MILLIGRAM(S): 100 INJECTION INTRAMUSCULAR at 22:02

## 2023-01-01 RX ADMIN — Medication 2: at 17:48

## 2023-01-01 RX ADMIN — Medication 2.5 MILLIGRAM(S): at 12:14

## 2023-01-01 RX ADMIN — HUMAN INSULIN 6 UNIT(S): 100 INJECTION, SUSPENSION SUBCUTANEOUS at 13:05

## 2023-01-01 RX ADMIN — Medication 2 UNIT(S): at 17:29

## 2023-01-01 RX ADMIN — Medication 7.5 MILLIGRAM(S): at 13:41

## 2023-01-01 RX ADMIN — Medication 10: at 18:40

## 2023-01-01 RX ADMIN — Medication 2: at 18:34

## 2023-01-01 RX ADMIN — Medication 81 MILLIGRAM(S): at 11:24

## 2023-01-01 RX ADMIN — HUMAN INSULIN 6 UNIT(S): 100 INJECTION, SUSPENSION SUBCUTANEOUS at 17:30

## 2023-01-01 RX ADMIN — HUMAN INSULIN 7 UNIT(S): 100 INJECTION, SUSPENSION SUBCUTANEOUS at 06:30

## 2023-01-01 RX ADMIN — HEPARIN SODIUM 5000 UNIT(S): 5000 INJECTION INTRAVENOUS; SUBCUTANEOUS at 17:48

## 2023-01-01 RX ADMIN — Medication 20 MILLIGRAM(S): at 23:29

## 2023-01-01 RX ADMIN — AZITHROMYCIN 255 MILLIGRAM(S): 500 TABLET, FILM COATED ORAL at 02:00

## 2023-01-01 RX ADMIN — Medication 2: at 05:26

## 2023-01-01 RX ADMIN — CARVEDILOL PHOSPHATE 6.25 MILLIGRAM(S): 80 CAPSULE, EXTENDED RELEASE ORAL at 18:15

## 2023-01-01 RX ADMIN — Medication 2.5 MILLIGRAM(S): at 00:29

## 2023-01-01 RX ADMIN — Medication 650 MILLIGRAM(S): at 23:41

## 2023-01-01 RX ADMIN — Medication 81 MILLIGRAM(S): at 12:14

## 2023-01-01 RX ADMIN — Medication 8: at 05:00

## 2023-01-01 RX ADMIN — Medication 7.5 MILLIGRAM(S): at 23:04

## 2023-01-01 RX ADMIN — HUMAN INSULIN 7 UNIT(S): 100 INJECTION, SUSPENSION SUBCUTANEOUS at 12:55

## 2023-01-01 RX ADMIN — Medication 2.5 MILLIGRAM(S): at 23:04

## 2023-01-01 RX ADMIN — Medication 25 MILLIGRAM(S): at 06:14

## 2023-01-01 RX ADMIN — ATORVASTATIN CALCIUM 80 MILLIGRAM(S): 80 TABLET, FILM COATED ORAL at 21:00

## 2023-01-01 RX ADMIN — INSULIN GLARGINE 3 UNIT(S): 100 INJECTION, SOLUTION SUBCUTANEOUS at 22:35

## 2023-01-01 RX ADMIN — Medication 50 MILLIEQUIVALENT(S): at 14:40

## 2023-01-01 RX ADMIN — CEFEPIME 100 MILLIGRAM(S): 1 INJECTION, POWDER, FOR SOLUTION INTRAMUSCULAR; INTRAVENOUS at 17:25

## 2023-01-01 RX ADMIN — Medication 2.5 MILLIGRAM(S): at 00:17

## 2023-01-01 RX ADMIN — SODIUM CHLORIDE 20 MILLILITER(S): 9 INJECTION, SOLUTION INTRAVENOUS at 18:49

## 2023-01-01 RX ADMIN — Medication 1 TABLET(S): at 17:48

## 2023-01-01 RX ADMIN — Medication 7.5 MILLIGRAM(S): at 23:20

## 2023-01-01 RX ADMIN — Medication 40 MILLIEQUIVALENT(S): at 09:05

## 2023-01-01 RX ADMIN — CEFEPIME 100 MILLIGRAM(S): 1 INJECTION, POWDER, FOR SOLUTION INTRAMUSCULAR; INTRAVENOUS at 16:26

## 2023-01-01 RX ADMIN — Medication 4 UNIT(S): at 17:36

## 2023-01-01 RX ADMIN — LATANOPROST 1 DROP(S): 0.05 SOLUTION/ DROPS OPHTHALMIC; TOPICAL at 21:25

## 2023-01-01 RX ADMIN — HUMAN INSULIN 7 UNIT(S): 100 INJECTION, SUSPENSION SUBCUTANEOUS at 17:41

## 2023-01-01 RX ADMIN — Medication 50 MILLIEQUIVALENT(S): at 18:00

## 2023-01-01 RX ADMIN — Medication 7.5 MILLIGRAM(S): at 23:54

## 2023-01-01 RX ADMIN — Medication 100 MILLIGRAM(S): at 13:31

## 2023-01-01 RX ADMIN — Medication 20 MILLIGRAM(S): at 13:50

## 2023-01-01 RX ADMIN — AMLODIPINE BESYLATE 10 MILLIGRAM(S): 2.5 TABLET ORAL at 05:08

## 2023-01-01 RX ADMIN — Medication 8: at 00:20

## 2023-01-01 RX ADMIN — Medication 5 MILLIGRAM(S): at 05:21

## 2023-01-01 RX ADMIN — Medication 25 MILLIGRAM(S): at 13:26

## 2023-01-01 RX ADMIN — Medication 100 MILLIEQUIVALENT(S): at 10:40

## 2023-01-01 RX ADMIN — Medication 20 MILLIEQUIVALENT(S): at 13:31

## 2023-01-01 RX ADMIN — Medication 3: at 08:41

## 2023-01-01 RX ADMIN — Medication 4: at 11:57

## 2023-01-01 RX ADMIN — HEPARIN SODIUM 5000 UNIT(S): 5000 INJECTION INTRAVENOUS; SUBCUTANEOUS at 18:07

## 2023-01-01 RX ADMIN — SODIUM CHLORIDE 50 MILLILITER(S): 9 INJECTION, SOLUTION INTRAVENOUS at 09:14

## 2023-01-01 RX ADMIN — CEFEPIME 100 MILLIGRAM(S): 1 INJECTION, POWDER, FOR SOLUTION INTRAMUSCULAR; INTRAVENOUS at 18:07

## 2023-01-01 RX ADMIN — Medication 10 MILLIGRAM(S): at 15:50

## 2023-01-01 RX ADMIN — Medication 2: at 01:09

## 2023-01-01 RX ADMIN — Medication 5 MILLIGRAM(S): at 01:02

## 2023-01-01 RX ADMIN — AMLODIPINE BESYLATE 10 MILLIGRAM(S): 2.5 TABLET ORAL at 05:39

## 2023-01-01 RX ADMIN — Medication 3 MILLILITER(S): at 22:20

## 2023-01-01 RX ADMIN — CEFEPIME 100 MILLIGRAM(S): 1 INJECTION, POWDER, FOR SOLUTION INTRAMUSCULAR; INTRAVENOUS at 17:58

## 2023-01-01 RX ADMIN — Medication 10 MILLIGRAM(S): at 22:50

## 2023-01-01 RX ADMIN — Medication 12.5 MILLIGRAM(S): at 05:12

## 2023-01-01 RX ADMIN — HUMAN INSULIN 7 UNIT(S): 100 INJECTION, SUSPENSION SUBCUTANEOUS at 18:16

## 2023-01-01 RX ADMIN — HEPARIN SODIUM 5000 UNIT(S): 5000 INJECTION INTRAVENOUS; SUBCUTANEOUS at 06:07

## 2023-01-01 RX ADMIN — Medication 2: at 13:32

## 2023-01-01 RX ADMIN — AZITHROMYCIN 255 MILLIGRAM(S): 500 TABLET, FILM COATED ORAL at 12:38

## 2023-01-01 RX ADMIN — Medication 4: at 13:48

## 2023-01-01 RX ADMIN — Medication 6: at 00:32

## 2023-01-01 RX ADMIN — Medication 100 MILLIEQUIVALENT(S): at 13:34

## 2023-01-01 RX ADMIN — HEPARIN SODIUM 5000 UNIT(S): 5000 INJECTION INTRAVENOUS; SUBCUTANEOUS at 06:16

## 2023-01-01 RX ADMIN — LATANOPROST 1 DROP(S): 0.05 SOLUTION/ DROPS OPHTHALMIC; TOPICAL at 22:29

## 2023-01-01 RX ADMIN — Medication 25 GRAM(S): at 06:39

## 2023-01-01 RX ADMIN — Medication 50 MILLIEQUIVALENT(S): at 01:29

## 2023-01-01 RX ADMIN — Medication 1 TABLET(S): at 05:15

## 2023-01-01 RX ADMIN — CEFEPIME 100 MILLIGRAM(S): 1 INJECTION, POWDER, FOR SOLUTION INTRAMUSCULAR; INTRAVENOUS at 17:36

## 2023-01-01 RX ADMIN — CHLORHEXIDINE GLUCONATE 1 APPLICATION(S): 213 SOLUTION TOPICAL at 11:57

## 2023-01-01 RX ADMIN — CEFEPIME 100 MILLIGRAM(S): 1 INJECTION, POWDER, FOR SOLUTION INTRAMUSCULAR; INTRAVENOUS at 16:56

## 2023-01-01 RX ADMIN — LATANOPROST 1 DROP(S): 0.05 SOLUTION/ DROPS OPHTHALMIC; TOPICAL at 22:43

## 2023-01-01 RX ADMIN — Medication 2.5 MILLIGRAM(S): at 12:23

## 2023-01-01 RX ADMIN — Medication 2: at 12:58

## 2023-01-01 RX ADMIN — Medication 12.5 MILLIGRAM(S): at 04:51

## 2023-01-01 RX ADMIN — Medication 2: at 17:44

## 2023-01-01 RX ADMIN — Medication 3: at 23:58

## 2023-01-01 RX ADMIN — SODIUM CHLORIDE 20 MILLILITER(S): 9 INJECTION, SOLUTION INTRAVENOUS at 19:27

## 2023-01-01 RX ADMIN — CEFEPIME 100 MILLIGRAM(S): 1 INJECTION, POWDER, FOR SOLUTION INTRAMUSCULAR; INTRAVENOUS at 17:30

## 2023-01-01 RX ADMIN — Medication 40 MILLIGRAM(S): at 05:12

## 2023-01-01 RX ADMIN — Medication 2: at 07:05

## 2023-01-01 RX ADMIN — Medication 5 MILLIGRAM(S): at 13:41

## 2023-01-01 RX ADMIN — Medication 2.5 MILLIGRAM(S): at 05:01

## 2023-01-01 RX ADMIN — HEPARIN SODIUM 5000 UNIT(S): 5000 INJECTION INTRAVENOUS; SUBCUTANEOUS at 05:00

## 2023-01-01 RX ADMIN — LATANOPROST 1 DROP(S): 0.05 SOLUTION/ DROPS OPHTHALMIC; TOPICAL at 21:39

## 2023-01-01 RX ADMIN — Medication 2 UNIT(S): at 17:44

## 2023-01-01 RX ADMIN — Medication 100 MILLIEQUIVALENT(S): at 16:01

## 2023-01-01 RX ADMIN — LATANOPROST 1 DROP(S): 0.05 SOLUTION/ DROPS OPHTHALMIC; TOPICAL at 21:41

## 2023-01-01 RX ADMIN — Medication 5: at 12:26

## 2023-01-01 RX ADMIN — Medication 5 MILLIGRAM(S): at 03:58

## 2023-01-01 RX ADMIN — HEPARIN SODIUM 5000 UNIT(S): 5000 INJECTION INTRAVENOUS; SUBCUTANEOUS at 17:37

## 2023-01-01 RX ADMIN — Medication 10: at 18:50

## 2023-01-01 RX ADMIN — CEFTRIAXONE 100 MILLIGRAM(S): 500 INJECTION, POWDER, FOR SOLUTION INTRAMUSCULAR; INTRAVENOUS at 13:58

## 2023-01-01 RX ADMIN — Medication 100 MILLIGRAM(S): at 00:54

## 2023-01-01 RX ADMIN — Medication 81 MILLIGRAM(S): at 12:13

## 2023-01-01 RX ADMIN — Medication 12: at 00:30

## 2023-01-01 RX ADMIN — Medication 12.5 MILLIGRAM(S): at 05:09

## 2023-01-01 RX ADMIN — Medication 5: at 23:34

## 2023-01-01 RX ADMIN — Medication 1: at 11:55

## 2023-01-01 RX ADMIN — Medication 1: at 17:29

## 2023-01-01 RX ADMIN — Medication 4 UNIT(S): at 17:51

## 2023-01-01 RX ADMIN — Medication 5 MILLIGRAM(S): at 11:21

## 2023-01-01 RX ADMIN — LOSARTAN POTASSIUM 100 MILLIGRAM(S): 100 TABLET, FILM COATED ORAL at 05:42

## 2023-01-01 RX ADMIN — Medication 20 MILLIGRAM(S): at 05:59

## 2023-01-01 RX ADMIN — Medication 20 MILLIGRAM(S): at 13:02

## 2023-01-01 RX ADMIN — LATANOPROST 1 DROP(S): 0.05 SOLUTION/ DROPS OPHTHALMIC; TOPICAL at 22:00

## 2023-01-01 RX ADMIN — Medication 7.5 MILLIGRAM(S): at 18:42

## 2023-01-01 RX ADMIN — Medication 650 MILLIGRAM(S): at 13:04

## 2023-01-01 RX ADMIN — Medication 7.5 MILLIGRAM(S): at 00:41

## 2023-01-01 RX ADMIN — HEPARIN SODIUM 5000 UNIT(S): 5000 INJECTION INTRAVENOUS; SUBCUTANEOUS at 05:03

## 2023-01-01 RX ADMIN — Medication 7.5 MILLIGRAM(S): at 06:56

## 2023-01-01 RX ADMIN — Medication 81 MILLIGRAM(S): at 12:23

## 2023-01-01 RX ADMIN — HUMAN INSULIN 7 UNIT(S): 100 INJECTION, SUSPENSION SUBCUTANEOUS at 23:58

## 2023-01-01 RX ADMIN — LATANOPROST 1 DROP(S): 0.05 SOLUTION/ DROPS OPHTHALMIC; TOPICAL at 22:32

## 2023-01-01 RX ADMIN — Medication 2 UNIT(S): at 08:44

## 2023-01-01 RX ADMIN — Medication 7.5 MILLIGRAM(S): at 05:02

## 2023-01-01 RX ADMIN — Medication 1 TABLET(S): at 11:21

## 2023-01-01 RX ADMIN — Medication 3: at 17:54

## 2023-01-01 RX ADMIN — Medication 4: at 06:57

## 2023-01-01 RX ADMIN — Medication 81 MILLIGRAM(S): at 11:28

## 2023-01-01 RX ADMIN — ATORVASTATIN CALCIUM 80 MILLIGRAM(S): 80 TABLET, FILM COATED ORAL at 22:32

## 2023-01-01 RX ADMIN — HEPARIN SODIUM 5000 UNIT(S): 5000 INJECTION INTRAVENOUS; SUBCUTANEOUS at 04:54

## 2023-01-01 RX ADMIN — Medication 100 MILLIGRAM(S): at 09:58

## 2023-01-01 RX ADMIN — Medication 100 MILLIEQUIVALENT(S): at 17:48

## 2023-01-01 RX ADMIN — AMLODIPINE BESYLATE 10 MILLIGRAM(S): 2.5 TABLET ORAL at 05:12

## 2023-01-01 RX ADMIN — HUMAN INSULIN 6 UNIT(S): 100 INJECTION, SUSPENSION SUBCUTANEOUS at 06:03

## 2023-01-01 RX ADMIN — Medication 100 MILLIGRAM(S): at 17:04

## 2023-01-01 RX ADMIN — Medication 100 MILLIGRAM(S): at 21:42

## 2023-01-01 RX ADMIN — Medication 10 MILLIGRAM(S): at 06:07

## 2023-01-01 RX ADMIN — Medication 12.5 MILLIGRAM(S): at 17:50

## 2023-01-01 RX ADMIN — HEPARIN SODIUM 5000 UNIT(S): 5000 INJECTION INTRAVENOUS; SUBCUTANEOUS at 17:45

## 2023-01-01 RX ADMIN — Medication 3: at 09:04

## 2023-01-01 RX ADMIN — CHLORHEXIDINE GLUCONATE 1 APPLICATION(S): 213 SOLUTION TOPICAL at 13:17

## 2023-01-01 RX ADMIN — Medication 81 MILLIGRAM(S): at 13:28

## 2023-01-01 RX ADMIN — Medication 5 MILLIGRAM(S): at 05:31

## 2023-01-01 RX ADMIN — QUETIAPINE FUMARATE 50 MILLIGRAM(S): 200 TABLET, FILM COATED ORAL at 11:57

## 2023-01-01 RX ADMIN — CARVEDILOL PHOSPHATE 6.25 MILLIGRAM(S): 80 CAPSULE, EXTENDED RELEASE ORAL at 13:14

## 2023-01-01 RX ADMIN — Medication 3: at 06:07

## 2023-01-01 RX ADMIN — Medication 7.5 MILLIGRAM(S): at 13:40

## 2023-01-01 RX ADMIN — Medication 20 MILLIGRAM(S): at 21:56

## 2023-01-01 RX ADMIN — Medication 20 MILLIEQUIVALENT(S): at 13:12

## 2023-01-01 RX ADMIN — INSULIN GLARGINE 5 UNIT(S): 100 INJECTION, SOLUTION SUBCUTANEOUS at 22:42

## 2023-01-01 RX ADMIN — Medication 7.5 MILLIGRAM(S): at 04:54

## 2023-01-01 RX ADMIN — Medication 2: at 13:27

## 2023-01-01 RX ADMIN — HEPARIN SODIUM 5000 UNIT(S): 5000 INJECTION INTRAVENOUS; SUBCUTANEOUS at 05:01

## 2023-01-01 RX ADMIN — LATANOPROST 1 DROP(S): 0.05 SOLUTION/ DROPS OPHTHALMIC; TOPICAL at 21:55

## 2023-01-01 RX ADMIN — LATANOPROST 1 DROP(S): 0.05 SOLUTION/ DROPS OPHTHALMIC; TOPICAL at 21:13

## 2023-01-01 RX ADMIN — Medication 5 MILLIGRAM(S): at 00:29

## 2023-01-01 RX ADMIN — HEPARIN SODIUM 5000 UNIT(S): 5000 INJECTION INTRAVENOUS; SUBCUTANEOUS at 17:05

## 2023-01-01 RX ADMIN — Medication 5 MILLIGRAM(S): at 17:44

## 2023-01-01 RX ADMIN — Medication 5 MILLIGRAM(S): at 09:24

## 2023-01-01 RX ADMIN — Medication 12.5 MILLIGRAM(S): at 09:34

## 2023-01-01 RX ADMIN — SENNA PLUS 2 TABLET(S): 8.6 TABLET ORAL at 22:32

## 2023-01-01 RX ADMIN — Medication 4 UNIT(S): at 03:44

## 2023-01-01 RX ADMIN — Medication 40 MILLIEQUIVALENT(S): at 12:04

## 2023-01-01 RX ADMIN — Medication 100 MILLIEQUIVALENT(S): at 11:20

## 2023-01-01 RX ADMIN — Medication 5 MILLIGRAM(S): at 18:07

## 2023-01-01 RX ADMIN — HUMAN INSULIN 9 UNIT(S): 100 INJECTION, SUSPENSION SUBCUTANEOUS at 13:06

## 2023-01-01 RX ADMIN — HUMAN INSULIN 6 UNIT(S): 100 INJECTION, SUSPENSION SUBCUTANEOUS at 11:51

## 2023-01-01 RX ADMIN — LATANOPROST 1 DROP(S): 0.05 SOLUTION/ DROPS OPHTHALMIC; TOPICAL at 23:35

## 2023-01-01 RX ADMIN — ATORVASTATIN CALCIUM 80 MILLIGRAM(S): 80 TABLET, FILM COATED ORAL at 22:14

## 2023-01-01 RX ADMIN — Medication 100 MILLIGRAM(S): at 08:03

## 2023-01-01 RX ADMIN — Medication 1 TABLET(S): at 11:24

## 2023-01-01 RX ADMIN — HEPARIN SODIUM 5000 UNIT(S): 5000 INJECTION INTRAVENOUS; SUBCUTANEOUS at 18:51

## 2023-01-01 RX ADMIN — Medication 4: at 18:19

## 2023-01-01 RX ADMIN — SODIUM CHLORIDE 50 MILLILITER(S): 9 INJECTION INTRAMUSCULAR; INTRAVENOUS; SUBCUTANEOUS at 10:30

## 2023-01-01 RX ADMIN — Medication 4: at 07:04

## 2023-01-01 RX ADMIN — Medication 20 MILLIGRAM(S): at 05:28

## 2023-01-01 RX ADMIN — Medication 2.5 MILLIGRAM(S): at 17:19

## 2023-01-01 RX ADMIN — Medication 2: at 09:10

## 2023-01-01 RX ADMIN — Medication 20 MILLIGRAM(S): at 16:28

## 2023-01-01 RX ADMIN — Medication 5 MILLIGRAM(S): at 18:51

## 2023-01-01 RX ADMIN — Medication 2: at 13:59

## 2023-01-01 RX ADMIN — Medication 12.5 MILLIGRAM(S): at 17:32

## 2023-01-01 RX ADMIN — ATORVASTATIN CALCIUM 80 MILLIGRAM(S): 80 TABLET, FILM COATED ORAL at 21:28

## 2023-01-01 RX ADMIN — POLYETHYLENE GLYCOL 3350 17 GRAM(S): 17 POWDER, FOR SOLUTION ORAL at 05:39

## 2023-01-01 RX ADMIN — Medication 6: at 17:38

## 2023-01-01 RX ADMIN — Medication 4 UNIT(S): at 08:42

## 2023-01-01 RX ADMIN — Medication 1 TABLET(S): at 13:13

## 2023-01-01 RX ADMIN — Medication 5 MILLIGRAM(S): at 17:50

## 2023-01-01 RX ADMIN — Medication 81 MILLIGRAM(S): at 13:02

## 2023-01-01 RX ADMIN — Medication 0.5 MILLIGRAM(S): at 06:58

## 2023-01-01 RX ADMIN — Medication 3: at 18:18

## 2023-01-01 RX ADMIN — Medication 20 MILLIGRAM(S): at 06:31

## 2023-01-01 RX ADMIN — Medication 6: at 18:16

## 2023-01-01 RX ADMIN — CEFTRIAXONE 100 MILLIGRAM(S): 500 INJECTION, POWDER, FOR SOLUTION INTRAMUSCULAR; INTRAVENOUS at 22:02

## 2023-01-01 RX ADMIN — LATANOPROST 1 DROP(S): 0.05 SOLUTION/ DROPS OPHTHALMIC; TOPICAL at 22:19

## 2023-01-01 RX ADMIN — HEPARIN SODIUM 5000 UNIT(S): 5000 INJECTION INTRAVENOUS; SUBCUTANEOUS at 05:04

## 2023-01-01 RX ADMIN — HUMAN INSULIN 9 UNIT(S): 100 INJECTION, SUSPENSION SUBCUTANEOUS at 18:16

## 2023-01-01 RX ADMIN — Medication 25 MILLIGRAM(S): at 21:41

## 2023-01-01 RX ADMIN — Medication 100 MILLIGRAM(S): at 11:21

## 2023-01-01 RX ADMIN — Medication 81 MILLIGRAM(S): at 12:57

## 2023-01-01 RX ADMIN — Medication 1: at 01:44

## 2023-01-01 RX ADMIN — CEFEPIME 100 MILLIGRAM(S): 1 INJECTION, POWDER, FOR SOLUTION INTRAMUSCULAR; INTRAVENOUS at 17:04

## 2023-01-01 RX ADMIN — Medication 5 MILLIGRAM(S): at 06:28

## 2023-01-01 RX ADMIN — Medication 7.5 MILLIGRAM(S): at 18:51

## 2023-01-01 RX ADMIN — Medication 3: at 00:25

## 2023-01-01 RX ADMIN — Medication 5: at 17:49

## 2023-01-01 RX ADMIN — Medication 7.5 MILLIGRAM(S): at 18:40

## 2023-01-01 RX ADMIN — AMLODIPINE BESYLATE 10 MILLIGRAM(S): 2.5 TABLET ORAL at 05:42

## 2023-01-01 RX ADMIN — Medication 2: at 00:13

## 2023-01-01 RX ADMIN — Medication 100 MILLIGRAM(S): at 12:58

## 2023-01-01 RX ADMIN — Medication 2.5 MILLIGRAM(S): at 17:02

## 2023-01-01 RX ADMIN — AMLODIPINE BESYLATE 10 MILLIGRAM(S): 2.5 TABLET ORAL at 04:59

## 2023-01-01 RX ADMIN — HUMAN INSULIN 7 UNIT(S): 100 INJECTION, SUSPENSION SUBCUTANEOUS at 13:50

## 2023-01-01 RX ADMIN — Medication 1: at 23:39

## 2023-01-01 RX ADMIN — Medication 100 MILLIGRAM(S): at 11:28

## 2023-01-01 RX ADMIN — Medication 20 MILLIEQUIVALENT(S): at 17:20

## 2023-01-01 RX ADMIN — LATANOPROST 1 DROP(S): 0.05 SOLUTION/ DROPS OPHTHALMIC; TOPICAL at 21:20

## 2023-01-01 RX ADMIN — BUMETANIDE 10 MG/HR: 0.25 INJECTION INTRAMUSCULAR; INTRAVENOUS at 12:18

## 2023-01-01 RX ADMIN — Medication 12.5 MILLIGRAM(S): at 16:06

## 2023-01-01 RX ADMIN — Medication 3 UNIT(S): at 17:50

## 2023-01-01 RX ADMIN — Medication 5 MILLIGRAM(S): at 13:31

## 2023-01-01 RX ADMIN — Medication 650 MILLIGRAM(S): at 22:41

## 2023-01-01 RX ADMIN — Medication 2.5 MILLIGRAM(S): at 05:19

## 2023-01-01 RX ADMIN — HEPARIN SODIUM 5000 UNIT(S): 5000 INJECTION INTRAVENOUS; SUBCUTANEOUS at 06:27

## 2023-01-01 RX ADMIN — BUMETANIDE 10 MG/HR: 0.25 INJECTION INTRAMUSCULAR; INTRAVENOUS at 13:00

## 2023-01-01 RX ADMIN — Medication 20 MILLIEQUIVALENT(S): at 15:43

## 2023-01-01 RX ADMIN — HUMAN INSULIN 9 UNIT(S): 100 INJECTION, SUSPENSION SUBCUTANEOUS at 00:20

## 2023-01-01 RX ADMIN — Medication 5 MILLIGRAM(S): at 11:23

## 2023-01-01 RX ADMIN — HUMAN INSULIN 7 UNIT(S): 100 INJECTION, SUSPENSION SUBCUTANEOUS at 01:09

## 2023-01-01 RX ADMIN — HEPARIN SODIUM 5000 UNIT(S): 5000 INJECTION INTRAVENOUS; SUBCUTANEOUS at 18:52

## 2023-01-01 RX ADMIN — HUMAN INSULIN 6 UNIT(S): 100 INJECTION, SUSPENSION SUBCUTANEOUS at 17:05

## 2023-01-01 RX ADMIN — Medication 10 MILLIGRAM(S): at 12:13

## 2023-01-01 RX ADMIN — Medication 100 MILLIGRAM(S): at 12:13

## 2023-01-01 RX ADMIN — INSULIN GLARGINE 14 UNIT(S): 100 INJECTION, SOLUTION SUBCUTANEOUS at 22:27

## 2023-01-01 RX ADMIN — Medication 1: at 17:51

## 2023-01-01 RX ADMIN — CARVEDILOL PHOSPHATE 12.5 MILLIGRAM(S): 80 CAPSULE, EXTENDED RELEASE ORAL at 06:15

## 2023-01-01 RX ADMIN — HEPARIN SODIUM 5000 UNIT(S): 5000 INJECTION INTRAVENOUS; SUBCUTANEOUS at 16:27

## 2023-01-01 RX ADMIN — QUETIAPINE FUMARATE 50 MILLIGRAM(S): 200 TABLET, FILM COATED ORAL at 10:42

## 2023-01-01 RX ADMIN — CARVEDILOL PHOSPHATE 6.25 MILLIGRAM(S): 80 CAPSULE, EXTENDED RELEASE ORAL at 18:17

## 2023-01-01 RX ADMIN — Medication 4: at 06:20

## 2023-01-01 RX ADMIN — Medication 2.5 MILLIGRAM(S): at 06:15

## 2023-01-01 RX ADMIN — HUMAN INSULIN 6 UNIT(S): 100 INJECTION, SUSPENSION SUBCUTANEOUS at 06:28

## 2023-01-01 RX ADMIN — ENOXAPARIN SODIUM 40 MILLIGRAM(S): 100 INJECTION SUBCUTANEOUS at 05:42

## 2023-01-01 RX ADMIN — HEPARIN SODIUM 5000 UNIT(S): 5000 INJECTION INTRAVENOUS; SUBCUTANEOUS at 18:41

## 2023-01-01 RX ADMIN — Medication 5 MILLIGRAM(S): at 21:59

## 2023-01-01 RX ADMIN — Medication 81 MILLIGRAM(S): at 12:07

## 2023-01-01 RX ADMIN — Medication 3: at 11:29

## 2023-01-01 RX ADMIN — Medication 2: at 13:04

## 2023-01-01 RX ADMIN — Medication 650 MILLIGRAM(S): at 05:51

## 2023-01-01 RX ADMIN — Medication 5 MILLIGRAM(S): at 17:36

## 2023-01-01 RX ADMIN — Medication 4: at 08:12

## 2023-01-01 RX ADMIN — SODIUM CHLORIDE 50 MILLILITER(S): 9 INJECTION INTRAMUSCULAR; INTRAVENOUS; SUBCUTANEOUS at 08:58

## 2023-01-01 RX ADMIN — HEPARIN SODIUM 5000 UNIT(S): 5000 INJECTION INTRAVENOUS; SUBCUTANEOUS at 17:59

## 2023-01-01 RX ADMIN — Medication 4 UNIT(S): at 12:12

## 2023-01-01 RX ADMIN — Medication 8: at 06:27

## 2023-01-01 RX ADMIN — Medication 10 MILLIGRAM(S): at 09:57

## 2023-01-01 RX ADMIN — Medication 5 MILLIGRAM(S): at 12:53

## 2023-01-01 RX ADMIN — LATANOPROST 1 DROP(S): 0.05 SOLUTION/ DROPS OPHTHALMIC; TOPICAL at 22:30

## 2023-01-01 RX ADMIN — Medication 50 MILLIEQUIVALENT(S): at 21:19

## 2023-01-01 RX ADMIN — Medication 6: at 06:59

## 2023-01-01 RX ADMIN — HUMAN INSULIN 6 UNIT(S): 100 INJECTION, SUSPENSION SUBCUTANEOUS at 18:39

## 2023-01-01 RX ADMIN — HEPARIN SODIUM 5000 UNIT(S): 5000 INJECTION INTRAVENOUS; SUBCUTANEOUS at 06:14

## 2023-01-01 RX ADMIN — Medication 4: at 13:06

## 2023-01-01 RX ADMIN — Medication 2: at 13:26

## 2023-01-01 RX ADMIN — ENOXAPARIN SODIUM 40 MILLIGRAM(S): 100 INJECTION SUBCUTANEOUS at 06:11

## 2023-01-01 RX ADMIN — AMLODIPINE BESYLATE 10 MILLIGRAM(S): 2.5 TABLET ORAL at 05:09

## 2023-01-01 RX ADMIN — Medication 40 MILLIEQUIVALENT(S): at 23:53

## 2023-01-01 RX ADMIN — HEPARIN SODIUM 5000 UNIT(S): 5000 INJECTION INTRAVENOUS; SUBCUTANEOUS at 17:53

## 2023-01-01 RX ADMIN — Medication 650 MILLIGRAM(S): at 13:34

## 2023-01-01 RX ADMIN — Medication 20 MILLIGRAM(S): at 21:13

## 2023-01-01 RX ADMIN — AMLODIPINE BESYLATE 10 MILLIGRAM(S): 2.5 TABLET ORAL at 17:45

## 2023-01-01 RX ADMIN — Medication 3: at 05:58

## 2023-01-01 RX ADMIN — LOSARTAN POTASSIUM 100 MILLIGRAM(S): 100 TABLET, FILM COATED ORAL at 05:15

## 2023-01-01 RX ADMIN — Medication 100 MILLIEQUIVALENT(S): at 13:08

## 2023-01-01 RX ADMIN — Medication 7.5 MILLIGRAM(S): at 17:49

## 2023-01-01 RX ADMIN — Medication 5 MILLIGRAM(S): at 00:13

## 2023-01-01 RX ADMIN — Medication 2.5 MILLIGRAM(S): at 11:37

## 2023-01-01 RX ADMIN — CARVEDILOL PHOSPHATE 12.5 MILLIGRAM(S): 80 CAPSULE, EXTENDED RELEASE ORAL at 18:09

## 2023-01-01 RX ADMIN — Medication 4: at 00:47

## 2023-01-01 RX ADMIN — Medication 2 UNIT(S): at 08:47

## 2023-01-01 RX ADMIN — Medication 2: at 08:57

## 2023-01-01 RX ADMIN — CEFEPIME 100 MILLIGRAM(S): 1 INJECTION, POWDER, FOR SOLUTION INTRAMUSCULAR; INTRAVENOUS at 17:53

## 2023-01-01 RX ADMIN — ENOXAPARIN SODIUM 30 MILLIGRAM(S): 100 INJECTION SUBCUTANEOUS at 05:09

## 2023-01-01 RX ADMIN — BUMETANIDE 10 MG/HR: 0.25 INJECTION INTRAMUSCULAR; INTRAVENOUS at 21:25

## 2023-01-01 RX ADMIN — Medication 81 MILLIGRAM(S): at 11:21

## 2023-01-01 RX ADMIN — Medication 5 MILLIGRAM(S): at 04:54

## 2023-01-01 RX ADMIN — BUMETANIDE 2 MILLIGRAM(S): 0.25 INJECTION INTRAMUSCULAR; INTRAVENOUS at 05:00

## 2023-01-01 RX ADMIN — Medication 10 MILLIGRAM(S): at 05:22

## 2023-01-01 RX ADMIN — AZITHROMYCIN 255 MILLIGRAM(S): 500 TABLET, FILM COATED ORAL at 12:53

## 2023-01-01 RX ADMIN — Medication 260 MILLIGRAM(S): at 04:33

## 2023-01-01 RX ADMIN — CEFTRIAXONE 100 MILLIGRAM(S): 500 INJECTION, POWDER, FOR SOLUTION INTRAMUSCULAR; INTRAVENOUS at 13:24

## 2023-01-01 RX ADMIN — Medication 81 MILLIGRAM(S): at 11:27

## 2023-01-01 RX ADMIN — Medication 1 TABLET(S): at 11:27

## 2023-01-01 RX ADMIN — Medication 3: at 09:26

## 2023-01-01 RX ADMIN — Medication 7.5 MILLIGRAM(S): at 00:30

## 2023-01-01 RX ADMIN — Medication 50 MILLIEQUIVALENT(S): at 05:01

## 2023-01-01 RX ADMIN — CEFEPIME 100 MILLIGRAM(S): 1 INJECTION, POWDER, FOR SOLUTION INTRAMUSCULAR; INTRAVENOUS at 16:47

## 2023-01-01 RX ADMIN — SODIUM CHLORIDE 50 MILLILITER(S): 9 INJECTION, SOLUTION INTRAVENOUS at 17:30

## 2023-01-01 RX ADMIN — HEPARIN SODIUM 5000 UNIT(S): 5000 INJECTION INTRAVENOUS; SUBCUTANEOUS at 18:17

## 2023-01-01 RX ADMIN — Medication 6: at 23:58

## 2023-01-01 RX ADMIN — HEPARIN SODIUM 5000 UNIT(S): 5000 INJECTION INTRAVENOUS; SUBCUTANEOUS at 17:15

## 2023-01-01 RX ADMIN — Medication 81 MILLIGRAM(S): at 09:59

## 2023-01-01 RX ADMIN — Medication 100 MILLIEQUIVALENT(S): at 19:06

## 2023-01-01 RX ADMIN — LATANOPROST 1 DROP(S): 0.05 SOLUTION/ DROPS OPHTHALMIC; TOPICAL at 21:36

## 2023-01-01 RX ADMIN — Medication 2: at 17:43

## 2023-01-01 RX ADMIN — LATANOPROST 1 DROP(S): 0.05 SOLUTION/ DROPS OPHTHALMIC; TOPICAL at 22:44

## 2023-01-01 RX ADMIN — Medication 20 MILLIGRAM(S): at 21:39

## 2023-01-01 RX ADMIN — Medication 20 MILLIGRAM(S): at 21:42

## 2023-01-01 RX ADMIN — Medication 40 MILLIGRAM(S): at 05:38

## 2023-01-01 RX ADMIN — Medication 3 UNIT(S): at 00:46

## 2023-01-01 RX ADMIN — Medication 12.5 MILLIGRAM(S): at 21:00

## 2023-01-01 RX ADMIN — ATORVASTATIN CALCIUM 80 MILLIGRAM(S): 80 TABLET, FILM COATED ORAL at 22:03

## 2023-01-01 RX ADMIN — CARVEDILOL PHOSPHATE 6.25 MILLIGRAM(S): 80 CAPSULE, EXTENDED RELEASE ORAL at 06:03

## 2023-01-01 RX ADMIN — HEPARIN SODIUM 5000 UNIT(S): 5000 INJECTION INTRAVENOUS; SUBCUTANEOUS at 17:36

## 2023-01-01 RX ADMIN — SODIUM CHLORIDE 500 MILLILITER(S): 9 INJECTION INTRAMUSCULAR; INTRAVENOUS; SUBCUTANEOUS at 22:50

## 2023-01-01 RX ADMIN — Medication 25 MILLIGRAM(S): at 22:29

## 2023-01-01 RX ADMIN — Medication 40 MILLIEQUIVALENT(S): at 11:20

## 2023-01-01 RX ADMIN — Medication 81 MILLIGRAM(S): at 08:42

## 2023-01-01 RX ADMIN — Medication 5 MILLIGRAM(S): at 05:01

## 2023-01-01 RX ADMIN — Medication 4: at 06:15

## 2023-01-01 RX ADMIN — Medication 5 MILLIGRAM(S): at 23:20

## 2023-01-01 RX ADMIN — ENOXAPARIN SODIUM 30 MILLIGRAM(S): 100 INJECTION SUBCUTANEOUS at 02:22

## 2023-01-01 RX ADMIN — Medication 5 MILLIGRAM(S): at 17:39

## 2023-01-01 RX ADMIN — Medication 3: at 13:40

## 2023-01-01 RX ADMIN — Medication 4 UNIT(S): at 12:15

## 2023-01-01 RX ADMIN — AZITHROMYCIN 255 MILLIGRAM(S): 500 TABLET, FILM COATED ORAL at 14:05

## 2023-01-01 RX ADMIN — CEFTRIAXONE 1000 MILLIGRAM(S): 500 INJECTION, POWDER, FOR SOLUTION INTRAMUSCULAR; INTRAVENOUS at 22:40

## 2023-01-01 RX ADMIN — Medication 7.5 MILLIGRAM(S): at 05:33

## 2023-01-01 RX ADMIN — Medication 81 MILLIGRAM(S): at 11:56

## 2023-01-01 RX ADMIN — Medication 10 MILLIGRAM(S): at 22:55

## 2023-01-01 RX ADMIN — Medication 12.5 MILLIGRAM(S): at 17:22

## 2023-01-01 RX ADMIN — Medication 100 MILLIEQUIVALENT(S): at 18:43

## 2023-01-01 RX ADMIN — Medication 40 MILLIEQUIVALENT(S): at 17:07

## 2023-01-01 RX ADMIN — SODIUM CHLORIDE 75 MILLILITER(S): 9 INJECTION INTRAMUSCULAR; INTRAVENOUS; SUBCUTANEOUS at 12:33

## 2023-01-01 RX ADMIN — Medication 100 MILLIGRAM(S): at 15:32

## 2023-01-01 RX ADMIN — HEPARIN SODIUM 5000 UNIT(S): 5000 INJECTION INTRAVENOUS; SUBCUTANEOUS at 17:18

## 2023-01-01 RX ADMIN — Medication 20 MILLIGRAM(S): at 09:24

## 2023-01-01 RX ADMIN — Medication 1 TABLET(S): at 21:41

## 2023-01-01 RX ADMIN — AZITHROMYCIN 255 MILLIGRAM(S): 500 TABLET, FILM COATED ORAL at 23:10

## 2023-01-01 RX ADMIN — Medication 2: at 18:05

## 2023-01-01 RX ADMIN — Medication 7.5 MILLIGRAM(S): at 13:49

## 2023-01-01 RX ADMIN — AMLODIPINE BESYLATE 10 MILLIGRAM(S): 2.5 TABLET ORAL at 05:16

## 2023-01-01 RX ADMIN — Medication 1 TABLET(S): at 13:31

## 2023-01-01 RX ADMIN — Medication 5 MILLIGRAM(S): at 00:33

## 2023-01-01 RX ADMIN — Medication 4: at 00:10

## 2023-01-01 RX ADMIN — ENOXAPARIN SODIUM 40 MILLIGRAM(S): 100 INJECTION SUBCUTANEOUS at 05:15

## 2023-01-01 RX ADMIN — Medication 2 UNIT(S): at 05:58

## 2023-01-01 RX ADMIN — Medication 100 MILLIEQUIVALENT(S): at 09:30

## 2023-01-01 RX ADMIN — CEFTRIAXONE 100 MILLIGRAM(S): 500 INJECTION, POWDER, FOR SOLUTION INTRAMUSCULAR; INTRAVENOUS at 14:04

## 2023-01-01 RX ADMIN — Medication 5 MILLIGRAM(S): at 11:50

## 2023-01-01 RX ADMIN — POLYETHYLENE GLYCOL 3350 17 GRAM(S): 17 POWDER, FOR SOLUTION ORAL at 17:20

## 2023-01-01 RX ADMIN — Medication 7.5 MILLIGRAM(S): at 11:20

## 2023-01-01 RX ADMIN — HEPARIN SODIUM 5000 UNIT(S): 5000 INJECTION INTRAVENOUS; SUBCUTANEOUS at 05:31

## 2023-01-01 RX ADMIN — Medication 7.5 MILLIGRAM(S): at 17:36

## 2023-01-01 RX ADMIN — CARVEDILOL PHOSPHATE 6.25 MILLIGRAM(S): 80 CAPSULE, EXTENDED RELEASE ORAL at 05:29

## 2023-01-01 RX ADMIN — Medication 81 MILLIGRAM(S): at 21:42

## 2023-01-01 RX ADMIN — Medication 3: at 12:14

## 2023-01-01 RX ADMIN — Medication 4 UNIT(S): at 13:28

## 2023-01-01 RX ADMIN — ENOXAPARIN SODIUM 30 MILLIGRAM(S): 100 INJECTION SUBCUTANEOUS at 05:38

## 2023-01-01 RX ADMIN — HEPARIN SODIUM 5000 UNIT(S): 5000 INJECTION INTRAVENOUS; SUBCUTANEOUS at 05:21

## 2023-01-01 RX ADMIN — Medication 5 MILLIGRAM(S): at 18:40

## 2023-01-01 RX ADMIN — Medication 6: at 00:24

## 2023-01-01 RX ADMIN — HUMAN INSULIN 6 UNIT(S): 100 INJECTION, SUSPENSION SUBCUTANEOUS at 00:42

## 2023-01-01 RX ADMIN — LATANOPROST 1 DROP(S): 0.05 SOLUTION/ DROPS OPHTHALMIC; TOPICAL at 22:56

## 2023-01-29 NOTE — ED PROVIDER NOTE - PROGRESS NOTE DETAILS
Zahra, PGY3: patient w/ leukocytosis of ~20 w/ lactate of 3.3 and concerning signs of CAP; will provide AB and admit to medicine

## 2023-01-29 NOTE — ED PROVIDER NOTE - OBJECTIVE STATEMENT
89YO female with PMH of DM2, HTN, & dementia via EMS from home presenting with complaints of  difficulty breathing. As per daughter Montrell ([phone]) AID called her informing of pt's difficulty breathing. Was found to be tachypneic and diaphoretic. Had a saturation of 80% per EMS and was placed on a nonrebreather.

## 2023-01-29 NOTE — ED PROVIDER NOTE - PHYSICAL EXAMINATION
GENERAL: well appearing in no acute distress, non-toxic appearing  HEAD: normocephalic, atraumatic  HENT: airway intact  EYES: normal conjunctiva  CARDIAC: regular rate and rhythm, normal S1S2, no appreciable murmurs, 2+ pulses in UE/LE b/l  PULM: normal breath sounds, clear to ascultation bilaterally, rhonchi at bases  GI: abdomen nondistended, soft, nontender, no guarding, rebound tenderness  NEURO: no focal motor or sensory deficits, AAOx0  MSK: no peripheral edema  SKIN: well-perfused, extremities warm, no visible rashes

## 2023-01-29 NOTE — ED ADULT NURSE NOTE - OBJECTIVE STATEMENT
87YO female with PMH of DM2, HTN, & dementia via EMS from home presenting with complaints of  difficulty breathing. As per daughter Montrell ([phone]) AID called her informing of pt's difficulty breathing two hours ago, when daughter fortunato came to pt home, pt was "sweaty", and having difficulty breathing. Pt Axox0-baseline as per daughter, bedbound. respirations even, & non-labored at rest. radial pulses strong and equal bilaterally. Skin warm, dry, pt UE and LE are contracted, lays on her left side. Pt placed in position of comfort. Pt educated on call bell system and provided call bell. Bed in lowest position, wheels locked, appropriate side rails raised. Pt denies needs at this time.

## 2023-01-29 NOTE — ED PROVIDER NOTE - CLINICAL SUMMARY MEDICAL DECISION MAKING FREE TEXT BOX
87YO female with PMH of DM2, HTN, & dementia via EMS from home presenting with complaints of  difficulty breathing. Patient HDS and breathing at 94-95% on RA w/o difficulty; concerns for infectious etiology (viral vs bacterial). Unlikely PE given lack of tachypnea on exam at this time; will evaluate accordingly

## 2023-01-29 NOTE — ED PROVIDER NOTE - ATTENDING CONTRIBUTION TO CARE
I performed a history and physical exam of the patient and discussed their management with the resident. I reviewed the resident's note and agree with the documented findings and plan of care.  Ara Robertson MD

## 2023-01-29 NOTE — ED ADULT NURSE REASSESSMENT NOTE - NS ED NURSE REASSESS COMMENT FT1
pt had a BM, was changed and cleaned. Straight catheter inserted using sterile technique. Second RN present to confirm sterility. Pt tolerated well. Urinary catheter drained clear giancarlo urine, 200cc no clots visualized.

## 2023-01-30 NOTE — H&P ADULT - PROBLEM SELECTOR PLAN 6
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

## 2023-01-30 NOTE — H&P ADULT - ASSESSMENT
88F w/ T2DM, HTN, HLD, and dementia (AOx0, minimally verbal) who presents to ED with SOB, admitted for sepsis. 88F w/ T2DM, HTN, HLD, and dementia (AOx0, minimally verbal) who presents to ED with SOB, pt was initially hypoxic but subsequently able to be weaned to room air, met sepsis criteria on arrival with possible aspiration pneumonia as source  88F w/ T2DM, HTN, HLD, and dementia (AOx0, minimally verbal) who presents to ED with SOB, pt was initially hypoxic but subsequently able to be weaned to room air, met sepsis criteria on arrival with possible aspiration event as source

## 2023-01-30 NOTE — CHART NOTE - NSCHARTNOTEFT_GEN_A_CORE
89yo F w/ PMHx of IDDM2, dementia (A/O x 0 and minimally verbal at baseline), chronic dysphagia, presents with SOB admitted for hypoxic respiratory failure and sepsis concerning for PNA vs pneumonitis vs ?chf. On CXR, mild pulm edema with pleural effusion. No focal consolidation. Procal not significally elevated. Will obtain CT chest to assess for PNA. Currently on room air.     Patient unable to participate in exam, even with Farsi . Per daughter, patient has been tolerating her pureed thicken diet with no of coughing or choking.       Plan  -d/w S+S to expedite swallow eval  -will start diet pureed with thicken diet (will need meds crushed in applesauce). Daughter understands the risk of recurrent aspiration and does not want to wait for formal S+S eval  -Aspiration precautions, elevate HOB  -cw with CTX, flagyl, and azithromycin  -Cards recs appreciated  -Elevated trops likely demand ischemia with no HENNY on ecg. Patient unable to verbalize chest pain  -TTE  -trend trops  -FULL CODE    d/w ACP    Yenny Roque MD  Division of Hospital Medicine  Available on Microsoft Teams

## 2023-01-30 NOTE — H&P ADULT - HISTORY OF PRESENT ILLNESS
88F w/ T2DM, HTN, HLD, and dementia (AOx0, minimally verbal) who presents to ED for dyspnea. Per daughter, pt became acutely SOB so EMS was called. Pt has history of dysphagia, on diet of smashed food and thickened liquids. Daughter states pt was otherwise in usual state of health prior to acute dyspnea. No fevers, cough, diarrhea, constipation or obvious signs of pain. At baseline, pt is minimally verbal (aphasic), does not follow commands, and is bedbound. Per EMS, pt was 80% on RA and was placed on NRB. In ED, HR 80-100s and was quickly weaned to RA. Labs significant for WBC 19.8, pH 7.28 with lactate of 3.3. CXR with b/l interstitial opacities R>L. S/p 500cc, ceftriaxone, and azithromycin. Admitted to medicine for further management. 88F w/ T2DM, HTN, HLD, and dementia (AOx0, minimally verbal) who presents to ED for dyspnea. Per daughter, pt became acutely SOB so EMS was called. Pt has history of dysphagia, on diet of smashed food and thickened liquids. Daughter states pt was otherwise in usual state of health prior to acute dyspnea. No fevers, cough, diarrhea, constipation or obvious signs of pain. At baseline, pt is minimally verbal (aphasic), does not follow commands, and is bedbound. Per EMS, pt was 80% on RA and was placed on NRB. In ED, HR 80-100s and was quickly weaned to RA. Labs significant for WBC 19.8, pH 7.28 with lactate of 3.3. CXR with b/l interstitial opacities R>L. S/p 500cc, ceftriaxone, and azithromycin. Admitted to medicine for further management.    Of note, pt another MRN: [id number]

## 2023-01-30 NOTE — H&P ADULT - PROBLEM SELECTOR PLAN 4
Previously on insulin, however daughter states it has been a long time since she has required long acting insulin. Uses short acting slide infrequently.  - f/u A1c  - SSI

## 2023-01-30 NOTE — SWALLOW BEDSIDE ASSESSMENT ADULT - DIET PRIOR TO ADMI
87 yo F w/ PMHx of DM2, HTN, dysphagia, and dementia BIBEMS from home presenting with complaints of difficulty breathing. At baseline, Pt is A&Ox0 and bedbound. Pt found to be tachypneic and diaphoretic w/ O2 saturation of 80% per EMS; placed on a nonrebreather.  In ED, HR 80-100s and was quickly weaned to RA. Labs significant for WBC 19.8, pH 7.28 with lactate of 3.3. CXR with b/l interstitial opacities R>L. S/p 500cc, ceftriaxone, and azithromycin. Admitted to medicine for further management. Pt reports Pt tolerates a diet of "very small cut up food and thick liquids" at home

## 2023-01-30 NOTE — CONSULT NOTE ADULT - PROBLEM SELECTOR RECOMMENDATION 3
likely type 2 demand mediated    - EKG reviewed - no acute ischemic STT changes  check ECHO  trend trops

## 2023-01-30 NOTE — CONSULT NOTE ADULT - PROBLEM SELECTOR RECOMMENDATION 9
+ leukocytosis on admission     - likely aspiration   blood cultures and urine culture pending  IV Antibiotics as ordered  management as per primary team

## 2023-01-30 NOTE — H&P ADULT - PROBLEM SELECTOR PLAN 2
80% on RA per EMS, stable on RA since arrival  CXR with b/l interstital opacities R>L, however interstitial opacities present 8/2022 as well.  Likely aspiration rather than PNA, however will empirically treat given severe leukocytosis and lactate on admission.  - Obtain SLP eval, aspiration precautions  - abx as above 80% on RA per EMS, stable on RA since arrival  CXR with b/l interstital opacities R>L, however interstitial opacities present 8/2022 as well.  Likely aspiration rather than PNA, however will empirically treat given severe leukocytosis and lactate on admission.  - NPO pending SLP eval, aspiration precautions  - abx as above 80% on RA per EMS, stable on RA since arrival  CXR with b/l interstitial opacities R>L  will empirically for aspiration pneumonia as patient met sepsis criteria on arrival   - NPO pending SLP eval, aspiration precautions  - abx as above  - pt oxygenation improved without diuretics but will obtain BNP due to chronic LE edema

## 2023-01-30 NOTE — SWALLOW BEDSIDE ASSESSMENT ADULT - SLP PERTINENT HISTORY OF CURRENT PROBLEM
87 yo F w/ PMHx of DM2, HTN, dysphagia, and dementia BIBEMS from home presenting with complaints of difficulty breathing. At baseline, Pt is A&Ox0 and bedbound. Pt found to be tachypneic and diaphoretic w/ O2 saturation of 80% per EMS; placed on a nonrebreather.  In ED, HR 80-100s and was quickly weaned to RA. Labs significant for WBC 19.8, pH 7.28 with lactate of 3.3. CXR with b/l interstitial opacities R>L. S/p 500cc, ceftriaxone, and azithromycin. Admitted to medicine for further management.

## 2023-01-30 NOTE — H&P ADULT - PROBLEM SELECTOR PLAN 8
Unknown baseline SCr. Noted to be 1-1.2 in 8/2022.   1.16 on admission, appears at baseline.  - avoid nephrotoxins, renally dose medications Unknown baseline SCr. Noted to be 1-1.2 in 8/2022.   1.16 on admission, appears at baseline.  UA with proteinuria, likely iso DM and T2DM  - f/u prot/cr  - avoid nephrotoxins, renally dose medications

## 2023-01-30 NOTE — H&P ADULT - NSHPLABSRESULTS_GEN_ALL_CORE
EKG: STD in V4-V6                        10.7   19.81 )-----------( 297      ( 2023 20:30 )             33.2           142  |  101  |  53<H>  ----------------------------<  261<H>  3.7   |  24  |  1.16    Ca    9.7      2023 20:30    TPro  7.7  /  Alb  4.3  /  TBili  0.3  /  DBili  x   /  AST    /  ALT    /  AlkPhos  108                Urinalysis Basic - ( 2023 22:07 )    Color: Light Yellow / Appearance: Clear / S.015 / pH: x  Gluc: x / Ketone: Negative  / Bili: Negative / Urobili: Negative   Blood: x / Protein: 300 mg/dL / Nitrite: Negative   Leuk Esterase: Negative / RBC: 5 /hpf / WBC 6 /HPF   Sq Epi: x / Non Sq Epi: 3 /hpf / Bacteria: Negative        PT/INR - ( 2023 20:30 )   PT: 12.4 sec;   INR: 1.08 ratio         PTT - ( 2023 20:30 )  PTT:27.3 sec    Lactate Trend   @ 23:19 Lactate:1.3             CAPILLARY BLOOD GLUCOSE      POCT Blood Glucose.: 412 mg/dL (2023 02:44)    CXR  IMPRESSION:  Bilateral interstitial opacities, right greater than left with small   right pleural effusion. Personally reviewed labs, imaging and EKG    EKG: STD in V4-V6                        10.7   19.81 )-----------( 297      ( 2023 20:30 )             33.2           142  |  101  |  53<H>  ----------------------------<  261<H>  3.7   |  24  |  1.16    Ca    9.7      2023 20:30    TPro  7.7  /  Alb  4.3  /  TBili  0.3  /  DBili  x   /  AST    /  ALT    /  AlkPhos  108                Urinalysis Basic - ( 2023 22:07 )    Color: Light Yellow / Appearance: Clear / S.015 / pH: x  Gluc: x / Ketone: Negative  / Bili: Negative / Urobili: Negative   Blood: x / Protein: 300 mg/dL / Nitrite: Negative   Leuk Esterase: Negative / RBC: 5 /hpf / WBC 6 /HPF   Sq Epi: x / Non Sq Epi: 3 /hpf / Bacteria: Negative        PT/INR - ( 2023 20:30 )   PT: 12.4 sec;   INR: 1.08 ratio         PTT - ( 2023 20:30 )  PTT:27.3 sec    Lactate Trend   @ 23:19 Lactate:1.3             CAPILLARY BLOOD GLUCOSE      POCT Blood Glucose.: 412 mg/dL (2023 02:44)    CXR  IMPRESSION:  Bilateral interstitial opacities, right greater than left with small   right pleural effusion.

## 2023-01-30 NOTE — H&P ADULT - PROBLEM SELECTOR PLAN 3
Trop elevated 62 to 85  EKG with STD in lateral leads which is seen in EKG 8/2022  Likely demand iso sepsis  - Trend trop to peak Trop elevated 62 to 85  EKG with minimal STD in V5-V6    Likely demand iso sepsis  - Trend trop to peak

## 2023-01-30 NOTE — CONSULT NOTE ADULT - SUBJECTIVE AND OBJECTIVE BOX
CHIEF COMPLAINT:  Dyspnea    HISTORY OF PRESENT ILLNESS: 88F w/ T2DM, HTN, HLD, and dementia (AOx0, minimally verbal) who presents to ED for dyspnea. Per daughter, pt became acutely SOB so EMS was called. Pt has history of dysphagia, on diet of smashed food and thickened liquids. Daughter states pt was otherwise in usual state of health prior to acute dyspnea. No fevers, cough, diarrhea, constipation or obvious signs of pain. At baseline, pt is minimally verbal (aphasic), does not follow commands, and is bedbound. Per EMS, pt was 80% on RA and was placed on NRB. In ED, HR 80-100s and was quickly weaned to RA. Labs significant for WBC 19.8, pH 7.28 with lactate of 3.3. CXR with b/l interstitial opacities R>L. S/p 500cc, ceftriaxone, and azithromycin. Admitted to medicine for further management.    Cardiology consulted per family request.  Pt seen this am, very restless/agitated, screaming.      Of note, pt another MRN: [id number]    PAST MEDICAL & SURGICAL HISTORY:  HTN (hypertension)    Diabetes mellitus    Dementia    S/P eye surgery    MEDICATIONS:  aspirin enteric coated 81 milliGRAM(s) Oral daily  enoxaparin Injectable 40 milliGRAM(s) SubCutaneous every 24 hours    azithromycin  IVPB 500 milliGRAM(s) IV Intermittent every 24 hours  cefTRIAXone   IVPB 1000 milliGRAM(s) IV Intermittent every 24 hours  metroNIDAZOLE  IVPB 500 milliGRAM(s) IV Intermittent every 8 hours      acetaminophen     Tablet .. 650 milliGRAM(s) Oral every 6 hours PRN  melatonin 3 milliGRAM(s) Oral at bedtime PRN  ondansetron Injectable 4 milliGRAM(s) IV Push every 8 hours PRN  QUEtiapine 50 milliGRAM(s) Oral daily PRN    atorvastatin 80 milliGRAM(s) Oral at bedtime  dextrose 50% Injectable 25 Gram(s) IV Push once  dextrose 50% Injectable 12.5 Gram(s) IV Push once  dextrose 50% Injectable 25 Gram(s) IV Push once  dextrose Oral Gel 15 Gram(s) Oral once PRN  glucagon  Injectable 1 milliGRAM(s) IntraMuscular once  insulin lispro (ADMELOG) corrective regimen sliding scale   SubCutaneous every 6 hours    dextrose 5%. 1000 milliLiter(s) IV Continuous <Continuous>  dextrose 5%. 1000 milliLiter(s) IV Continuous <Continuous>    FAMILY HISTORY:  No pertinent family history in first degree relatives    SOCIAL HISTORY:    [ ] Non-smoker  [ ] Smoker  [ ] Alcohol    Allergies    No Known Allergies    Intolerances    REVIEW OF SYSTEMS:    [ ] All others negative	  [XX] Unable to obtain    PHYSICAL EXAM:  T(C): 36.6 (01-30-23 @ 05:20), Max: 37.4 (01-29-23 @ 19:39)  HR: 92 (01-30-23 @ 05:20) (88 - 105)  BP: 161/60 (01-30-23 @ 05:20) (110/53 - 177/76)  RR: 18 (01-30-23 @ 05:20) (17 - 19)  SpO2: 95% (01-30-23 @ 05:20) (94% - 99%)  Wt(kg): --  I&O's Summary    Appearance: Normal	  HEENT: Normal oral mucosa, PERRL, EOMI	  Lymphatic: No lymphadenopathy  Cardiovascular: Normal S1 S2, No JVD, No murmurs  Respiratory: Lungs clear to auscultation	  Psychiatry: A & O x 0, Restless/Agitated  Gastrointestinal: Soft, Non-tender, + BS	  Skin: No rashes, No ecchymoses, No cyanosis	  Neurologic: Non-focal  Extremities: Normal range of motion, No clubbing, cyanosis - BL LE edema  Vascular: Peripheral pulses palpable 2+ bilaterally    TELEMETRY: 	    ECG:  	  RADIOLOGY: < from: Xray Chest 1 View- PORTABLE-Urgent (01.29.23 @ 20:42) >  ******PRELIMINARY REPORT******      ******PRELIMINARY REPORT******         ACC: [id number] EXAM:  XR CHEST PORTABLE URGENT 1V   ORDERED BY: PURVI PORTER     PROCEDURE DATE:  01/29/2023    ******PRELIMINARY REPORT******      ******PRELIMINARY REPORT******           INTERPRETATION:  CLINICAL INDICATION: Sepsis    TECHNIQUE: Single frontal, portable view of the chest was obtained.    COMPARISON: None    FINDINGS:    Heart size cannot be assessed on this projection.  Small right pleural effusion. Interstitial opacities, greater on the   right side.  The visualized osseous structures demonstrate no acute pathology.      IMPRESSION:  Bilateral interstitial opacities, right greater than left with small   right pleural effusion.        ******PRELIMINARY REPORT******      ******PRELIMINARY REPORT******     < end of copied text >    OTHER: 	  	  LABS:	 	    CARDIAC MARKERS: Troponin T, High Sensitivity Result: 280: Specimen not hemolyzed Troponin T, High Sensitivity Result: 85: Specimen not hemolyzed Troponin T, High Sensitivity Result: 62: Specimen not hemolyzed                 9.2    11.07 )-----------( 225      ( 30 Jan 2023 06:54 )             28.8     01-30    140  |  103  |  51<H>  ----------------------------<  397<H>  4.0   |  26  |  1.04    Ca    8.9      30 Jan 2023 06:54  Phos  4.2     01-30  Mg     2.1     01-30    TPro  6.3  /  Alb  3.5  /  TBili  0.3  /  DBili  x   /  AST  26  /  ALT  21  /  AlkPhos  77  01-30    proBNP:   Lipid Profile:   HgA1c:   TSH:

## 2023-01-30 NOTE — SWALLOW BEDSIDE ASSESSMENT ADULT - SLP GENERAL OBSERVATIONS
Encountered Pt sitting semi-upright in bed on RA. Pt is A&Ox0, minimally verbally, and unable to follow simple commands. Per RN, Pt has been intermittently agitated and scratching staff.

## 2023-01-30 NOTE — SWALLOW BEDSIDE ASSESSMENT ADULT - COMMENTS
Hx continued:   Pt met sepsis criteria upon admission w/ possible aspiration event as source. Keep NPO and obtain speech evaluation.   : Per daughter, patient has been tolerating her pureed thicken diet with no of coughing or choking. Daughter understands the risk of recurrent aspiration and does not want to wait for formal S+S eval. Will start diet pureed with thicken diet (will need meds crushed in applesauce).     IMAGIN/29 CXR: IMPRESSION: Bilateral interstitial opacities, likely mild pulmonary edema with right pleural effusion.    ***Pt is not previously known to this service. +impulsivity and rapid rate of intake

## 2023-01-30 NOTE — H&P ADULT - PROBLEM SELECTOR PLAN 1
Leukocytosis, tachycardia, lactate  2/2 aspiration/PNA  RVP neg. UA neg.  - c/w empiric ceftriaxone and azithromycin  - f/u procal, blood cx Leukocytosis, tachycardia, lactate  2/2 aspiration/PNA  RVP neg. UA neg.  - c/w empiric ceftriaxone and azithromycin  - f/u procal, blood cx  - urine legionella and strep pneumo Leukocytosis, tachycardia, lactate  2/2 aspiration/PNA  RVP neg. UA neg.  - c/w empiric ceftriaxone, metronidazole, azithromycin  - f/u procal, blood cx  - urine legionella and strep pneumo

## 2023-01-30 NOTE — CONSULT NOTE ADULT - PROBLEM SELECTOR RECOMMENDATION 2
desat at home to 80s on RA  resolved on admission to ED  tolerating room air  supplemental O2 as needed

## 2023-01-30 NOTE — H&P ADULT - PROBLEM SELECTOR PLAN 9
DVT: lovenox  Diet: CC/Kosher, pureed and mildly thick per SLP recs 8/2022 - pending repeat SLP eval  Dispo: pendint clinical course DVT: lovenox  Diet: CC/Kosher, pureed and mildly thick per SLP recs 8/2022 - pending repeat SLP eval  Dispo: pending clinical course DVT: lovenox  Diet: CC/Kosher, pureed and mildly thick per SLP recs 8/2022 - NPO pending repeat SLP eval  Dispo: pending clinical course

## 2023-01-30 NOTE — CONSULT NOTE ADULT - PROBLEM SELECTOR RECOMMENDATION 7
restart home meds as needed    - restart hydrochlorothiazide 12.5mg PO daily  continue to monitor restart home meds as needed    - restart amlodipine 5mg PO daily  continue to monitor

## 2023-01-30 NOTE — SWALLOW BEDSIDE ASSESSMENT ADULT - SWALLOW EVAL: DIAGNOSIS
89 yo F w/ PMHx of DM2, dysphagia, and dementia BIBEMS from home presenting with complaints of difficulty breathing. Pt found to have hypoxic respiratory failure and sepsis concerning for PNA vs pneumonitis vs ?CHF. Pt presents w/ an overtly functional oral/pharyngeal swallow for minced/moist and mildly thick liquids. Per daughter Petty, this is Pt's baseline. No overt s/s of laryngeal penetration/aspiration noted throughout this evaluation. Upon palpation, hyolaryngeal excursion and onset of pharyngeal swallow are WNL. Silent aspiration cannot be r/o at the bedside. This service will follow up to monitor diet tolerance and determine need for objective testing. 89 yo F w/ PMHx of DM2, dysphagia, and dementia BIBEMS from home presenting with complaints of difficulty breathing. Pt found to have hypoxic respiratory failure and sepsis concerning for PNA vs pneumonitis vs ?CHF. Pt presents w/ an overtly functional oral/pharyngeal swallow for minced/moist and mildly thick liquids. Per daughter Petty, this is Pt's baseline. No overt s/s of laryngeal penetration/aspiration noted throughout this evaluation. Upon palpation, hyolaryngeal excursion and onset of pharyngeal swallow are WNL. Pt noted w/ rapid rate of intake; feeder should provide small bites/sips at a slow rate. Silent aspiration cannot be r/o at the bedside. This service will follow up to monitor diet tolerance and determine need for objective testing.

## 2023-01-30 NOTE — H&P ADULT - NSHPPHYSICALEXAM_GEN_ALL_CORE
Vital Signs Last 24 Hrs  T(C): 37 (29 Jan 2023 23:20), Max: 37.4 (29 Jan 2023 19:39)  T(F): 98.6 (29 Jan 2023 23:20), Max: 99.4 (29 Jan 2023 19:39)  HR: 90 (29 Jan 2023 23:20) (88 - 105)  BP: 177/76 (29 Jan 2023 23:20) (110/53 - 177/76)  BP(mean): 64 (29 Jan 2023 20:14) (64 - 64)  RR: 19 (29 Jan 2023 23:20) (17 - 19)  SpO2: 99% (29 Jan 2023 23:20) (95% - 99%)    Parameters below as of 29 Jan 2023 23:20  Patient On (Oxygen Delivery Method): room air      Gen: no acute distress  HEENT: atraumatic, normocephalic, R eye cataract+, L eye reactive, EOMI  CV: RRR, no murmurs  Resp: CTAB  GI: soft, nontender, nondistended, BS+  MSK: extremities atraumatic, no cyanosis or clubbing, 2+ BLE edema  Skin: warm, dry, no rashes or lesions  Neuro: no focal deficits, sensation grossly intact  Psych: alert and oriented x3, appropriate mood and affect Vital Signs Last 24 Hrs  T(C): 37 (29 Jan 2023 23:20), Max: 37.4 (29 Jan 2023 19:39)  T(F): 98.6 (29 Jan 2023 23:20), Max: 99.4 (29 Jan 2023 19:39)  HR: 90 (29 Jan 2023 23:20) (88 - 105)  BP: 177/76 (29 Jan 2023 23:20) (110/53 - 177/76)  BP(mean): 64 (29 Jan 2023 20:14) (64 - 64)  RR: 19 (29 Jan 2023 23:20) (17 - 19)  SpO2: 99% (29 Jan 2023 23:20) (95% - 99%)    Parameters below as of 29 Jan 2023 23:20  Patient On (Oxygen Delivery Method): room air      Gen: no acute distress  HEENT: atraumatic, normocephalic, R eye cataract+, L eye reactive, EOMI  CV: RRR, no murmurs  Resp: CTAB  GI: soft, nontender, nondistended, BS+  MSK: extremities atraumatic, no cyanosis or clubbing, 2+ BLE edema  Skin: warm, dry, no rashes or lesions  Neuro: MAEx4, withdraws to pain  Psych: alert and oriented x3, appropriate mood and affect Vital Signs Last 24 Hrs  T(C): 37 (29 Jan 2023 23:20), Max: 37.4 (29 Jan 2023 19:39)  T(F): 98.6 (29 Jan 2023 23:20), Max: 99.4 (29 Jan 2023 19:39)  HR: 90 (29 Jan 2023 23:20) (88 - 105)  BP: 177/76 (29 Jan 2023 23:20) (110/53 - 177/76)  BP(mean): 64 (29 Jan 2023 20:14) (64 - 64)  RR: 19 (29 Jan 2023 23:20) (17 - 19)  SpO2: 99% (29 Jan 2023 23:20) (95% - 99%)    Parameters below as of 29 Jan 2023 23:20  Patient On (Oxygen Delivery Method): room air      Gen: no acute distress  HEENT: atraumatic, normocephalic, R eye cataract+, L eye reactive, EOMI  CV: RRR, no murmurs  Resp: CTAB  GI: soft, nontender, nondistended, BS+  MSK: extremities atraumatic, no cyanosis or clubbing, 2+ BLE edema  Skin: warm, dry, no rashes or lesions  Neuro: MAEx4, withdraws to pain  Psych: alert and oriented x0 Vital Signs Last 24 Hrs  T(C): 37 (29 Jan 2023 23:20), Max: 37.4 (29 Jan 2023 19:39)  T(F): 98.6 (29 Jan 2023 23:20), Max: 99.4 (29 Jan 2023 19:39)  HR: 90 (29 Jan 2023 23:20) (88 - 105)  BP: 177/76 (29 Jan 2023 23:20) (110/53 - 177/76)  BP(mean): 64 (29 Jan 2023 20:14) (64 - 64)  RR: 19 (29 Jan 2023 23:20) (17 - 19)  SpO2: 99% (29 Jan 2023 23:20) (95% - 99%)    Parameters below as of 29 Jan 2023 23:20  Patient On (Oxygen Delivery Method): room air      Gen: agitated but no acute distress  HEENT: atraumatic, normocephalic, R eye cataract+, L eye reactive, EOMI  CV: RRR, no murmurs  Resp: CTAB  GI: soft, nontender, nondistended, BS+  MSK: extremities atraumatic, no cyanosis or clubbing, 2+ BLE edema  Skin: warm, dry, no rashes or lesions  Neuro: MAEx4, withdraws to pain  Psych: alert and oriented x0, does not answer questions

## 2023-01-30 NOTE — H&P ADULT - PROBLEM SELECTOR PLAN 7
Home: losartan 100 mg daily, amlodipine 10 mg daily, HCTZ 12.5 daily Home: losartan 100 mg daily, amlodipine 10 mg daily, HCTZ 12.5 daily  - holding while NPO, IV hydral prn

## 2023-01-30 NOTE — H&P ADULT - ATTENDING COMMENTS
87yo F w/ PMHx of IDDM2, dementia (A/O x 0 and minimally verbal at baseline), chronic dysphagia, presents with SOB, per report pt was found to be acutely short of breath at home, EMS noted her to be hypoxic on arrival requiring NRB but was subsequently able to be weaned back to room air while in the ED, lower extremity edema also seen on exam, labs are notable for leukocytosis, hyperglycemia and a mildly elevated but rising troponin, pt met sepsis criteria on arrival secondary to WBC and tachycardia, CXR was notable for interstitial opacities bilaterally and a small R pleural effusion, EKG showed minimal ST depressions in V5-V6, overall concerning for aspiration pneumonia vs pneumonitis, new onset HF also possible but patient oxygenation improved without any diuretics, start empiric treatment for aspiration pneumonia with ceftriaxone, metronidazole, and azithromycin, f/u blood cultures, Keep NPO, obtain speech eval, continue with insulin sliding scale (no lab evidence of DKA), trend troponin to peak, obtain BNP and echo, monitor on telemetry, rest of plan as outlined above

## 2023-01-31 NOTE — ADVANCED PRACTICE NURSE CONSULT - RECOMMEDATIONS
Impression:    fecal incontinence  urinary incontinence  incontinence associated dermatitis  B/L buttocks/sacral deep tissue injury present on admission    Recommendations:    1) continue turning and positioning q2 and PRN utilizing offloading assistive devices  2) continue with routine pericare daily and PRN soiling  3) encourage optimal nutrition  4) waffle cushion when oob to chair  5) B/L LE complete cair air fluidized boots to offload heels/feet  6) maki protective barrier cream to B/L buttocks/sacrum daily and PRN soiling  7) incontinence management - continue external female urinary catheter to divert urine from skin   8) limit diaper usage to while patient is ambulating only, then remove      Plan discussed with JUSTICE Gillis at bedside

## 2023-01-31 NOTE — PROGRESS NOTE ADULT - SUBJECTIVE AND OBJECTIVE BOX
Patient is a 88y old  Female who presents with a chief complaint of Hypoxia (2023 17:39)        SUBJECTIVE / OVERNIGHT EVENTS: Patient had no acute events overnight. Patient seen and examined at bedside this morning. Arouseable. AOx0    ROS: unable to assess    MEDICATIONS  (STANDING):  amLODIPine   Tablet 10 milliGRAM(s) Oral daily  aspirin enteric coated 81 milliGRAM(s) Oral daily  atorvastatin 80 milliGRAM(s) Oral at bedtime  azithromycin  IVPB 500 milliGRAM(s) IV Intermittent every 24 hours  cefTRIAXone   IVPB 1000 milliGRAM(s) IV Intermittent every 24 hours  chlorhexidine 2% Cloths 1 Application(s) Topical daily  dextrose 5%. 1000 milliLiter(s) (100 mL/Hr) IV Continuous <Continuous>  dextrose 5%. 1000 milliLiter(s) (50 mL/Hr) IV Continuous <Continuous>  dextrose 50% Injectable 25 Gram(s) IV Push once  dextrose 50% Injectable 12.5 Gram(s) IV Push once  dextrose 50% Injectable 25 Gram(s) IV Push once  enoxaparin Injectable 40 milliGRAM(s) SubCutaneous every 24 hours  glucagon  Injectable 1 milliGRAM(s) IntraMuscular once  insulin lispro (ADMELOG) corrective regimen sliding scale   SubCutaneous every 6 hours  metroNIDAZOLE  IVPB 500 milliGRAM(s) IV Intermittent every 8 hours    MEDICATIONS  (PRN):  acetaminophen     Tablet .. 650 milliGRAM(s) Oral every 6 hours PRN Temp greater or equal to 38C (100.4F), Mild Pain (1 - 3)  dextrose Oral Gel 15 Gram(s) Oral once PRN Blood Glucose LESS THAN 70 milliGRAM(s)/deciliter  melatonin 3 milliGRAM(s) Oral at bedtime PRN Insomnia  ondansetron Injectable 4 milliGRAM(s) IV Push every 8 hours PRN Nausea and/or Vomiting  QUEtiapine 50 milliGRAM(s) Oral daily PRN anxiety/agitation      Vital Signs Last 24 Hrs  T(C): 36.5 (2023 11:17), Max: 36.7 (2023 23:35)  T(F): 97.7 (2023 11:17), Max: 98.1 (2023 23:35)  HR: 81 (2023 17:00) (74 - 95)  BP: 141/57 (2023 17:00) (122/48 - 169/56)  BP(mean): --  RR: 18 (2023 11:17) (18 - 18)  SpO2: 94% (2023 11:17) (92% - 94%)    Parameters below as of 2023 11:17  Patient On (Oxygen Delivery Method): room air      CAPILLARY BLOOD GLUCOSE      POCT Blood Glucose.: 120 mg/dL (2023 17:58)  POCT Blood Glucose.: 364 mg/dL (2023 12:04)  POCT Blood Glucose.: 182 mg/dL (2023 08:00)  POCT Blood Glucose.: 200 mg/dL (2023 01:41)    I&O's Summary    2023 07:01  -  2023 18:44  --------------------------------------------------------  IN: 600 mL / OUT: 0 mL / NET: 600 mL        PHYSICAL EXAM  GENERAL: NAD, lying comfortably in bed   HEENT:  Atraumatic, Normocephalic, conjunctiva and sclera clear,   CHEST/LUNG: no wob on room air  HEART: RRR, S1 and S2 No murmurs, rubs, or gallops  ABDOMEN: Soft, Nontender, Nondistended;  EXTREMITIES: No clubbing, cyanosis. LE edema  NEURO: AAOx0  SKIN: No rashes or lesions on visible skin    LABS:                        8.7    11.55 )-----------( 219      ( 2023 06:39 )             27.1         142  |  106  |  40<H>  ----------------------------<  185<H>  3.6   |  25  |  0.89    Ca    8.8      2023 06:39  Phos  3.5       Mg     2.1         TPro  6.3  /  Alb  3.5  /  TBili  0.3  /  DBili  x   /  AST  26  /  ALT  21  /  AlkPhos  77      PT/INR - ( 2023 20:30 )   PT: 12.4 sec;   INR: 1.08 ratio         PTT - ( 2023 20:30 )  PTT:27.3 sec      Urinalysis Basic - ( 2023 22:07 )    Color: Light Yellow / Appearance: Clear / S.015 / pH: x  Gluc: x / Ketone: Negative  / Bili: Negative / Urobili: Negative   Blood: x / Protein: 300 mg/dL / Nitrite: Negative   Leuk Esterase: Negative / RBC: 5 /hpf / WBC 6 /HPF   Sq Epi: x / Non Sq Epi: 3 /hpf / Bacteria: Negative          RADIOLOGY & ADDITIONAL TESTS:      Labs Personally Reviewed  Imaging Personally Reviewed  Consultant(s) Notes Reviewed

## 2023-01-31 NOTE — ADVANCED PRACTICE NURSE CONSULT - ASSESSMENT
When wound care RN arrived on unit, patient was found lying in a low air loss pressure redistribution support surface style bed. Patient speaks Farsi, she is confused and combative at times. Ms Connor is unable to turn independently and staff assistance x 2 was provided. Once turned, wound care RN was able to visualize an area of persistent nonblanchable deep red erythema with a blanchable periphery measuring approximately 12cm x 8cm x 0cm. The presentation of this wound is consistent with a deep tissue injury with incontinence involvement. Once consult was complete, patient was educated on the need for routine turning and positioning to prevent pressure injuries and patient was placed in a left side-lying position utilizing pillow positioner assistive devices.

## 2023-01-31 NOTE — PATIENT PROFILE ADULT - PATIENT REPRESENTATIVE: ( YOU CAN CHOOSE ANY PERSON THAT CAN ASSIST YOU WITH YOUR HEALTH CARE PREFERENCES, DOES NOT HAVE TO BE A SPOUSE, IMMEDIATE FAMILY OR SIGNIFICANT OTHER/PARTNER)
Number Of Freeze-Thaw Cycles: 3 freeze-thaw cycles Post-Care Instructions: I reviewed with the patient in detail post-care instructions. Patient is to wear sunprotection, and avoid picking at any of the treated lesions. Pt may apply Vaseline to crusted or scabbing areas. Duration Of Freeze Thaw-Cycle (Seconds): 5 Consent: The patient's consent was obtained including but not limited to risks of crusting, scabbing, blistering, scarring, darker or lighter pigmentary change, recurrence, incomplete removal and infection. Render Note In Bullet Format When Appropriate: No Detail Level: Detailed Medical Necessity Information: It is in your best interest to select a reason for this procedure from the list below. All of these items fulfill various CMS LCD requirements except the new and changing color options. Medical Necessity Clause: This procedure was medically necessary because the lesions that were treated were: same name as above Medical Necessity Clause: This procedure was medically necessary because the patient is immunosuppressed.

## 2023-01-31 NOTE — CHART NOTE - NSCHARTNOTEFT_GEN_A_CORE
HPI:  88F w/ T2DM, HTN, HLD, and dementia (AOx0, minimally verbal) who presents to ED for dyspnea. Per daughter, pt became acutely SOB so EMS was called. Pt has history of dysphagia, on diet of smashed food and thickened liquids. Daughter states pt was otherwise in usual state of health prior to acute dyspnea. No fevers, cough, diarrhea, constipation or obvious signs of pain. At baseline, pt is minimally verbal (aphasic), does not follow commands, and is bedbound. Per EMS, pt was 80% on RA and was placed on NRB. In ED, HR 80-100s and was quickly weaned to RA. Labs significant for WBC 19.8, pH 7.28 with lactate of 3.3. CXR with b/l interstitial opacities R>L. S/p 500cc, ceftriaxone, and azithromycin.    Event:   Notified by RN patient with episode of PAT on telemetry.  Patient seen at bedside.  Does not appear to be in any distress.  Unable to ROS 2/2 baseline mental status.     ICU Vital Signs Last 24 Hrs  T(C): 36.5 (31 Jan 2023 04:30), Max: 36.7 (30 Jan 2023 12:30)  T(F): 97.7 (31 Jan 2023 04:30), Max: 98.1 (30 Jan 2023 23:35)  HR: 74 (31 Jan 2023 04:30) (74 - 103)  BP: 160/64 (31 Jan 2023 04:30) (133/82 - 169/56)  BP(mean): --  ABP: --  ABP(mean): --  RR: 18 (31 Jan 2023 04:30) (18 - 18)  SpO2: 92% (31 Jan 2023 04:30) (92% - 95%)    O2 Parameters below as of 31 Jan 2023 04:30  Patient On (Oxygen Delivery Method): room air    MEDICATIONS  (STANDING):  amLODIPine   Tablet 10 milliGRAM(s) Oral daily  aspirin enteric coated 81 milliGRAM(s) Oral daily  atorvastatin 80 milliGRAM(s) Oral at bedtime  azithromycin  IVPB 500 milliGRAM(s) IV Intermittent every 24 hours  cefTRIAXone   IVPB 1000 milliGRAM(s) IV Intermittent every 24 hours  chlorhexidine 2% Cloths 1 Application(s) Topical daily  dextrose 5%. 1000 milliLiter(s) (100 mL/Hr) IV Continuous <Continuous>  dextrose 5%. 1000 milliLiter(s) (50 mL/Hr) IV Continuous <Continuous>  dextrose 50% Injectable 25 Gram(s) IV Push once  dextrose 50% Injectable 12.5 Gram(s) IV Push once  dextrose 50% Injectable 25 Gram(s) IV Push once  enoxaparin Injectable 40 milliGRAM(s) SubCutaneous every 24 hours  glucagon  Injectable 1 milliGRAM(s) IntraMuscular once  insulin lispro (ADMELOG) corrective regimen sliding scale   SubCutaneous every 6 hours  metroNIDAZOLE  IVPB 500 milliGRAM(s) IV Intermittent every 8 hours    MEDICATIONS  (PRN):  acetaminophen     Tablet .. 650 milliGRAM(s) Oral every 6 hours PRN Temp greater or equal to 38C (100.4F), Mild Pain (1 - 3)  dextrose Oral Gel 15 Gram(s) Oral once PRN Blood Glucose LESS THAN 70 milliGRAM(s)/deciliter  melatonin 3 milliGRAM(s) Oral at bedtime PRN Insomnia  ondansetron Injectable 4 milliGRAM(s) IV Push every 8 hours PRN Nausea and/or Vomiting  QUEtiapine 50 milliGRAM(s) Oral daily PRN anxiety/agitation    A/P  Episode of PAT  x 1.6s  >STAT set of vitals obtained.  BP and HR stable.  No fever  >STAT 12 lead EKG  >F/u CBC, BMP, mag and phos  >Keep K> 4 and Mag> 2  >C/w telemetry   >C/w abx    Will follow up with attending this am     Emelin Reyes Monegro, NP  Medicine Department   [facility] [id number] HPI:  88F w/ T2DM, HTN, HLD, and dementia (AOx0, minimally verbal) who presents to ED for dyspnea. Per daughter, pt became acutely SOB so EMS was called. Pt has history of dysphagia, on diet of smashed food and thickened liquids. Daughter states pt was otherwise in usual state of health prior to acute dyspnea. No fevers, cough, diarrhea, constipation or obvious signs of pain. At baseline, pt is minimally verbal (aphasic), does not follow commands, and is bedbound. Per EMS, pt was 80% on RA and was placed on NRB. In ED, HR 80-100s and was quickly weaned to RA. Labs significant for WBC 19.8, pH 7.28 with lactate of 3.3. CXR with b/l interstitial opacities R>L. S/p 500cc, ceftriaxone, and azithromycin.    Event:   Notified by RN patient with episode of PAT on telemetry.  Patient seen at bedside.  Does not appear to be in any distress.  Unable to ROS 2/2 baseline mental status.     ICU Vital Signs Last 24 Hrs  T(C): 36.5 (31 Jan 2023 04:30), Max: 36.7 (30 Jan 2023 12:30)  T(F): 97.7 (31 Jan 2023 04:30), Max: 98.1 (30 Jan 2023 23:35)  HR: 74 (31 Jan 2023 04:30) (74 - 103)  BP: 160/64 (31 Jan 2023 04:30) (133/82 - 169/56)  BP(mean): --  ABP: --  ABP(mean): --  RR: 18 (31 Jan 2023 04:30) (18 - 18)  SpO2: 92% (31 Jan 2023 04:30) (92% - 95%)    O2 Parameters below as of 31 Jan 2023 04:30  Patient On (Oxygen Delivery Method): room air    MEDICATIONS  (STANDING):  amLODIPine   Tablet 10 milliGRAM(s) Oral daily  aspirin enteric coated 81 milliGRAM(s) Oral daily  atorvastatin 80 milliGRAM(s) Oral at bedtime  azithromycin  IVPB 500 milliGRAM(s) IV Intermittent every 24 hours  cefTRIAXone   IVPB 1000 milliGRAM(s) IV Intermittent every 24 hours  chlorhexidine 2% Cloths 1 Application(s) Topical daily  dextrose 5%. 1000 milliLiter(s) (100 mL/Hr) IV Continuous <Continuous>  dextrose 5%. 1000 milliLiter(s) (50 mL/Hr) IV Continuous <Continuous>  dextrose 50% Injectable 25 Gram(s) IV Push once  dextrose 50% Injectable 12.5 Gram(s) IV Push once  dextrose 50% Injectable 25 Gram(s) IV Push once  enoxaparin Injectable 40 milliGRAM(s) SubCutaneous every 24 hours  glucagon  Injectable 1 milliGRAM(s) IntraMuscular once  insulin lispro (ADMELOG) corrective regimen sliding scale   SubCutaneous every 6 hours  metroNIDAZOLE  IVPB 500 milliGRAM(s) IV Intermittent every 8 hours    MEDICATIONS  (PRN):  acetaminophen     Tablet .. 650 milliGRAM(s) Oral every 6 hours PRN Temp greater or equal to 38C (100.4F), Mild Pain (1 - 3)  dextrose Oral Gel 15 Gram(s) Oral once PRN Blood Glucose LESS THAN 70 milliGRAM(s)/deciliter  melatonin 3 milliGRAM(s) Oral at bedtime PRN Insomnia  ondansetron Injectable 4 milliGRAM(s) IV Push every 8 hours PRN Nausea and/or Vomiting  QUEtiapine 50 milliGRAM(s) Oral daily PRN anxiety/agitation    A/P  Episode of PAT  x 1.6s  >STAT set of vitals obtained.  BP and HR stable.  No fever  >STAT 12 lead EKG  >F/u CBC, BMP, mag and phos  >Keep K> 4 and Mag> 2  >C/w telemetry   >C/w abx  >Continue to follow cards recs  >Pending Echo     Will follow up with attending this am     Emelin Reyes Monegro, NP  Medicine Department   [facility] [id number]

## 2023-01-31 NOTE — PATIENT PROFILE ADULT - FALL HARM RISK - HARM RISK INTERVENTIONS
Assistance with ambulation/Assistance OOB with selected safe patient handling equipment/Communicate Risk of Fall with Harm to all staff/Monitor for mental status changes/Monitor gait and stability/Reinforce activity limits and safety measures with patient and family/Reorient to person, place and time as needed/Tailored Fall Risk Interventions/Use of alarms - bed, chair and/or voice tab/Visual Cue: Yellow wristband and red socks/Bed in lowest position, wheels locked, appropriate side rails in place/Call bell, personal items and telephone in reach/Instruct patient to call for assistance before getting out of bed or chair/Non-slip footwear when patient is out of bed/[place] to call system/Physically safe environment - no spills, clutter or unnecessary equipment/Purposeful Proactive Rounding/Room/bathroom lighting operational, light cord in reach

## 2023-01-31 NOTE — PHYSICAL THERAPY INITIAL EVALUATION ADULT - ADDITIONAL COMMENTS
pt poor historian. per pt's daughter,  Patient lives in pvt house with spouse and 24 hrs x 7 days HHA.  pt non ambulatory since one month. Prior to that amb short distance using RW with one person assist. receives PT at home. Recently discontinued 2/2 insurance regulation. pt owns pamela lift, w/c( which is too big- unable to maneuver inside. do not have hospital bed.

## 2023-01-31 NOTE — PROGRESS NOTE ADULT - SUBJECTIVE AND OBJECTIVE BOX
Subjective: Patient seen and examined. No new events except as noted.     REVIEW OF SYSTEMS:  Unable to obtain.     MEDICATIONS:  MEDICATIONS  (STANDING):  amLODIPine   Tablet 10 milliGRAM(s) Oral daily  aspirin enteric coated 81 milliGRAM(s) Oral daily  atorvastatin 80 milliGRAM(s) Oral at bedtime  azithromycin  IVPB 500 milliGRAM(s) IV Intermittent every 24 hours  cefTRIAXone   IVPB 1000 milliGRAM(s) IV Intermittent every 24 hours  chlorhexidine 2% Cloths 1 Application(s) Topical daily  dextrose 5%. 1000 milliLiter(s) (100 mL/Hr) IV Continuous <Continuous>  dextrose 5%. 1000 milliLiter(s) (50 mL/Hr) IV Continuous <Continuous>  dextrose 50% Injectable 25 Gram(s) IV Push once  dextrose 50% Injectable 12.5 Gram(s) IV Push once  dextrose 50% Injectable 25 Gram(s) IV Push once  enoxaparin Injectable 40 milliGRAM(s) SubCutaneous every 24 hours  glucagon  Injectable 1 milliGRAM(s) IntraMuscular once  insulin lispro (ADMELOG) corrective regimen sliding scale   SubCutaneous every 6 hours  metroNIDAZOLE  IVPB 500 milliGRAM(s) IV Intermittent every 8 hours    PHYSICAL EXAM:  T(C): 36.5 (01-31-23 @ 11:17), Max: 36.7 (01-30-23 @ 23:35)  HR: 82 (01-31-23 @ 11:17) (74 - 103)  BP: 148/74 (01-31-23 @ 11:17) (133/82 - 169/56)  RR: 18 (01-31-23 @ 11:17) (18 - 18)  SpO2: 94% (01-31-23 @ 11:17) (92% - 94%)  Wt(kg): --  I&O's Summary    Appearance: Normal	  HEENT: Normal oral mucosa, PERRL, EOMI	  Lymphatic: No lymphadenopathy , no edema  Cardiovascular: Normal S1 S2, No JVD, No murmurs , Peripheral pulses palpable 2+ bilaterally  Respiratory: Lungs clear to auscultation, normal effort 	  Gastrointestinal: Soft, Non-tender, + BS	  Skin: No rashes, No ecchymoses, No cyanosis, warm to touch  Musculoskeletal: Normal range of motion, normal strength  Psychiatry: Resting   Ext: No edema    LABS:    CARDIAC MARKERS:                        8.7    11.55 )-----------( 219      ( 31 Jan 2023 06:39 )             27.1     01-31    142  |  106  |  40<H>  ----------------------------<  185<H>  3.6   |  25  |  0.89    Ca    8.8      31 Jan 2023 06:39  Phos  3.5     01-31  Mg     2.1     01-31    TPro  6.3  /  Alb  3.5  /  TBili  0.3  /  DBili  x   /  AST  26  /  ALT  21  /  AlkPhos  77  01-30    proBNP:   Lipid Profile:   HgA1c:   TSH:     TELEMETRY: SR 70s, PAT yesterday up to 150s for 1.6secs	    ECG:  	  RADIOLOGY:   DIAGNOSTIC TESTING:  [ ] Echocardiogram:  [ ]  Catheterization:  [ ] Stress Test:    OTHER:

## 2023-01-31 NOTE — ADVANCED PRACTICE NURSE CONSULT - REASON FOR CONSULT
Wound care consult initiated by RN to assess patient's skin for a possible sacral deep tissue injury present on admission    Reason for Admission: Hypoxia  History of Present Illness:   88F w/ T2DM, HTN, HLD, and dementia (AOx0, minimally verbal) who presents to ED for dyspnea. Per daughter, pt became acutely SOB so EMS was called. Pt has history of dysphagia, on diet of smashed food and thickened liquids. Daughter states pt was otherwise in usual state of health prior to acute dyspnea. No fevers, cough, diarrhea, constipation or obvious signs of pain. At baseline, pt is minimally verbal (aphasic), does not follow commands, and is bedbound. Per EMS, pt was 80% on RA and was placed on NRB. In ED, HR 80-100s and was quickly weaned to RA. Labs significant for WBC 19.8, pH 7.28 with lactate of 3.3. CXR with b/l interstitial opacities R>L. S/p 500cc, ceftriaxone, and azithromycin. Admitted to medicine for further management.

## 2023-01-31 NOTE — PATIENT PROFILE ADULT - FUNCTIONAL ASSESSMENT - BASIC MOBILITY 6.
1-calculated by average/Not able to assess (calculate score using [facility] averaging method)

## 2023-01-31 NOTE — PHYSICAL THERAPY INITIAL EVALUATION ADULT - PERTINENT HX OF CURRENT PROBLEM, REHAB EVAL
89YO female with PMH of DM2, HTN, & dementia via EMS from home presenting with complaints of  difficulty breathing. Pt has history of dysphagia, on diet of smashed food and thickened liquids. Daughter states pt was otherwise in usual state of health prior to acute dyspnea. No fevers, cough, diarrhea, constipation or obvious signs of pain. At baseline, pt is minimally verbal (aphasic), does not follow commands, and is bedbound. Per EMS, pt was 80% on RA and was placed on NRB. In ED, HR 80-100s and was quickly weaned to RA. Unlikely PE given lack of tachypnea on exam at this time;  CXR with b/l interstitial opacities R>L  will empirically for aspiration pneumonia as patient met sepsis criteria on arrival . Trop elevated 62 to 85  EKG with minimal STD in V5-V6    Likely demand iso sepsis. EKG reviewed - no acute ischemic STT changes

## 2023-01-31 NOTE — PATIENT PROFILE ADULT - NSPROHMSYMPCOND_GEN_A_NUR
Chief Complaint  Back Pain (Left  x 5 days keeps getting worse day by day./) and Urinary Tract Infection (Burning x 2 days, has over active bladder)    Subjective        Brittny Fay presents to [facility]  Brittny presents with acute left sided low  Back pain, radiating into her left lower extremity. Walks frequently and exercises three times weekly. Back pain is gradually worsening. Pain is currently 7/10. No previous history of low back pain with leg involvement. Has tried ice, ibuprofen with temporary relief and it returns. Started after walking from [place] [employer] and noticed mild pain upon arrival to her home. States it is a short walk.     Also with urinary symptoms in the past two days. Mild burn, hx of overactive bladder. Hx of IC but states symptoms are more related to overactive bladder. Increased frequency, getting up four times at night to urinate, no recent change in symptom. Denies fever or chills. Denies flank pain.     Back Pain  This is a new problem. The current episode started in the past 7 days. The problem occurs constantly. The problem has been gradually worsening since onset. The pain is present in the lumbar spine and sacro-iliac. The quality of the pain is described as aching. The pain radiates to the left thigh and left knee. The pain is at a severity of 7/10. The pain is moderate. The pain is the same all the time. The symptoms are aggravated by standing and bending. Associated symptoms include leg pain. Pertinent negatives include no abdominal pain, bladder incontinence, bowel incontinence, chest pain, dysuria, fever, headaches, numbness, paresis, paresthesias, pelvic pain, perianal numbness, tingling, weakness or weight loss. She has tried nothing for the symptoms. The treatment provided no relief.   Urinary Tract Infection   This is a new problem. The current episode started in the past 7 days. The problem has been unchanged. The quality of the pain is  "described as burning. The pain is mild. There has been no fever. Associated symptoms include frequency and urgency. Pertinent negatives include no chills, discharge, flank pain, hematuria, hesitancy, nausea, sweats or vomiting. She has tried nothing for the symptoms. The treatment provided no relief.       Objective   Vital Signs:  /78 (BP Location: Right arm, Patient Position: Sitting, Cuff Size: Adult)   Pulse 88   Temp 96.6 °F (35.9 °C) (Temporal)   Resp 18   Ht 162.6 cm (64.02\")   Wt 56.3 kg (124 lb 1.6 oz)   SpO2 97%   BMI 21.29 kg/m²   Estimated body mass index is 21.29 kg/m² as calculated from the following:    Height as of this encounter: 162.6 cm (64.02\").    Weight as of this encounter: 56.3 kg (124 lb 1.6 oz).    BMI is within normal parameters. No other follow-up for BMI required.      Physical Exam  Constitutional:       Appearance: Normal appearance. She is not ill-appearing.   Cardiovascular:      Rate and Rhythm: Normal rate and regular rhythm.      Heart sounds: No murmur heard.    No friction rub. No gallop.   Pulmonary:      Effort: Pulmonary effort is normal. No respiratory distress.      Breath sounds: Normal breath sounds. No wheezing, rhonchi or rales.   Abdominal:      General: Bowel sounds are normal. There is no distension.      Palpations: Abdomen is soft. There is no mass.      Tenderness: There is no abdominal tenderness. There is no right CVA tenderness or left CVA tenderness.      Hernia: No hernia is present.   Musculoskeletal:      Lumbar back: Tenderness (at SI joint) present. No bony tenderness. Normal range of motion. Negative right straight leg raise test and negative left straight leg raise test.   Skin:     General: Skin is warm and dry.   Neurological:      Mental Status: She is alert and oriented to person, place, and time.   Psychiatric:         Mood and Affect: Mood normal.        Result Review :                Assessment and Plan   Diagnoses and all orders for " this visit:    1. Acute left-sided low back pain with left-sided sciatica (Primary)  -     XR Spine Lumbar 2 or 3 View; Future  -     Ambulatory Referral to Physical Therapy Evaluate and treat  -     Urine Culture - Urine, Urine, Clean Catch; Future  -     Urine Culture - Urine, Urine, Clean Catch    2. Dysuria  -     POC Urinalysis Dipstick, Automated  -     Urine Culture - Urine, Urine, Clean Catch; Future  -     Urine Culture - Urine, Urine, Clean Catch    Other orders  -     methylPREDNISolone (MEDROL) 4 MG dose pack; Take as directed on package instructions.  Dispense: 21 tablet; Refill: 0             Follow Up   No follow-ups on file.  Patient was given instructions and counseling regarding her condition or for health maintenance advice. Please see specific information pulled into the AVS if appropriate.     Dysuria: history of IC, trace bacteria, nitrate negative, will culture and await result prior to initiating treatment    Back pain with sciatica: start medrol dose pack, instructions given for use, patient verbalized understanding, recommend physical therapy to help with exercises to help ease symptoms, patient would like to proceed with xray, discussed recommendation to treat and obtain imaging for no improvement, patient persistent, will proceed with xray   cardiovascular/diabetes

## 2023-02-01 NOTE — PROGRESS NOTE ADULT - PROBLEM SELECTOR PLAN 8
Unknown baseline SCr. Noted to be 1-1.2 in 8/2022.   1.16 on admission, appears at baseline.  UA with proteinuria, likely iso DM and T2DM  - avoid nephrotoxins, renally dose medications
Unknown baseline SCr. Noted to be 1-1.2 in 8/2022.   1.16 on admission, appears at baseline.  UA with proteinuria, likely iso DM and T2DM  - avoid nephrotoxins, renally dose medications

## 2023-02-01 NOTE — PROGRESS NOTE ADULT - PROBLEM SELECTOR PLAN 9
DVT: lovenox  Diet: CC/Kosher, minced and mildly thick per SLP recs 8/2022  Dispo: home PT
DVT: lovenox  Diet: CC/Kosher, minced and mildly thick per SLP recs 8/2022  Dispo: home PT

## 2023-02-01 NOTE — CHART NOTE - NSCHARTNOTEFT_GEN_A_CORE
Notified by RN for PAT 5.55 seconds on Tele, HR to 155. Pt asymptomatic.    Vital Signs Last 24 Hrs  T(C): 36.5 (01 Feb 2023 11:06), Max: 36.5 (01 Feb 2023 11:06)  T(F): 97.7 (01 Feb 2023 11:06), Max: 97.7 (01 Feb 2023 11:06)  HR: 77 (01 Feb 2023 20:00) (59 - 77)  BP: 153/68 (01 Feb 2023 20:00) (128/73 - 169/72)  BP(mean): --  RR: 18 (01 Feb 2023 11:06) (18 - 18)  SpO2: 95% (01 Feb 2023 11:06) (93% - 95%)    Parameters below as of 01 Feb 2023 11:06  Patient On (Oxygen Delivery Method): room air          Labs:                          10.3   10.14 )-----------( 266      ( 01 Feb 2023 06:50 )             32.1     02-01    141  |  103  |  33<H>  ----------------------------<  237<H>  3.8   |  28  |  0.93    Ca    8.9      01 Feb 2023 06:50  Phos  3.5     01-31  Mg     2.1     01-31          Assessment & Plan:  HPI:  88F w/ T2DM, HTN, HLD, and dementia (AOx0, minimally verbal) who presents to ED for dyspnea. Per daughter, pt became acutely SOB so EMS was called. Pt has history of dysphagia, on diet of smashed food and thickened liquids. Daughter states pt was otherwise in usual state of health prior to acute dyspnea. No fevers, cough, diarrhea, constipation or obvious signs of pain. At baseline, pt is minimally verbal (aphasic), does not follow commands, and is bedbound. Per EMS, pt was 80% on RA and was placed on NRB. In ED, HR 80-100s and was quickly weaned to RA. Labs significant for WBC 19.8, pH 7.28 with lactate of 3.3. CXR with b/l interstitial opacities R>L. S/p 500cc, ceftriaxone, and azithromycin. Admitted to medicine for further management. Now with tele event 5.55 seconds of PAT, HR up to 155. Hx of 1.6s PAT  on 1/30.      1. PAT -= 5.55 seconds, HR to 155  - Asymptomatic  - Hemodynamically stable  - Baseline rhythm NSR with HR 60s on tele  - Electrolytes ordered stat  - Keep K > 4, Mg > 2, Phos > 3  - Will closely monitor on tele, clinical status, and vitals  - Will endorse to primary team in AM        Magaly Levy PA-C  Department of Medicine  Spectra [phone]

## 2023-02-01 NOTE — PROGRESS NOTE ADULT - NSPROGADDITIONALINFOA_GEN_ALL_CORE
Updated daughter over phone  D/w ACP    Plan for dispo tomorrow if tolerating PO abx for total 7day abx course    Yenny Roque MD  Division of Hospital Medicine  Available on [employer] Teams
d/w ACP    Yenny Roque MD  Division of Hospital Medicine  Available on [employer] Teams

## 2023-02-01 NOTE — PROGRESS NOTE ADULT - SUBJECTIVE AND OBJECTIVE BOX
Subjective: Patient seen and examined. No new events except as noted.   Calm, Nonverbal.    REVIEW OF SYSTEMS:  Unable to obtain.     MEDICATIONS:  MEDICATIONS  (STANDING):  amLODIPine   Tablet 10 milliGRAM(s) Oral daily  aspirin enteric coated 81 milliGRAM(s) Oral daily  atorvastatin 80 milliGRAM(s) Oral at bedtime  azithromycin  IVPB 500 milliGRAM(s) IV Intermittent every 24 hours  cefTRIAXone   IVPB 1000 milliGRAM(s) IV Intermittent every 24 hours  chlorhexidine 2% Cloths 1 Application(s) Topical daily  dextrose 5%. 1000 milliLiter(s) (100 mL/Hr) IV Continuous <Continuous>  dextrose 5%. 1000 milliLiter(s) (50 mL/Hr) IV Continuous <Continuous>  dextrose 50% Injectable 25 Gram(s) IV Push once  dextrose 50% Injectable 12.5 Gram(s) IV Push once  dextrose 50% Injectable 25 Gram(s) IV Push once  enoxaparin Injectable 40 milliGRAM(s) SubCutaneous every 24 hours  glucagon  Injectable 1 milliGRAM(s) IntraMuscular once  insulin lispro (ADMELOG) corrective regimen sliding scale   SubCutaneous every 6 hours  metroNIDAZOLE  IVPB 500 milliGRAM(s) IV Intermittent every 8 hours    PHYSICAL EXAM:  Vital Signs Last 24 Hrs  T(C): 36.5 (01 Feb 2023 11:06), Max: 36.9 (31 Jan 2023 20:49)  T(F): 97.7 (01 Feb 2023 11:06), Max: 98.5 (31 Jan 2023 20:49)  HR: 64 (01 Feb 2023 11:06) (59 - 86)  BP: 142/72 (01 Feb 2023 11:06) (122/48 - 169/72)  BP(mean): --  RR: 18 (01 Feb 2023 11:06) (18 - 18)  SpO2: 95% (01 Feb 2023 11:06) (93% - 96%)    Parameters below as of 01 Feb 2023 11:06  Patient On (Oxygen Delivery Method): room air    I&O's Summary    31 Jan 2023 07:01  -  01 Feb 2023 07:00  --------------------------------------------------------  IN: 1120 mL / OUT: 850 mL / NET: 270 mL    Appearance: Normal	  HEENT: Normal oral mucosa, PERRL, EOMI	  Lymphatic: No lymphadenopathy , no edema  Cardiovascular: Normal S1 S2, No JVD, No murmurs , Peripheral pulses palpable 2+ bilaterally  Respiratory: Lungs clear to auscultation, normal effort 	  Gastrointestinal: Soft, Non-tender, + BS	  Skin: No rashes, No ecchymoses, No cyanosis, warm to touch  Musculoskeletal: Decreased ROM/Strength  Psychiatry: Calm   Ext: No edema    LABS:    CARDIAC MARKERS:                        8.7    11.55 )-----------( 219      ( 31 Jan 2023 06:39 )             27.1     01-31    142  |  106  |  40<H>  ----------------------------<  185<H>  3.6   |  25  |  0.89    Ca    8.8      31 Jan 2023 06:39  Phos  3.5     01-31  Mg     2.1     01-31    TPro  6.3  /  Alb  3.5  /  TBili  0.3  /  DBili  x   /  AST  26  /  ALT  21  /  AlkPhos  77  01-30    proBNP:   Lipid Profile:   HgA1c:   TSH:     TELEMETRY: SR 70s, PAT yesterday up to 130s for 8.14	    ECG:  < from: Transthoracic Echocardiogram (01.31.23 @ 10:50) >  Patient name: BRENNEN ISBELL  YOB: 1935   Age: 88 (F)   MR#: [id number]  Study Date: 1/31/2023  [facility] Ull[facility] LE[facility]  Study quality: Technically difficult  Referring Physician: Holden Harvey MD  Blood Pressure: 148/74 mmHg  Height: 142 cm  Weight: 64 kg  BSA: 1.5 m2  ------------------------------------------------------------------------  PROCEDURE: Transthoracic echocardiogram with 2-D, M-Mode  and complete spectral and color flow Doppler.  INDICATION: Dyspnea, unspecified (R06.00)  ------------------------------------------------------------------------  Dimensions:    Normal Values:  LA:     4.5    2.0 - 4.0 cm  Ao:            2.0 - 3.8 cm  SEPTUM:        0.6 - 1.2 cm  PWT:           0.6 - 1.1 cm  LVIDd:         3.0 - 5.6 cm  LVIDs:         1.8 - 4.0 cm  EF (Visual Estimate): 65-70 %  Doppler Peak Velocity (m/sec): AoV=2.5  ------------------------------------------------------------------------  Observations:  Mitral Valve: Mitral annular calcification, otherwise  normal mitral valve. Mild-moderate mitral regurgitation.  Aortic Valve/Aorta: Calcified trileaflet aortic valve with  decreased opening. Peak transaortic valve gradient equals  25 mm Hg, mean transaortic valve gradient equals 15 mm Hg,  aortic valve velocity time integral equals 59 cm,  consistent with mild aortic stenosis. Inadequate LVOT  doppler measurement.  LVOT diameter: 1.8 cm.  Left Atrium: Mildly dilated left atrium.  LA volume index =  40 cc/m2.  Left Ventricle: Endocardium not well visualized; grossly  normal left ventricular systolic function. Concentric left  ventricular hypertrophy. Moderate diastolic dysfunction  (Stage II).  Right Heart: Normal right atrium. The right ventricle is  not well visualized; grossly normal right ventricular  systolic function. Normal tricuspid valve. Mild-moderate  tricuspid regurgitation. Normal pulmonic valve.  Pericardium/Pleura: Normal pericardium with no pericardial  effusion.  Left pleural effusion.  Hemodynamic: Estimated right atrial pressure is 8 mm Hg.  Estimated right ventricular systolic pressure equals 52 mm  Hg, assuming right atrial pressure equals 8 mm Hg,  consistent with moderate pulmonary hypertension.  ------------------------------------------------------------------------  Conclusions:  1. Concentric left ventricular hypertrophy.  2. Endocardium not well visualized; grossly normal left  ventricular systolic function.  3. Moderate diastolic dysfunction (Stage II).  4. The right ventricle is not well visualized; grossly  normal right ventricular systolic function.  5. Estimated pulmonary artery systolic pressure equals 52  mm Hg, assuming right atrial pressure equals 8 mm Hg,  consistent with moderate pulmonary pressures.  6. Left pleural effusion.  *** No previous Echo exam.  ------------------------------------------------------------------------  Confirmed on  1/31/2023 - 12:50:38 by ISHMAEL Boyd  ------------------------------------------------------------------------    < end of copied text >  	  RADIOLOGY:   DIAGNOSTIC TESTING:  [ ] Echocardiogram:  [ ]  Catheterization:  [ ] Stress Test:    OTHER:

## 2023-02-01 NOTE — PROGRESS NOTE ADULT - SUBJECTIVE AND OBJECTIVE BOX
Patient is a 88y old  Female who presents with a chief complaint of Hypoxia (01 Feb 2023 11:24)        SUBJECTIVE / OVERNIGHT EVENTS: Patient had no acute events overnight. Patient seen and examined at bedside this morning.     ROS: [ - ] Fever [ - ] Chills [ - ] Nausea/Vomiting [ - ] Chest Pain [ - ] Shortness of breath     MEDICATIONS  (STANDING):  amLODIPine   Tablet 10 milliGRAM(s) Oral daily  amoxicillin  875 milliGRAM(s)/clavulanate 1 Tablet(s) Oral every 12 hours  aspirin enteric coated 81 milliGRAM(s) Oral daily  atorvastatin 80 milliGRAM(s) Oral at bedtime  chlorhexidine 2% Cloths 1 Application(s) Topical daily  dextrose 5%. 1000 milliLiter(s) (100 mL/Hr) IV Continuous <Continuous>  dextrose 5%. 1000 milliLiter(s) (50 mL/Hr) IV Continuous <Continuous>  dextrose 50% Injectable 25 Gram(s) IV Push once  dextrose 50% Injectable 12.5 Gram(s) IV Push once  dextrose 50% Injectable 25 Gram(s) IV Push once  enoxaparin Injectable 40 milliGRAM(s) SubCutaneous every 24 hours  glucagon  Injectable 1 milliGRAM(s) IntraMuscular once  hydrochlorothiazide 12.5 milliGRAM(s) Oral daily  insulin lispro (ADMELOG) corrective regimen sliding scale   SubCutaneous every 6 hours  losartan 100 milliGRAM(s) Oral daily    MEDICATIONS  (PRN):  acetaminophen     Tablet .. 650 milliGRAM(s) Oral every 6 hours PRN Temp greater or equal to 38C (100.4F), Mild Pain (1 - 3)  dextrose Oral Gel 15 Gram(s) Oral once PRN Blood Glucose LESS THAN 70 milliGRAM(s)/deciliter  melatonin 3 milliGRAM(s) Oral at bedtime PRN Insomnia  ondansetron Injectable 4 milliGRAM(s) IV Push every 8 hours PRN Nausea and/or Vomiting  QUEtiapine 50 milliGRAM(s) Oral daily PRN anxiety/agitation      Vital Signs Last 24 Hrs  T(C): 36.5 (01 Feb 2023 11:06), Max: 36.9 (31 Jan 2023 20:49)  T(F): 97.7 (01 Feb 2023 11:06), Max: 98.5 (31 Jan 2023 20:49)  HR: 64 (01 Feb 2023 11:06) (59 - 81)  BP: 142/72 (01 Feb 2023 11:06) (138/65 - 169/72)  BP(mean): --  RR: 18 (01 Feb 2023 11:06) (18 - 18)  SpO2: 95% (01 Feb 2023 11:06) (93% - 96%)    Parameters below as of 01 Feb 2023 11:06  Patient On (Oxygen Delivery Method): room air      CAPILLARY BLOOD GLUCOSE      POCT Blood Glucose.: 167 mg/dL (01 Feb 2023 11:13)  POCT Blood Glucose.: 254 mg/dL (01 Feb 2023 05:53)  POCT Blood Glucose.: 220 mg/dL (01 Feb 2023 00:17)  POCT Blood Glucose.: 120 mg/dL (31 Jan 2023 17:58)    I&O's Summary    31 Jan 2023 07:01  -  01 Feb 2023 07:00  --------------------------------------------------------  IN: 1120 mL / OUT: 850 mL / NET: 270 mL    01 Feb 2023 07:01  -  01 Feb 2023 15:36  --------------------------------------------------------  IN: 600 mL / OUT: 0 mL / NET: 600 mL        PHYSICAL EXAM  GENERAL: NAD, lying comfortably in bed   HEENT:  Atraumatic, Normocephalic, EOMI, conjunctiva and sclera clear, no LAD  CHEST/LUNG: Clear to auscultation bilaterally; No wheeze  HEART: RRR, S1 and S2 No murmurs, rubs, or gallops  ABDOMEN: Soft, Nontender, Nondistended; Bowel sounds present  EXTREMITIES:  2+ Peripheral Pulses, No clubbing, cyanosis, or edema  NEURO: AAOx3, non-focal  SKIN: No rashes or lesions    LABS:                        10.3   10.14 )-----------( 266      ( 01 Feb 2023 06:50 )             32.1     02-01    141  |  103  |  33<H>  ----------------------------<  237<H>  3.8   |  28  |  0.93    Ca    8.9      01 Feb 2023 06:50  Phos  3.5     01-31  Mg     2.1     01-31                  RADIOLOGY & ADDITIONAL TESTS:      Labs Personally Reviewed  Imaging Personally Reviewed  Consultant(s) Notes Reviewed        Patient is a 88y old  Female who presents with a chief complaint of Hypoxia (01 Feb 2023 11:24)        SUBJECTIVE / OVERNIGHT EVENTS: Patient had no acute events overnight. Patient seen and examined at bedside this morning. Eyes open. Becomes agitated when touched.     ROS: unable to assess    MEDICATIONS  (STANDING):  amLODIPine   Tablet 10 milliGRAM(s) Oral daily  amoxicillin  875 milliGRAM(s)/clavulanate 1 Tablet(s) Oral every 12 hours  aspirin enteric coated 81 milliGRAM(s) Oral daily  atorvastatin 80 milliGRAM(s) Oral at bedtime  chlorhexidine 2% Cloths 1 Application(s) Topical daily  dextrose 5%. 1000 milliLiter(s) (100 mL/Hr) IV Continuous <Continuous>  dextrose 5%. 1000 milliLiter(s) (50 mL/Hr) IV Continuous <Continuous>  dextrose 50% Injectable 25 Gram(s) IV Push once  dextrose 50% Injectable 12.5 Gram(s) IV Push once  dextrose 50% Injectable 25 Gram(s) IV Push once  enoxaparin Injectable 40 milliGRAM(s) SubCutaneous every 24 hours  glucagon  Injectable 1 milliGRAM(s) IntraMuscular once  hydrochlorothiazide 12.5 milliGRAM(s) Oral daily  insulin lispro (ADMELOG) corrective regimen sliding scale   SubCutaneous every 6 hours  losartan 100 milliGRAM(s) Oral daily    MEDICATIONS  (PRN):  acetaminophen     Tablet .. 650 milliGRAM(s) Oral every 6 hours PRN Temp greater or equal to 38C (100.4F), Mild Pain (1 - 3)  dextrose Oral Gel 15 Gram(s) Oral once PRN Blood Glucose LESS THAN 70 milliGRAM(s)/deciliter  melatonin 3 milliGRAM(s) Oral at bedtime PRN Insomnia  ondansetron Injectable 4 milliGRAM(s) IV Push every 8 hours PRN Nausea and/or Vomiting  QUEtiapine 50 milliGRAM(s) Oral daily PRN anxiety/agitation      Vital Signs Last 24 Hrs  T(C): 36.5 (01 Feb 2023 11:06), Max: 36.9 (31 Jan 2023 20:49)  T(F): 97.7 (01 Feb 2023 11:06), Max: 98.5 (31 Jan 2023 20:49)  HR: 64 (01 Feb 2023 11:06) (59 - 81)  BP: 142/72 (01 Feb 2023 11:06) (138/65 - 169/72)  BP(mean): --  RR: 18 (01 Feb 2023 11:06) (18 - 18)  SpO2: 95% (01 Feb 2023 11:06) (93% - 96%)    Parameters below as of 01 Feb 2023 11:06  Patient On (Oxygen Delivery Method): room air      CAPILLARY BLOOD GLUCOSE      POCT Blood Glucose.: 167 mg/dL (01 Feb 2023 11:13)  POCT Blood Glucose.: 254 mg/dL (01 Feb 2023 05:53)  POCT Blood Glucose.: 220 mg/dL (01 Feb 2023 00:17)  POCT Blood Glucose.: 120 mg/dL (31 Jan 2023 17:58)    I&O's Summary    31 Jan 2023 07:01  -  01 Feb 2023 07:00  --------------------------------------------------------  IN: 1120 mL / OUT: 850 mL / NET: 270 mL    01 Feb 2023 07:01  -  01 Feb 2023 15:36  --------------------------------------------------------  IN: 600 mL / OUT: 0 mL / NET: 600 mL        PHYSICAL EXAM  GENERAL: NAD, lying comfortably in bed   HEENT:  Atraumatic, Normocephalic, conjunctiva and sclera clear,   CHEST/LUNG: no wob on room air  HEART: RRR, S1 and S2 No murmurs, rubs, or gallops  ABDOMEN: Soft, Nontender, Nondistended;  EXTREMITIES: No clubbing, cyanosis. LE edema  NEURO: AAOx0  SKIN: No rashes or lesions on visible skin    LABS:                        10.3   10.14 )-----------( 266      ( 01 Feb 2023 06:50 )             32.1     02-01    141  |  103  |  33<H>  ----------------------------<  237<H>  3.8   |  28  |  0.93    Ca    8.9      01 Feb 2023 06:50  Phos  3.5     01-31  Mg     2.1     01-31                  RADIOLOGY & ADDITIONAL TESTS:      Labs Personally Reviewed  Imaging Personally Reviewed  Consultant(s) Notes Reviewed

## 2023-02-02 NOTE — DISCHARGE NOTE NURSING/CASE MANAGEMENT/SOCIAL WORK - NSDCFUADDAPPT_GEN_ALL_CORE_FT
APPTS ARE READY TO BE MADE: [x ] YES    Best Family or Patient Contact (if needed):    Additional Information about above appointments (if needed):    1: PCP (within 1 week)  2: Cardiology  3:     Other comments or requests:

## 2023-02-02 NOTE — DISCHARGE NOTE NURSING/CASE MANAGEMENT/SOCIAL WORK - PATIENT PORTAL LINK FT
You can access the FollowMyHealth Patient Portal offered by [facility] by registering at the following website: [email or web address]. By joining [employer]’s FollowMyHealth portal, you will also be able to view your health information using other applications (apps) compatible with our system.

## 2023-02-02 NOTE — DISCHARGE NOTE PROVIDER - HOSPITAL COURSE
88F w/ T2DM, HTN, HLD, and dementia (AOx0, minimally verbal) who presents to ED with SOB, pt was initially hypoxic but subsequently able to be weaned to room air, met sepsis criteria on arrival with possible aspiration event as source       1. Sepsis.   - Leukocytosis, tachycardia, lactate  - 2/2 aspiration/PNA  - RVP neg. UA neg.  - transition from ceftriaxone, metronidazole, azithromycin to augmentin. First PO dose tonight, crushed in applesauce  - bld cx ntgd  - urine legionella neg.    2. Acute respiratory failure with hypoxia.   - 80% on RA per EMS, stable on RA since arrival  - CXR with b/l interstitial opacities R>L  -will empirically for aspiration pneumonia as patient met sepsis criteria on arrival   - minced with thicken liquids  - abx as above  - pt oxygenation improved without diuretics but will obtain BNP due to chronic LE edema.   - s/p 40mg IV lasix x1 for LE edema and left pleural effusion  -Cancelled CT chest- patient agitated, will avoid sedation. Empirically treat for PNA. And restart HCTZ.    3. Elevated troponin.   -Trop peaked at 800s, downtrended  -EKG with minimal STD in V5-V6    -Likely demand iso sepsis.    4. Diabetes mellitus.   - Previously on insulin, however daughter states it has been a long time since she has required long acting insulin. Uses short acting slide infrequently.  - a1c 7.4  - SSI.    5. Lower extremity edema.   - Chronic  - duplex neg.    6. Dementia.   - At baseline, pt is AOx0, minimally verbal (aphasic), does not follow commands, and is bedbound  - Prior MR head with significant microvascular disease  - c/w seroquel prn  - c/w ASA, statin.    7. HTN (hypertension).   - Home: losartan 100 mg daily, amlodipine 10 mg daily, HCTZ 12.5 daily  -restarted Amlodipine and losartan, Hydrochlorthiazide.    8. Chronic kidney disease, unspecified CKD stage.   -Unknown baseline SCr. Noted to be 1-1.2 in 8/2022.   -1.16 on admission, appears at baseline.  -UA with proteinuria, likely iso DM and T2DM  - avoid nephrotoxins, renally dose medications.     88F w/ T2DM, HTN, HLD, and dementia (AOx0, minimally verbal) who presents to ED with SOB, pt was initially hypoxic but subsequently able to be weaned to room air, met sepsis criteria on arrival with possible aspiration event as source       1. Sepsis.   - Leukocytosis, tachycardia, lactate  - 2/2 aspiration/PNA  - RVP neg. UA neg.  - transition from ceftriaxone, metronidazole, azithromycin to augmentin. First PO dose tonight, crushed in applesauce  - bld cx ntgd  - urine legionella neg.    2. Acute respiratory failure with hypoxia.   - 80% on RA per EMS, stable on RA since arrival  - CXR with b/l interstitial opacities R>L  -will empirically for aspiration pneumonia as patient met sepsis criteria on arrival   - minced with thicken liquids  - abx as above  - pt oxygenation improved without diuretics but will obtain BNP due to chronic LE edema.   - s/p 40mg IV lasix x1 for LE edema and left pleural effusion  -Cancelled CT chest- patient agitated, will avoid sedation. Empirically treat for PNA. And restart HCTZ.    3. Elevated troponin.   -Trop peaked at 800s, downtrended  -EKG with minimal STD in V5-V6    -Likely demand iso sepsis.    4. Diabetes mellitus.   - Previously on insulin, however daughter states it has been a long time since she has required long acting insulin. Uses short acting slide infrequently.  - a1c 7.4  - SSI.    5. Lower extremity edema.   - Chronic  - duplex neg.    6. Dementia.   - At baseline, pt is AOx0, minimally verbal (aphasic), does not follow commands, and is bedbound  - Prior MR head with significant microvascular disease  - c/w seroquel prn  - c/w ASA, statin.    7. HTN (hypertension).   - Home: losartan 100 mg daily, amlodipine 10 mg daily, HCTZ 12.5 daily  -restarted Amlodipine and losartan, Hydrochlorthiazide.    8. Chronic kidney disease, unspecified CKD stage.   -Unknown baseline SCr. Noted to be 1-1.2 in 8/2022.   -1.16 on admission, appears at baseline.  -UA with proteinuria, likely iso DM and T2DM  - avoid nephrotoxins, renally dose medications.      Discharge planning discussed with attending Dr. Roque. Patient is medically cleared and stable for discharge. Medication Reconciliation reviewed with attending. Follow up outpatient with your PCP.

## 2023-02-02 NOTE — DISCHARGE NOTE PROVIDER - NSDCFUADDAPPT_GEN_ALL_CORE_FT
APPTS ARE READY TO BE MADE: [x ] YES    Best Family or Patient Contact (if needed):    Additional Information about above appointments (if needed):    1: PCP (within 1 week)  2: Cardiology  3:     Other comments or requests:    APPTS ARE READY TO BE MADE: [x ] YES    Best Family or Patient Contact (if needed):    Additional Information about above appointments (if needed):    1: PCP (within 1 week)  2: Cardiology  3:     Other comments or requests:   Patient was provided with follow up request details and was advised to call to schedule follow up within specified time frame.

## 2023-02-02 NOTE — PROGRESS NOTE ADULT - PROBLEM SELECTOR PROBLEM 6
Dementia
Chronic kidney disease, unspecified CKD stage
Chronic kidney disease, unspecified CKD stage
Dementia
Chronic kidney disease, unspecified CKD stage

## 2023-02-02 NOTE — PROGRESS NOTE ADULT - ASSESSMENT
88F w/ T2DM, HTN, HLD, and dementia (AOx0, minimally verbal) who presents to ED for dyspnea.
88F w/ T2DM, HTN, HLD, and dementia (AOx0, minimally verbal) who presents to ED with SOB, pt was initially hypoxic but subsequently able to be weaned to room air, met sepsis criteria on arrival with possible aspiration event as source 
88F w/ T2DM, HTN, HLD, and dementia (AOx0, minimally verbal) who presents to ED with SOB, pt was initially hypoxic but subsequently able to be weaned to room air, met sepsis criteria on arrival with possible aspiration event as source

## 2023-02-02 NOTE — PROVIDER CONTACT NOTE (OTHER) - SITUATION
Pat had event on tele PAT HR up to 150 x1.6 seconds
PAT x 5.55sec, HR up to 155
Pt hypertensive /73
PAT up to 130 for 8.14 seconds
Patient with Suspected DTI on admission to [address] [place] [address].

## 2023-02-02 NOTE — DISCHARGE NOTE PROVIDER - CARE PROVIDER_API CALL
Edwar Dickson)  EndocrinologyMetabDiabetes; Internal Medicine  [address] [facility], [address]  [place] [address]  Phone: [phone]  Fax: [phone]  Established Patient  Follow Up Time: 1-3 days    Tuan Rico)  Cardiology; Internal Medicine  [address] [facility], [address]  [place] [address]  Phone: [phone]  Fax: [phone]  Follow Up Time: 1 week

## 2023-02-02 NOTE — PROGRESS NOTE ADULT - PROBLEM SELECTOR PLAN 4
Previously on insulin, however daughter states it has been a long time since she has required long acting insulin. Uses short acting slide infrequently.  - a1c 7.4  - SSI
baseline A&Ox0  aspiration and fall precautions
Previously on insulin, however daughter states it has been a long time since she has required long acting insulin. Uses short acting slide infrequently.  - a1c 7.4  - SSI

## 2023-02-02 NOTE — DISCHARGE NOTE PROVIDER - PROVIDER TOKENS
PROVIDER:[TOKEN:[[id number]],FOLLOWUP:[1-3 days],ESTABLISHEDPATIENT:[T]],PROVIDER:[TOKEN:[[id number]],FOLLOWUP:[1 week]]

## 2023-02-02 NOTE — PROGRESS NOTE ADULT - SUBJECTIVE AND OBJECTIVE BOX
Subjective: Patient seen and examined. No new events except as noted.   Calm, Nonverbal.  Eating breakfast.  PAT on tele.     REVIEW OF SYSTEMS:  Unable to obtain.     MEDICATIONS:  MEDICATIONS  (STANDING):  amLODIPine   Tablet 10 milliGRAM(s) Oral daily  aspirin enteric coated 81 milliGRAM(s) Oral daily  atorvastatin 80 milliGRAM(s) Oral at bedtime  azithromycin  IVPB 500 milliGRAM(s) IV Intermittent every 24 hours  cefTRIAXone   IVPB 1000 milliGRAM(s) IV Intermittent every 24 hours  chlorhexidine 2% Cloths 1 Application(s) Topical daily  dextrose 5%. 1000 milliLiter(s) (100 mL/Hr) IV Continuous <Continuous>  dextrose 5%. 1000 milliLiter(s) (50 mL/Hr) IV Continuous <Continuous>  dextrose 50% Injectable 25 Gram(s) IV Push once  dextrose 50% Injectable 12.5 Gram(s) IV Push once  dextrose 50% Injectable 25 Gram(s) IV Push once  enoxaparin Injectable 40 milliGRAM(s) SubCutaneous every 24 hours  glucagon  Injectable 1 milliGRAM(s) IntraMuscular once  insulin lispro (ADMELOG) corrective regimen sliding scale   SubCutaneous every 6 hours  metroNIDAZOLE  IVPB 500 milliGRAM(s) IV Intermittent every 8 hours    PHYSICAL EXAM:  Vital Signs Last 24 Hrs  T(C): 36.6 (02 Feb 2023 11:04), Max: 36.6 (02 Feb 2023 11:04)  T(F): 97.9 (02 Feb 2023 11:04), Max: 97.9 (02 Feb 2023 11:04)  HR: 77 (02 Feb 2023 11:04) (72 - 87)  BP: 127/74 (02 Feb 2023 11:04) (127/74 - 153/68)  BP(mean): --  RR: 18 (02 Feb 2023 11:04) (18 - 18)  SpO2: 97% (02 Feb 2023 11:04) (95% - 97%)    Parameters below as of 02 Feb 2023 11:04  Patient On (Oxygen Delivery Method): room air    I&O's Summary    01 Feb 2023 07:01  -  02 Feb 2023 07:00  --------------------------------------------------------  IN: 840 mL / OUT: 500 mL / NET: 340 mL    Appearance: Normal	  HEENT: Normal oral mucosa, PERRL, EOMI	  Lymphatic: No lymphadenopathy , no edema  Cardiovascular: Normal S1 S2, No JVD, No murmurs , Peripheral pulses palpable 2+ bilaterally  Respiratory: Lungs clear to auscultation, normal effort 	  Gastrointestinal: Soft, Non-tender, + BS	  Skin: No rashes, No ecchymoses, No cyanosis, warm to touch  Musculoskeletal: Decreased ROM/Strength  Psychiatry: Calm   Ext: No edema    LABS:    CARDIAC MARKERS:                        10.4   7.95  )-----------( 256      ( 02 Feb 2023 06:15 )             32.6     02-02    142  |  104  |  27<H>  ----------------------------<  171<H>  4.8   |  27  |  0.86    Ca    9.3      02 Feb 2023 06:19  Phos  3.4     02-01  Mg     2.1     02-01    proBNP:   Lipid Profile:   HgA1c:   TSH:     TELEMETRY: SR 70-90	  ECG:	  RADIOLOGY:   DIAGNOSTIC TESTING:  [ ] Echocardiogram:  [ ]  Catheterization:  [ ] Stress Test:    OTHER:

## 2023-02-02 NOTE — PROGRESS NOTE ADULT - PROBLEM SELECTOR PLAN 3
likely type 2 demand mediated    - EKG reviewed - no acute ischemic STT changes  EF 65-70%, mild-mod MR, mild AS, mod diastolic dysfx, mild-mod TR, mod pulm HTN  peaked at 800s, now downtrending  c/w ASA
likely type 2 demand mediated    - EKG reviewed - no acute ischemic STT changes  EF 65-70%, mild-mod MR, mild AS, mod diastolic dysfx, mild-mod TR, mod pulm HTN  peaked at 800s, now downtrending  c/w ASA
likely type 2 demand mediated    - EKG reviewed - no acute ischemic STT changes  check ECHO  peaked at 800s, now downtrending  c/w ASA
Trop peaked at 800s, downtrended  EKG with minimal STD in V5-V6    Likely demand iso sepsis
Trop peaked at 800s, downtrended  EKG with minimal STD in V5-V6    Likely demand iso sepsis

## 2023-02-02 NOTE — PROGRESS NOTE ADULT - PROBLEM SELECTOR PLAN 6
stable
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
stable
unsure of pt baseline  nephro consult

## 2023-02-02 NOTE — DISCHARGE NOTE PROVIDER - NSDCMRMEDTOKEN_GEN_ALL_CORE_FT
Admelog 100 units/mL injectable solution: 1-4 unit(s) injectable 3 times a day (with meals)  amLODIPine 10 mg oral tablet: 1 tab(s) orally once a day  aspirin 81 mg oral delayed release tablet: 1 tab(s) orally once a day  atorvastatin 80 mg oral tablet: 1 tab(s) orally once a day  hydroCHLOROthiazide 12.5 mg oral capsule: 1 cap(s) orally once a day  Lexapro 10 mg oral tablet: 1 tab(s) orally once a day, As Needed  losartan 100 mg oral tablet: 1 tab(s) orally once a day  QUEtiapine 50 mg oral tablet: 1 tab(s) orally once a day, As Needed  ramelteon 8 mg oral tablet: 1 tab(s) orally once a day (at bedtime), As Needed   Admelog 100 units/mL injectable solution: 1-4 unit(s) injectable 3 times a day (with meals)  amLODIPine 10 mg oral tablet: 1 tab(s) orally once a day  amoxicillin-clavulanate 875 mg-125 mg oral tablet: 1 tab(s) orally every 12 hours    Finish last dose on 2/4/23  aspirin 81 mg oral delayed release tablet: 1 tab(s) orally once a day  atorvastatin 80 mg oral tablet: 1 tab(s) orally once a day  hydroCHLOROthiazide 12.5 mg oral capsule: 1 cap(s) orally once a day  Lexapro 10 mg oral tablet: 1 tab(s) orally once a day, As Needed  losartan 100 mg oral tablet: 1 tab(s) orally once a day  QUEtiapine 50 mg oral tablet: 1 tab(s) orally once a day, As Needed  ramelteon 8 mg oral tablet: 1 tab(s) orally once a day (at bedtime), As Needed   Admelog 100 units/mL injectable solution: 1-4 unit(s) injectable 3 times a day (with meals)  amLODIPine 10 mg oral tablet: 1 tab(s) orally once a day  amoxicillin-clavulanate 875 mg-125 mg oral tablet: 1 tab(s) orally every 12 hours    Finish last dose on 2/4/23  aspirin 81 mg oral delayed release tablet: 1 tab(s) orally once a day  atorvastatin 80 mg oral tablet: 1 tab(s) orally once a day  escitalopram 10 mg oral tablet: 1 tab(s) orally once a day -for chest pain   hydroCHLOROthiazide 12.5 mg oral capsule: 1 cap(s) orally once a day  losartan 100 mg oral tablet: 1 tab(s) orally once a day  QUEtiapine 50 mg oral tablet: 1 tab(s) orally once a day, As Needed  ramelteon 8 mg oral tablet: 1 tab(s) orally once a day (at bedtime), As Needed

## 2023-02-02 NOTE — PROVIDER CONTACT NOTE (OTHER) - BACKGROUND
Patient admitted for PNA due to infectious disease. Patient AxOxO.
Pt admitted with PNA. Hx of dementia, HTN, DM
Patient admitted for Pneumonia due to infectious disease
patient admitted for PNA
Pt was admitted with sepsis d/t aspiration pna, elevated troponin, chronic le edema, hx of dementia.

## 2023-02-02 NOTE — PROGRESS NOTE ADULT - PROBLEM SELECTOR PLAN 1
+ leukocytosis on admission     - likely aspiration   blood cultures NGTD, urine culture no growth 1/29/23  PO Antibiotics as ordered  management as per primary team
Leukocytosis, tachycardia, lactate  2/2 aspiration/PNA  RVP neg. UA neg.  - transition from ceftriaxone, metronidazole, azithromycin to augmentin. First PO dose tonight, crushed in applesauce  - bld cx ntgd  - urine legionella neg
+ leukocytosis on admission     - likely aspiration   blood cultures and urine culture pending  IV Antibiotics as ordered  management as per primary team
+ leukocytosis on admission     - likely aspiration   blood cultures and urine culture pending  IV Antibiotics as ordered  management as per primary team
Leukocytosis, tachycardia, lactate  2/2 aspiration/PNA  RVP neg. UA neg.  - c/w empiric ceftriaxone, metronidazole, azithromycin  - bld cx ntgd  - urine legionella and strep pneumo pending

## 2023-02-02 NOTE — DISCHARGE NOTE PROVIDER - ATTENDING DISCHARGE PHYSICAL EXAMINATION:
PHYSICAL EXAM  GENERAL: NAD, lying comfortably in bed   HEENT:  Atraumatic, Normocephalic, conjunctiva and sclera clear,   CHEST/LUNG: no wob on room air  HEART: RRR, S1 and S2 No murmurs, rubs, or gallops  ABDOMEN: Soft, Nontender, Nondistended;  EXTREMITIES: No clubbing, cyanosis. trace le edema   NEURO: AAOx0, moving all extremities

## 2023-02-02 NOTE — PROGRESS NOTE ADULT - PROBLEM SELECTOR PLAN 7
c/w meds as ordered  continue to monitor
Home: losartan 100 mg daily, amlodipine 10 mg daily, HCTZ 12.5 daily  -restarted amlopdine and losartan, hydrochlorthizide
Home: losartan 100 mg daily, amlodipine 10 mg daily, HCTZ 12.5 daily  -restarted amlopdine and losartan
c/w meds as ordered  continue to monitor
c/w meds as ordered    - can start losartan 25mg PO daily  continue to monitor

## 2023-02-02 NOTE — PROVIDER CONTACT NOTE (OTHER) - ASSESSMENT
patient vitals stable, no s/s of chest pain or sob
VSS, Pt AOx0, does not follow commands, no s/s of chest pain or sob noted.
Pt AOxO, pt uncooperative, trying to take off BP cuff, but resting in no acute distress otherwise. /73, . other VSS on RA.
Patient AxOxO. Patient, Tense and shouting when writer attempted to do vital signs. Patient Axillary temp 98.1. Patient sleeping in bed at time of the event.
Suspected DTI measures 8cm by 8cm

## 2023-02-02 NOTE — DISCHARGE NOTE PROVIDER - NSDCCPCAREPLAN_GEN_ALL_CORE_FT
PRINCIPAL DISCHARGE DIAGNOSIS  Diagnosis: Pneumonia  Assessment and Plan of Treatment: Pneumonia is a lung infection that can cause a fever, cough, and trouble breathing.  Continue all antibiotics as ordered until complete.  Nutrition is important, eat small frequent meals.  Get lots of rest and drink fluids.  Call your health care provider upon arrival home from hospital and make a follow up appointment for one week.  If your cough worsens, you develop fever greater than 101', you have shaking chills, a fast heartbeat, trouble breathing and/or feel your are breathing much faster than usual, call your healthcare provider.  Make sure you wash your hands frequently.  You were given a course of antibiotics during your hospital stay.  Continue Augmentin  Please follow-up with your primary care physician within one week of discharge.      SECONDARY DISCHARGE DIAGNOSES  Diagnosis: Sepsis  Assessment and Plan of Treatment: Continue Augmentin  Take all antibiotics as ordered.  Call you Health care provider upon arrival home to make a one week follow up appointment.  If you develop fever, chills, malaise, or change in mental status call your Health Care Provider or go to the Emergency Department.  Nutrition is important, eat small frequent meals to help ensure you get adequate calories.  Do not stay in bed all day!  Increase your activity daily as tolerated.    Diagnosis: Diabetes mellitus  Assessment and Plan of Treatment: HgA1C this admission - 7.4  Make sure you get your HgA1c checked every three months.  If you take oral diabetes medications, check your blood glucose two times a day.  If you take insulin, check your blood glucose before meals and at bedtime.  It's important not to skip any meals.  Keep a log of your blood glucose results and always take it with you to your doctor appointments.  Keep a list of your current medications including injectables and over the counter medications and bring this medication list with you to all your doctor appointments.  If you have not seen your ophthalmologist this year call for appointment.  Check your feet daily for redness, sores, or openings. Do not self treat. If no improvement in two days call your primary care physician for an appointment.  Low blood sugar (hypoglycemia) is a blood sugar below 70mg/dl. Check your blood sugar if you feel signs/symptoms of hypoglycemia. If your blood sugar is below 70 take 15 grams of carbohydrates (ex 4 oz of apple juice, 3-4 glucose tablets, or 4-6 oz of regular soda) wait 15 minutes and repeat blood sugar to make sure it comes up above 70.  If your blood sugar is above 70 and you are due for a meal, have a meal.  If you are not due for a meal have a snack.  This snack helps keeps your blood sugar at a safe range.      Diagnosis: Elevated troponin  Assessment and Plan of Treatment: We trended your troponin level. Troponin is a cardiac enzyme. Troponin was likely elevated secondary to demand ischemia in the setting of sepsis.    Diagnosis: Chronic kidney disease, unspecified CKD stage  Assessment and Plan of Treatment: Avoid taking (NSAIDs) - (ex: Ibuprofen, Advil, Celebrex, Naprosyn)  Avoid taking any nephrotoxic agents (can harm kidneys) - Intravenous contrast for diagnostic testing, combination cold medications.  Have all medications adjusted for your renal function by your Health Care Provider.  Blood pressure control is important.  Take all medication as prescribed.      Diagnosis: Lower extremity edema  Assessment and Plan of Treatment: Your edema has been chronic. A duplex scan of your lower extremities was performed to ensure you did not have clots in the lower extremities. No blood clots observed on imaging.    Diagnosis: HTN (hypertension)  Assessment and Plan of Treatment: Continue losartan 100 mg daily  Continue amlodipine 10 mg daily  Continue hydrochlorothiazide 12.5 mg daily  Take medications for your blood pressure as recommended.  Eat a heart healthy diet that is low in saturated fats and salt, and includes whole grains, fruits, vegetables and lean protein   Exercise regularly (consult with your physician or cardiologist first); maintain a heart healthy weight.   If you smoke - quit (A resource to help you stop smoking is the [facility] – phone number 599-685-5877.). Continue to follow with your primary physician or cardiologist.   Seek medical help for dizziness, Lightheadedness, Blurry vision, Headache, Chest pain, Shortness of breath  Follow up with your medical doctor and cardiologist to establish long term blood pressure treatment goals.    Diagnosis: Dementia  Assessment and Plan of Treatment: Dementia causes memory problems and make it hard to think clear. The symptoms of dementia often start off very mild and get worse slowly. Symptoms are forgetfulness, confusion, trouble with language, getting lost in familiar places. As dementia gets worse, it can cause anger or aggression, see things that aren't there, decreased ability to eat, bathe, dress, or do other everyday tasks, or cause people to lose bladder and bowel control. Alzheimer disease, there are medicines that might help some. If you have vascular dementia, your doctor will focus on keeping your blood pressure and cholesterol as normal as possible. Sadly, there really aren't good treatments for most types of dementia.  Prevent falls and accidents by securing loose rugs or use non-skid rugs, loose wires or electrical cords. Wear sturdy, comfortable shoes, keep walkways well lit. There are no proven ways to prevent dementia. But encourage physical activity, healthy diet and social interaction to help keep the brain healthy. Keep medications, sharp knives, lighters, matches, power tools, and guns out of reach. Lower the temperature on water heaters. Keep familiar objects and people around. Use reminders, notes, or directions for daily activities or tasks. Keep a simple, consistent routine for waking, meals, bathing, dressing, and bedtime. Display emergency numbers and home address near all telephones.   SEEK MEDICAL CARE IF: New behavioral problems start such as moodiness, aggressiveness, or seeing things that are not there (hallucinations). Any new problem with brain function happens. This includes problems with balance, speech, swallowing difficulty or falling a lot. Small changes or worsening in any aspect of brain function can be a sign that the illness is getting worse or a sign of infection. Seeing a caregiver right away is important.   SEEK IMMEDIATE MEDICAL CARE IF: New or worsened confusion, sleepiness, or inability to sleep develops.     PRINCIPAL DISCHARGE DIAGNOSIS  Diagnosis: Pneumonia  Assessment and Plan of Treatment: Pneumonia is a lung infection that can cause a fever, cough, and trouble breathing.  Continue all antibiotics as ordered until complete.  Nutrition is important, eat small frequent meals.  Get lots of rest and drink fluids.  Call your health care provider upon arrival home from hospital and make a follow up appointment for one week.  If your cough worsens, you develop fever greater than 101', you have shaking chills, a fast heartbeat, trouble breathing and/or feel your are breathing much faster than usual, call your healthcare provider.  Make sure you wash your hands frequently.  You were given a course of antibiotics during your hospital stay.  Continue Augmentin  Please follow-up with your primary care physician within one week of discharge.      SECONDARY DISCHARGE DIAGNOSES  Diagnosis: Sepsis  Assessment and Plan of Treatment: Continue Augmentin  Take all antibiotics as ordered.  Call you Health care provider upon arrival home to make a one week follow up appointment.  If you develop fever, chills, malaise, or change in mental status call your Health Care Provider or go to the Emergency Department.  Nutrition is important, eat small frequent meals to help ensure you get adequate calories.  Do not stay in bed all day!  Increase your activity daily as tolerated.    Diagnosis: Diabetes mellitus  Assessment and Plan of Treatment: HgA1C this admission - 7.4  Make sure you get your HgA1c checked every three months.  If you take oral diabetes medications, check your blood glucose two times a day.  If you take insulin, check your blood glucose before meals and at bedtime.  It's important not to skip any meals.  Keep a log of your blood glucose results and always take it with you to your doctor appointments.  Keep a list of your current medications including injectables and over the counter medications and bring this medication list with you to all your doctor appointments.  If you have not seen your ophthalmologist this year call for appointment.  Check your feet daily for redness, sores, or openings. Do not self treat. If no improvement in two days call your primary care physician for an appointment.  Low blood sugar (hypoglycemia) is a blood sugar below 70mg/dl. Check your blood sugar if you feel signs/symptoms of hypoglycemia. If your blood sugar is below 70 take 15 grams of carbohydrates (ex 4 oz of apple juice, 3-4 glucose tablets, or 4-6 oz of regular soda) wait 15 minutes and repeat blood sugar to make sure it comes up above 70.  If your blood sugar is above 70 and you are due for a meal, have a meal.  If you are not due for a meal have a snack.  This snack helps keeps your blood sugar at a safe range.      Diagnosis: Elevated troponin  Assessment and Plan of Treatment: We trended your troponin level. Troponin is a cardiac enzyme. Troponin was likely elevated secondary to demand ischemia in the setting of sepsis.    Diagnosis: Chronic kidney disease, unspecified CKD stage  Assessment and Plan of Treatment: Avoid taking (NSAIDs) - (ex: Ibuprofen, Advil, Celebrex, Naprosyn)  Avoid taking any nephrotoxic agents (can harm kidneys) - Intravenous contrast for diagnostic testing, combination cold medications.  Have all medications adjusted for your renal function by your Health Care Provider.  Blood pressure control is important.  Take all medication as prescribed.      Diagnosis: Lower extremity edema  Assessment and Plan of Treatment: Your edema has been chronic. A duplex scan of your lower extremities was performed to ensure you did not have clots in the lower extremities. No blood clots observed on imaging.    Diagnosis: HTN (hypertension)  Assessment and Plan of Treatment: Continue losartan 100 mg daily  Continue amlodipine 10 mg daily  Continue hydrochlorothiazide 12.5 mg daily  Take medications for your blood pressure as recommended.  Eat a heart healthy diet that is low in saturated fats and salt, and includes whole grains, fruits, vegetables and lean protein   Exercise regularly (consult with your physician or cardiologist first); maintain a heart healthy weight.   If you smoke - quit (A resource to help you stop smoking is the [facility] – phone number [phone].). Continue to follow with your primary physician or cardiologist.   Seek medical help for dizziness, Lightheadedness, Blurry vision, Headache, Chest pain, Shortness of breath  Follow up with your medical doctor and cardiologist to establish long term blood pressure treatment goals.    Diagnosis: Dementia  Assessment and Plan of Treatment: Dementia causes memory problems and make it hard to think clear. The symptoms of dementia often start off very mild and get worse slowly. Symptoms are forgetfulness, confusion, trouble with language, getting lost in familiar places. As dementia gets worse, it can cause anger or aggression, see things that aren't there, decreased ability to eat, bathe, dress, or do other everyday tasks, or cause people to lose bladder and bowel control. Alzheimer disease, there are medicines that might help some. If you have vascular dementia, your doctor will focus on keeping your blood pressure and cholesterol as normal as possible. Sadly, there really aren't good treatments for most types of dementia.  Prevent falls and accidents by securing loose rugs or use non-skid rugs, loose wires or electrical cords. Wear sturdy, comfortable shoes, keep walkways well lit. There are no proven ways to prevent dementia. But encourage physical activity, healthy diet and social interaction to help keep the brain healthy. Keep medications, sharp knives, lighters, matches, power tools, and guns out of reach. Lower the temperature on water heaters. Keep familiar objects and people around. Use reminders, notes, or directions for daily activities or tasks. Keep a simple, consistent routine for waking, meals, bathing, dressing, and bedtime. Display emergency numbers and home address near all telephones.   SEEK MEDICAL CARE IF: New behavioral problems start such as moodiness, aggressiveness, or seeing things that are not there (hallucinations). Any new problem with brain function happens. This includes problems with balance, speech, swallowing difficulty or falling a lot. Small changes or worsening in any aspect of brain function can be a sign that the illness is getting worse or a sign of infection. Seeing a caregiver right away is important.   SEEK IMMEDIATE MEDICAL CARE IF: New or worsened confusion, sleepiness, or inability to sleep develops.    Diagnosis: Pressure injury of deep tissue of sacral region  Assessment and Plan of Treatment: Bilateral sacral/buttock deep tissue injury wound care instructions:  1) continue turning and positioning every 2 hours and as needed, utilizing offloading assistive devices  2) continue with routine pericare daily and as needed for soiling  3) encourage optimal nutrition  4) waffle cushion when out of bed to chair  5) bilateral lower extremity complete cair air fluidized boots to offload heels/feet  6) maki protective barrier cream to bilateral buttocks/sacrum daily and as needed for soiling  7) incontinence management - continue external female urinary catheter to divert urine from skin   8) limit diaper usage to while patient is ambulating only, then remove     PRINCIPAL DISCHARGE DIAGNOSIS  Diagnosis: Pneumonia  Assessment and Plan of Treatment: Pneumonia is a lung infection that can cause a fever, cough, and trouble breathing.  Continue all antibiotics as ordered until complete.  Nutrition is important, eat small frequent meals.  Get lots of rest and drink fluids.  Call your health care provider upon arrival home from hospital and make a follow up appointment for one week.  If your cough worsens, you develop fever greater than 101', you have shaking chills, a fast heartbeat, trouble breathing and/or feel your are breathing much faster than usual, call your healthcare provider.  Make sure you wash your hands frequently.  You were given a course of antibiotics during your hospital stay.  Augmentin antibiotic was sent to your pharmacy. Please complete the course of antibiotics, which ends on Saturday 2/4/23. You may crush the pill and give it in applesauce.  Please follow-up with your primary care physician within one week of discharge.      SECONDARY DISCHARGE DIAGNOSES  Diagnosis: Sepsis  Assessment and Plan of Treatment: Continue Augmentin antibiotic on 2/4/23  Take all antibiotics as ordered.  Call you Health care provider upon arrival home to make a one week follow up appointment.  If you develop fever, chills, malaise, or change in mental status call your Health Care Provider or go to the Emergency Department.  Nutrition is important, eat small frequent meals to help ensure you get adequate calories.  Do not stay in bed all day!  Increase your activity daily as tolerated.    Diagnosis: Diabetes mellitus  Assessment and Plan of Treatment: HgA1C this admission - 7.4  Make sure you get your HgA1c checked every three months.  If you take oral diabetes medications, check your blood glucose two times a day.  If you take insulin, check your blood glucose before meals and at bedtime.  It's important not to skip any meals.  Keep a log of your blood glucose results and always take it with you to your doctor appointments.  Keep a list of your current medications including injectables and over the counter medications and bring this medication list with you to all your doctor appointments.  If you have not seen your ophthalmologist this year call for appointment.  Check your feet daily for redness, sores, or openings. Do not self treat. If no improvement in two days call your primary care physician for an appointment.  Low blood sugar (hypoglycemia) is a blood sugar below 70mg/dl. Check your blood sugar if you feel signs/symptoms of hypoglycemia. If your blood sugar is below 70 take 15 grams of carbohydrates (ex 4 oz of apple juice, 3-4 glucose tablets, or 4-6 oz of regular soda) wait 15 minutes and repeat blood sugar to make sure it comes up above 70.  If your blood sugar is above 70 and you are due for a meal, have a meal.  If you are not due for a meal have a snack.  This snack helps keeps your blood sugar at a safe range.      Diagnosis: Elevated troponin  Assessment and Plan of Treatment: We trended your troponin level. Troponin is a cardiac enzyme. Troponin was likely elevated secondary to demand ischemia in the setting of sepsis.    Diagnosis: Chronic kidney disease, unspecified CKD stage  Assessment and Plan of Treatment: Avoid taking (NSAIDs) - (ex: Ibuprofen, Advil, Celebrex, Naprosyn)  Avoid taking any nephrotoxic agents (can harm kidneys) - Intravenous contrast for diagnostic testing, combination cold medications.  Have all medications adjusted for your renal function by your Health Care Provider.  Blood pressure control is important.  Take all medication as prescribed.      Diagnosis: Lower extremity edema  Assessment and Plan of Treatment: Your edema has been chronic. A duplex scan of your lower extremities was performed to ensure you did not have clots in the lower extremities. No blood clots observed on imaging.    Diagnosis: HTN (hypertension)  Assessment and Plan of Treatment: Continue losartan 100 mg daily  Continue amlodipine 10 mg daily  Continue hydrochlorothiazide 12.5 mg daily  Take medications for your blood pressure as recommended.  Eat a heart healthy diet that is low in saturated fats and salt, and includes whole grains, fruits, vegetables and lean protein   Exercise regularly (consult with your physician or cardiologist first); maintain a heart healthy weight.   If you smoke - quit (A resource to help you stop smoking is the [facility] – phone number [phone].). Continue to follow with your primary physician or cardiologist.   Seek medical help for dizziness, Lightheadedness, Blurry vision, Headache, Chest pain, Shortness of breath  Follow up with your medical doctor and cardiologist to establish long term blood pressure treatment goals.    Diagnosis: Dementia  Assessment and Plan of Treatment: Dementia causes memory problems and make it hard to think clear. The symptoms of dementia often start off very mild and get worse slowly. Symptoms are forgetfulness, confusion, trouble with language, getting lost in familiar places. As dementia gets worse, it can cause anger or aggression, see things that aren't there, decreased ability to eat, bathe, dress, or do other everyday tasks, or cause people to lose bladder and bowel control. Alzheimer disease, there are medicines that might help some. If you have vascular dementia, your doctor will focus on keeping your blood pressure and cholesterol as normal as possible. Sadly, there really aren't good treatments for most types of dementia.  Prevent falls and accidents by securing loose rugs or use non-skid rugs, loose wires or electrical cords. Wear sturdy, comfortable shoes, keep walkways well lit. There are no proven ways to prevent dementia. But encourage physical activity, healthy diet and social interaction to help keep the brain healthy. Keep medications, sharp knives, lighters, matches, power tools, and guns out of reach. Lower the temperature on water heaters. Keep familiar objects and people around. Use reminders, notes, or directions for daily activities or tasks. Keep a simple, consistent routine for waking, meals, bathing, dressing, and bedtime. Display emergency numbers and home address near all telephones.   SEEK MEDICAL CARE IF: New behavioral problems start such as moodiness, aggressiveness, or seeing things that are not there (hallucinations). Any new problem with brain function happens. This includes problems with balance, speech, swallowing difficulty or falling a lot. Small changes or worsening in any aspect of brain function can be a sign that the illness is getting worse or a sign of infection. Seeing a caregiver right away is important.   SEEK IMMEDIATE MEDICAL CARE IF: New or worsened confusion, sleepiness, or inability to sleep develops.    Diagnosis: Pressure injury of deep tissue of sacral region  Assessment and Plan of Treatment: Bilateral sacral/buttock deep tissue injury wound care instructions:  1) continue turning and positioning every 2 hours and as needed, utilizing offloading assistive devices  2) continue with routine pericare daily and as needed for soiling  3) encourage optimal nutrition  4) waffle cushion when out of bed to chair  5) bilateral lower extremity complete cair air fluidized boots to offload heels/feet  6) maki protective barrier cream to bilateral buttocks/sacrum daily and as needed for soiling  7) incontinence management - continue external female urinary catheter to divert urine from skin   8) limit diaper usage to while patient is ambulating only, then remove

## 2023-02-02 NOTE — PROGRESS NOTE ADULT - PROBLEM SELECTOR PLAN 2
desat at home to 80s on RA  resolved on admission to ED  tolerating room air  supplemental O2 as needed
desat at home to 80s on RA  resolved on admission to ED  tolerating room air  supplemental O2 as needed
80% on RA per EMS, stable on RA since arrival  CXR with b/l interstitial opacities R>L  will empirically for aspiration pneumonia as patient met sepsis criteria on arrival   - minced with thicken liquids  - abx as above  - pt oxygenation improved without diuretics but will obtain BNP due to chronic LE edema.   - s/p 40mg IV lasix x1 for LE edema and left pleural effusion  -Cancelled CT chest- patient agitated, will avoid sedation. Empirically treat for PNA. And restart HCTZ
desat at home to 80s on RA  resolved on admission to ED  tolerating room air  supplemental O2 as needed
80% on RA per EMS, stable on RA since arrival  CXR with b/l interstitial opacities R>L  will empirically for aspiration pneumonia as patient met sepsis criteria on arrival   - minced with thicken liquids  - abx as above  - pt oxygenation improved without diuretics but will obtain BNP due to chronic LE edema.   - 40mg IV lasix x1 for LE edema and left pleural effusion  -ct chest

## 2023-02-02 NOTE — PROVIDER CONTACT NOTE (OTHER) - ACTION/TREATMENT ORDERED:
Provider notified, order EKG- writer attempted EKG with help of other staff patient unable to sit still through EKG provider aware, ordered phos to be to be added to morning labs.
PA notified, no new orders
Provider ordered Stat labs and continue to monitor.
MD aware. No new orders, continue to monitor and notify of any changes
Provider Notified, Wound consult, Dietitian consult in place.

## 2023-02-02 NOTE — PROVIDER CONTACT NOTE (OTHER) - REASON
Pat had event on tele  PAT HR up to 150 x1.6 seconds
Pt hypertensive /73
Patient with suspected DTI to sacrum
PAT up to 130 for 8.14 seconds
PAT x 5.55sec, HR up to 155

## 2023-02-11 PROBLEM — I10 BENIGN ESSENTIAL HYPERTENSION: Status: ACTIVE | Noted: 2023-01-01

## 2023-02-11 PROBLEM — F03.918 DEMENTIA WITH OTHER BEHAVIORAL DISTURBANCE: Status: ACTIVE | Noted: 2023-01-01

## 2023-02-11 PROBLEM — E11.9 DIABETES: Status: ACTIVE | Noted: 2023-01-01

## 2023-02-11 PROBLEM — J69.0 ASPIRATION PNEUMONIA: Status: ACTIVE | Noted: 2023-01-01

## 2023-02-11 PROBLEM — E78.5 HYPERLIPIDEMIA: Status: ACTIVE | Noted: 2023-01-01

## 2023-02-11 PROBLEM — R60.0 BILATERAL EDEMA OF LOWER EXTREMITY: Status: ACTIVE | Noted: 2023-01-01

## 2023-02-11 PROBLEM — G47.00 INSOMNIA: Status: ACTIVE | Noted: 2023-01-01

## 2023-02-14 NOTE — HEALTH RISK ASSESSMENT
[Low Fat Diet] : low fat [Low Salt Diet] : low salt [Language] : difficulty with language [Behavior] : difficulty with behavior [Learning/Retaining New Information] : difficulty learning/retaining new information [Handling Complex Tasks] : difficulty handling complex tasks [Reasoning] : difficulty with reasoning [Transportation] : transportation [With Significant Other] : lives with significant other [Full assistance needed] : managing finances [Patient/Caregiver unclear of wishes] : , patient/caregiver unclear of wishes [FreeTextEntry1] : minced food with thick liquid [Change in mental status noted] : No change in mental status noted [de-identified] : Dementia [de-identified] : as per daughter she is unable to take her out of her home, requires wheelchair, no ramp in place [de-identified] :  [FreeTextEntry7] : daughter fills pillbox,  gives insulin. HHA assist with oral meds [AdvancecareDate] : 02/10/23 [FreeTextEntry4] : Conversation with daughter Petty via telephone regarding ACP and end of life wishes. As per daughter she is not aware of patient's wishes as she is not HCP, and has not discussed CPR vs DNR. HCP reported to be  Radhamaibis Geeta.  was in the home during visit, but not available for discussion of ACP or GOC.

## 2023-02-14 NOTE — HISTORY OF PRESENT ILLNESS
[Post-hospitalization from ___ Hospital] : Post-hospitalization from [unfilled] Hospital [Admitted on: ___] : The patient was admitted on [unfilled] [Discharged on ___] : discharged on [unfilled] [Discharge Summary] : discharge summary [Discharge Med List] : discharge medication list [Other: ____] : [unfilled] [Med Reconciliation] : medication reconciliation has been completed [Patient Contacted By: ____] : and contacted by [unfilled] [FreeTextEntry2] : 88F w/ T2DM, HTN, HLD, and dementia (AOx0, minimally verbal) who presents to ED with SOB, pt was initially hypoxic but subsequently able to be weaned to room air, met sepsis criteria on arrival with possible aspiration event as source   \par CXR with b/l interstitial opacities R>L -will empirically for aspiration pneumonia as patient met sepsis criteria on arrival.\par Diet- minced with thicken liquid. At baseline, pt is AOx0, minimally verbal (aphasic), does not follow commands, and is bedbound.\par Seen today in her home awake, alert oriented x 0, sitting in chair in no acute distress. Farsi speaking. Daughter Petty not present at visit, I spoke with her via telephone prior to arrival and post visit.  in the home, but not available for participation at this visit.  Daughter reports that patient is confused,  no longer recognizes her and does not have meaningful communication. Lives with her , has 24/7 HHA. She is resistant to PE and unable to participate. Non ambulatory and dependent for all IADL's. Negative for falls as per HHA. Pt has w/c, bed rail, heel float booties and pamela lift in the home.  Daughter is unable to take her to MD appts, she has PMD Dr Joleen Olsen [phone] that does home visits and was seen Wed by NP as per daughter.  She receives all medication as prescribed according to HHA and daughter,  gives insulin.  Pt is well groomed, well cared for in no acute distress at this time. \par \par

## 2023-02-14 NOTE — PHYSICAL EXAM
[No Acute Distress] : no acute distress [Well Nourished] : well nourished [Well Developed] : well developed [Well-Appearing] : well-appearing [Normal Sclera/Conjunctiva] : normal sclera/conjunctiva [EOMI] : extraocular movements intact [Normal Outer Ear/Nose] : the outer ears and nose were normal in appearance [Normal Nasal Mucosa] : the nasal mucosa was normal [No JVD] : no jugular venous distention [Supple] : supple [No Respiratory Distress] : no respiratory distress  [Clear to Auscultation] : lungs were clear to auscultation bilaterally [No Accessory Muscle Use] : no accessory muscle use [Normal Rate] : normal rate  [Regular Rhythm] : with a regular rhythm [Normal S1, S2] : normal S1 and S2 [No Murmur] : no murmur heard [Soft] : abdomen soft [Non Tender] : non-tender [Non-distended] : non-distended [Normal Bowel Sounds] : normal bowel sounds [No Spinal Tenderness] : no spinal tenderness [No Rash] : no rash [Agitated] : agitated [Impaired judgment] : impaired judgment [Impaired Insight] : impaired insight [Person] : disoriented to person [Place] : disoriented to place [Time] : disoriented to time [Short Term Intact] : short term memory impaired [Remote Intact] : remote memory impaired [Registration Intact] : recent registration memory impaired [Span Intact] : the attention span was decreased [Concentration Intact] : a decrease in concentrating ability was observed [Naming Objects] : difficulty naming common objects [Repeating Phrases] : difficulty repeating a phrase [Writing A Sentence] : difficulty writing a sentence [Current Events] : inadequate knowledge of current events [Past History] : inadequate fund of knowledge regarding past history [Vocabulary] : inadequate fund of knowledge regarding vocabulary [Anxious] : not anxious [Flat] : not flat [Normal Articulation] : abnormal articulation [Normal Fluency] : not fluent [Normal Coherency] : not coherent [Normal Spontaneity] : not spontaneous [de-identified] : some missing teeth bottom left [de-identified] : poor effort, unable to follow directions secondary to dementia [de-identified] : BLE grade 1 to left LLE, grade 2 to RLE. Chronic condition [de-identified] : incontinent [de-identified] : incontinent [de-identified] : no moaning, grimacing or withdrawing during palpation [de-identified] : visible skin intact, unable to assess sacrum, pt in chair unable to stand. 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 As per daughter it is chronic, more than 5 yrs, has been assessed in the past with no plan for treatment.  [de-identified] : non ambulatory

## 2023-02-14 NOTE — REVIEW OF SYSTEMS
[Lower Ext Edema] : lower extremity edema [Fever] : no fever [Night Sweats] : no night sweats [Discharge] : no discharge [Redness] : no redness [Nasal Discharge] : no nasal discharge [Wheezing] : no wheezing [Cough] : no cough [Constipation] : no constipation [Diarrhea] : diarrhea [FreeTextEntry2] : reported by HHA pt unable to verbally participate in PE [de-identified] : Daughter reports previous DTI to sacrum has resolved

## 2023-02-14 NOTE — COUNSELING
[Fall prevention counseling provided] : Fall prevention counseling provided [Use recommended devices] : Use recommended devices [FreeTextEntry1] : Requires pamela lift, wheelchair for mobility.

## 2023-02-14 NOTE — PLAN
[FreeTextEntry1] : -Take all medications as prescribed\par -follow up with PMD and daughter will discuss recommended follow up appts for Cardiology and endocrine.\par -continue with supportive care from family and HHA's\par -continue to use heel booties, off load pressure to sacrum, at least Q 2 hrs while in chair and in bed. Continue to use skin protective ointment. Frequently change diaper to avoid skin irritaion/breakdown\par -use DME as prescribed\par -have future discussion regarding GOC and ACP as a family\par -call TCM for any changes to condition, questions or concerns. 24/7 availability explained to daughter and HHA, phone numbers left in the home.\par

## 2023-04-22 NOTE — CONSULT NOTE ADULT - ASSESSMENT
Impression:    Plan:  - CTPE pending as per ED, reasonable given bedbound patient with asymmetric leg edema with sinus tachycardia and respiratory distress   - Agree with IV diuresis, can dose lasix 40mg IV bid to start with aggressive lytes repletion (K/Mg to 4/2 respectively)  - Daily weights, I/O's if possible   Impression:  NSTEMI, suspect type II/demand  Pulmonary edema  Leukocytosis, reactive vs. aspiration PNA  Lactic acidosis, query 2/2 WOB  HTN  Dementia    Plan:  - CTPE pending as per ED, reasonable given bedbound patient with asymmetric leg edema with sinus tachycardia and respiratory distress   - Agree with IV diuresis, can dose lasix 40mg IV bid to start with aggressive lytes repletion (K/Mg to 4/2 respectively)  - Daily weights, I/O's if possible     88-year-old past medical history of hypertension, hyperlipidemia, DM, dementia presents with difficulty breathing.  Suspect PE vs. aspiration PNA w/ component of adHF.     Impression:  NSTEMI, suspect type II/demand  Pulmonary edema  Leukocytosis, reactive vs. aspiration PNA  Lactic acidosis, query 2/2 WOB  TOÑITO (baseline creatinine 0.86)  HTN  Dementia    Plan:  - CTPE pending as per ED, reasonable given bedbound patient with asymmetric leg edema with sinus tachycardia and respiratory distress   - Agree with IV diuresis, can dose lasix 40mg IV bid to start with aggressive lytes repletion (K/Mg to 4/2 respectively)  - Daily weights, I/O's if possible  - Defer DAPT load and hep gtt as do not suspect acute plaque rupture at this time. Continue aspirin 81 and atorva 80   - TTE   - Continue antihypertensives to goal < 130/80   - f/u A1c, lipid panel and TSH for further risk stratification   - Ongoing GOC discussion as per primary team    88-year-old past medical history of hypertension, hyperlipidemia, DM, dementia presents with difficulty breathing.  Suspect PE vs. aspiration PNA w/ component of adHF.     Impression:  NSTEMI, suspect type II/demand  Pulmonary edema  Leukocytosis, reactive vs. aspiration PNA  Lactic acidosis, query 2/2 WOB  TOÑITO (baseline creatinine 0.86)  HTN  Dementia    Plan:  - CTPE pending as per ED, reasonable given bedbound patient with asymmetric leg edema with sinus tachycardia and respiratory distress   - Agree with IV diuresis, can dose lasix 40mg IV bid to start with aggressive lytes repletion (K/Mg to 4/2 respectively)  - Daily weights, I/O's if possible  - Defer DAPT load and hep gtt as do not suspect acute plaque rupture at this time. hstrop peaked at 71. Continue aspirin 81 and atorva 80   - TTE   - Continue antihypertensives to goal < 130/80   - f/u A1c, lipid panel and TSH for further risk stratification   - Ongoing GOC discussion as per primary team    88-year-old past medical history of hypertension, hyperlipidemia, DM, dementia presents with difficulty breathing.  Suspect PE vs. aspiration PNA w/ component of adHF.     Impression:  NSTEMI, suspect type II/demand  Pulmonary edema  Leukocytosis, reactive vs. aspiration PNA  Lactic acidosis, query 2/2 WOB  TOÑITO (baseline creatinine 0.86)  HTN  Dementia    Plan:  - CTPE duplex pending as per ED, reasonable given bedbound patient with asymmetric leg edema with sinus tachycardia and respiratory distress   - Agree with IV diuresis, can dose lasix 40mg IV bid to start with aggressive lytes repletion (K/Mg to 4/2 respectively)  - Daily weights, I/O's if possible  - Defer DAPT load and hep gtt as do not suspect acute plaque rupture at this time. hstrop peaked at 71. Continue aspirin 81 and atorva 80   - TTE   - Continue antihypertensives to goal < 130/80   - f/u A1c, lipid panel and TSH for further risk stratification   - Ongoing GOC discussion as per primary team    88-year-old past medical history of hypertension, hyperlipidemia, DM, dementia presents with difficulty breathing.  Suspect PE vs. aspiration PNA w/ component of adHF.     Impression:  NSTEMI, suspect type II/demand  Pulmonary edema  Leukocytosis, reactive vs. aspiration PNA  Lactic acidosis, query 2/2 WOB  TOÑITO (baseline creatinine 0.86)  HTN  Dementia    Plan:  - Given bedbound patient with asymmetric leg edema with sinus tachycardia and respiratory distress. Duplex negative. Would not pursue CTPE at this time, continue to address GOC as per primary team  - Agree with IV diuresis, can dose lasix 40mg IV bid to start with aggressive lytes repletion (K/Mg to 4/2 respectively)  - Daily weights, I/O's if possible  - Defer DAPT load and hep gtt as do not suspect acute plaque rupture at this time. hstrop peaked at 71. Continue aspirin 81 and atorva 80   - TTE   - Continue antihypertensives to goal < 130/80   - f/u A1c, lipid panel and TSH for further risk stratification

## 2023-04-22 NOTE — ED PROVIDER NOTE - CLINICAL SUMMARY MEDICAL DECISION MAKING FREE TEXT BOX
87 yo F with HTN, HLD, DM presents with SOB. Patient hypertensive to 204/67, tachycardic and borderline febrile - will  check rectal temp for accurate measurement. Patient oxygen saturation 94% on room air - slightly hypoxic. 87 yo F with HTN, HLD, DM presents with SOB. Patient hypertensive to 204/67, tachycardic and borderline febrile - will  check rectal temp for accurate measurement. Patient oxygen saturation 94% on room air - slightly hypoxic. Concern for pneumonia (possibly aspiration) vs pulmonary embolism due to patients right lower extremity edema. Will get labs, CTA to differentiate. Likely admission.

## 2023-04-22 NOTE — CONSULT NOTE ADULT - SUBJECTIVE AND OBJECTIVE BOX
CARDIOLOGY FELLOW CONSULT NOTE    HPI:    Interval Events:   /67 satting 96% Ra RR 22   139/4.5 | 104/20 | 66/1.70 < 264  13.51 < 9.3 > 239  36/24 | 96 | 0.3 | 7.5/3.9   proBNP 4203 hstrop 71 mg 2.7   lactate 2.1   XR: hilar congestion, probable pleural effusion  In the ED, received: lasix 20mgIVP x1, haldol 1mg IM, Zosyn 3.375 IVPB, and duonebs     PMHx:   Diabetes Mellitus, Type 2  Glaucoma  Hyperlipidemia  HTN (Hypertension)  Diabetic retinopathy  Dementia  Cataract    PSHx:   S/P Carpal Tunnel Release    Allergies:  No Known Allergies    Home Meds:    Current Medications:   furosemide   Injectable 20 milliGRAM(s) IV Push Once  piperacillin/tazobactam IVPB... 3.375 Gram(s) IV Intermittent once    FAMILY HISTORY:  Family history of bipolar disorder (Child)    Social History:  Smoking History:  Alcohol Use:  Drug Use:    REVIEW OF SYSTEMS: 14pt ros neg unless stated above    Physical Exam:  T(F): 99.2 (04-22), Max: 99.3 (04-22)  HR: 71 (04-22) (71 - 117)  BP: 145/78 (04-22) (145/78 - 204/67)  RR: 22 (04-22)  SpO2: 97% (04-22)  GENERAL: No acute distress, well-developed  HEAD:  Atraumatic, Normocephalic  ENT: EOMI, PERRLA, conjunctiva and sclera clear, Neck supple, No JVD, moist mucosa  CHEST/LUNG: Clear to auscultation bilaterally; No wheeze, equal breath sounds bilaterally   BACK: No spinal tenderness  HEART: Regular rate and rhythm; No murmurs, rubs, or gallops  ABDOMEN: Soft, Nontender, Nondistended; Bowel sounds present  EXTREMITIES:  No clubbing, cyanosis, or edema  PSYCH: Nl behavior, nl affect  NEUROLOGY: AAOx3, non-focal, cranial nerves intact  SKIN: Normal color, No rashes or lesions  LINES:    ECG: sinus tachycardia, left axis, borderline LVH per amanda criteria, wandering baseline     TTE 2015  Conclusions:  Technically difficult study.  1. Normal left ventricular internal dimensions and wall  thicknesses.  2. Hyperdynamic left ventricle.  3. Mild diastolic dysfunction (Stage I).  4. Normal right ventricular size and function.  *** No previous Echo exam.    Labs: Personally reviewed                        9.3    13.51 )-----------( 239      ( 22 Apr 2023 21:20 )             28.1     04-22    139  |  104  |  66<H>  ----------------------------<  264<H>  4.5   |  20<L>  |  1.70<H>    Ca    9.1      22 Apr 2023 21:20  Mg     2.7     04-22    TPro  7.5  /  Alb  3.9  /  TBili  0.3  /  DBili  x   /  AST  36  /  ALT  24  /  AlkPhos  96  04-22        CARDIAC MARKERS ( 22 Apr 2023 21:20 )  71 ng/L / x     / x     / x     / x     / x     CARDIOLOGY FELLOW CONSULT NOTE    HPI:  History obtained exclusively through daughter Petty, who can be reached at [phone]  Alternate MRN [id number]    88-year-old past medical history of hypertension, hyperlipidemia, DM, dementia presents with difficulty breathing. Patient brought in by daughter who is giving history.  Daughter noticed that patient is struggling to breathe in the last few days; her home health aide has also noticed increased swelling in her hands, feet and even her face.   Patient has been eating and drinking normally.  Patient usually has a puréed diet but as per daughter, patient usually coughs a lot.  Recently hospitalized for pneumonia in January.   Per daughter, patient is difficult to communicate with but somehow managed to tell HHA that she was feeling unwell prior to hospital presentation. BPs at home usually run around 140s/90s.     Interval Events:   /67 satting 96% Ra RR 22.   139/4.5 | 104/20 | 66/1.70 < 264  13.51 < 9.3 > 239  36/24 | 96 | 0.3 | 7.5/3.9   proBNP 4203 hstrop 71 mg 2.7   lactate 2.1   XR: hilar congestion, probable pleural effusion  POCUS: bilateral B lines     Unclear how accurate these vitals are, as patient difficult to direct and she was struggling against monitors, refusing stethoscope on her chest. She is mildly diaphoretic and yells at providers indiscriminately.    In the ED, received: lasix 20mgIVP x1, haldol 1mg IM, Zosyn 3.375 IVPB, and duonebs     PMHx:   Diabetes Mellitus, Type 2  Glaucoma  Hyperlipidemia  HTN (Hypertension)  Diabetic retinopathy  Dementia  Cataract    PSHx:   S/P Carpal Tunnel Release    Allergies:  No Known Allergies    Home Meds:  Aspirin 81  Atorva 80  Amlodipine 10  Losartan 100mg, hydochlorothizide 12.5mg daily  Ramelton 8  Vitamin D 33143i qweekly  Current Medications:   furosemide   Injectable 20 milliGRAM(s) IV Push Once  piperacillin/tazobactam IVPB... 3.375 Gram(s) IV Intermittent once    FAMILY HISTORY:  Family history of bipolar disorder (Child)    SHx: noncontributory     REVIEW OF SYSTEMS: 14pt ros neg unless stated above    Physical Exam:  T(F): 99.2 (04-22), Max: 99.3 (04-22)  HR: 71 (04-22) (71 - 117)  BP: 145/78 (04-22) (145/78 - 204/67)  RR: 22 (04-22)  SpO2: 97% (04-22)  GENERAL: AOx0-1, right eye cataract   HEAD:  Atraumatic, Normocephalic  ENT: EOMI, PERRLA, conjunctiva and sclera clear, Neck supple, +JVD to jaw   CHEST/LUNG: Patient batted away my stethoscope and I was unable to auscultate anterior chest wall for long and posterior chest wall at all  BACK: No spinal tenderness  HEART: tachycardic   ABDOMEN: Soft, Nontender, Nondistended; Bowel sounds present  EXTREMITIES:  R> L ROMIE pitting edema  PSYCH: Nl behavior, nl affect  NEUROLOGY: AAOx3, non-focal, cranial nerves intact  SKIN: Normal color, No rashes or lesions    ECG: sinus tachycardia, left axis, borderline LVH per amanda criteria, wandering baseline     TTE 2015  Conclusions:  Technically difficult study.  1. Normal left ventricular internal dimensions and wall  thicknesses.  2. Hyperdynamic left ventricle.  3. Mild diastolic dysfunction (Stage I).  4. Normal right ventricular size and function.  *** No previous Echo exam.    Labs: Personally reviewed                        9.3    13.51 )-----------( 239      ( 22 Apr 2023 21:20 )             28.1     04-22    139  |  104  |  66<H>  ----------------------------<  264<H>  4.5   |  20<L>  |  1.70<H>    Ca    9.1      22 Apr 2023 21:20  Mg     2.7     04-22    TPro  7.5  /  Alb  3.9  /  TBili  0.3  /  DBili  x   /  AST  36  /  ALT  24  /  AlkPhos  96  04-22        CARDIAC MARKERS ( 22 Apr 2023 21:20 )  71 ng/L / x     / x     / x     / x     / x     CARDIOLOGY FELLOW CONSULT NOTE    HPI:  History obtained exclusively through daughter Petty, who can be reached at [phone]  Alternate MRN [id number]    88-year-old past medical history of hypertension, hyperlipidemia, DM, dementia presents with difficulty breathing. Patient brought in by daughter who is giving history.  Daughter noticed that patient is struggling to breathe in the last few days; her home health aide has also noticed increased swelling in her hands, feet and even her face.   Patient has been eating and drinking normally.  Patient usually has a puréed diet but as per daughter, patient usually coughs a lot.  Recently hospitalized for pneumonia in January; at that time hstrop peaked to 800s and thought 2/2 demand ischemia.  Per daughter, patient is difficult to communicate with but somehow managed to tell HHA that she was feeling unwell prior to hospital presentation. BPs at home usually run around 140s/90s.     Interval Events:   /67 satting 96% Ra RR 22.   139/4.5 | 104/20 | 66/1.70 < 264  13.51 < 9.3 > 239  36/24 | 96 | 0.3 | 7.5/3.9   proBNP 4203 hstrop 71 mg 2.7   lactate 2.1   XR: hilar congestion, probable pleural effusion  POCUS: bilateral B lines     Unclear how accurate these vitals are, as patient difficult to direct and she was struggling against monitors, refusing stethoscope on her chest. She is mildly diaphoretic and yells at providers indiscriminately.    In the ED, received: lasix 20mgIVP x1, haldol 1mg IM, Zosyn 3.375 IVPB, and duonebs     PMHx:   Diabetes Mellitus, Type 2  Glaucoma  Hyperlipidemia  HTN (Hypertension)  Diabetic retinopathy  Dementia  Cataract    PSHx:   S/P Carpal Tunnel Release    Allergies:  No Known Allergies    Home Meds:  Aspirin 81  Atorva 80  Amlodipine 10  Losartan 100mg, hydochlorothizide 12.5mg daily  Ramelton 8  Vitamin D 72376n qweekly  Current Medications:   furosemide   Injectable 20 milliGRAM(s) IV Push Once  piperacillin/tazobactam IVPB... 3.375 Gram(s) IV Intermittent once    FAMILY HISTORY:  Family history of bipolar disorder (Child)    SHx: noncontributory     REVIEW OF SYSTEMS: 14pt ros neg unless stated above    Physical Exam:  T(F): 99.2 (04-22), Max: 99.3 (04-22)  HR: 71 (04-22) (71 - 117)  BP: 145/78 (04-22) (145/78 - 204/67)  RR: 22 (04-22)  SpO2: 97% (04-22)  GENERAL: AOx0-1, right eye cataract   HEAD:  Atraumatic, Normocephalic  ENT: EOMI, PERRLA, conjunctiva and sclera clear, Neck supple, +JVD to jaw   CHEST/LUNG: Patient batted away my stethoscope and I was unable to auscultate anterior chest wall for long and posterior chest wall at all  BACK: No spinal tenderness  HEART: tachycardic   ABDOMEN: Soft, Nontender, Nondistended; Bowel sounds present  EXTREMITIES:  R> L ROMIE pitting edema  PSYCH: Nl behavior, nl affect  NEUROLOGY: AAOx3, non-focal, cranial nerves intact  SKIN: Normal color, No rashes or lesions    ECG: sinus tachycardia, left axis, borderline LVH per amanda criteria, wandering baseline     TTE 2015  Conclusions:  Technically difficult study.  1. Normal left ventricular internal dimensions and wall  thicknesses.  2. Hyperdynamic left ventricle.  3. Mild diastolic dysfunction (Stage I).  4. Normal right ventricular size and function.  *** No previous Echo exam.    Labs: Personally reviewed                        9.3    13.51 )-----------( 239      ( 22 Apr 2023 21:20 )             28.1     04-22    139  |  104  |  66<H>  ----------------------------<  264<H>  4.5   |  20<L>  |  1.70<H>    Ca    9.1      22 Apr 2023 21:20  Mg     2.7     04-22    TPro  7.5  /  Alb  3.9  /  TBili  0.3  /  DBili  x   /  AST  36  /  ALT  24  /  AlkPhos  96  04-22        CARDIAC MARKERS ( 22 Apr 2023 21:20 )  71 ng/L / x     / x     / x     / x     / x

## 2023-04-22 NOTE — ED PROVIDER NOTE - OBJECTIVE STATEMENT
88-year-old past medical history of hypertension, hyperlipidemia, DM, dementia presents with difficulty breathing.  Patient brought in by daughter who is giving history.  Daughter noticed that patient is struggling to breathe in the last few days.  Has had increased swelling in hands and feet.  Patient has been eating and drinking normally.  Patient usually has a puréed diet but as per daughter, patient usually coughs a lot.  Recently hospitalized for pneumonia in January. Family has not been able to measure a temperature at home.

## 2023-04-22 NOTE — ED PROVIDER NOTE - NSICDXFAMILYHX_GEN_ALL_CORE_FT
FAMILY HISTORY:  Child  Still living? Unknown  Family history of bipolar disorder, Age at diagnosis: Age Unknown

## 2023-04-22 NOTE — ED PROVIDER NOTE - PROGRESS NOTE DETAILS
Discussed risk vs. benefit for CTs with contrast with son who is healthcare proxy. Son confirms patient can receive contrast despite renal function. Son also says patient is full code. Discussion done by myself and Attg Evelio.     Radha Dunn MD, PGY2 Patient receiving sedation however still agitated, not tolerating CT/US. D-dimer elevated. Patient covered for both PE and pneumonia.     Radha Dunn MD, PGY2 Patient was diaphoretic, tachycardic, hypertensive - moved into single room, placed on monitor. IV obtained. Wheezing auscultated, given duonebs, patient improved.     Radha Dunn MD, PGY2 Patient reassessed multiple times with daughter at bedside. Pt agitated and tried home medication first and then haldol. Cardiology consulted with labs findings concerning for nstemi. Pt had prior admission under different MRN w similar presentation treated for aspiration pneumonia and demand ischemia, daughter states she is concerned about aspiration again. Cards consult appreciated to umesh and agree with plan to ro PE. Will empirically treat for possible aspiration. GOC discussed, pt is full code.   Attempted to obtain CT pt agitated, will treat with Lovenox sq at this time as concern with risk of sedation and pending RLE doppler with swelling.  Pt had an episode of hypoxic to 86% improved with NC, pt keeps pulling O2 sat monitor off, considered hi flow and called respiratory but now noted to be 95% on RA. Pulse ox applied to forehead. Pt signed out to Dr. Jonas. NAMITA

## 2023-04-22 NOTE — ED ADULT NURSE NOTE - OBJECTIVE STATEMENT
89 y/o female with PMH of dementia, HTN, HLD, and DM2, presents to ED BIBEMS c/o difficulty breathing. Per EMS, pt had audible wheezing during transport, pt given breathing treatment, audible wheezing currently not present. Pt presents sating 95% on RA. Pt is alert but oriented x0, incoherent speech present (family states this is baseline," 87 y/o female with PMH of dementia, HTN, HLD, and DM2, presents to ED BIBEMS c/o difficulty breathing. Per EMS, pt had audible wheezing during transport, pt given breathing treatment, audible wheezing currently not present. Pt presents sating 95% on RA. Pt is alert but oriented x0, incoherent speech present (family states this is baseline," pt agitated and combative, breathing spontaneous and unlabored, abdomen non-tender, able to move all extremities, skin warm and dry. Pt unable to vocalize complaints. Family at bedside, comfort and safety measures in place, bed in lowest position, side rails up, and wheels locked.

## 2023-04-22 NOTE — ED PROVIDER NOTE - PHYSICAL EXAMINATION
GENERAL: no acute distress, non-toxic appearing  HEAD: normocephalic, atraumatic  HEENT: normal conjunctiva, oral mucosa moist, neck supple  CARDIAC: regular rate and rhythm, normal S1 and S2,  no appreciable murmurs  PULM: clear to ascultation bilaterally, no crackles, rales, rhonchi, or wheezing  GI: abdomen nondistended, soft, nontender, no guarding or rebound tenderness  NEURO: alert and oriented x 0, normal speech, moving all extremities   MSK: +RLE swelling  SKIN: no visible rashes, dry, well-perfused  PSYCH: appropriate mood and affect

## 2023-04-22 NOTE — ED ADULT NURSE REASSESSMENT NOTE - NS ED NURSE REASSESS COMMENT FT1
MD at bedside and noted pt has increased work of breathing, Duoneb ordered and given. Pt moved to room closer to nursing station, placed on cardiac monitor, MD at bedside. Family member at bedside states concern because of pt experiencing diaphoresis. MD at bedside and noted pt has increased work of breathing, Duoneb ordered and given. Pt moved to room closer to nursing station, placed on cardiac monitor, MD at bedside.

## 2023-04-22 NOTE — ED PROVIDER NOTE - NSCAREINITIATED _GEN_ER
UMBERTO Note: SW spoke with pt and then with mother, Santa Claus on phone for family collateral. She denies any concerns re: pt being d/c. She will  pt and transport her home. Hortencia Fraser(Attending)

## 2023-04-22 NOTE — ED PROVIDER NOTE - ATTENDING CONTRIBUTION TO CARE
RGUJRAL 89yo female hx HLD, HTN, Alzheimers, bed bound BIB daughter today. States as per aide patient noticed to have difficulty breathing and called her daughter. As per her patient looks comfortable and at baseline this time. Patient had previous episode and had pneumonia so brought her in for eval. Pt also receives lasix by PA every other day per daughter. Pt is limited historian.   On exam, Patient is awake, alert and at baseline.   Patient is in respiratory distress.   NCAT  Lungs are CTA B/L,+S1S2   Abdomen:Soft nd/nt+bs no rebound or guarding.  RLE swelling. 2+ DP.   Skin with no rash.  Check labs, EKG, rectal temp to eval for not limited to ACS, PNA, PE. Closely monitor.

## 2023-04-22 NOTE — ED PROVIDER NOTE - NSICDXPASTMEDICALHX_GEN_ALL_CORE_FT
PAST MEDICAL HISTORY:  Cataract     Dementia     Diabetes Mellitus, Type 2     Diabetic retinopathy     Glaucoma     HTN (Hypertension)     Hyperlipidemia

## 2023-04-22 NOTE — ED ADULT NURSE NOTE - NSIMPLEMENTINTERV_GEN_ALL_ED
Implemented All Fall with Harm Risk Interventions:  [place] to call system. Call bell, personal items and telephone within reach. Instruct patient to call for assistance. Room bathroom lighting operational. Non-slip footwear when patient is off stretcher. Physically safe environment: no spills, clutter or unnecessary equipment. Stretcher in lowest position, wheels locked, appropriate side rails in place. Provide visual cue, wrist band, yellow gown, etc. Monitor gait and stability. Monitor for mental status changes and reorient to person, place, and time. Review medications for side effects contributing to fall risk. Reinforce activity limits and safety measures with patient and family. Provide visual clues: red socks.

## 2023-04-23 NOTE — H&P ADULT - NSHPLABSRESULTS_GEN_ALL_CORE
Labs and imaging data obtained by the ED personally reviewed.                        9.1    8.72  )-----------( 221      ( 2023 09:06 )             27.6     04  138  |  103  |  70<H>  ----------------------------<  442<H>  3.5   |  18<L>  |  1.79<H>    Ca    8.8      2023 09:06  Phos  5.5       Mg     2.6         TPro  7.0  /  Alb  3.7  /  TBili  0.4  /  DBili  x   /  AST  39  /  ALT  27  /  AlkPhos  89      PT/INR - ( 2023 09:06 )   PT: 13.3 sec;   INR: 1.14 ratio    PTT - ( 2023 09:06 )  PTT:30.5 sec    hsTrop = 71 --> 63  pBNP = 4203 --> 4508    Urinalysis Basic - ( 2023 14:43 )  Color: Colorless / Appearance: Clear / S.020 / pH: x  Gluc: x / Ketone: Negative  / Bili: Negative / Urobili: Negative   Blood: x / Protein: 100 mg/dl / Nitrite: Negative   Leuk Esterase: Negative / RBC: 6 /hpf / WBC 0 /HPF   Sq Epi: x / Non Sq Epi: x / Bacteria: Negative    CXR as reported: Small right pleural effusion with associated atelectasis.    RLE Venous Doppers as reported: Technically limited study due to patient condition with patent right common femoral vein    CT C/A/P w/ IV contrast (PE protocol) as reported: No pulmonary embolism. Rounded patchy opacities predominantly left lower lobe, suggestive of infection. Interlobular septal thickening and moderate pleural effusions, suggestive   of pulmonary edema. Large stool burden throughout the colon and rectum.    ECG w/ QTC = 497/498

## 2023-04-23 NOTE — H&P ADULT - PROBLEM SELECTOR PLAN 2
- unclear if new onset Afib  - etiology likely related to underlying infection and fluid overload state  - continue telemetry monitoring  - will continue to closely monitor hemodynamics continuing rate control w/ beta-blocker  - will need to discuss risks vs. benefits of AC with family and cardiology

## 2023-04-23 NOTE — CONSULT NOTE ADULT - TIME BILLING
Advanced care planning was discussed with family.  Advanced care planning forms were reviewed and discussed as appropriate.  Differential diagnosis and plan of care discussed family  after the evaluation.   Pain assessed and judicious use of narcotics when appropriate was discussed.  Importance of Fall and aspiration prevention discussed.  Counseling on Smoking and Alcohol cessation was offered when appropriate.  Counseling on Diet, exercise, and medication compliance was done.

## 2023-04-23 NOTE — RAPID RESPONSE TEAM SUMMARY - NSSITUATIONBACKGROUNDRRT_GEN_ALL_CORE
88-year-old past medical history of hypertension, hyperlipidemia, DM, dementia presents with difficulty breathing. Suspect PE vs. aspiration PNA w/ component of adHF.     RRT called for hypoxia and tachycardia. Patient came back from CTA chest and had tachycardia (HR 140s) and hypoxia (70s-80s on room air). Upon my arrival patient was 100% on NRB. EKG done concerning for afib. Patient afebrile, BP 170s/90s. Glucose 400s. Crackles on exam. Lopressor 5mg IV given with improvement in HR to 70s-90s, still in afib. Given prior CXR with concern for fluid overload, will give another 20IV lasix and get repeat portable CXR. NRB downgraded to 6LNC patient satting >95%. Patient pending echo and CTA chest read.

## 2023-04-23 NOTE — H&P ADULT - PROBLEM SELECTOR PLAN 1
- appreciate cardiology / Dr. Rico's evaluation and recommendations  - s/p IV diuresis during RRT, will continue w/ IV Lasix 40mg daily for now, closely monitoring pt's respiratory status, fluid status, renal function (s/p contrast), and electrolytes  - continue telemetry monitoring  - TTE ordered  - wean O2 supplementation as tolerated to maintain SpO2 > 94%

## 2023-04-23 NOTE — CONSULT NOTE ADULT - ASSESSMENT
88-year-old past medical history of hypertension, hyperlipidemia, DM, dementia/ bedbound presents with difficulty breathing likely due to aspiration PNA w/ component of adHF.   Possible  PE     Case discussed with her daughter Petty at length

## 2023-04-23 NOTE — ED ADULT NURSE REASSESSMENT NOTE - NS ED NURSE REASSESS COMMENT FT1
Pt desated to 86% MD EDISON at bedside, pt placed on 6L NC sating 94%. Pt agitated again, per MD Fraser, proceed with Haldol IV. Pt desated to 86% MD EDISON at bedside, pt placed on 6L NC sating 94%. Pt agitated again, per MD Fraser, proceed with Haldol IV. Respiratory called.

## 2023-04-23 NOTE — H&P ADULT - TIME BILLING
conducting history and physical examination, reviewing diagnostic workup as ordered by the ED, reviewing previous hospitalization documentation, discussing with consultants, determining acute diagnoses / plan for continued monitoring and management, and communication with caregivers.

## 2023-04-23 NOTE — CONSULT NOTE ADULT - SUBJECTIVE AND OBJECTIVE BOX
CHIEF COMPLAINT:  pt well known to me from office. Her daughter Petty called me this am   HISTORY OF PRESENT ILLNESS:  88-year-old past medical history of hypertension, hyperlipidemia, DM, dementia presents with difficulty breathing. Patient brought in by daughter who is giving history.  Daughter noticed that patient is struggling to breathe in the last few days; her home health aide has also noticed increased swelling in her hands, feet and even her face.   Patient has been eating and drinking normally.  Patient usually has a puréed diet but as per daughter, patient usually coughs a lot.  Recently hospitalized for pneumonia in January; at that time hstrop peaked to 800s and thought 2/2 demand ischemia.  Per daughter, patient is difficult to communicate with but somehow managed to tell HHA that she was feeling unwell prior to hospital presentation. BPs at home usually run around 140s/90s.         PAST MEDICAL & SURGICAL HISTORY:  Diabetes Mellitus, Type 2      Glaucoma      Hyperlipidemia      HTN (Hypertension)      Diabetic retinopathy      Dementia      Cataract      S/P Carpal Tunnel Release  right wrist 5/6/2009              MEDICATIONS:  metoprolol tartrate Injectable 5 milliGRAM(s) IV Push every 6 hours            dextrose 50% Injectable 25 Gram(s) IV Push once  dextrose 50% Injectable 25 Gram(s) IV Push once  dextrose 50% Injectable 12.5 Gram(s) IV Push once  dextrose Oral Gel 15 Gram(s) Oral once PRN  glucagon  Injectable 1 milliGRAM(s) IntraMuscular once  insulin lispro (ADMELOG) corrective regimen sliding scale   SubCutaneous every 6 hours    dextrose 5%. 1000 milliLiter(s) IV Continuous <Continuous>  dextrose 5%. 1000 milliLiter(s) IV Continuous <Continuous>      FAMILY HISTORY:  Family history of bipolar disorder (Child)        SOCIAL HISTORY:    [ ] Non-smoker  [ ] Smoker  [ ] Alcohol    Allergies    No Known Allergies    Intolerances    	    REVIEW OF SYSTEMS:  	    [ ] All others negative	  [XX ] Unable to obtain    PHYSICAL EXAM:  T(C): 36.8 (04-23-23 @ 07:45), Max: 37.4 (04-22-23 @ 19:27)  HR: 75 (04-23-23 @ 07:45) (71 - 117)  BP: 165/49 (04-23-23 @ 07:45) (117/44 - 204/67)  RR: 18 (04-23-23 @ 07:45) (18 - 24)  SpO2: 100% (04-23-23 @ 07:45) (86% - 100%)  Wt(kg): --  I&O's Summary      Appearance: NAD non verbal 	  HEENT:   Normal oral mucosa, PERRL, EOMI	  Lymphatic: No lymphadenopathy  Cardiovascular: Normal S1 S2, No JVD, No murmurs  Respiratory: decreased bs 	  Psychiatry: A & O x 0  Gastrointestinal:  Soft, Non-tender, + BS	  Skin: No rashes, No ecchymoses, No cyanosis	  Neurologic: Non-focal  Extremities: decreased  range of motion, ++ edema  Vascular: Peripheral pulses palpable 2+ bilaterally    TELEMETRY: 	    ECG:  	sinus tachycardia, left axis, borderline LVH per amanda criteria, wandering baselineRADIOLOGY:  < from: Xray Chest 1 View AP/PA (04.22.23 @ 20:30) >    ******PRELIMINARY REPORT******      ******PRELIMINARY REPORT******         ACC: [id number] EXAM:  XR CHEST AP OR PA 1V   ORDERED BY:  JACK ZHOU     PROCEDURE DATE:  04/22/2023    ******PRELIMINARY REPORT******      ******PRELIMINARY REPORT******           INTERPRETATION:  EXAMINATION: XR CHEST    CLINICAL INDICATION: Chest Pain    TECHNIQUE: Single frontal, portable view of the chest was obtained.    COMPARISON: Chest x-ray 10/14/2015.    FINDINGS:  The heart is poorly assessed on this projection..  Small right pleural effusion with associated atelectasis.  No pneumothorax.    IMPRESSION:  Small right pleural effusion with associated atelectasis.        ******PRELIMINARY REPORT******      ******PRELIMINARY REPORT******     < end of copied text >  < from: [facility] Duplex Lower Ext Vein Scan, Right (04.23.23 @ 03:52) >    ACC: [id number] EXAM:  DUPLEX EXT VEINS LOWER RT   ORDERED BY:  JACK ZHOU     PROCEDURE DATE:  04/23/2023          INTERPRETATION:  CLINICAL INFORMATION: Right lower extremity swelling    COMPARISON: None available.    TECHNIQUE: Duplex sonography of the RIGHT LOWER extremity veins with   color and spectral Doppler, with and without compression.    FINDINGS/  IMPRESSION:  Technically limited study due to patient condition with patent right   common femoral vein    --- End of Report ---        < end of copied text >    OTHER: 	  	  LABS:	 	    CARDIAC MARKERS:    Troponin T, High Sensitivity Result: 63: Specimen not hemolyzed Troponin T, High Sensitivity Result: 71: Moderate Hemolysis,Troponin T results may be falsely decreased D-Dimer Assay, Quantitative: 1118: "D-Dimer result less than or equal to 229ng/mL DDU correlates with the                               9.1    8.72  )-----------( 221      ( 23 Apr 2023 09:06 )             27.6     04-23    138  |  103  |  70<H>  ----------------------------<  442<H>  3.5   |  18<L>  |  1.79<H>    Ca    8.8      23 Apr 2023 09:06  Phos  5.5     04-23  Mg     2.6     04-23    TPro  7.0  /  Alb  3.7  /  TBili  0.4  /  DBili  x   /  AST  39  /  ALT  27  /  AlkPhos  89  04-23    proBNP:   Lipid Profile:   HgA1c:   TSH:

## 2023-04-23 NOTE — H&P ADULT - PROBLEM SELECTOR PLAN 5
- stool burden noted on imaging  - if patient is able to tolerate PO will initiate oral bowel regimen, otherwise will order enema  - monitor stool count and UOP

## 2023-04-23 NOTE — ED ADULT NURSE REASSESSMENT NOTE - NS ED NURSE REASSESS COMMENT FT1
Pt appears more comfortable according to daughter at bedside. Pt currently resting in stretcher and presents more calm than earlier. Pt to go to CT.

## 2023-04-23 NOTE — CHART NOTE - NSCHARTNOTEFT_GEN_A_CORE
Pt came back from CT and was noted to be Tachycardic to 140's with possible New Afib on Tele, Hypoxic with O2sat to 70's on RA.  Pt had Ativan prior to CT and Haldol overnight.   RRT called  Pt was treated for New PAF with IV Lopressor, Hypoxia with NRB with O2sat up to 100%, Lasix 20mg IV x 1  CTA CHest - verbal report with No PE, some evidence of Pulmonary edema and a question of infection, NO Pericaldial effusion, No PTX --- Final read pending  CTAP - Verbal report with Large stool burden  VBG done  BS: > 400  Triop on admission 71-> 63    Exam:   Neuro: pt confused, not cooperating  Lungs with scattered rales, no wheezing  HR: Irregular  LE: Right leg edema > Left    A/P:  VBG to be repeated later today  Lasix 20 mg IV x 1  Insulin AMAYA, NPO  AC Penidng further evaluation and GOC  IV Lopressor while NPO  CXR  TTE  Strict I/O  Daily weight  Close monitoring and Cards follow up

## 2023-04-23 NOTE — H&P ADULT - HISTORY OF PRESENT ILLNESS
Elvia MRN = [id number]  88yoF w/ PMHx of advanced dementia (at B/L patient is bedbound, nonverbal, though able to tolerate PO diet - per family, patient has hearty appetite), HTN, HLD, IDDM, who presents from home where she lives with her , brought in by her daughter (son is at her bedside during this evaluation) w/ complaints of daughter noticing the patient has been having difficulty breathing over the past few days in setting of notable increased swelling of the patient's hands, feet, and face. Of note, patient was recently hospitalized at [facility] for aspiration PNA (and found to have demand ischemia) when she presented with similar complaints and was found to be hypoxic; at the time, S+S eval was recommended, but per documentation at the end of Jan 2023, pt's daughter declined formal/objective evaluation, and instead, understanding all risks associated with aspiration, preferred to have patient continue on her typical dysphagia diet (puree/finely cut up food). Currently, patient is calm, sleeping, though she is arousable, and becomes agitated with attempts at physical examination. Per documentation, patient's daughter notes that the patient has been eating and drinking normally but that the patient does cough a lot.     PMD Dr Joleen Olsen [phone] does home visits    ED Presenting Vitals: 99.3F, 102bpm, 204/67 --> 180/75, 24br/m, 94% on RA   ED Course: s/p Lovenox 30mg SC x1, IV Zosyn 3.375g x1, IV Azithromycin 500mg x1, Lispro 6unit x1, Lasix 20mg IVP x3, Ativan 0.5mg IVP x1, Haldol 1mg IVP x2, Metoprolol 12.5mg PO x1, Lopressor 5mg IVP x2; RRT called for hypoxia and tachycardia -- pt found to be in ?new Afib w/ RVR

## 2023-04-23 NOTE — H&P ADULT - PROBLEM SELECTOR PLAN 4
- S+S eval requested but per son at bedside, family will discuss wishes re: continued evaluation vs. feeds to remain as is understanding elevated aspiration risks and subsequent risks with aspiration events -- for now, they request dysphagia screen and initiation of diet overnight if patient passes  - maintain strict aspiration precautions

## 2023-04-23 NOTE — ED ADULT NURSE REASSESSMENT NOTE - NS ED NURSE REASSESS COMMENT FT1
pt not tolerating nasal cannula; adv GER Vo; adv to cancel taking pt from dept to vascular for doppler; called Dorcas to cancel doppler at this time

## 2023-04-23 NOTE — H&P ADULT - PROBLEM SELECTOR PLAN 7
- w/ functional quadriplegia and high risk for behavioral disturbance  - encouraged family to be at bedside to help with re-orientation  - monitor QTC s/p Haldol/Ativan in ED  - GOC to be clarified in AM amongst patient's family and with HCP (pt's )

## 2023-04-23 NOTE — ED ADULT NURSE REASSESSMENT NOTE - NSFALLRSKASSESSDT_ED_ALL_ED
23-Apr-2023 09:53
23-Apr-2023 10:14
22-Apr-2023 21:59
22-Apr-2023 22:31
23-Apr-2023 00:23
23-Apr-2023 01:01
23-Apr-2023 01:18
23-Apr-2023 13:55
23-Apr-2023 16:55

## 2023-04-23 NOTE — ED ADULT NURSE REASSESSMENT NOTE - NS ED NURSE REASSESS COMMENT FT1
pt returned from ct scan; placed back on cardiac monitor; pt heart rate into the 140's appears to be afib; pt breathing appears labored; [place] [id number] adv of status; rapid response called at 0847; see RR sheet; pt stabilized; placed on premafit; diaper changed; bladder scan done 94mls in bladder; diaper completely full of urine; rectal temp done 98.4; stool in rectum unable to retrieve

## 2023-04-23 NOTE — H&P ADULT - PROBLEM SELECTOR PLAN 3
- etiology likely related to elevated aspiration risk w/ dementia and advanced dementia but will treat for acute (non-severe) bacterial pneumonia iso recent hospitalization within the last 90 days  - send BCx STAT along w/ sputum culture if available, MRSA PCR, legionella Ag, and S.pneumo Ag  - Abx w/ IV Ceftriaxone and IV Azithromycin for now  - monitor QTc (on admission = 498)

## 2023-04-23 NOTE — H&P ADULT - ASSESSMENT
Elvia MRN = [id number]    88yoF w/ PMHx of advanced dementia (at B/L patient is bedbound, nonverbal, though able to tolerate PO diet - per family, patient has hearty appetite), HTN, HLD, IDDM, who presents from home where she lives with her , brought in by her daughter (son is at her bedside during this evaluation) w/ complaints of daughter noticing the patient has been having difficulty breathing over the past few days in setting of notable increased swelling of the patient's hands, feet, and face.     PMD Dr Joleen Olsen [phone] does home visits

## 2023-04-23 NOTE — H&P ADULT - NSHPPHYSICALEXAM_GEN_ALL_CORE
Vital Signs Last 24 Hrs  T(F): 98.2 (23 Apr 2023 15:25), Max: 99.3 (22 Apr 2023 19:27)  HR: 109 (23 Apr 2023 15:25) (71 - 142)  BP: 131/89 (23 Apr 2023 15:25) (117/44 - 204/67)  RR: 20 (23 Apr 2023 15:25) (18 - 26)  SpO2: 100% (23 Apr 2023 15:25) (78% - 100%)  Parameters below as of 23 Apr 2023 15:25  Patient On (Oxygen Delivery Method): face tent Vital Signs Last 24 Hrs  T(F): 98.2 (23 Apr 2023 15:25), Max: 99.3 (22 Apr 2023 19:27)  HR: 109 (23 Apr 2023 15:25) (71 - 142)  BP: 131/89 (23 Apr 2023 15:25) (117/44 - 204/67)  RR: 20 (23 Apr 2023 15:25) (18 - 26)  SpO2: 100% (23 Apr 2023 15:25) (78% - 100%)  Parameters below as of 23 Apr 2023 15:25  Patient On (Oxygen Delivery Method): face tent    Patient unwilling (becomes agitated) to participate in comprehensive physical examination.  GEN: elderly woman, laying in stretcher in NAD when left undisturbed  PSYCH: A&Ox0, mood and affect appear agitated  SKIN: intact, no e/o rash  HEAD: NC, AT  NECK: supple, no e/o elevated JVP  RESPI: no accessory muscle use, on NRB   CARDIO: no LE edema B/L  EXT: patient able to move all extremities spontaneously

## 2023-04-24 NOTE — PROGRESS NOTE ADULT - PROBLEM SELECTOR PLAN 1
pt hypoxic on admission with tachypnea, noted with pulm congestion on imaging. s/p RRT in ED - status now improved after starting IV diuretics.    - now off supplemental O2   - cardiology / Dr. Rico's evaluation and recommendations appreciated --> c/w lasix 40 IV daily   - c/t closely monitor pt's respiratory status, fluid status, renal function (s/p contrast), and electrolytes  - continue telemetry monitoring  - TTE ordered  - c/t monitor O2 sat

## 2023-04-24 NOTE — PROGRESS NOTE ADULT - SUBJECTIVE AND OBJECTIVE BOX
[facility] Division of Hospital Medicine  Chico Fisher MD  Available via MS Teams    SUBJECTIVE / OVERNIGHT EVENTS:  no acute events overnight   rate better controlled this AM, off O2   unable to obtain ROS 2/2 dementia     ADDITIONAL REVIEW OF SYSTEMS:  as above     MEDICATIONS  (STANDING):  amLODIPine   Tablet 10 milliGRAM(s) Oral daily  aspirin  chewable 81 milliGRAM(s) Oral daily  atorvastatin 80 milliGRAM(s) Oral at bedtime  azithromycin  IVPB 500 milliGRAM(s) IV Intermittent every 24 hours  azithromycin  IVPB      cefTRIAXone   IVPB 1000 milliGRAM(s) IV Intermittent every 24 hours  dextrose 5%. 1000 milliLiter(s) (50 mL/Hr) IV Continuous <Continuous>  dextrose 5%. 1000 milliLiter(s) (100 mL/Hr) IV Continuous <Continuous>  dextrose 50% Injectable 25 Gram(s) IV Push once  dextrose 50% Injectable 25 Gram(s) IV Push once  dextrose 50% Injectable 12.5 Gram(s) IV Push once  enoxaparin Injectable 30 milliGRAM(s) SubCutaneous every 24 hours  furosemide   Injectable 40 milliGRAM(s) IV Push daily  glucagon  Injectable 1 milliGRAM(s) IntraMuscular once  insulin lispro (ADMELOG) corrective regimen sliding scale   SubCutaneous three times a day before meals  insulin lispro (ADMELOG) corrective regimen sliding scale   SubCutaneous at bedtime  latanoprost 0.005% Ophthalmic Solution 1 Drop(s) Both EYES at bedtime  metoprolol tartrate 12.5 milliGRAM(s) Oral two times a day    MEDICATIONS  (PRN):  dextrose Oral Gel 15 Gram(s) Oral once PRN Blood Glucose LESS THAN 70 milliGRAM(s)/deciliter      I&O's Summary      PHYSICAL EXAM:  Vital Signs Last 24 Hrs  T(C): 36.9 (2023 09:35), Max: 36.9 (2023 09:35)  T(F): 98.4 (2023 09:35), Max: 98.4 (2023 09:35)  HR: 85 (2023 09:35) (74 - 132)  BP: 159/75 (2023 09:35) (120/93 - 165/90)  BP(mean): 100 (2023 12:48) (100 - 100)  RR: 18 (2023 09:35) (18 - 20)  SpO2: 93% (2023 09:35) (93% - 100%)    Parameters below as of 2023 09:35  Patient On (Oxygen Delivery Method): room air      PHYSICAL EXAM:  GENERAL: NAD, chronically ill appearing, cachectic   HEAD:  Atraumatic, normocephalic  EYES: EOMI, conjunctiva and sclera clear  NECK: Supple, no JVD  CHEST/LUNG: rales at bases, otherwise CTA, off supp O2   HEART: Regular rate and rhythm; no murmurs, no LE edema   ABDOMEN: Soft, nondistended; bowel sounds present  EXTREMITIES:  2+ Peripheral Pulses, no clubbing, cyanosis  SKIN: No rashes or lesions      LABS:                        9.1    8.72  )-----------( 221      ( 2023 09:06 )             27.6     04-23    138  |  103  |  70<H>  ----------------------------<  442<H>  3.5   |  18<L>  |  1.79<H>    Ca    8.8      2023 09:06  Phos  5.5     04-23  Mg     2.6     04-23    TPro  7.0  /  Alb  3.7  /  TBili  0.4  /  DBili  x   /  AST  39  /  ALT  27  /  AlkPhos  89  04-23    PT/INR - ( 2023 09:06 )   PT: 13.3 sec;   INR: 1.14 ratio         PTT - ( 2023 09:06 )  PTT:30.5 sec      Urinalysis Basic - ( 2023 14:43 )    Color: Colorless / Appearance: Clear / S.020 / pH: x  Gluc: x / Ketone: Negative  / Bili: Negative / Urobili: Negative   Blood: x / Protein: 100 mg/dl / Nitrite: Negative   Leuk Esterase: Negative / RBC: 6 /hpf / WBC 0 /HPF   Sq Epi: x / Non Sq Epi: x / Bacteria: Negative        SARS-CoV-2: NotDetec (2023 21:20)      RADIOLOGY & ADDITIONAL TESTS:  New Imaging Personally Reviewed Today:  New Electrocardiogram Personally Reviewed Today:  Other Results Reviewed Today:   Prior or Outpatient Records Reviewed Today with Summary:    COORDINATION OF CARE:  Consultant Communication and Details of Discussion (where applicable):

## 2023-04-24 NOTE — PROGRESS NOTE ADULT - PROBLEM SELECTOR PLAN 5
- stool burden noted on imaging  - start senna, Miralax. consider suppository if no BM   - monitor stool count and UOP

## 2023-04-24 NOTE — PROGRESS NOTE ADULT - PROBLEM SELECTOR PLAN 2
noted with Afib w RVR on admission, unclear if new onset Afib  - etiology likely related to underlying infection and fluid overload state  - started on lopressor 12.5mg BID with improved rates, c/t monitor on tele   - will need to discuss risks vs. benefits of AC with family and cardiology

## 2023-04-24 NOTE — PROGRESS NOTE ADULT - PROBLEM SELECTOR PLAN 2
Check CT chest ideally with contrast (PE protocol)   aspiration precautions   Started on Abx   Supplemental oxygen as needed SPO > 92%  DVT ppx.

## 2023-04-24 NOTE — PROGRESS NOTE ADULT - SUBJECTIVE AND OBJECTIVE BOX
Subjective: Patient seen and examined. No new events except as noted.     REVIEW OF SYSTEMS:  Unable to obtain       MEDICATIONS:  MEDICATIONS  (STANDING):  amLODIPine   Tablet 10 milliGRAM(s) Oral daily  aspirin  chewable 81 milliGRAM(s) Oral daily  atorvastatin 80 milliGRAM(s) Oral at bedtime  azithromycin  IVPB 500 milliGRAM(s) IV Intermittent every 24 hours  azithromycin  IVPB      cefTRIAXone   IVPB 1000 milliGRAM(s) IV Intermittent every 24 hours  dextrose 5%. 1000 milliLiter(s) (50 mL/Hr) IV Continuous <Continuous>  dextrose 5%. 1000 milliLiter(s) (100 mL/Hr) IV Continuous <Continuous>  dextrose 50% Injectable 25 Gram(s) IV Push once  dextrose 50% Injectable 25 Gram(s) IV Push once  dextrose 50% Injectable 12.5 Gram(s) IV Push once  enoxaparin Injectable 30 milliGRAM(s) SubCutaneous every 24 hours  furosemide   Injectable 40 milliGRAM(s) IV Push daily  glucagon  Injectable 1 milliGRAM(s) IntraMuscular once  insulin lispro (ADMELOG) corrective regimen sliding scale   SubCutaneous three times a day before meals  insulin lispro (ADMELOG) corrective regimen sliding scale   SubCutaneous at bedtime  latanoprost 0.005% Ophthalmic Solution 1 Drop(s) Both EYES at bedtime  metoprolol tartrate 12.5 milliGRAM(s) Oral two times a day      PHYSICAL EXAM:  T(C): 36.7 (04-24-23 @ 04:25), Max: 36.8 (04-23-23 @ 15:25)  HR: 81 (04-24-23 @ 04:25) (74 - 132)  BP: 165/90 (04-24-23 @ 04:25) (120/93 - 165/90)  RR: 18 (04-24-23 @ 04:25) (18 - 20)  SpO2: 93% (04-24-23 @ 04:25) (93% - 100%)  Wt(kg): --  I&O's Summary        Appearance: NAD non verbal 	  HEENT:   Normal oral mucosa, PERRL, EOMI	  Lymphatic: No lymphadenopathy  Cardiovascular: Normal S1 S2, No JVD, No murmurs  Respiratory: decreased bs 	  Psychiatry: A & O x 0  Gastrointestinal:  Soft, Non-tender, + BS	  Skin: No rashes, No ecchymoses, No cyanosis	  Neurologic: Non-focal  Extremities: decreased  range of motion, ++ edema  Vascular: Peripheral pulses palpable 2+ bilaterally        LABS:    CARDIAC MARKERS:                                9.1    8.72  )-----------( 221      ( 23 Apr 2023 09:06 )             27.6     04-23    138  |  103  |  70<H>  ----------------------------<  442<H>  3.5   |  18<L>  |  1.79<H>    Ca    8.8      23 Apr 2023 09:06  Phos  5.5     04-23  Mg     2.6     04-23    TPro  7.0  /  Alb  3.7  /  TBili  0.4  /  DBili  x   /  AST  39  /  ALT  27  /  AlkPhos  89  04-23          TELEMETRY: 	    ECG:  	  RADIOLOGY:   DIAGNOSTIC TESTING:  [ ] Echocardiogram:  [ ]  Catheterization:  [ ] Stress Test:    OTHER:

## 2023-04-24 NOTE — PROGRESS NOTE ADULT - ASSESSMENT
Alternate MRN: [id number]    88yoF w/ PMHx of advanced dementia (at B/L patient is bedbound, nonverbal, though able to tolerate PO diet - per family, patient has hearty appetite), HTN, HLD, IDDM, brought in by family for difficulty breathing over the past few days in setting of notable increased swelling of the patient's hands, feet, and face.

## 2023-04-25 NOTE — PROGRESS NOTE ADULT - SUBJECTIVE AND OBJECTIVE BOX
Subjective: Patient seen and examined. No new events except as noted.   On RA     REVIEW OF SYSTEMS:    Unable to obtain     MEDICATIONS:  MEDICATIONS  (STANDING):  amLODIPine   Tablet 10 milliGRAM(s) Oral daily  aspirin  chewable 81 milliGRAM(s) Oral daily  atorvastatin 80 milliGRAM(s) Oral at bedtime  azithromycin  IVPB 500 milliGRAM(s) IV Intermittent every 24 hours  azithromycin  IVPB      cefTRIAXone   IVPB 1000 milliGRAM(s) IV Intermittent every 24 hours  dextrose 5%. 1000 milliLiter(s) (50 mL/Hr) IV Continuous <Continuous>  dextrose 5%. 1000 milliLiter(s) (100 mL/Hr) IV Continuous <Continuous>  dextrose 50% Injectable 25 Gram(s) IV Push once  dextrose 50% Injectable 25 Gram(s) IV Push once  dextrose 50% Injectable 12.5 Gram(s) IV Push once  enoxaparin Injectable 30 milliGRAM(s) SubCutaneous every 24 hours  furosemide   Injectable 40 milliGRAM(s) IV Push daily  glucagon  Injectable 1 milliGRAM(s) IntraMuscular once  insulin lispro (ADMELOG) corrective regimen sliding scale   SubCutaneous three times a day before meals  insulin lispro (ADMELOG) corrective regimen sliding scale   SubCutaneous at bedtime  insulin lispro Injectable (ADMELOG) 2 Unit(s) SubCutaneous three times a day before meals  latanoprost 0.005% Ophthalmic Solution 1 Drop(s) Both EYES at bedtime  metoprolol tartrate 12.5 milliGRAM(s) Oral two times a day  polyethylene glycol 3350 17 Gram(s) Oral two times a day  potassium chloride    Tablet ER 40 milliEquivalent(s) Oral once  senna 2 Tablet(s) Oral at bedtime      PHYSICAL EXAM:  T(C): 36.7 (04-25-23 @ 05:13), Max: 37.1 (04-24-23 @ 18:43)  HR: 78 (04-25-23 @ 05:13) (75 - 88)  BP: 172/73 (04-25-23 @ 05:13) (158/62 - 183/65)  RR: 18 (04-25-23 @ 05:13) (18 - 18)  SpO2: 97% (04-25-23 @ 05:13) (95% - 97%)  Wt(kg): --  I&O's Summary      Appearance: NAD non verbal 	  HEENT:   Normal oral mucosa, PERRL, EOMI	  Lymphatic: No lymphadenopathy  Cardiovascular: Normal S1 S2, No JVD, No murmurs  Respiratory: decreased bs 	  Psychiatry: A & O x 0  Gastrointestinal:  Soft, Non-tender, + BS	  Skin: No rashes, No ecchymoses, No cyanosis	  Neurologic: Non-focal  Extremities: decreased  range of motion, No  edema  Vascular: Peripheral pulses palpable 2+ bilaterally  LABS:    CARDIAC MARKERS:                          8.0    13.18 )-----------( 179      ( 24 Apr 2023 17:32 )             25.8     04-24    139  |  102  |  78<H>  ----------------------------<  169<H>  3.5   |  19<L>  |  2.41<H>    Ca    8.9      24 Apr 2023 17:32  Mg     2.6     04-24            TELEMETRY: 	    ECG:  	  RADIOLOGY:   DIAGNOSTIC TESTING:  [ ] Echocardiogram:  < from: TTE W or WO Ultrasound Enhancing Agent (04.24.23 @ 13:24) >  ________________________________________________________________________________________  Referring Physician:    [id number] Chi Bro  Interpreting Physician: Adrian Vasquez M.D.  Primary Sonographer:    Martin Lacey [facility]    CPT:               ECHO TTE WO CON COMP W DOPP - 69595.m  Indication(s):     Dyspnea, unspecified - R06.00  Procedure:         Transthoracic echocardiogram with 2-D, M-mode and complete                     spectral and color flow Doppler.  Ordering Location: [facility]  Study Information: Image quality for this study is technically difficult.    _______________________________________________________________________________________  CONCLUSIONS:      1. Technically difficult image quality.   2. Mild to moderate left ventricular hypertrophy.   3. The left ventricular systolic function is normal with an ejection fraction visually estimated at 55 to 60 %.   4. Normal right ventricular cavity size and normal systolic function.   5. Moderate mitral regurgitation.   6. Mild tricuspid regurgitation.   7. Moderate calcification of the aortic valve leaflets.   8. Moderate aortic stenosis. The peak transaortic velocity is 2.47 m/s, peak transaortic gradient is 24.4 mmHg and mean transaortic gradient is 16.0 mmHg. The aortic valve area is estimated at 0.76 cm² by the continuity equation. The peak transaortic velocity is 2.47 m/s, peak transaortic gradientis 24.4 mmHg and mean transaortic gradient is 16.0 mmHg with an LVOT/aortic valve VTI ratio of 0.33. The aortic valve area is estimated at 0.76 cm² by the continuity equation. There is trace aortic regurgitation. DVI is 0.33 which suggests that stenosis is not as severe as calculated by continuity equation.   9. There is mild to moderate mitral valve stenosis.  10. Compared to the transthoracic echocardiogram performed on 9/1/2015 there is new valvular disease and LVH.    ________________________________________________________________________________________  FINDINGS:  Left Ventricle:  The left ventricular systolic function is normal. Mild to moderate left ventricular hypertrophy.     Right Ventricle:  Normal right ventricular cavity size and normal systolic function. Tricuspid annular plane systolic excursion (TAPSE) is 1.3 cm (normal >=1.7 cm).     Aortic Valve:  There is moderate calcification of the aortic valve leaflets. There is moderate aortic stenosis. The peak transaortic velocity is 2.47 m/s, peak transaortic gradient is 24.4 mmHg and mean transaortic gradient is 16.0 mmHg. The aortic valve area is estimated at 0.76 cm² by the continuity equation. The peak transaortic velocity is 2.47 m/s, peak transaortic gradient is 24.4mmHg and mean transaortic gradient is 16.0 mmHg with an LVOT/aortic valve VTI ratio of 0.33. The aortic valve area is estimated at 0.76 cm² by the continuity equation. There is trace aortic regurgitation.     Mitral Valve:  There is mitral valve thickening of the anterior and posterior leaflets. There is moderate calcification of the mitral valve annulus. There is mild to moderate mitral valve stenosis. There is moderate mitral regurgitation.     Tricuspid Valve:  There is mild tricuspid regurgitation.     Pericardium:  There is a small pericardial effusion.    < end of copied text >    [ ]  Catheterization:  [ ] Stress Test:    OTHER: 	  Swallow Bedside Assessment:   General Information   · Patient Profile Review	yes  · H & P Review	yes  · Pertinent History of Current Problem	88yoF w/ PMHx advanced dementia (at B/L patient is bedbound, nonverbal, though able to tolerate PO diet - per family, patient has hearty appetite), HTN, HLD, IDDM, presents from home, lives with her , brought in by her daughter (son is at her bedside during this evaluation) w/ c/o daughter noticing pt has been having difficulty breathing over the past few days; notable increased swelling of hands, feet, and face. (cont)  · Current Diet	Easy to Chew  · Comments	(pmhx cont)Of note, pt was recently hospitalized at [facility] for aspiration PNA (and found to have demand ischemia); presented with similar complaints; found to be hypoxic; at the time, S+S eval was recommended, but per documentation Jan 2023, pt's daughter declined formal/objective evaluation, understanding all risks associated with aspiration, preferred to have patient continue on puree/finely cut up food. Currently, patient is calm, sleeping, arousable; becomes agitated with attempts at physical examination. Per documentation, daughter states pt has been eating and drinking normally but does cough a lot.  Ddx: PE vs. aspiration PNA w/ component of adHF.   RRT called 4/23 for hypoxia and tachycardia->lopressor and lasix for fluid overload; NRB weaned to 6L O2 NC, remained on unit  Passed RN dysphagia screen. Easy to Chew/thin diet initiated per MD 4/25    Past Medical History:   · 	Cataract: Entered Date: 14-Oct-2015 12:15, Entered By: Caity Ortez, Last Modified By: Caity Ortez  · 	Dementia: Entered Date: 30-Aug-2015 05:16, Entered By: Yandy Moise, Last Modified By: Yandy Moise  · 	Diabetic retinopathy: Entered Date: 21-Apr-2015 21:47, Entered By: Abraham Kohler, Last Modified By: Abraham Kohler  · 	HTN (Hypertension): Entered Date: 11-Mar-2011 09:13, Entered By: Lilliana Campos, Last Modified By: Lilliana Campos  · 	Hyperlipidemia: Entered Date: 11-Mar-2011 09:13, Entered By: Lilliana Campos, Last Modified By: Lilliana Campos  · 	Glaucoma: Entered Date: 11-Mar-2011 08:32, Entered By: Lilliana Campos, Last Modified By: Lilliana Campos  · 	Diabetes Mellitus, Type 2: Entered Date: 11-Mar-2011 08:28, Entered By: Lilliana Campos, Last Modified By: Lilliana Campos    Past Surgical History:   · 	S/P Carpal Tunnel Release: Description: right wrist 5/6/2009, Entered By: Lilliana Campos, Entered Date: 11-Mar-2011 09:16, Last Modified By: Lilliana Campos    Clinical Impressions   · Diagnosis	Order received for bedside swallow evaluation. Chart reviewed. Unclear as to GOC regarding family wishes for pleasure feeds versus formal bedside evaluation. As per documentation, patient is reportedly coughing during eating yet passed RN dysphagia screen. PO diet has been initiated by MD. TEAMS message sent to ELLEN Lo. This service will await response prior to addressing consult.    Recommendations   · Date	25-Apr-2023    Electronic Signatures for Addendum Section:   Beena Keene (Speech Pathologist) (Signed Addendum 25-Apr-2023 09:59)  	As per d/w ELLEN Lo, provider does not want formal bedside swallow evaluation at this time. Diet per MD. This service will remain available for reconsult as needed.    Electronic Signatures:  Beena Keene (Speech Pathologist)  (Signed 25-Apr-2023 09:29)  	Authored: Swallow Bedside Assessment      Last Updated: 25-Apr-2023 09:59 by Beena Keene (Speech Pathologist)

## 2023-04-25 NOTE — PROGRESS NOTE ADULT - PROBLEM SELECTOR PLAN 1
pt hypoxic on admission with tachypnea, noted with pulm congestion on imaging. s/p RRT in ED - status now improved after starting IV diuretics.    - now off supplemental O2   - cardiology / Dr. Rico's evaluation and recommendations appreciated --> d/c lasix now   - c/t closely monitor pt's respiratory status, fluid status, renal function (s/p contrast), and electrolytes  - continue telemetry monitoring  - TTE reviewed and noted - normal LF systolic function, new valvular disease (mod AS, mod MR)  - c/t monitor O2 sat

## 2023-04-25 NOTE — PROGRESS NOTE ADULT - PROBLEM SELECTOR PLAN 2
rising Cr in past 2-3 days, with Cr ~3 today, baseline from prior admission ~1  - may be 2/2 PNA and IV diuretics   - check urine lytes, check bladder scan x 1  - check renal US  - d/c lasix, trend Cr closely   - renally dose medications, avoid nephrotoxins

## 2023-04-25 NOTE — PROGRESS NOTE ADULT - SUBJECTIVE AND OBJECTIVE BOX
[facility] Division of Hospital Medicine  Chico Fisher MD  Available via MS Teams    SUBJECTIVE / OVERNIGHT EVENTS:  no acute events overnight  unable to obtain ROS 2/2 underlying advanced dementia     ADDITIONAL REVIEW OF SYSTEMS:  as noted above     MEDICATIONS  (STANDING):  amLODIPine   Tablet 10 milliGRAM(s) Oral daily  aspirin  chewable 81 milliGRAM(s) Oral daily  atorvastatin 80 milliGRAM(s) Oral at bedtime  azithromycin  IVPB      azithromycin  IVPB 500 milliGRAM(s) IV Intermittent every 24 hours  cefTRIAXone   IVPB 1000 milliGRAM(s) IV Intermittent every 24 hours  dextrose 5%. 1000 milliLiter(s) (50 mL/Hr) IV Continuous <Continuous>  dextrose 5%. 1000 milliLiter(s) (100 mL/Hr) IV Continuous <Continuous>  dextrose 50% Injectable 25 Gram(s) IV Push once  dextrose 50% Injectable 25 Gram(s) IV Push once  dextrose 50% Injectable 12.5 Gram(s) IV Push once  enoxaparin Injectable 30 milliGRAM(s) SubCutaneous every 24 hours  glucagon  Injectable 1 milliGRAM(s) IntraMuscular once  insulin lispro (ADMELOG) corrective regimen sliding scale   SubCutaneous three times a day before meals  insulin lispro (ADMELOG) corrective regimen sliding scale   SubCutaneous at bedtime  insulin lispro Injectable (ADMELOG) 2 Unit(s) SubCutaneous three times a day before meals  latanoprost 0.005% Ophthalmic Solution 1 Drop(s) Both EYES at bedtime  metoprolol tartrate 12.5 milliGRAM(s) Oral two times a day  polyethylene glycol 3350 17 Gram(s) Oral two times a day  potassium chloride    Tablet ER 20 milliEquivalent(s) Oral every 2 hours  potassium chloride  10 mEq/50 mL IVPB 10 milliEquivalent(s) IV Intermittent once  senna 2 Tablet(s) Oral at bedtime    MEDICATIONS  (PRN):  dextrose Oral Gel 15 Gram(s) Oral once PRN Blood Glucose LESS THAN 70 milliGRAM(s)/deciliter      I&O's Summary      PHYSICAL EXAM:  Vital Signs Last 24 Hrs  T(C): 37.3 (2023 11:21), Max: 37.3 (2023 11:21)  T(F): 99.1 (2023 11:21), Max: 99.1 (2023 11:21)  HR: 84 (2023 11:21) (75 - 88)  BP: 183/65 (2023 11:21) (158/62 - 183/65)  BP(mean): --  RR: 18 (2023 11:21) (18 - 18)  SpO2: 95% (2023 11:21) (95% - 97%)    Parameters below as of 2023 11:21  Patient On (Oxygen Delivery Method): room air      PHYSICAL EXAM:  GENERAL: NAD, cachectic, chronically ill appearing  HEAD:  Atraumatic, normocephalic  EYES: EOMI, conjunctiva and sclera clear  NECK: Supple, no JVD  CHEST/LUNG: Clear to auscultation bilaterally; no wheezing or rales  HEART: Regular rate and rhythm; no murmurs, no LE edema   ABDOMEN: Soft, nondistended; bowel sounds present  EXTREMITIES:  2+ Peripheral Pulses, no clubbing, cyanosis  PSYCH: AAOx0, not anxious  SKIN: No rashes or lesions      LABS:                        7.4    10.39 )-----------( 195      ( 2023 11:32 )             22.0     04-25    140  |  103  |  84<H>  ----------------------------<  117<H>  2.7<LL>   |  22  |  3.07<H>    Ca    8.5      2023 11:33  Mg     2.6     04-24            Urinalysis Basic - ( 2023 14:43 )    Color: Colorless / Appearance: Clear / S.020 / pH: x  Gluc: x / Ketone: Negative  / Bili: Negative / Urobili: Negative   Blood: x / Protein: 100 mg/dl / Nitrite: Negative   Leuk Esterase: Negative / RBC: 6 /hpf / WBC 0 /HPF   Sq Epi: x / Non Sq Epi: x / Bacteria: Negative        Culture - Blood (collected 2023 00:00)  Source: .Blood Blood  Preliminary Report (2023 04:02):    No growth to date.    Culture - Urine (collected 2023 14:43)  Source: Clean Catch Clean Catch (Midstream)  Preliminary Report (2023 11:35):    50,000 - 99,000 CFU/mL Escherichia coli    10,000 - 49,000 CFU/mL Gram positive organisms      SARS-CoV-2: Edgar (2023 21:20)      RADIOLOGY & ADDITIONAL TESTS:  New Imaging Personally Reviewed Today:  New Electrocardiogram Personally Reviewed Today:  Other Results Reviewed Today:   Prior or Outpatient Records Reviewed Today with Summary:    COORDINATION OF CARE:  Consultant Communication and Details of Discussion (where applicable):

## 2023-04-25 NOTE — PROGRESS NOTE ADULT - ASSESSMENT
Alternate MRN: [id number]    88yoF w/ PMHx of advanced dementia (at B/L patient is bedbound, nonverbal, though able to tolerate PO diet - per family, patient has hearty appetite), HTN, HLD, IDDM, brought in by family for difficulty breathing over the past few days in setting of notable increased swelling of the patient's hands, feet, and face.

## 2023-04-25 NOTE — PROGRESS NOTE ADULT - PROBLEM SELECTOR PLAN 5
noted with Afib w RVR on admission, unclear if new onset Afib  - etiology likely related to underlying infection and fluid overload state  - started on lopressor 12.5mg BID with improved rates --> now in sinus

## 2023-04-26 NOTE — CHART NOTE - NSCHARTNOTEFT_GEN_A_CORE
Medicine PA Note     Patient is a 88y old  Female who presents with a chief complaint of SOB/GARRISON, swelling of hands/legs/face (25 Apr 2023 13:04)  Notifoed by RN, patient is hypoxic on venturi mask. Pt seen and examined at bedside, agitated and pulling off oxygen mask. Pt unable to participate in assessment due to agitation,      Vital Signs Last 24 Hrs  T(C): 36.4 (26 Apr 2023 05:00), Max: 37.3 (25 Apr 2023 11:21)  T(F): 97.5 (26 Apr 2023 05:00), Max: 99.1 (25 Apr 2023 11:21)  HR: 92 (26 Apr 2023 05:00) (80 - 106)  BP: 151/71 (26 Apr 2023 05:00) (136/71 - 183/65)  BP(mean): --  RR: 18 (26 Apr 2023 05:00) (18 - 18)  SpO2: 92% (26 Apr 2023 05:00) (92% - 95%)    Parameters below as of 26 Apr 2023 05:00  Patient On (Oxygen Delivery Method): room air      Labs:                        7.8    11.14 )-----------( 193      ( 25 Apr 2023 17:54 )             25.3     04-25    141  |  104  |  88<H>  ----------------------------<  197<H>  4.2   |  19<L>  |  3.31<H>    Ca    8.2<L>      25 Apr 2023 17:54  Mg     2.6     04-25    Physical Exam:  General: WN/WD NAD  Neurology: A&Ox3, nonfocal, TEJADA x 4  Head:  Normocephalic, atraumatic  Respiratory: CTA B/L  CV: RRR, S1S2, no murmur  Abdominal: Soft, NT, ND no palpable mass  MSK: No edema, + peripheral pulses, FROM all 4 extremity        Radiology:  < from: Xray Chest 1 View- PORTABLE-Urgent (Xray Chest 1 View- PORTABLE-Urgent .) (04.26.23 @ 04:00) >  ******PRELIMINARY REPORT******    ******PRELIMINARY REPORT******   ACC: [id number] EXAM:  XR CHEST PORTABLE URGENT 1V   ORDERED BY:  ZAMZAM LEUNG   PROCEDURE DATE:  04/26/2023    ******PRELIMINARY REPORT******    ******PRELIMINARY REPORT******   INTERPRETATION:  Similar pulmonary edema with slightly icnreased left   pleural effusion  ******PRELIMINARY REPORT******    *****PRELIMINARY REPORT******   GILMER COTTON MD; Resident Radiologist  This document is a PRELIMINARY interpretation and is pending final   attending approval. Apr 26 2023  5:10AM  < end of copied text >      Assessment & Plan:  HPI:  Elvia MRN = [id number]  88yoF w/ PMHx of advanced dementia (at B/L patient is bedbound, nonverbal, though able to tolerate PO diet - per family, patient has hearty appetite), HTN, HLD, IDDM, who presents from home where she lives with her , brought in by her daughter (son is at her bedside during this evaluation) w/ complaints of daughter noticing the patient has been having difficulty breathing over the past few days in setting of notable increased swelling of the patient's hands, feet, and face. Of note, patient was recently hospitalized at [facility] for aspiration PNA (and found to have demand ischemia) when she presented with similar complaints and was found to be hypoxic; at the time, S+S eval was recommended, but per documentation at the end of Jan 2023, pt's daughter declined formal/objective evaluation, and instead, understanding all risks associated with aspiration, preferred to have patient continue on her typical dysphagia diet (puree/finely cut up food). Currently, patient is calm, sleeping, though she is arousable, and becomes agitated with attempts at physical examination. Per documentation, patient's daughter notes that the patient has been eating and drinking normally but that the patient does cough a lot.   PMD Dr Joleen Olsen [phone] does home visitsED Presentin Vitals: 99.3F, 102bpm, 204/67 --> 180/75, 24br/m, 94% on RA.ED Course: s/p Lovenox 30mg SC x1, IV Zosyn 3.375g x1, IV Azithromycin 500mg x1, Lispro 6unit x1, Lasix 20mg IVP x3, Ativan 0.5mg IVP x1, Haldol 1mg IVP x2, Metoprolol 12.5mg PO x1, Lopressor 5mg IVP x2; RRT called for hypoxia and tachycardia -- pt found to be in ?new Afib w/ RVR (23 Apr 2023 16:04)    Pt now presenting with hypoxia likely 2/2 pulmonary edema. Pt initially placed on venturi mask and eventually able to be weaned down to 6LNC.    1) Hypoxia likely 2/2 pulmonary edema  - Pt currently hemodynamically stable  - Continue with 6LNC  - Bilateral unsecured mittens ordered as patient is agitated and pulling out oxygen  - Chest x-ray as noted above, with pulmonary edema  - ABG ordered, patient hard stick, unable to obtain at this time.  - Discussed with [facility], Dr. Gurrola, advised to hold off on Lasix due to pts worsening TOÑITO and patient is oxygenating well now on 6LNC  - Will continue to monitor closely on continuous pulse ox  - Possible nephrology consult in the AM for worsening TOÑITO  - will endorse to AM Team    Zamzam Leung PA-C  Department of Medicine   Spectra #[id number]

## 2023-04-26 NOTE — SWALLOW BEDSIDE ASSESSMENT ADULT - DIET PRIOR TO ADMI
Per daughter, Pt has been consuming "small chopped foods and thick liquids" at home; endorses more coughing w/ intake lately Per daughter, Pt has been consuming "small chopped foods and kind-of thick liquids" at home; endorses more coughing w/ intake lately

## 2023-04-26 NOTE — CONSULT NOTE ADULT - SUBJECTIVE AND OBJECTIVE BOX
seen and examined. TOÑITO in setting of contrast nephropathy and congested kidney  ? AS severe. cards to re eval. full consult to follow  seen and examined. TOÑITO in setting of contrast nephropathy and congested kidney  ? AS severe. cards to re eval. full consult to follow       [facility]  [phone]  NEPHROLOGY      INITIAL CONSULT NOTE  --------------------------------------------------------------------------------  HPI:      88yoF w/ PMHx of advanced dementia (at B/L patient is bedbound, nonverbal, though able to tolerate PO diet - per family, patient has hearty appetite), HTN, HLD, IDDM, who presents from home where she lives with her , brought in by her daughter (son is at her bedside during this evaluation) w/ complaints of daughter noticing the patient has been having difficulty breathing over the past few days in setting of notable increased swelling of the patient's hands, feet, and face. Of note, patient was recently hospitalized at [facility] for aspiration PNA (and found to have demand ischemia) when she presented with similar complaints and was found to be hypoxic; at the time, S+S eval was recommended, but per documentation at the end of Jan 2023,  had been on  dysphagia diet (puree/finely cut up food). Currently, patient is calm, sleeping, though she is arousable, and becomes agitated with attempts PMD Dr Joleen Olsen [phone] does home visits    ED Presenting Vitals: 99.3F, 102bpm, 204/67 --> 180/75, 24br/m, 94% on RA   ED Course: s/p Lovenox 30mg SC x1, IV Zosyn 3.375g x1, IV Azithromycin 500mg x1, Lispro 6unit x1, Lasix 20mg IVP x3, Ativan 0.5mg IVP x1, Haldol 1mg IVP x2, Metoprolol 12.5mg PO x1, Lopressor 5mg IVP x2; RRT called for hypoxia and tachycardia -- pt found to be in ?new Afib w/ RVR . received iv lasix after admission. also had cta chest on admission   now noticed with abn creatinine. renal consult called.       PAST HISTORY  --------------------------------------------------------------------------------  PAST MEDICAL & SURGICAL HISTORY:  Diabetes Mellitus, Type 2      Glaucoma      Hyperlipidemia      HTN (Hypertension)      Diabetic retinopathy      Dementia      Cataract      Diabetes mellitus      HTN (hypertension)      HLD (hyperlipidemia)      HTN (hypertension)      Diabetes mellitus      Dementia      S/P Carpal Tunnel Release  right wrist 5/6/2009      No significant past surgical history      S/P eye surgery        FAMILY HISTORY:  Family history of bipolar disorder (Child)  pt unable to give     PAST SOCIAL HISTORY:  pt unable to give   ALLERGIES & MEDICATIONS  --------------------------------------------------------------------------------  Allergies    No Known Allergies  Allergy Status Unknown    Intolerances      Standing Inpatient Medications  amLODIPine   Tablet 10 milliGRAM(s) Oral daily  aspirin  chewable 81 milliGRAM(s) Oral daily  atorvastatin 80 milliGRAM(s) Oral at bedtime  cefepime   IVPB 1000 milliGRAM(s) IV Intermittent every 24 hours  dextrose 5%. 1000 milliLiter(s) IV Continuous <Continuous>  dextrose 5%. 1000 milliLiter(s) IV Continuous <Continuous>  dextrose 50% Injectable 25 Gram(s) IV Push once  dextrose 50% Injectable 25 Gram(s) IV Push once  dextrose 50% Injectable 12.5 Gram(s) IV Push once  enoxaparin Injectable 30 milliGRAM(s) SubCutaneous every 24 hours  glucagon  Injectable 1 milliGRAM(s) IntraMuscular once  insulin lispro (ADMELOG) corrective regimen sliding scale   SubCutaneous at bedtime  insulin lispro (ADMELOG) corrective regimen sliding scale   SubCutaneous three times a day before meals  insulin lispro Injectable (ADMELOG) 4 Unit(s) SubCutaneous three times a day before meals  latanoprost 0.005% Ophthalmic Solution 1 Drop(s) Both EYES at bedtime  metoprolol tartrate 12.5 milliGRAM(s) Oral two times a day  polyethylene glycol 3350 17 Gram(s) Oral two times a day  senna 2 Tablet(s) Oral at bedtime    PRN Inpatient Medications  dextrose Oral Gel 15 Gram(s) Oral once PRN      REVIEW OF SYSTEMS  --------------------------------------------------------------------------------  non communicative     VITALS/PHYSICAL EXAM  --------------------------------------------------------------------------------  T(C): 36.9 (04-26-23 @ 21:18), Max: 36.9 (04-26-23 @ 21:18)  HR: 73 (04-26-23 @ 21:18) (72 - 106)  BP: 140/61 (04-26-23 @ 21:18) (140/61 - 167/70)  RR: 18 (04-26-23 @ 21:18) (18 - 20)  SpO2: 98% (04-26-23 @ 21:18) (92% - 100%)  Wt(kg): --        04-25-23 @ 07:01  -  04-26-23 @ 07:00  --------------------------------------------------------  IN: 360 mL / OUT: 0 mL / NET: 360 mL    04-26-23 @ 07:01  -  04-26-23 @ 23:18  --------------------------------------------------------  IN: 480 mL / OUT: 0 mL / NET: 480 mL      Physical Exam:  	Gen: ill appearing elderly F on high flow O2   	no jvd ,  	Pulm: decrease bs  no rales  +  ronchi  - wheezing  	CV: RRR, S1S2; no rub  	Abd: +BS, soft, nontender/nondistended  	: No  bladder distention   	UE: Warm, no cyanosis  no clubbing,  no edema;  	LE: Warm, no cyanosis  no clubbing, no edema  	Neuro: dementia     LABS/STUDIES  --------------------------------------------------------------------------------              8.4    14.79 >-----------<  246      [04-26-23 @ 09:33]              26.8     140  |  103  |  101  ----------------------------<  473      [04-26-23 @ 09:32]  5.2   |  17  |  4.08        Ca     8.5     [04-26-23 @ 09:32]      Mg     2.8     [04-26-23 @ 09:32]      Phos  6.6     [04-26-23 @ 09:32]    TPro  6.9  /  Alb  3.9  /  TBili  0.3  /  DBili  x   /  AST  31  /  ALT  25  /  AlkPhos  84  [04-26-23 @ 09:32]          Creatinine Trend:  SCr 4.08 [04-26 @ 09:32]  SCr 3.31 [04-25 @ 17:54]  SCr 3.07 [04-25 @ 11:33]  SCr 2.41 [04-24 @ 17:32]  SCr 1.79 [04-23 @ 09:06]    Urinalysis - [04-23-23 @ 14:43]      Color Colorless / Appearance Clear / SG 1.020 / pH 6.0      Gluc Trace / Ketone Negative  / Bili Negative / Urobili Negative       Blood Small / Protein 100 mg/dl / Leuk Est Negative / Nitrite Negative      RBC 6 / WBC 0 / Hyaline 0 / Gran  / Sq Epi  / Non Sq Epi  / Bacteria Negative      TSH 2.59      [08-09-22 @ 06:20]  Lipid: chol 216, TG 80, HDL 49, LDL --      [08-08-22 @ 06:31]

## 2023-04-26 NOTE — SWALLOW BEDSIDE ASSESSMENT ADULT - SWALLOW EVAL: PATIENT/FAMILY GOALS STATEMENT
Daughter concerned about Pt's PO intake and wishes to make an educated decision in regards to Pt's intake/diet

## 2023-04-26 NOTE — PROGRESS NOTE ADULT - SUBJECTIVE AND OBJECTIVE BOX
[facility] Division of Hospital Medicine  Chico Fisher MD  Available via MS Teams    SUBJECTIVE / OVERNIGHT EVENTS:  pt hypoxic overnight, started on venturi mask but pt pulling it off, now on HFNC   unable to obtain ROS 2/2 baseline dementia     ADDITIONAL REVIEW OF SYSTEMS:  as noted above     MEDICATIONS  (STANDING):  amLODIPine   Tablet 10 milliGRAM(s) Oral daily  aspirin  chewable 81 milliGRAM(s) Oral daily  atorvastatin 80 milliGRAM(s) Oral at bedtime  cefepime   IVPB 1000 milliGRAM(s) IV Intermittent every 24 hours  dextrose 5%. 1000 milliLiter(s) (50 mL/Hr) IV Continuous <Continuous>  dextrose 5%. 1000 milliLiter(s) (100 mL/Hr) IV Continuous <Continuous>  dextrose 50% Injectable 25 Gram(s) IV Push once  dextrose 50% Injectable 25 Gram(s) IV Push once  dextrose 50% Injectable 12.5 Gram(s) IV Push once  enoxaparin Injectable 30 milliGRAM(s) SubCutaneous every 24 hours  glucagon  Injectable 1 milliGRAM(s) IntraMuscular once  insulin lispro (ADMELOG) corrective regimen sliding scale   SubCutaneous three times a day before meals  insulin lispro (ADMELOG) corrective regimen sliding scale   SubCutaneous at bedtime  insulin lispro Injectable (ADMELOG) 2 Unit(s) SubCutaneous three times a day before meals  latanoprost 0.005% Ophthalmic Solution 1 Drop(s) Both EYES at bedtime  metoprolol tartrate 12.5 milliGRAM(s) Oral two times a day  polyethylene glycol 3350 17 Gram(s) Oral two times a day  senna 2 Tablet(s) Oral at bedtime    MEDICATIONS  (PRN):  dextrose Oral Gel 15 Gram(s) Oral once PRN Blood Glucose LESS THAN 70 milliGRAM(s)/deciliter      I&O's Summary    25 Apr 2023 07:01  -  26 Apr 2023 07:00  --------------------------------------------------------  IN: 360 mL / OUT: 0 mL / NET: 360 mL        PHYSICAL EXAM:  Vital Signs Last 24 Hrs  T(C): 36.4 (26 Apr 2023 09:10), Max: 37 (25 Apr 2023 16:27)  T(F): 97.6 (26 Apr 2023 09:10), Max: 98.6 (25 Apr 2023 16:27)  HR: 74 (26 Apr 2023 09:34) (72 - 106)  BP: 151/65 (26 Apr 2023 09:10) (136/71 - 167/70)  BP(mean): --  RR: 20 (26 Apr 2023 09:34) (18 - 20)  SpO2: 97% (26 Apr 2023 09:34) (92% - 100%)    Parameters below as of 26 Apr 2023 09:34  Patient On (Oxygen Delivery Method): nasal cannula, high flow,@31C  O2 Flow (L/min): 40  O2 Concentration (%): 60    PHYSICAL EXAM:  GENERAL: NAD, cachectic,   HEAD:  Atraumatic, normocephalic  EYES: conjunctiva and sclera clear  NECK: Supple, no JVD  CHEST/LUNG: rales at bases, no tachypnea, on HFNC   HEART: Regular rate and rhythm; no murmurs, no LE edema   ABDOMEN: Soft, nondistended; bowel sounds present  EXTREMITIES:  2+ Peripheral Pulses, no clubbing, cyanosis  PSYCH: AAOX0, intermittently agitated during exam   SKIN: No rashes or lesions      LABS:                        8.4    14.79 )-----------( 246      ( 26 Apr 2023 09:33 )             26.8     04-26    140  |  103  |  101<H>  ----------------------------<  473<HH>  5.2   |  17<L>  |  4.08<H>    Ca    8.5      26 Apr 2023 09:32  Phos  6.6     04-26  Mg     2.8     04-26    TPro  6.9  /  Alb  3.9  /  TBili  0.3  /  DBili  x   /  AST  31  /  ALT  25  /  AlkPhos  84  04-26              Culture - Blood (collected 24 Apr 2023 00:00)  Source: .Blood Blood  Preliminary Report (25 Apr 2023 04:02):    No growth to date.    Culture - Urine (collected 23 Apr 2023 14:43)  Source: Clean Catch Clean Catch (Midstream)  Preliminary Report (25 Apr 2023 16:35):    50,000 - 99,000 CFU/mL Escherichia coli    10,000 - 49,000 CFU/mL Enterococcus faecalis      SARS-CoV-2: NotDetec (22 Apr 2023 21:20)  SARS-CoV-2: NotDetec (29 Jan 2023 22:08)      RADIOLOGY & ADDITIONAL TESTS:  New Imaging Personally Reviewed Today:  New Electrocardiogram Personally Reviewed Today:  Other Results Reviewed Today:   Prior or Outpatient Records Reviewed Today with Summary:    COORDINATION OF CARE:  Consultant Communication and Details of Discussion (where applicable):

## 2023-04-26 NOTE — CONSULT NOTE ADULT - ASSESSMENT
88yoF w/ PMHx of advanced dementia (at B/L patient is bedbound, nonverbal, though able to tolerate PO diet - per family, patient has hearty appetite), HTN, HLD, IDDM, who presents from home where she lives with her , brought in by her daughter (son is at her bedside during this evaluation) w/ complaints of daughter noticing the patient has been having difficulty breathing over the past few days in setting of notable increased swelling of the patient's hands, feet, and face. Of note, patient was recently hospitalized at [facility] for aspiration PNA (and found to have demand ischemia) when she presented with similar complaints and was found to be hypoxic; at the time, S+S eval was recommended, but per documentation at the end of Jan 2023,      1- TOÑITO   2- CHF       toñito ins setting of chf as well as more likely due to contrast nephropathy   her echo reveals mod to severe range AS  diuretics to hold today but will need to restart soon   start bumex 1 mg ivp bid with goal to raise dose  dw GI primary team. d/w  pt daughter at bedside

## 2023-04-26 NOTE — PROGRESS NOTE ADULT - PROBLEM SELECTOR PLAN 2
pt hypoxic on admission with tachypnea, noted with pulm congestion on imaging.   - cardiology / Dr. Rico's evaluation and recommendations appreciated --> defer further lasix for now   - c/t closely monitor pt's respiratory status, fluid status, renal function (s/p contrast), and electrolytes  - continue telemetry monitoring  - TTE reviewed and noted - normal LV systolic function, new valvular disease (mod AS, mod MR)  - c/t monitor O2 sat

## 2023-04-26 NOTE — SWALLOW BEDSIDE ASSESSMENT ADULT - PHARYNGEAL PHASE
No overt s/s of laryngeal penetration/aspiration/Delayed pharyngeal swallow/Decreased laryngeal elevation No overt s/s of laryngeal penetration/aspiration; audible swallows w/ cup sips which may be indicative of discoordinated swallow/Delayed pharyngeal swallow/Decreased laryngeal elevation

## 2023-04-26 NOTE — PROGRESS NOTE ADULT - PROBLEM SELECTOR PLAN 1
pt with worsening hypoxia overnight, not tolerating venturi mask so placed on HFNC for now  - CXR reviewed, +pulm edema and new LL effusion. However, pt does not appear otherwise overloaded on exam  - suspect pt may have had recurrent aspiration event, as WBC now also elevated from prior   - will c/w HFNC and wean as tolerated  - d/w Dr Rico - will hold off on lasix for now, c/t monitor volume status closely  - will escalate abx to cefepime for now, plan for 5-7d course in total (4/26--)

## 2023-04-26 NOTE — SWALLOW BEDSIDE ASSESSMENT ADULT - MODE OF PRESENTATION
spoon/fed by clinician cup/spoon/fed by clinician Pt unable to create negative intraoral pressure to sip liquid from straw/cup/spoon/fed by clinician

## 2023-04-26 NOTE — PROGRESS NOTE ADULT - ASSESSMENT
Alternate MRN: [id number]    88yoF w/ PMHx of advanced dementia (at B/L patient is bedbound, nonverbal, though able to tolerate PO diet - per family, patient has hearty appetite), HTN, HLD, IDDM, brought in by family for difficulty breathing over the past few days in setting of notable increased swelling of the patient's hands, feet, and face.

## 2023-04-26 NOTE — PROGRESS NOTE ADULT - SUBJECTIVE AND OBJECTIVE BOX
Subjective: Patient seen and examined. No new events except as noted.   Hypoxic event early this am.  Pt seen this am on HFNC.  Pt awake and alert, aide feeding pt.    REVIEW OF SYSTEMS:  Unable to obtain.    MEDICATIONS:  MEDICATIONS  (STANDING):  amLODIPine   Tablet 10 milliGRAM(s) Oral daily  aspirin  chewable 81 milliGRAM(s) Oral daily  atorvastatin 80 milliGRAM(s) Oral at bedtime  azithromycin  IVPB 500 milliGRAM(s) IV Intermittent every 24 hours  azithromycin  IVPB      cefTRIAXone   IVPB 1000 milliGRAM(s) IV Intermittent every 24 hours  dextrose 5%. 1000 milliLiter(s) (50 mL/Hr) IV Continuous <Continuous>  dextrose 5%. 1000 milliLiter(s) (100 mL/Hr) IV Continuous <Continuous>  dextrose 50% Injectable 25 Gram(s) IV Push once  dextrose 50% Injectable 25 Gram(s) IV Push once  dextrose 50% Injectable 12.5 Gram(s) IV Push once  enoxaparin Injectable 30 milliGRAM(s) SubCutaneous every 24 hours  furosemide   Injectable 40 milliGRAM(s) IV Push daily  glucagon  Injectable 1 milliGRAM(s) IntraMuscular once  insulin lispro (ADMELOG) corrective regimen sliding scale   SubCutaneous three times a day before meals  insulin lispro (ADMELOG) corrective regimen sliding scale   SubCutaneous at bedtime  insulin lispro Injectable (ADMELOG) 2 Unit(s) SubCutaneous three times a day before meals  latanoprost 0.005% Ophthalmic Solution 1 Drop(s) Both EYES at bedtime  metoprolol tartrate 12.5 milliGRAM(s) Oral two times a day  polyethylene glycol 3350 17 Gram(s) Oral two times a day  potassium chloride    Tablet ER 40 milliEquivalent(s) Oral once  senna 2 Tablet(s) Oral at bedtime    PHYSICAL EXAM:  Vital Signs Last 24 Hrs  T(C): 36.4 (26 Apr 2023 09:10), Max: 37 (25 Apr 2023 16:27)  T(F): 97.6 (26 Apr 2023 09:10), Max: 98.6 (25 Apr 2023 16:27)  HR: 74 (26 Apr 2023 09:34) (72 - 106)  BP: 151/65 (26 Apr 2023 09:10) (136/71 - 167/70)  BP(mean): --  RR: 20 (26 Apr 2023 09:34) (18 - 20)  SpO2: 97% (26 Apr 2023 09:34) (92% - 100%)    Parameters below as of 26 Apr 2023 09:34  Patient On (Oxygen Delivery Method): nasal cannula, high flow,@31C  O2 Flow (L/min): 40  O2 Concentration (%): 60    I&O's Summary    25 Apr 2023 07:01  -  26 Apr 2023 07:00  --------------------------------------------------------  IN: 360 mL / OUT: 0 mL / NET: 360 mL    Appearance: NAD non verbal 	  HEENT: Normal oral mucosa, PERRL, EOMI	  Lymphatic: No lymphadenopathy  Cardiovascular: Normal S1 S2, No JVD, No murmurs  Respiratory: decreased bs, on HFNC	  Psychiatry: A & O x 0  Gastrointestinal:  Soft, Non-tender, + BS	  Skin: No rashes, No ecchymoses, No cyanosis	  Neurologic: Non-focal  Extremities: decreased  range of motion, No  edema  Vascular: Peripheral pulses palpable 2+ bilaterally  LABS:    CARDIAC MARKERS:                        8.4    14.79 )-----------( 246      ( 26 Apr 2023 09:33 )             26.8     04-26    140  |  103  |  101<H>  ----------------------------<  473<HH>  5.2   |  17<L>  |  4.08<H>    Ca    8.5      26 Apr 2023 09:32  Phos  6.6     04-26  Mg     2.8     04-26    TPro  6.9  /  Alb  3.9  /  TBili  0.3  /  DBili  x   /  AST  31  /  ALT  25  /  AlkPhos  84  04-26    TELEMETRY: SR	    ECG:  	  RADIOLOGY:   DIAGNOSTIC TESTING:  [ ] Echocardiogram:  [ ]  Catheterization:  [ ] Stress Test:    OTHER:

## 2023-04-27 NOTE — PROGRESS NOTE ADULT - SUBJECTIVE AND OBJECTIVE BOX
[facility]   [phone]  RENAL FOLLOW UP NOTE  --------------------------------------------------------------------------------  Chief Complaint:    24 hour events/subjective:    seen earlier   on high flow O2    PAST HISTORY  --------------------------------------------------------------------------------  No significant changes to PMH, PSH, FHx, SHx, unless otherwise noted    ALLERGIES & MEDICATIONS  --------------------------------------------------------------------------------  Allergies    No Known Allergies  Allergy Status Unknown    Intolerances      Standing Inpatient Medications  amLODIPine   Tablet 10 milliGRAM(s) Oral daily  aspirin  chewable 81 milliGRAM(s) Oral daily  atorvastatin 80 milliGRAM(s) Oral at bedtime  cefepime   IVPB 1000 milliGRAM(s) IV Intermittent every 24 hours  dextrose 5%. 1000 milliLiter(s) IV Continuous <Continuous>  dextrose 5%. 1000 milliLiter(s) IV Continuous <Continuous>  dextrose 50% Injectable 25 Gram(s) IV Push once  dextrose 50% Injectable 25 Gram(s) IV Push once  dextrose 50% Injectable 12.5 Gram(s) IV Push once  glucagon  Injectable 1 milliGRAM(s) IntraMuscular once  heparin   Injectable 5000 Unit(s) SubCutaneous every 12 hours  insulin lispro (ADMELOG) corrective regimen sliding scale   SubCutaneous at bedtime  insulin lispro (ADMELOG) corrective regimen sliding scale   SubCutaneous three times a day before meals  insulin lispro Injectable (ADMELOG) 4 Unit(s) SubCutaneous three times a day before meals  latanoprost 0.005% Ophthalmic Solution 1 Drop(s) Both EYES at bedtime  metoprolol tartrate 12.5 milliGRAM(s) Oral two times a day  polyethylene glycol 3350 17 Gram(s) Oral two times a day  senna 2 Tablet(s) Oral at bedtime  sodium chloride 0.9%. 1000 milliLiter(s) IV Continuous <Continuous>    PRN Inpatient Medications  dextrose Oral Gel 15 Gram(s) Oral once PRN      REVIEW OF SYSTEMS  --------------------------------------------------------------------------------  not able to give ros     VITALS/PHYSICAL EXAM  --------------------------------------------------------------------------------  T(C): 36.6 (04-27-23 @ 12:10), Max: 36.7 (04-27-23 @ 04:25)  HR: 83 (04-27-23 @ 15:20) (72 - 89)  BP: 147/61 (04-27-23 @ 12:10) (147/61 - 167/73)  RR: 17 (04-27-23 @ 15:20) (17 - 18)  SpO2: 97% (04-27-23 @ 15:20) (94% - 100%)  Wt(kg): --        04-26-23 @ 07:01  -  04-27-23 @ 07:00  --------------------------------------------------------  IN: 480 mL / OUT: 0 mL / NET: 480 mL    04-27-23 @ 07:01  -  04-27-23 @ 21:36  --------------------------------------------------------  IN: 470 mL / OUT: 0 mL / NET: 470 mL      Physical Exam:  	    Gen: ill appearing elderly F on high flow O2   	no jvd ,  	Pulm: decrease bs  no rales  +  ronchi  - wheezing  	CV: RRR, S1S2; no rub  	Abd: +BS, soft, nontender/nondistended  	: No  bladder distention   	UE: Warm, no cyanosis  no clubbing,  no edema;  	LE: Warm, no cyanosis  no clubbing, no edema  	Neuro: dementia     LABS/STUDIES  --------------------------------------------------------------------------------              7.5    16.16 >-----------<  225      [04-27-23 @ 06:39]              23.6     137  |  104  |  113  ----------------------------<  178      [04-27-23 @ 20:51]  4.8   |  13  |  4.56        Ca     8.0     [04-27-23 @ 20:51]      Mg     2.8     [04-26-23 @ 09:32]      Phos  6.6     [04-26-23 @ 09:32]    TPro  6.9  /  Alb  3.9  /  TBili  0.3  /  DBili  x   /  AST  31  /  ALT  25  /  AlkPhos  84  [04-26-23 @ 09:32]          Creatinine Trend:  SCr 4.56 [04-27 @ 20:51]  SCr 4.75 [04-27 @ 06:38]  SCr 4.08 [04-26 @ 09:32]  SCr 3.31 [04-25 @ 17:54]  SCr 3.07 [04-25 @ 11:33]              Urinalysis - [04-23-23 @ 14:43]      Color Colorless / Appearance Clear / SG 1.020 / pH 6.0      Gluc Trace / Ketone Negative  / Bili Negative / Urobili Negative       Blood Small / Protein 100 mg/dl / Leuk Est Negative / Nitrite Negative      RBC 6 / WBC 0 / Hyaline 0 / Gran  / Sq Epi  / Non Sq Epi  / Bacteria Negative      TSH 2.59      [08-09-22 @ 06:20]  Lipid: chol 216, TG 80, HDL 49, LDL --      [08-08-22 @ 06:31]

## 2023-04-27 NOTE — PROGRESS NOTE ADULT - ASSESSMENT
Alternate MRN: [id number]    88yoF w/ PMHx of advanced dementia (at B/L patient is bedbound, nonverbal, though able to tolerate PO diet - per family, patient has hearty appetite), HTN, HLD, IDDM, brought in by family for difficulty breathing over the past few days in setting of notable increased swelling of the patient's hands, feet, and face; a/w hypoxic respiratory failure 2/2 aspiration PNA and acute decompensated diastolic heart failure, with course c/b significant TOÑITO. Initially hypoxia was improving and taken off oxygen, then with recurrent hypoxia, suspect 2/2 recurrent aspiration event.

## 2023-04-27 NOTE — CHART NOTE - NSCHARTNOTEFT_GEN_A_CORE
Notified by RN for pt in apparent respiratory distress on 6L NC.   Patient seen and examined at bedside. Unable to participate in ROS due to baseline dementia.     General: in apparent respiratory distress   Cardiovascular: +S1S2  Respiratory: labored breathing, +rhonchi   Extremities: minimal edema, palpable peripheral pulses    Vital Signs Last 24 Hrs  T(C): 36.8 (27 Apr 2023 21:37), Max: 36.8 (27 Apr 2023 21:37)  T(F): 98.3 (27 Apr 2023 21:37), Max: 98.3 (27 Apr 2023 21:37)  HR: 93 (27 Apr 2023 21:37) (72 - 93)  BP: 164/64 (27 Apr 2023 21:37) (147/61 - 167/73)  BP(mean): --  RR: 17 (27 Apr 2023 21:37) (17 - 18)  SpO2: 89% (27 Apr 2023 21:37) (89% - 100%)    Parameters below as of 27 Apr 2023 21:37  Patient On (Oxygen Delivery Method): nasal cannula      A/P:  87 y/o female with PMHx of advanced dementia (at B/L patient is bedbound, nonverbal, though able to tolerate PO diet - per family, patient has hearty appetite), HTN, HLD, IDDM, who presents with difficulty breathing over the past few days in setting of notable increased swelling of the patient's hands, feet, and face. Now with apparent respiratory distress while on 6L NC and hypoxia.     #respiratory distress/hypoxia   - Pt placed on NRB with saturation increase to 100%, then placed on HFNC  - Urgent CXR ordered & performed  - Duoneb x1 ordered & given   - Will continue to monitor  - Will endorse to AM team. Attending to follow    Plan as above discussed and agreed upon with [facility].     Juwan More PA-C  Dept. of Medicine  Spectra [phone]

## 2023-04-27 NOTE — PROGRESS NOTE ADULT - SUBJECTIVE AND OBJECTIVE BOX
[facility] Division of Hospital Medicine  Chico Fisher MD  Available via MS Teams    SUBJECTIVE / OVERNIGHT EVENTS:  no acute events overnight   unable to obtain ROS 2/2 baseline advanced dementia     ADDITIONAL REVIEW OF SYSTEMS:  as noted above     MEDICATIONS  (STANDING):  amLODIPine   Tablet 10 milliGRAM(s) Oral daily  aspirin  chewable 81 milliGRAM(s) Oral daily  atorvastatin 80 milliGRAM(s) Oral at bedtime  cefepime   IVPB 1000 milliGRAM(s) IV Intermittent every 24 hours  dextrose 5%. 1000 milliLiter(s) (50 mL/Hr) IV Continuous <Continuous>  dextrose 5%. 1000 milliLiter(s) (100 mL/Hr) IV Continuous <Continuous>  dextrose 50% Injectable 25 Gram(s) IV Push once  dextrose 50% Injectable 25 Gram(s) IV Push once  dextrose 50% Injectable 12.5 Gram(s) IV Push once  glucagon  Injectable 1 milliGRAM(s) IntraMuscular once  heparin   Injectable 5000 Unit(s) SubCutaneous every 12 hours  insulin lispro (ADMELOG) corrective regimen sliding scale   SubCutaneous three times a day before meals  insulin lispro (ADMELOG) corrective regimen sliding scale   SubCutaneous at bedtime  insulin lispro Injectable (ADMELOG) 4 Unit(s) SubCutaneous three times a day before meals  latanoprost 0.005% Ophthalmic Solution 1 Drop(s) Both EYES at bedtime  metoprolol tartrate 12.5 milliGRAM(s) Oral two times a day  polyethylene glycol 3350 17 Gram(s) Oral two times a day  senna 2 Tablet(s) Oral at bedtime  sodium chloride 0.9%. 1000 milliLiter(s) (50 mL/Hr) IV Continuous <Continuous>    MEDICATIONS  (PRN):  dextrose Oral Gel 15 Gram(s) Oral once PRN Blood Glucose LESS THAN 70 milliGRAM(s)/deciliter      I&O's Summary    26 Apr 2023 07:01  -  27 Apr 2023 07:00  --------------------------------------------------------  IN: 480 mL / OUT: 0 mL / NET: 480 mL    27 Apr 2023 07:01  -  27 Apr 2023 13:59  --------------------------------------------------------  IN: 470 mL / OUT: 0 mL / NET: 470 mL        PHYSICAL EXAM:  Vital Signs Last 24 Hrs  T(C): 36.6 (27 Apr 2023 12:10), Max: 36.9 (26 Apr 2023 21:18)  T(F): 97.8 (27 Apr 2023 12:10), Max: 98.4 (26 Apr 2023 21:18)  HR: 89 (27 Apr 2023 12:10) (72 - 89)  BP: 147/61 (27 Apr 2023 12:10) (140/61 - 167/73)  BP(mean): --  RR: 18 (27 Apr 2023 12:10) (17 - 18)  SpO2: 100% (27 Apr 2023 12:10) (94% - 100%)    Parameters below as of 27 Apr 2023 12:10  Patient On (Oxygen Delivery Method): nasal cannula, high flow      PHYSICAL EXAM:  GENERAL: NAD, cachectic,   HEAD:  Atraumatic, normocephalic  EYES: conjunctiva and sclera clear  NECK: Supple, no JVD  CHEST/LUNG: rales at bases, no tachypnea, on HFNC   HEART: Regular rate and rhythm; no murmurs, no LE edema   ABDOMEN: Soft, nondistended; bowel sounds present  EXTREMITIES:  2+ Peripheral Pulses, no clubbing, cyanosis  PSYCH: AAOX0, intermittently agitated during exam   SKIN: No rashes or lesions    LABS:                        7.5    16.16 )-----------( 225      ( 27 Apr 2023 06:39 )             23.6     04-27    141  |  104  |  110<H>  ----------------------------<  171<H>  4.5   |  16<L>  |  4.75<H>    Ca    8.5      27 Apr 2023 06:38  Phos  6.6     04-26  Mg     2.8     04-26    TPro  6.9  /  Alb  3.9  /  TBili  0.3  /  DBili  x   /  AST  31  /  ALT  25  /  AlkPhos  84  04-26              SARS-CoV-2: NotDete (22 Apr 2023 21:20)  SARS-CoV-2: NotDete (29 Jan 2023 22:08)      RADIOLOGY & ADDITIONAL TESTS:  New Imaging Personally Reviewed Today:  New Electrocardiogram Personally Reviewed Today:  Other Results Reviewed Today:   Prior or Outpatient Records Reviewed Today with Summary:    COORDINATION OF CARE:  Consultant Communication and Details of Discussion (where applicable):

## 2023-04-27 NOTE — PROGRESS NOTE ADULT - ASSESSMENT
88yoF w/ PMHx of advanced dementia (at B/L patient is bedbound, nonverbal, though able to tolerate PO diet - per family, patient has hearty appetite), HTN, HLD, IDDM, who presents from home where she lives with her , brought in by her daughter (son is at her bedside during this evaluation) w/ complaints of daughter noticing the patient has been having difficulty breathing over the past few days in setting of notable increased swelling of the patient's hands, feet, and face. Of note, patient was recently hospitalized at [facility] for aspiration PNA (and found to have demand ischemia) when she presented with similar complaints and was found to be hypoxic; at the time, S+S eval was recommended, but per documentation at the end of Jan 2023,      1- TOÑITO   2- CHF       toiñto ins setting of chf as well as more likely due to contrast nephropathy   her echo reveals mod to severe range AS  diuretics to hold today   pt with decrease skin turgor and decrease po intake and contrast nephropathy will attempt gentle ivf today given renal function cont to deteriorate in past 24 hr   diurese if sob worsens   d/w primary team and cards    88yoF w/ PMHx of advanced dementia (at B/L patient is bedbound, nonverbal, though able to tolerate PO diet - per family, patient has hearty appetite), HTN, HLD, IDDM, who presents from home where she lives with her , brought in by her daughter (son is at her bedside during this evaluation) w/ complaints of daughter noticing the patient has been having difficulty breathing over the past few days in setting of notable increased swelling of the patient's hands, feet, and face. Of note, patient was recently hospitalized at [facility] for aspiration PNA (and found to have demand ischemia) when she presented with similar complaints and was found to be hypoxic; at the time, S+S eval was recommended, but per documentation at the end of Jan 2023,      1- TOÑITO   2- CHF       toñito ins setting of chf as well as more likely due to contrast nephropathy   her echo reveals mod to severe range AS  diuretics to hold today   pt with decrease skin turgor and decrease po intake and contrast nephropathy will attempt gentle ivf today given renal function cont to deteriorate in past 24 hr   diurese if sob worsens   d/w primary team and cards     addendum:   acidosis to have lactic acid and abg checked

## 2023-04-27 NOTE — PROGRESS NOTE ADULT - SUBJECTIVE AND OBJECTIVE BOX
Subjective: Patient seen and examined. No new events except as noted.   Awake and alert, remains on HFNC.    REVIEW OF SYSTEMS:  Unable to obtain.    MEDICATIONS:  MEDICATIONS  (STANDING):  amLODIPine   Tablet 10 milliGRAM(s) Oral daily  aspirin  chewable 81 milliGRAM(s) Oral daily  atorvastatin 80 milliGRAM(s) Oral at bedtime  azithromycin  IVPB 500 milliGRAM(s) IV Intermittent every 24 hours  azithromycin  IVPB      cefTRIAXone   IVPB 1000 milliGRAM(s) IV Intermittent every 24 hours  dextrose 5%. 1000 milliLiter(s) (50 mL/Hr) IV Continuous <Continuous>  dextrose 5%. 1000 milliLiter(s) (100 mL/Hr) IV Continuous <Continuous>  dextrose 50% Injectable 25 Gram(s) IV Push once  dextrose 50% Injectable 25 Gram(s) IV Push once  dextrose 50% Injectable 12.5 Gram(s) IV Push once  enoxaparin Injectable 30 milliGRAM(s) SubCutaneous every 24 hours  furosemide   Injectable 40 milliGRAM(s) IV Push daily  glucagon  Injectable 1 milliGRAM(s) IntraMuscular once  insulin lispro (ADMELOG) corrective regimen sliding scale   SubCutaneous three times a day before meals  insulin lispro (ADMELOG) corrective regimen sliding scale   SubCutaneous at bedtime  insulin lispro Injectable (ADMELOG) 2 Unit(s) SubCutaneous three times a day before meals  latanoprost 0.005% Ophthalmic Solution 1 Drop(s) Both EYES at bedtime  metoprolol tartrate 12.5 milliGRAM(s) Oral two times a day  polyethylene glycol 3350 17 Gram(s) Oral two times a day  potassium chloride    Tablet ER 40 milliEquivalent(s) Oral once  senna 2 Tablet(s) Oral at bedtime    PHYSICAL EXAM:  Vital Signs Last 24 Hrs  T(C): 36.6 (27 Apr 2023 12:10), Max: 36.9 (26 Apr 2023 21:18)  T(F): 97.8 (27 Apr 2023 12:10), Max: 98.4 (26 Apr 2023 21:18)  HR: 89 (27 Apr 2023 12:10) (72 - 89)  BP: 147/61 (27 Apr 2023 12:10) (140/61 - 167/73)  BP(mean): --  RR: 18 (27 Apr 2023 12:10) (17 - 18)  SpO2: 100% (27 Apr 2023 12:10) (94% - 100%)    Parameters below as of 27 Apr 2023 12:10  Patient On (Oxygen Delivery Method): nasal cannula, high flow    I&O's Summary    26 Apr 2023 07:01  -  27 Apr 2023 07:00  --------------------------------------------------------  IN: 480 mL / OUT: 0 mL / NET: 480 mL    Appearance: NAD non verbal 	  HEENT: Normal oral mucosa, PERRL, EOMI	  Lymphatic: No lymphadenopathy  Cardiovascular: Normal S1 S2, No JVD, No murmurs  Respiratory: decreased bs, on HFNC	  Psychiatry: A & O x 0  Gastrointestinal:  Soft, Non-tender, + BS	  Skin: No rashes, No ecchymoses, No cyanosis	  Neurologic: Non-focal  Extremities: decreased  range of motion, No  edema  Vascular: Peripheral pulses palpable 2+ bilaterally    LABS:    CARDIAC MARKERS:                        7.5    16.16 )-----------( 225      ( 27 Apr 2023 06:39 )             23.6     04-27    141  |  104  |  110<H>  ----------------------------<  171<H>  4.5   |  16<L>  |  4.75<H>    Ca    8.5      27 Apr 2023 06:38  Phos  6.6     04-26  Mg     2.8     04-26    TPro  6.9  /  Alb  3.9  /  TBili  0.3  /  DBili  x   /  AST  31  /  ALT  25  /  AlkPhos  84  04-26    TELEMETRY: SR, PAT to 103 x2 secs  ECG:  	  RADIOLOGY:   DIAGNOSTIC TESTING:  [ ] Echocardiogram:  [ ]  Catheterization:  [ ] Stress Test:    OTHER:

## 2023-04-27 NOTE — PROGRESS NOTE ADULT - PROBLEM SELECTOR PLAN 1
pt with worsening hypoxia 4/26, placed on HFNC - now on 40/40  - CXR reviewed, +pulm edema and new LL effusion. However, pt does not appear otherwise overloaded on exam. suspect pt may have had recurrent aspiration event  - will c/w HFNC and wean as tolerated  - will c/t hold off on lasix for now, c/t monitor volume status closely  - c/w cefepime for now, plan for 5-7d course in total (4/26--)

## 2023-04-27 NOTE — PATIENT PROFILE ADULT - FALL HARM RISK - HARM RISK INTERVENTIONS

## 2023-04-27 NOTE — PROGRESS NOTE ADULT - PROBLEM SELECTOR PLAN 2
pt hypoxic on admission with tachypnea, noted with pulm congestion on imaging.   - cardiology following --> defer further lasix for now   - c/t closely monitor pt's respiratory status, fluid status, renal function (s/p contrast), and electrolytes  - continue telemetry monitoring  - TTE reviewed and noted - normal LV systolic function, new valvular disease (mod-sev AS, mod MR)  - c/t monitor O2 sat

## 2023-04-28 NOTE — PROGRESS NOTE ADULT - SUBJECTIVE AND OBJECTIVE BOX
[facility] Division of Hospital Medicine  Chico Fisher MD  Available via MS Teams    SUBJECTIVE / OVERNIGHT EVENTS:  pt with worsening hypoxia overnight, now on HFNC and venturi mask  no urine output despite IVF yesterday   appears more lethargic today  unable to obtain ROS 2/2 dementia     ADDITIONAL REVIEW OF SYSTEMS:  as noted above     MEDICATIONS  (STANDING):  amLODIPine   Tablet 10 milliGRAM(s) Oral daily  aspirin  chewable 81 milliGRAM(s) Oral daily  atorvastatin 80 milliGRAM(s) Oral at bedtime  buMETAnide Infusion 2 mG/Hr (10 mL/Hr) IV Continuous <Continuous>  cefepime   IVPB 1000 milliGRAM(s) IV Intermittent every 24 hours  dextrose 5%. 1000 milliLiter(s) (50 mL/Hr) IV Continuous <Continuous>  dextrose 5%. 1000 milliLiter(s) (100 mL/Hr) IV Continuous <Continuous>  dextrose 50% Injectable 25 Gram(s) IV Push once  dextrose 50% Injectable 25 Gram(s) IV Push once  dextrose 50% Injectable 12.5 Gram(s) IV Push once  glucagon  Injectable 1 milliGRAM(s) IntraMuscular once  heparin   Injectable 5000 Unit(s) SubCutaneous every 12 hours  insulin lispro (ADMELOG) corrective regimen sliding scale   SubCutaneous at bedtime  insulin lispro (ADMELOG) corrective regimen sliding scale   SubCutaneous three times a day before meals  insulin lispro Injectable (ADMELOG) 4 Unit(s) SubCutaneous three times a day before meals  latanoprost 0.005% Ophthalmic Solution 1 Drop(s) Both EYES at bedtime  metoprolol tartrate 12.5 milliGRAM(s) Oral two times a day  polyethylene glycol 3350 17 Gram(s) Oral two times a day  senna 2 Tablet(s) Oral at bedtime  sodium chloride 0.9%. 1000 milliLiter(s) (50 mL/Hr) IV Continuous <Continuous>    MEDICATIONS  (PRN):  dextrose Oral Gel 15 Gram(s) Oral once PRN Blood Glucose LESS THAN 70 milliGRAM(s)/deciliter      I&O's Summary    27 Apr 2023 07:01  -  28 Apr 2023 07:00  --------------------------------------------------------  IN: 820 mL / OUT: 400 mL / NET: 420 mL        PHYSICAL EXAM:  Vital Signs Last 24 Hrs  T(C): 36.3 (28 Apr 2023 11:51), Max: 36.8 (27 Apr 2023 21:37)  T(F): 97.4 (28 Apr 2023 11:51), Max: 98.3 (27 Apr 2023 21:37)  HR: 87 (28 Apr 2023 11:51) (83 - 149)  BP: 163/63 (28 Apr 2023 11:51) (153/63 - 164/64)  BP(mean): --  RR: 18 (28 Apr 2023 11:51) (17 - 28)  SpO2: 100% (28 Apr 2023 11:51) (89% - 100%)    Parameters below as of 28 Apr 2023 11:51  Patient On (Oxygen Delivery Method): nasal cannula, high flow      PHYSICAL EXAM:  GENERAL: NAD, cachectic lethargic  HEAD: atraumatic, normocephalic  NECK: Supple,+ JVD  CHEST/LUNG: rales b/l, mild tachypnea, on HFNC   HEART: Regular rate and rhythm; no murmurs, no LE edema   ABDOMEN: Soft, nondistended; bowel sounds present  EXTREMITIES:  2+ Peripheral Pulses, no clubbing, cyanosis  SKIN: No rashes or lesions      LABS:                        7.5    11.94 )-----------( 242      ( 28 Apr 2023 11:06 )             23.1     04-28    141  |  103  |  110<H>  ----------------------------<  199<H>  3.6   |  17<L>  |  4.67<H>    Ca    8.0<L>      28 Apr 2023 11:14    TPro  6.9  /  Alb  3.7  /  TBili  0.2  /  DBili  x   /  AST  22  /  ALT  22  /  AlkPhos  69  04-28              SARS-CoV-2: NotDetec (22 Apr 2023 21:20)  SARS-CoV-2: NotDetec (29 Jan 2023 22:08)      RADIOLOGY & ADDITIONAL TESTS:  New Imaging Personally Reviewed Today:  New Electrocardiogram Personally Reviewed Today:  Other Results Reviewed Today:   Prior or Outpatient Records Reviewed Today with Summary:    COORDINATION OF CARE:  Consultant Communication and Details of Discussion (where applicable):

## 2023-04-28 NOTE — PROGRESS NOTE ADULT - PROBLEM SELECTOR PLAN 1
worsening hypoxia overnight, now on dual HFNC and venturi mask, sat 100%. slightly tachypneic on exam   - likely 2/2 combination of pulme edema and aspiration PNA   - will c/w supplemental O2, wean as tolerated   - d/w cardiology and renal - will start bumex gtt given no UOP   - c/w cefepime for now, plan for 5-7d course in total (4/26--)

## 2023-04-28 NOTE — PROGRESS NOTE ADULT - ASSESSMENT
Alternate MRN: [id number]    88yoF w/ PMHx of advanced dementia (at B/L patient is bedbound, nonverbal, though able to tolerate PO diet - per family, patient has hearty appetite), HTN, HLD, IDDM, brought in by family for difficulty breathing over the past few days in setting of notable increased swelling of the patient's hands, feet, and face; a/w hypoxic respiratory failure 2/2 aspiration PNA and acute decompensated diastolic heart failure, with course c/b significant TOÑITO. Initially hypoxia was improving and taken off oxygen, then with recurrent hypoxia, suspect 2/2 recurrent aspiration event.

## 2023-04-28 NOTE — CHART NOTE - NSCHARTNOTEFT_GEN_A_CORE
Pt was scheduled for MBS this morning. However, per chart review this morning pt "desat o2 again mid 80s. PA to come see pt. Non-rebreather added on top of HFNC for a short while." Pt is not medically appropriate to come off unit for MBS at this time. Will defer and re-schedule as appropriate. Discussed with ACPTammy.

## 2023-04-28 NOTE — PROGRESS NOTE ADULT - PROBLEM SELECTOR PLAN 1
continuing holding lasix  appears well compensated at present time  ECHO reviewed start Bumex gtt given anuric state since yesterday.   ECHO reviewed

## 2023-04-28 NOTE — PROGRESS NOTE ADULT - PROBLEM SELECTOR PLAN 2
pt hypoxic on admission with tachypnea, noted with pulm congestion on imaging.   - cardiology following --> d/w cardio and renal, will start on bumex gtt now   - c/t closely monitor pt's respiratory status, fluid status, renal function (s/p contrast), and electrolytes  - continue telemetry monitoring  - TTE reviewed and noted - normal LV systolic function, new valvular disease (mod-sev AS, mod MR)  - c/t monitor O2 sat

## 2023-04-28 NOTE — PROGRESS NOTE ADULT - SUBJECTIVE AND OBJECTIVE BOX
Subjective: Patient seen and examined. No new events except as noted.    respiratory distress on 6L NC yesterday   now on nasla high flow       REVIEW OF SYSTEMS:  Unable to obtain   MEDICATIONS:  MEDICATIONS  (STANDING):  amLODIPine   Tablet 10 milliGRAM(s) Oral daily  aspirin  chewable 81 milliGRAM(s) Oral daily  atorvastatin 80 milliGRAM(s) Oral at bedtime  cefepime   IVPB 1000 milliGRAM(s) IV Intermittent every 24 hours  dextrose 5%. 1000 milliLiter(s) (50 mL/Hr) IV Continuous <Continuous>  dextrose 5%. 1000 milliLiter(s) (100 mL/Hr) IV Continuous <Continuous>  dextrose 50% Injectable 25 Gram(s) IV Push once  dextrose 50% Injectable 25 Gram(s) IV Push once  dextrose 50% Injectable 12.5 Gram(s) IV Push once  glucagon  Injectable 1 milliGRAM(s) IntraMuscular once  heparin   Injectable 5000 Unit(s) SubCutaneous every 12 hours  insulin lispro (ADMELOG) corrective regimen sliding scale   SubCutaneous three times a day before meals  insulin lispro (ADMELOG) corrective regimen sliding scale   SubCutaneous at bedtime  insulin lispro Injectable (ADMELOG) 4 Unit(s) SubCutaneous three times a day before meals  latanoprost 0.005% Ophthalmic Solution 1 Drop(s) Both EYES at bedtime  metoprolol tartrate 12.5 milliGRAM(s) Oral two times a day  polyethylene glycol 3350 17 Gram(s) Oral two times a day  senna 2 Tablet(s) Oral at bedtime  sodium chloride 0.9%. 1000 milliLiter(s) (50 mL/Hr) IV Continuous <Continuous>      PHYSICAL EXAM:  T(C): 36.8 (04-28-23 @ 04:18), Max: 36.8 (04-27-23 @ 21:37)  HR: 85 (04-28-23 @ 09:39) (83 - 149)  BP: 153/63 (04-28-23 @ 04:18) (147/61 - 164/64)  RR: 18 (04-28-23 @ 09:39) (17 - 28)  SpO2: 96% (04-28-23 @ 09:39) (89% - 100%)  Wt(kg): --  I&O's Summary    27 Apr 2023 07:01  -  28 Apr 2023 07:00  --------------------------------------------------------  IN: 820 mL / OUT: 400 mL / NET: 420 mL          Appearance: NAD non verbal 	  HEENT: Normal oral mucosa, PERRL, EOMI	  Lymphatic: No lymphadenopathy  Cardiovascular: Normal S1 S2, No JVD, No murmurs  Respiratory: decreased bs, on HFNC	  Psychiatry: A & O x 0  Gastrointestinal:  Soft, Non-tender, + BS	  Skin: No rashes, No ecchymoses, No cyanosis	  Neurologic: Non-focal  Extremities: decreased  range of motion, No  edema  Vascular: Peripheral pulses palpable 2+ bilaterally          LABS:    CARDIAC MARKERS:                                7.5    16.16 )-----------( 225      ( 27 Apr 2023 06:39 )             23.6     04-27    137  |  104  |  113<H>  ----------------------------<  178<H>  4.8   |  13<L>  |  4.56<H>    Ca    8.0<L>      27 Apr 2023 20:51      proBNP:   Lipid Profile:   HgA1c:   TSH:             TELEMETRY: 	    ECG:  	  RADIOLOGY:   DIAGNOSTIC TESTING:  [ ] Echocardiogram:  [ ]  Catheterization:  [ ] Stress Test:    OTHER: 	           Subjective: Patient seen and examined. No new events except as noted.    respiratory distress on 6L NC yesterday   now on nasaL high flow   ANURIC SINCE YESTERDAY      REVIEW OF SYSTEMS:  Unable to obtain   MEDICATIONS:  MEDICATIONS  (STANDING):  amLODIPine   Tablet 10 milliGRAM(s) Oral daily  aspirin  chewable 81 milliGRAM(s) Oral daily  atorvastatin 80 milliGRAM(s) Oral at bedtime  cefepime   IVPB 1000 milliGRAM(s) IV Intermittent every 24 hours  dextrose 5%. 1000 milliLiter(s) (50 mL/Hr) IV Continuous <Continuous>  dextrose 5%. 1000 milliLiter(s) (100 mL/Hr) IV Continuous <Continuous>  dextrose 50% Injectable 25 Gram(s) IV Push once  dextrose 50% Injectable 25 Gram(s) IV Push once  dextrose 50% Injectable 12.5 Gram(s) IV Push once  glucagon  Injectable 1 milliGRAM(s) IntraMuscular once  heparin   Injectable 5000 Unit(s) SubCutaneous every 12 hours  insulin lispro (ADMELOG) corrective regimen sliding scale   SubCutaneous three times a day before meals  insulin lispro (ADMELOG) corrective regimen sliding scale   SubCutaneous at bedtime  insulin lispro Injectable (ADMELOG) 4 Unit(s) SubCutaneous three times a day before meals  latanoprost 0.005% Ophthalmic Solution 1 Drop(s) Both EYES at bedtime  metoprolol tartrate 12.5 milliGRAM(s) Oral two times a day  polyethylene glycol 3350 17 Gram(s) Oral two times a day  senna 2 Tablet(s) Oral at bedtime  sodium chloride 0.9%. 1000 milliLiter(s) (50 mL/Hr) IV Continuous <Continuous>      PHYSICAL EXAM:  T(C): 36.8 (04-28-23 @ 04:18), Max: 36.8 (04-27-23 @ 21:37)  HR: 85 (04-28-23 @ 09:39) (83 - 149)  BP: 153/63 (04-28-23 @ 04:18) (147/61 - 164/64)  RR: 18 (04-28-23 @ 09:39) (17 - 28)  SpO2: 96% (04-28-23 @ 09:39) (89% - 100%)  Wt(kg): --  I&O's Summary    27 Apr 2023 07:01  -  28 Apr 2023 07:00  --------------------------------------------------------  IN: 820 mL / OUT: 400 mL / NET: 420 mL          Appearance: NAD non verbal 	  HEENT: Normal oral mucosa, PERRL, EOMI	  Lymphatic: No lymphadenopathy  Cardiovascular: Normal S1 S2, No JVD, No murmurs  Respiratory: decreased bs, on HFNC	  Psychiatry: A & O x 0  Gastrointestinal:  Soft, Non-tender, + BS	  Skin: No rashes, No ecchymoses, No cyanosis	  Neurologic: Non-focal  Extremities: decreased  range of motion, No  edema  Vascular: Peripheral pulses palpable 2+ bilaterally          LABS:    CARDIAC MARKERS:                                7.5    16.16 )-----------( 225      ( 27 Apr 2023 06:39 )             23.6     04-27    137  |  104  |  113<H>  ----------------------------<  178<H>  4.8   |  13<L>  |  4.56<H>    Ca    8.0<L>      27 Apr 2023 20:51      proBNP:   Lipid Profile:   HgA1c:   TSH:             TELEMETRY: 	    ECG:  	  RADIOLOGY:   DIAGNOSTIC TESTING:  [ ] Echocardiogram:  [ ]  Catheterization:  [ ] Stress Test:    OTHER:

## 2023-04-28 NOTE — PROGRESS NOTE ADULT - SUBJECTIVE AND OBJECTIVE BOX
[facility]   [phone]  RENAL FOLLOW UP NOTE  --------------------------------------------------------------------------------  Chief Complaint:    24 hour events/subjective:    on high flow O2   AMS    PAST HISTORY  --------------------------------------------------------------------------------  No significant changes to PMH, PSH, FHx, SHx, unless otherwise noted    ALLERGIES & MEDICATIONS  --------------------------------------------------------------------------------  Allergies    No Known Allergies  Allergy Status Unknown    Intolerances      Standing Inpatient Medications  amLODIPine   Tablet 10 milliGRAM(s) Oral daily  aspirin  chewable 81 milliGRAM(s) Oral daily  atorvastatin 80 milliGRAM(s) Oral at bedtime  buMETAnide Infusion 2 mG/Hr IV Continuous <Continuous>  buMETAnide Injectable 2 milliGRAM(s) IV Push once  cefepime   IVPB 1000 milliGRAM(s) IV Intermittent every 24 hours  dextrose 5%. 1000 milliLiter(s) IV Continuous <Continuous>  dextrose 5%. 1000 milliLiter(s) IV Continuous <Continuous>  dextrose 50% Injectable 25 Gram(s) IV Push once  dextrose 50% Injectable 25 Gram(s) IV Push once  dextrose 50% Injectable 12.5 Gram(s) IV Push once  glucagon  Injectable 1 milliGRAM(s) IntraMuscular once  heparin   Injectable 5000 Unit(s) SubCutaneous every 12 hours  insulin lispro (ADMELOG) corrective regimen sliding scale   SubCutaneous three times a day before meals  insulin lispro (ADMELOG) corrective regimen sliding scale   SubCutaneous at bedtime  insulin lispro Injectable (ADMELOG) 4 Unit(s) SubCutaneous three times a day before meals  latanoprost 0.005% Ophthalmic Solution 1 Drop(s) Both EYES at bedtime  metoprolol tartrate 12.5 milliGRAM(s) Oral two times a day  polyethylene glycol 3350 17 Gram(s) Oral two times a day  senna 2 Tablet(s) Oral at bedtime  sodium chloride 0.9%. 1000 milliLiter(s) IV Continuous <Continuous>    PRN Inpatient Medications  dextrose Oral Gel 15 Gram(s) Oral once PRN      REVIEW OF SYSTEMS  --------------------------------------------------------------------------------        VITALS/PHYSICAL EXAM  --------------------------------------------------------------------------------  T(C): 36.8 (04-28-23 @ 04:18), Max: 36.8 (04-27-23 @ 21:37)  HR: 85 (04-28-23 @ 09:39) (83 - 149)  BP: 153/63 (04-28-23 @ 04:18) (147/61 - 164/64)  RR: 18 (04-28-23 @ 09:39) (17 - 28)  SpO2: 96% (04-28-23 @ 09:39) (89% - 100%)  Wt(kg): --        04-27-23 @ 07:01  -  04-28-23 @ 07:00  --------------------------------------------------------  IN: 820 mL / OUT: 400 mL / NET: 420 mL      Physical Exam:  	      Gen: ill appearing elderly F on high flow O2   	no jvd ,  	Pulm: decrease bs  + coarse bs   - wheezing  	CV: RRR, S1S2; no rub  	Abd: +BS, soft, nontender/nondistended  	: No  bladder distention   	UE: Warm, no cyanosis  no clubbing,  no edema;  	LE: Warm, no cyanosis  no clubbing, no edema  	Neuro: dementia       LABS/STUDIES  --------------------------------------------------------------------------------              7.5    16.16 >-----------<  225      [04-27-23 @ 06:39]              23.6     137  |  104  |  113  ----------------------------<  178      [04-27-23 @ 20:51]  4.8   |  13  |  4.56        Ca     8.0     [04-27-23 @ 20:51]            Creatinine Trend:  SCr 4.56 [04-27 @ 20:51]  SCr 4.75 [04-27 @ 06:38]  SCr 4.08 [04-26 @ 09:32]  SCr 3.31 [04-25 @ 17:54]  SCr 3.07 [04-25 @ 11:33]              Urinalysis - [04-23-23 @ 14:43]      Color Colorless / Appearance Clear / SG 1.020 / pH 6.0      Gluc Trace / Ketone Negative  / Bili Negative / Urobili Negative       Blood Small / Protein 100 mg/dl / Leuk Est Negative / Nitrite Negative      RBC 6 / WBC 0 / Hyaline 0 / Gran  / Sq Epi  / Non Sq Epi  / Bacteria Negative      TSH 2.59      [08-09-22 @ 06:20]  Lipid: chol 216, TG 80, HDL 49, LDL --      [08-08-22 @ 06:31]

## 2023-04-28 NOTE — PROGRESS NOTE ADULT - ASSESSMENT
88yoF w/ PMHx of advanced dementia (at B/L patient is bedbound, nonverbal, though able to tolerate PO diet - per family, patient has hearty appetite), HTN, HLD, IDDM, who presents from home where she lives with her , brought in by her daughter (son is at her bedside during this evaluation) w/ complaints of daughter noticing the patient has been having difficulty breathing over the past few days in setting of notable increased swelling of the patient's hands, feet, and face. Of note, patient was recently hospitalized at [facility] for aspiration PNA (and found to have demand ischemia) when she presented with similar complaints and was found to be hypoxic; at the time, S+S eval was recommended, but per documentation at the end of Jan 2023,      1- TOÑITO   2- CHF   3- acidosis       toñito ins setting of chf as well as more likely due to contrast nephropathy  and possible pneumonia   her echo reveals mod to severe range AS  acidosis to have repeat blood work and abg given hypoxemia  start bumex 2 mg ivp and bumex drip 2mg/hr   she is not a good dialysis candidate in setting of her advance dementia   I have d/w pt daughter in detail as well regarding this.   d/w primary team at bedside  d/w pt cards as well   strict I/O

## 2023-04-28 NOTE — PROGRESS NOTE ADULT - PROBLEM SELECTOR PLAN 12
Given worsening clinical status with increasing O2 requirements and ongoing renal failure, I had an extensive discussion with Petty (dtr) regarding diagnosis and prognosis. She is in discussion with family regarding code status, but will likely make pt DNR/DNI. Depending on any renal recovery, will consider palliative care input next week for ongoing GOC.     20 minutes spent on advanced care planning Given worsening clinical status with increasing O2 requirements and ongoing renal failure, I had an extensive discussion with Petty (dtr) regarding diagnosis and prognosis. After discussion with her brothers, pt is made DNR/DNI. Depending on any renal recovery, will consider palliative care input next week for ongoing GOC.     20 minutes spent on advanced care planning

## 2023-04-29 NOTE — PROGRESS NOTE ADULT - ASSESSMENT
88yoF w/ PMHx of advanced dementia (at B/L patient is bedbound, nonverbal, though able to tolerate PO diet - per family, patient has hearty appetite), HTN, HLD, IDDM, who presents from home where she lives with her , brought in by her daughter (son is at her bedside during this evaluation) w/ complaints of daughter noticing the patient has been having difficulty breathing over the past few days in setting of notable increased swelling of the patient's hands, feet, and face. Of note, patient was recently hospitalized at [facility] for aspiration PNA (and found to have demand ischemia) when she presented with similar complaints and was found to be hypoxic; at the time, S+S eval was recommended, but per documentation at the end of Jan 2023,      1- TOÑITO   2- CHF   3- acidosis       toñito ins setting of chf as well as more likely due to contrast nephropathy  and possible pneumonia   her echo reveals mod to severe range AS  acidosis  trend ph    bumex drip 2mg/hr  to cont today pt is non oliguric now.   cr seems to have been stabilizing   she is not a dialysis candidate in setting of her advance dementia   cont high flow O2 given hypoxemia   strict I/O

## 2023-04-29 NOTE — PROGRESS NOTE ADULT - SUBJECTIVE AND OBJECTIVE BOX
[facility]   [phone]  RENAL FOLLOW UP NOTE  --------------------------------------------------------------------------------  Chief Complaint:    24 hour events/subjective:    seen earlier   on high flow O2     PAST HISTORY  --------------------------------------------------------------------------------  No significant changes to PMH, PSH, FHx, SHx, unless otherwise noted    ALLERGIES & MEDICATIONS  --------------------------------------------------------------------------------  Allergies    No Known Allergies  Allergy Status Unknown    Intolerances      Standing Inpatient Medications  amLODIPine   Tablet 10 milliGRAM(s) Oral daily  aspirin  chewable 81 milliGRAM(s) Oral daily  atorvastatin 80 milliGRAM(s) Oral at bedtime  buMETAnide Infusion 2 mG/Hr IV Continuous <Continuous>  cefepime   IVPB 1000 milliGRAM(s) IV Intermittent every 24 hours  dextrose 5%. 1000 milliLiter(s) IV Continuous <Continuous>  dextrose 5%. 1000 milliLiter(s) IV Continuous <Continuous>  dextrose 50% Injectable 25 Gram(s) IV Push once  dextrose 50% Injectable 25 Gram(s) IV Push once  dextrose 50% Injectable 12.5 Gram(s) IV Push once  glucagon  Injectable 1 milliGRAM(s) IntraMuscular once  heparin   Injectable 5000 Unit(s) SubCutaneous every 12 hours  insulin lispro (ADMELOG) corrective regimen sliding scale   SubCutaneous three times a day before meals  insulin lispro (ADMELOG) corrective regimen sliding scale   SubCutaneous at bedtime  insulin lispro Injectable (ADMELOG) 4 Unit(s) SubCutaneous three times a day before meals  latanoprost 0.005% Ophthalmic Solution 1 Drop(s) Both EYES at bedtime  metoprolol tartrate 12.5 milliGRAM(s) Oral two times a day  polyethylene glycol 3350 17 Gram(s) Oral two times a day  senna 2 Tablet(s) Oral at bedtime    PRN Inpatient Medications  dextrose Oral Gel 15 Gram(s) Oral once PRN  hydrALAZINE Injectable 5 milliGRAM(s) IV Push every 6 hours PRN      REVIEW OF SYSTEMS  --------------------------------------------------------------------------------    obtunded    VITALS/PHYSICAL EXAM  --------------------------------------------------------------------------------  T(C): 36.3 (04-29-23 @ 20:56), Max: 37 (04-29-23 @ 00:37)  HR: 103 (04-29-23 @ 20:56) (92 - 103)  BP: 177/63 (04-29-23 @ 20:56) (177/51 - 193/70)  RR: 19 (04-29-23 @ 20:56) (18 - 22)  SpO2: 100% (04-29-23 @ 20:56) (100% - 100%)  Wt(kg): --        04-28-23 @ 07:01  -  04-29-23 @ 07:00  --------------------------------------------------------  IN: 120 mL / OUT: 1400 mL / NET: -1280 mL    04-29-23 @ 07:01  -  04-29-23 @ 22:38  --------------------------------------------------------  IN: 0 mL / OUT: 900 mL / NET: -900 mL      Physical Exam:  	      Gen: ill appearing elderly F on high flow O2   	no jvd ,  	Pulm: decrease bs  + coarse bs   - wheezing  	CV: RRR, S1S2; no rub  	Abd: +BS, soft, nontender/nondistended  	: No  bladder distention   	UE: Warm, no cyanosis  no clubbing,  no edema;  	LE: Warm, no cyanosis  no clubbing, no edema  	Neuro: dementia       LABS/STUDIES  --------------------------------------------------------------------------------              7.2    9.08  >-----------<  210      [04-29-23 @ 07:08]              22.2     146  |  105  |  116  ----------------------------<  129      [04-29-23 @ 18:27]  3.5   |  20  |  3.87        Ca     8.5     [04-29-23 @ 18:27]      Mg     2.6     [04-29-23 @ 18:27]      Phos  5.1     [04-29-23 @ 07:06]    TPro  6.6  /  Alb  3.6  /  TBili  0.3  /  DBili  x   /  AST  17  /  ALT  20  /  AlkPhos  67  [04-29-23 @ 07:06]          Creatinine Trend:  SCr 3.87 [04-29 @ 18:27]  SCr 4.26 [04-29 @ 07:06]  SCr 4.67 [04-28 @ 11:14]  SCr 4.56 [04-27 @ 20:51]  SCr 4.75 [04-27 @ 06:38]              Urinalysis - [04-23-23 @ 14:43]      Color Colorless / Appearance Clear / SG 1.020 / pH 6.0      Gluc Trace / Ketone Negative  / Bili Negative / Urobili Negative       Blood Small / Protein 100 mg/dl / Leuk Est Negative / Nitrite Negative      RBC 6 / WBC 0 / Hyaline 0 / Gran  / Sq Epi  / Non Sq Epi  / Bacteria Negative      TSH 2.59      [08-09-22 @ 06:20]  Lipid: chol 216, TG 80, HDL 49, LDL --      [08-08-22 @ 06:31]

## 2023-04-29 NOTE — PROGRESS NOTE ADULT - ASSESSMENT
Alternate MRN: [id number]    88yoF w/ PMHx of advanced dementia (at B/L patient is bedbound, nonverbal, though able to tolerate PO diet - per family, patient has hearty appetite), HTN, HLD, IDDM, brought in by family for difficulty breathing over the past few days in setting of notable increased swelling of the patient's hands, feet, and face; a/w hypoxic respiratory failure 2/2 aspiration PNA and acute decompensated diastolic heart failure, with course c/b significant TOÑITO. Initially hypoxia was improving and taken off oxygen, then with recurrent hypoxia, suspect 2/2 recurrent aspiration event.

## 2023-04-29 NOTE — PROGRESS NOTE ADULT - SUBJECTIVE AND OBJECTIVE BOX
[facility] of [facility] Medicine  Chico Fisher MD  Available via MS Teams      SUBJECTIVE / OVERNIGHT EVENTS:  no acute events overnight   unable to obtain ROS 2/2 baseline advanced dementia     ADDITIONAL REVIEW OF SYSTEMS:   as noted above     MEDICATIONS  (STANDING):  amLODIPine   Tablet 10 milliGRAM(s) Oral daily  aspirin  chewable 81 milliGRAM(s) Oral daily  atorvastatin 80 milliGRAM(s) Oral at bedtime  buMETAnide Infusion 2 mG/Hr (10 mL/Hr) IV Continuous <Continuous>  cefepime   IVPB 1000 milliGRAM(s) IV Intermittent every 24 hours  dextrose 5%. 1000 milliLiter(s) (100 mL/Hr) IV Continuous <Continuous>  dextrose 5%. 1000 milliLiter(s) (50 mL/Hr) IV Continuous <Continuous>  dextrose 50% Injectable 25 Gram(s) IV Push once  dextrose 50% Injectable 25 Gram(s) IV Push once  dextrose 50% Injectable 12.5 Gram(s) IV Push once  glucagon  Injectable 1 milliGRAM(s) IntraMuscular once  heparin   Injectable 5000 Unit(s) SubCutaneous every 12 hours  insulin lispro (ADMELOG) corrective regimen sliding scale   SubCutaneous at bedtime  insulin lispro (ADMELOG) corrective regimen sliding scale   SubCutaneous three times a day before meals  insulin lispro Injectable (ADMELOG) 4 Unit(s) SubCutaneous three times a day before meals  latanoprost 0.005% Ophthalmic Solution 1 Drop(s) Both EYES at bedtime  metoprolol tartrate 12.5 milliGRAM(s) Oral two times a day  polyethylene glycol 3350 17 Gram(s) Oral two times a day  senna 2 Tablet(s) Oral at bedtime    MEDICATIONS  (PRN):  dextrose Oral Gel 15 Gram(s) Oral once PRN Blood Glucose LESS THAN 70 milliGRAM(s)/deciliter  hydrALAZINE Injectable 5 milliGRAM(s) IV Push every 6 hours PRN SBP>180      I&O's Summary    28 Apr 2023 07:01  -  29 Apr 2023 07:00  --------------------------------------------------------  IN: 120 mL / OUT: 1400 mL / NET: -1280 mL        PHYSICAL EXAM:  Vital Signs Last 24 Hrs  T(C): 36.6 (29 Apr 2023 04:28), Max: 37 (29 Apr 2023 00:37)  T(F): 97.9 (29 Apr 2023 04:28), Max: 98.6 (29 Apr 2023 00:37)  HR: 99 (29 Apr 2023 09:16) (85 - 99)  BP: 178/50 (29 Apr 2023 04:28) (177/51 - 191/64)  BP(mean): --  RR: 18 (29 Apr 2023 09:16) (18 - 22)  SpO2: 100% (29 Apr 2023 09:16) (96% - 100%)    Parameters below as of 29 Apr 2023 09:16  Patient On (Oxygen Delivery Method): nasal cannula, high flow,Temp @31  O2 Flow (L/min): 60  O2 Concentration (%): 100    PHYSICAL EXAM:  GENERAL: NAD, cachectic lethargic  HEAD: atraumatic, normocephalic  NECK: Supple,+ JVD  CHEST/LUNG: rales b/l, mild tachypnea, on HFNC   HEART: Regular rate and rhythm; no murmurs, no LE edema   ABDOMEN: Soft, nondistended; bowel sounds present  EXTREMITIES:  2+ Peripheral Pulses, no clubbing, cyanosis  SKIN: No rashes or lesions    LABS:                        7.2    9.08  )-----------( 210      ( 29 Apr 2023 07:08 )             22.2     04-29    142  |  103  |  115<H>  ----------------------------<  201<H>  3.3<L>   |  17<L>  |  4.26<H>    Ca    8.0<L>      29 Apr 2023 07:06  Phos  5.1     04-29  Mg     2.6     04-29    TPro  6.6  /  Alb  3.6  /  TBili  0.3  /  DBili  x   /  AST  17  /  ALT  20  /  AlkPhos  67  04-29              SARS-CoV-2: NotDetec (22 Apr 2023 21:20)  SARS-CoV-2: NotDetec (29 Jan 2023 22:08)      RADIOLOGY & ADDITIONAL TESTS:  New Imaging Personally Reviewed Today:  New Electrocardiogram Personally Reviewed Today:  Other Results Reviewed Today:   Prior or Outpatient Records Reviewed Today with Summary:    COORDINATION OF CARE:  Consultant Communication and Details of Discussion (where applicable):

## 2023-04-29 NOTE — PROGRESS NOTE ADULT - PROBLEM SELECTOR PLAN 2
pt hypoxic on admission with tachypnea, noted with pulm congestion on imaging.   - now on bumex gtt now, UOP improving, c/t monitor   - c/t closely monitor pt's respiratory status, fluid status, renal function (s/p contrast), and electrolytes  - continue telemetry monitoring  - TTE reviewed and noted - normal LV systolic function, new valvular disease (mod-sev AS, mod MR)  - c/t monitor O2 sat

## 2023-04-29 NOTE — PROGRESS NOTE ADULT - PROBLEM SELECTOR PLAN 1
remains hypoxic, requiring dual HFNC and venturi mask, sat mid 90s.    - likely 2/2 combination of pulme edema and aspiration PNA   - will c/w supplemental O2, wean as tolerated   - c/w bumex gtt, improved UOP   - c/w cefepime for now, plan for 5-7d course in total (4/26--)

## 2023-04-29 NOTE — CHART NOTE - NSCHARTNOTEFT_GEN_A_CORE
Medicine NP note    CC: 4.1 Seconds of PAT  Pt asymptomatic        88yoF w/ PMHx of advanced dementia (at B/L patient is bedbound, nonverbal, though able to tolerate PO diet - per family, patient has hearty appetite), HTN, HLD, IDDM, brought in by family for difficulty breathing over the past few days in setting of notable increased swelling of the patient's hands, feet, and face; a/w hypoxic respiratory failure 2/2 aspiration PNA and acute decompensated diastolic heart failure, with course c/b significant TOÑITO. Initially hypoxia was improving and taken off oxygen, then with recurrent hypoxia, suspect 2/2 recurrent aspiration event.          Problem/Plan - 1:  ·  Problem: Acute respiratory failure with hypoxia.   ·  Plan: remains hypoxic, requiring dual HFNC and venturi mask, sat mid 90s.    - likely 2/2 combination of pulme edema and aspiration PNA   - will c/w supplemental O2, wean as tolerated   - c/w bumex gtt, improved UOP   - c/w cefepime for now, plan for 5-7d course in total (4/26--).     Problem/Plan - 2:  ·  Problem: Acute on chronic diastolic heart failure.   ·  Plan: pt hypoxic on admission with tachypnea, noted with pulm congestion on imaging.   - now on bumex gtt now, UOP improving, c/t monitor   - c/t closely monitor pt's respiratory status, fluid status, renal function (s/p contrast), and electrolytes  - continue telemetry monitoring  - TTE reviewed and noted - normal LV systolic function, new valvular disease (mod-sev AS, mod MR)  - c/t monitor O2 sat.     Problem/Plan - 3:  ·  Problem: TOÑITO (acute kidney injury).   ·  Plan: worsening Cr, now to 4.5 (baseline from prior admission ~1)  - renal following - suspect likely contrast induced with possible component of lasix induced  - on bumex gtt, making urine now, monitor strict I/Os  - mild hypoK from diuresis, replete and c/t monitor BMP BID for now   - renal US - no obstruction   - trend Cr closely   - renally dose medications, avoid nephrotoxins  - nephrology recs appreciated.     Problem/Plan - 4: Medicine NP note    CC: 4.1 Seconds of PAT with HR in 190's  Back tp baseline SR  Pt asymptomatic    Vital Signs Last 24 Hrs  T(C): 36.3 (29 Apr 2023 20:56), Max: 37 (29 Apr 2023 00:37)  T(F): 97.3 (29 Apr 2023 20:56), Max: 98.6 (29 Apr 2023 00:37)  HR: 95 (29 Apr 2023 22:26) (92 - 103)  BP: 185/61 (29 Apr 2023 23:37) (177/51 - 193/70)  BP(mean): --  RR: 20 (29 Apr 2023 22:26) (18 - 22)  SpO2: 100% (29 Apr 2023 22:26) (100% - 100%)    Parameters below as of 29 Apr 2023 22:26  Patient On (Oxygen Delivery Method): nasal cannula, high flow  O2 Flow (L/min): 60  O2 Concentration (%): 100      88yoF w/ PMHx of advanced dementia (at B/L patient is bedbound, nonverbal, though able to tolerate PO diet - per family, patient has hearty appetite), HTN, HLD, IDDM, brought in by family for difficulty breathing over the past few days in setting of notable increased swelling of the patient's hands, feet, and face; a/w hypoxic respiratory failure   On dUAL HF, and VM, ON bumex gtt  Now with 4.1 seconds of PAT  Pt asymptomatic  C/W metoprolol  C/W tele  Will order electrolytes am  Will sign out to day team    Carl Grigsby P BC  [id number]

## 2023-04-30 NOTE — PROGRESS NOTE ADULT - ASSESSMENT
88yoF w/ PMHx of advanced dementia (at B/L patient is bedbound, nonverbal, though able to tolerate PO diet - per family, patient has hearty appetite), HTN, HLD, IDDM, who presents from home where she lives with her , brought in by her daughter (son is at her bedside during this evaluation) w/ complaints of daughter noticing the patient has been having difficulty breathing over the past few days in setting of notable increased swelling of the patient's hands, feet, and face. Of note, patient was recently hospitalized at [facility] for aspiration PNA (and found to have demand ischemia) when she presented with similar complaints and was found to be hypoxic; at the time, S+S eval was recommended, but per documentation at the end of Jan 2023,      1- TOÑITO   2- CHF   3- acidosis       toñito ins setting of chf as well as more likely due to contrast nephropathy  and possible pneumonia   her echo reveals mod to severe range AS  acidosis  trend ph   cr seems to be improving   dc bumex drip from 2mg/hr--> 0.5mg/hr---> off   give prn bumex ivp   hypernatremia trend na    she is not a dialysis candidate in setting of her advance dementia   cont high flow O2 given hypoxemia   strict I/O  d/w pt daughter

## 2023-04-30 NOTE — PROGRESS NOTE ADULT - SUBJECTIVE AND OBJECTIVE BOX
[facility] Division of Hospital Medicine  Chico Fisher MD  Available via MS Teams    SUBJECTIVE / OVERNIGHT EVENTS:  no acute events overnight   unable to obtain ROS 2/2 advanced dementia     ADDITIONAL REVIEW OF SYSTEMS:  as noted above     MEDICATIONS  (STANDING):  amLODIPine   Tablet 10 milliGRAM(s) Oral daily  aspirin  chewable 81 milliGRAM(s) Oral daily  atorvastatin 80 milliGRAM(s) Oral at bedtime  buMETAnide Injectable 2 milliGRAM(s) IV Push daily  cefepime   IVPB 1000 milliGRAM(s) IV Intermittent every 24 hours  dextrose 5%. 1000 milliLiter(s) (100 mL/Hr) IV Continuous <Continuous>  dextrose 5%. 1000 milliLiter(s) (50 mL/Hr) IV Continuous <Continuous>  dextrose 50% Injectable 25 Gram(s) IV Push once  dextrose 50% Injectable 25 Gram(s) IV Push once  dextrose 50% Injectable 12.5 Gram(s) IV Push once  glucagon  Injectable 1 milliGRAM(s) IntraMuscular once  heparin   Injectable 5000 Unit(s) SubCutaneous every 12 hours  insulin lispro (ADMELOG) corrective regimen sliding scale   SubCutaneous every 6 hours  latanoprost 0.005% Ophthalmic Solution 1 Drop(s) Both EYES at bedtime  metoprolol tartrate Injectable 2.5 milliGRAM(s) IV Push every 6 hours    MEDICATIONS  (PRN):  dextrose Oral Gel 15 Gram(s) Oral once PRN Blood Glucose LESS THAN 70 milliGRAM(s)/deciliter  hydrALAZINE Injectable 5 milliGRAM(s) IV Push every 6 hours PRN SBP>180      I&O's Summary    29 Apr 2023 07:01  -  30 Apr 2023 07:00  --------------------------------------------------------  IN: 0 mL / OUT: 2900 mL / NET: -2900 mL        PHYSICAL EXAM:  Vital Signs Last 24 Hrs  T(C): 36.7 (30 Apr 2023 04:48), Max: 36.9 (29 Apr 2023 20:25)  T(F): 98 (30 Apr 2023 04:48), Max: 98.4 (29 Apr 2023 20:25)  HR: 100 (30 Apr 2023 11:50) (92 - 105)  BP: 168/78 (30 Apr 2023 05:16) (168/78 - 193/70)  BP(mean): --  RR: 24 (30 Apr 2023 11:50) (19 - 24)  SpO2: 100% (30 Apr 2023 11:50) (100% - 100%)    Parameters below as of 30 Apr 2023 11:50  Patient On (Oxygen Delivery Method): HFNC  O2 Flow (L/min): 60  O2 Concentration (%): 60    PHYSICAL EXAM:  GENERAL: NAD, cachectic lethargic  HEAD: atraumatic, normocephalic  NECK: Supple,+ JVD  CHEST/LUNG: rales b/l, mild tachypnea, on HFNC   HEART: Regular rate and rhythm; no murmurs, no LE edema   ABDOMEN: Soft, nondistended; bowel sounds present  EXTREMITIES:  2+ Peripheral Pulses, no clubbing, cyanosis  SKIN: No rashes or lesions    LABS:                        7.8    11.08 )-----------( 265      ( 30 Apr 2023 07:13 )             24.4     04-30    148<H>  |  103  |  108<H>  ----------------------------<  237<H>  3.4<L>   |  18<L>  |  3.75<H>    Ca    8.6      30 Apr 2023 07:10  Phos  5.2     04-30  Mg     2.6     04-30    TPro  6.6  /  Alb  3.6  /  TBili  0.3  /  DBili  x   /  AST  17  /  ALT  20  /  AlkPhos  67  04-29              SARS-CoV-2: NotDetec (22 Apr 2023 21:20)  SARS-CoV-2: NotDetec (29 Jan 2023 22:08)      RADIOLOGY & ADDITIONAL TESTS:  New Imaging Personally Reviewed Today:  New Electrocardiogram Personally Reviewed Today:  Other Results Reviewed Today:   Prior or Outpatient Records Reviewed Today with Summary:    COORDINATION OF CARE:  Consultant Communication and Details of Discussion (where applicable):

## 2023-04-30 NOTE — PROGRESS NOTE ADULT - PROBLEM SELECTOR PLAN 1
improving slowly, now just on HFNC, weaning FiO2 as tolerated   - likely 2/2 combination of pulm edema and aspiration PNA   - will c/w supplemental O2, wean as tolerated   - c/w bumex, improved UOP   - c/w cefepime for now, plan for 7d course in total (4/26--)

## 2023-04-30 NOTE — PROGRESS NOTE ADULT - PROBLEM SELECTOR PLAN 2
contrast induced     - IVF as per nephro  Bumex gtt as above. Discussed with Dr. Eid and hospitalist. Called and discussed with pts daughter Petty as well.  nephrology following   continue to monitor Cr

## 2023-04-30 NOTE — PROGRESS NOTE ADULT - PROBLEM SELECTOR PLAN 2
pt hypoxic on admission with tachypnea, noted with pulm congestion on imaging.   - s/p bumex gtt with good UO - switch to bumex IV daily for now    - c/t closely monitor pt's respiratory status, fluid status, renal function (s/p contrast), and electrolytes  - continue telemetry monitoring  - TTE reviewed and noted - normal LV systolic function, new valvular disease (mod-sev AS, mod MR)  - c/t monitor O2 sat

## 2023-04-30 NOTE — PROVIDER CONTACT NOTE (OTHER) - ACTION/TREATMENT ORDERED:
Provider made aware of event on tele. pt has standing dose of metoprolol as well as amlodipine but pt NPO; brought to attention to provider. labs to be drawn. Awaiting further instruction.

## 2023-04-30 NOTE — PROGRESS NOTE ADULT - SUBJECTIVE AND OBJECTIVE BOX
Subjective: Patient seen and examined. No new events except as noted.   4.1 Seconds of PAT with HR in 190's  Back tp baseline SR  remains on Bumex gtt     REVIEW OF SYSTEMS:  Unable to obtain    MEDICATIONS:  MEDICATIONS  (STANDING):  amLODIPine   Tablet 10 milliGRAM(s) Oral daily  aspirin  chewable 81 milliGRAM(s) Oral daily  atorvastatin 80 milliGRAM(s) Oral at bedtime  buMETAnide Infusion 2 mG/Hr (10 mL/Hr) IV Continuous <Continuous>  cefepime   IVPB 1000 milliGRAM(s) IV Intermittent every 24 hours  dextrose 5%. 1000 milliLiter(s) (50 mL/Hr) IV Continuous <Continuous>  dextrose 5%. 1000 milliLiter(s) (100 mL/Hr) IV Continuous <Continuous>  dextrose 50% Injectable 25 Gram(s) IV Push once  dextrose 50% Injectable 12.5 Gram(s) IV Push once  dextrose 50% Injectable 25 Gram(s) IV Push once  glucagon  Injectable 1 milliGRAM(s) IntraMuscular once  heparin   Injectable 5000 Unit(s) SubCutaneous every 12 hours  insulin lispro (ADMELOG) corrective regimen sliding scale   SubCutaneous every 6 hours  latanoprost 0.005% Ophthalmic Solution 1 Drop(s) Both EYES at bedtime  metoprolol tartrate Injectable 2.5 milliGRAM(s) IV Push every 6 hours      PHYSICAL EXAM:  T(C): 36.7 (04-30-23 @ 04:48), Max: 36.9 (04-29-23 @ 20:25)  HR: 105 (04-30-23 @ 05:23) (92 - 105)  BP: 168/78 (04-30-23 @ 05:16) (168/78 - 193/70)  RR: 20 (04-30-23 @ 05:23) (18 - 20)  SpO2: 100% (04-30-23 @ 05:23) (100% - 100%)  Wt(kg): --  I&O's Summary    29 Apr 2023 07:01  -  30 Apr 2023 07:00  --------------------------------------------------------  IN: 0 mL / OUT: 2900 mL / NET: -2900 mL          Appearance: NAD non verbal 	  HEENT: Normal oral mucosa, PERRL, EOMI	  Lymphatic: No lymphadenopathy  Cardiovascular: Normal S1 S2, No JVD, No murmurs  Respiratory: decreased bs, on NC   Psychiatry: A & O x 0  Gastrointestinal:  Soft, Non-tender, + BS	  Skin: No rashes, No ecchymoses, No cyanosis	  Neurologic: Non-focal  Extremities: decreased  range of motion, No  edema  Vascular: Peripheral pulses palpable 2+ bilaterally          LABS:    CARDIAC MARKERS:                                7.8    11.08 )-----------( 265      ( 30 Apr 2023 07:13 )             24.4     04-30    148<H>  |  103  |  108<H>  ----------------------------<  237<H>  3.4<L>   |  18<L>  |  3.75<H>    Ca    8.6      30 Apr 2023 07:10  Phos  5.2     04-30  Mg     2.6     04-30    TPro  6.6  /  Alb  3.6  /  TBili  0.3  /  DBili  x   /  AST  17  /  ALT  20  /  AlkPhos  67  04-29        TELEMETRY: SR PAT 4 sec	    ECG:  	  RADIOLOGY:   DIAGNOSTIC TESTING:  [ ] Echocardiogram:  [ ]  Catheterization:  [ ] Stress Test:    OTHER:

## 2023-04-30 NOTE — PROGRESS NOTE ADULT - SUBJECTIVE AND OBJECTIVE BOX
[facility]   [phone]  RENAL FOLLOW UP NOTE  --------------------------------------------------------------------------------  Chief Complaint:    24 hour events/subjective:    seen earlier  on high flow O2   non interactive     PAST HISTORY  --------------------------------------------------------------------------------  No significant changes to PMH, PSH, FHx, SHx, unless otherwise noted    ALLERGIES & MEDICATIONS  --------------------------------------------------------------------------------  Allergies    No Known Allergies  Allergy Status Unknown    Intolerances      Standing Inpatient Medications  amLODIPine   Tablet 10 milliGRAM(s) Oral daily  aspirin  chewable 81 milliGRAM(s) Oral daily  atorvastatin 80 milliGRAM(s) Oral at bedtime  cefepime   IVPB 1000 milliGRAM(s) IV Intermittent every 24 hours  dextrose 5%. 1000 milliLiter(s) IV Continuous <Continuous>  dextrose 5%. 1000 milliLiter(s) IV Continuous <Continuous>  dextrose 50% Injectable 25 Gram(s) IV Push once  dextrose 50% Injectable 25 Gram(s) IV Push once  dextrose 50% Injectable 12.5 Gram(s) IV Push once  glucagon  Injectable 1 milliGRAM(s) IntraMuscular once  heparin   Injectable 5000 Unit(s) SubCutaneous every 12 hours  insulin lispro (ADMELOG) corrective regimen sliding scale   SubCutaneous every 6 hours  latanoprost 0.005% Ophthalmic Solution 1 Drop(s) Both EYES at bedtime  metoprolol tartrate Injectable 2.5 milliGRAM(s) IV Push every 6 hours    PRN Inpatient Medications  dextrose Oral Gel 15 Gram(s) Oral once PRN  hydrALAZINE Injectable 5 milliGRAM(s) IV Push every 6 hours PRN      REVIEW OF SYSTEMS  --------------------------------------------------------------------------------        VITALS/PHYSICAL EXAM  --------------------------------------------------------------------------------  T(C): 36.7 (04-30-23 @ 04:48), Max: 36.9 (04-29-23 @ 20:25)  HR: 95 (04-30-23 @ 15:18) (95 - 105)  BP: 168/78 (04-30-23 @ 05:16) (168/78 - 193/70)  RR: 20 (04-30-23 @ 15:18) (19 - 24)  SpO2: 100% (04-30-23 @ 15:18) (100% - 100%)  Wt(kg): --        04-29-23 @ 07:01  -  04-30-23 @ 07:00  --------------------------------------------------------  IN: 0 mL / OUT: 2900 mL / NET: -2900 mL    04-30-23 @ 07:01  -  04-30-23 @ 17:49  --------------------------------------------------------  IN: 0 mL / OUT: 250 mL / NET: -250 mL      Physical Exam:  	      Gen: ill appearing elderly F on high flow O2   	no jvd ,  	Pulm: decrease bs  + coarse bs   - wheezing  	CV: RRR, S1S2; no rub  	Abd: +BS, soft, nontender/nondistended  	: No  bladder distention   	UE: Warm, no cyanosis  no clubbing,  no edema;  	LE: Warm, no cyanosis  no clubbing, no edema  	Neuro: dementia       LABS/STUDIES  --------------------------------------------------------------------------------              7.8    11.08 >-----------<  265      [04-30-23 @ 07:13]              24.4     148  |  103  |  108  ----------------------------<  237      [04-30-23 @ 07:10]  3.4   |  18  |  3.75        Ca     8.6     [04-30-23 @ 07:10]      Mg     2.6     [04-30-23 @ 07:10]      Phos  5.2     [04-30-23 @ 07:10]    TPro  6.6  /  Alb  3.6  /  TBili  0.3  /  DBili  x   /  AST  17  /  ALT  20  /  AlkPhos  67  [04-29-23 @ 07:06]          Creatinine Trend:  SCr 3.75 [04-30 @ 07:10]  SCr 3.87 [04-29 @ 18:27]  SCr 4.26 [04-29 @ 07:06]  SCr 4.67 [04-28 @ 11:14]  SCr 4.56 [04-27 @ 20:51]              Urinalysis - [04-23-23 @ 14:43]      Color Colorless / Appearance Clear / SG 1.020 / pH 6.0      Gluc Trace / Ketone Negative  / Bili Negative / Urobili Negative       Blood Small / Protein 100 mg/dl / Leuk Est Negative / Nitrite Negative      RBC 6 / WBC 0 / Hyaline 0 / Gran  / Sq Epi  / Non Sq Epi  / Bacteria Negative      TSH 2.59      [08-09-22 @ 06:20]  Lipid: chol 216, TG 80, HDL 49, LDL --      [08-08-22 @ 06:31]

## 2023-04-30 NOTE — PROVIDER CONTACT NOTE (OTHER) - ACTION/TREATMENT ORDERED:
Provider made aware of tele event and VS with elevated BP. s/p hydralazine and manual BP as noted. Will follow up with repeat manual BP as per NP Carl Grigsby. no further interventions at this time.

## 2023-04-30 NOTE — PROGRESS NOTE ADULT - ASSESSMENT
Slab Off:No Alternate MRN: [id number]    88yoF w/ PMHx of advanced dementia (at B/L patient is bedbound, nonverbal, though able to tolerate PO diet - per family, patient has hearty appetite), HTN, HLD, IDDM, brought in by family for difficulty breathing over the past few days in setting of notable increased swelling of the patient's hands, feet, and face; a/w hypoxic respiratory failure 2/2 aspiration PNA and acute decompensated diastolic heart failure, with course c/b significant TOÑITO. Initially hypoxia was improving and taken off oxygen, then with recurrent hypoxia.

## 2023-05-01 NOTE — ADVANCED PRACTICE NURSE CONSULT - ASSESSMENT
When wound care RN arrived on unit, patient was found lying in a low air loss pressure redistribution support surface style bed. Patient was nonverbal and is unable to turn independently, staff assistance x 2 was provided. Once turned, wound care RN was able to visualize external urinary device in place to divert urine from skin. There is an area of persistent nonblanchable maroon and purple hued discoloration noted on B/L buttocks/sacral skin, area measures approximately 6cm x 6cm x 0cm. Within this location there is superficial partial thickness skin loss noted. Wound appearance is consistent with a deep tissue injury in evolution with incontinence involvement. Once consult was complete, patient was placed in a left side-lying position utilizing pillow positioner assistive devices. Education not appropriate for this patient.

## 2023-05-01 NOTE — PROGRESS NOTE ADULT - PROBLEM SELECTOR PLAN 2
pt hypoxic on admission with tachypnea, noted with pulm congestion on imaging.   - s/p bumex gtt with good UO - switch to bumex IV daily for now    - c/t closely monitor pt's respiratory status, fluid status, renal function (s/p contrast), and electrolytes  - continue telemetry monitoring  - TTE reviewed and noted - normal LV systolic function, new valvular disease (mod-sev AS, mod MR)  - c/t monitor O2 sat  - appreciate cardiology recs

## 2023-05-01 NOTE — PROGRESS NOTE ADULT - SUBJECTIVE AND OBJECTIVE BOX
[facility] Division of Hospital Medicine  Melissa Hong MD  Pager (M-F, 9L-7U): [phone]  Other Times:  [phone]    Patient is a 88y old  Female who presents with a chief complaint of SOB/GARRISON, swelling of hands/legs/face (01 May 2023 12:45)      SUBJECTIVE / OVERNIGHT EVENTS: No acute events. Weaned down to 6L O2  ADDITIONAL REVIEW OF SYSTEMS: Unable to obtain due to underlying dementia    MEDICATIONS  (STANDING):  amLODIPine   Tablet 10 milliGRAM(s) Oral daily  aspirin  chewable 81 milliGRAM(s) Oral daily  atorvastatin 80 milliGRAM(s) Oral at bedtime  buMETAnide Injectable 2 milliGRAM(s) IV Push daily  cefepime   IVPB 1000 milliGRAM(s) IV Intermittent every 24 hours  dextrose 5%. 1000 milliLiter(s) (100 mL/Hr) IV Continuous <Continuous>  dextrose 5%. 1000 milliLiter(s) (50 mL/Hr) IV Continuous <Continuous>  dextrose 5%. 1000 milliLiter(s) (20 mL/Hr) IV Continuous <Continuous>  dextrose 50% Injectable 25 Gram(s) IV Push once  dextrose 50% Injectable 12.5 Gram(s) IV Push once  dextrose 50% Injectable 25 Gram(s) IV Push once  glucagon  Injectable 1 milliGRAM(s) IntraMuscular once  heparin   Injectable 5000 Unit(s) SubCutaneous every 12 hours  insulin lispro (ADMELOG) corrective regimen sliding scale   SubCutaneous every 6 hours  latanoprost 0.005% Ophthalmic Solution 1 Drop(s) Both EYES at bedtime  metoprolol tartrate Injectable 2.5 milliGRAM(s) IV Push every 6 hours    MEDICATIONS  (PRN):  dextrose Oral Gel 15 Gram(s) Oral once PRN Blood Glucose LESS THAN 70 milliGRAM(s)/deciliter  hydrALAZINE Injectable 5 milliGRAM(s) IV Push every 6 hours PRN SBP>180      CAPILLARY BLOOD GLUCOSE      POCT Blood Glucose.: 231 mg/dL (01 May 2023 12:52)  POCT Blood Glucose.: 210 mg/dL (01 May 2023 06:46)  POCT Blood Glucose.: 177 mg/dL (30 Apr 2023 23:37)  POCT Blood Glucose.: 197 mg/dL (30 Apr 2023 17:34)    I&O's Summary    30 Apr 2023 07:01  -  01 May 2023 07:00  --------------------------------------------------------  IN: 250 mL / OUT: 700 mL / NET: -450 mL        PHYSICAL EXAM:  Vital Signs Last 24 Hrs  T(C): 37.8 (01 May 2023 11:24), Max: 37.8 (01 May 2023 11:24)  T(F): 100 (01 May 2023 11:24), Max: 100 (01 May 2023 11:24)  HR: 108 (01 May 2023 11:24) (84 - 108)  BP: 157/68 (01 May 2023 11:24) (137/60 - 173/65)  BP(mean): --  RR: 19 (01 May 2023 11:24) (18 - 20)  SpO2: 100% (01 May 2023 11:24) (99% - 100%)    Parameters below as of 01 May 2023 11:24  Patient On (Oxygen Delivery Method): nasal cannula      CONSTITUTIONAL: NAD, chronically ill appearing elderly woman  EYES: PERRLA; conjunctiva and sclera clear  ENMT: Dry oral mucosa  NECK: Suppl  RESPIRATORY: Normal respiratory effort; lungs are clear to auscultation bilaterally  CARDIOVASCULAR: Regular rate and rhythm, normal S1 and S2, no murmur/rub/gallop; No lower extremity edema  ABDOMEN: Nontender to palpation, normoactive bowel sounds, no rebound/guarding  MUSCULOSKELETAL:  Contracted   PSYCH: A+O X 0      LABS: reviewed                        9.2    13.62 )-----------( 310      ( 01 May 2023 07:16 )             28.1     05-01    152<H>  |  104  |  115<H>  ----------------------------<  208<H>  3.2<L>   |  22  |  3.30<H>    Ca    9.4      01 May 2023 07:15  Phos  5.2     04-30  Mg     2.6     04-30

## 2023-05-01 NOTE — PROGRESS NOTE ADULT - PROBLEM SELECTOR PLAN 1
2/2 combination of pulm edema and aspiration PNA, improving  - weaned off HFNC, now on 6L O2  - will c/w supplemental O2, wean as tolerated   - c/w bumex, improved UOP   - c/w cefepime for now, plan for 7d course in total (4/26-5/2)

## 2023-05-01 NOTE — SWALLOW BEDSIDE ASSESSMENT ADULT - DIET PRIOR TO ADMI
Per daughter, Pt has been consuming "small chopped foods and kind-of thick liquids" at home; endorses more coughing w/ intake lately

## 2023-05-01 NOTE — ADVANCED PRACTICE NURSE CONSULT - RECOMMEDATIONS
Impression:    urinary incontinence  fecal incontinence  incontinence associated dermatitis  B/L buttocks/sacral deep tissue injury in evolution      Recommendations:    1) continue turning and positioning q2 and PRN utilizing offloading assistive devices  2) continue with routine pericare daily and PRN soiling  3) encourage optimal nutrition  4) waffle cushion when oob to chair  5) B/L LE complete cair air fluidized boots to offload heels/feet  6) triad protective barrier cream to B/L buttocks/sacrum daily and PRN soiling  7) incontinence management - continue external female urinary catheter to divert urine from skin    Plan discussed with JUSTICE Salinas at bedside

## 2023-05-01 NOTE — SWALLOW BEDSIDE ASSESSMENT ADULT - ASR SWALLOW RECOMMEND DIAG
To objectively assess the swallow mechanism and r/o aspiration; Pt is not eligible for FEES due to agitation/VFSS/MBS
Pt is not appropriate for objective testing at this time due to poor mental status

## 2023-05-01 NOTE — PROGRESS NOTE ADULT - SUBJECTIVE AND OBJECTIVE BOX
[facility]   [phone]  RENAL FOLLOW UP NOTE  --------------------------------------------------------------------------------  Chief Complaint:    24 hour events/subjective:    patient seen and examined.   not communicative    PAST HISTORY  --------------------------------------------------------------------------------  No significant changes to PMH, PSH, FHx, SHx, unless otherwise noted    ALLERGIES & MEDICATIONS  --------------------------------------------------------------------------------  Allergies    No Known Allergies  Allergy Status Unknown    Intolerances      Standing Inpatient Medications  amLODIPine   Tablet 10 milliGRAM(s) Oral daily  aspirin  chewable 81 milliGRAM(s) Oral daily  atorvastatin 80 milliGRAM(s) Oral at bedtime  buMETAnide Injectable 2 milliGRAM(s) IV Push daily  cefepime   IVPB 1000 milliGRAM(s) IV Intermittent every 24 hours  dextrose 5%. 1000 milliLiter(s) IV Continuous <Continuous>  dextrose 5%. 1000 milliLiter(s) IV Continuous <Continuous>  dextrose 5%. 1000 milliLiter(s) IV Continuous <Continuous>  dextrose 50% Injectable 25 Gram(s) IV Push once  dextrose 50% Injectable 12.5 Gram(s) IV Push once  dextrose 50% Injectable 25 Gram(s) IV Push once  glucagon  Injectable 1 milliGRAM(s) IntraMuscular once  heparin   Injectable 5000 Unit(s) SubCutaneous every 12 hours  insulin lispro (ADMELOG) corrective regimen sliding scale   SubCutaneous every 6 hours  latanoprost 0.005% Ophthalmic Solution 1 Drop(s) Both EYES at bedtime  metoprolol tartrate Injectable 2.5 milliGRAM(s) IV Push every 6 hours    PRN Inpatient Medications  dextrose Oral Gel 15 Gram(s) Oral once PRN  hydrALAZINE Injectable 5 milliGRAM(s) IV Push every 6 hours PRN      REVIEW OF SYSTEMS  --------------------------------------------------------------------------------    patient is unable to give ROS    VITALS/PHYSICAL EXAM  --------------------------------------------------------------------------------  T(C): 37.8 (05-01-23 @ 11:24), Max: 37.8 (05-01-23 @ 11:24)  HR: 108 (05-01-23 @ 11:24) (84 - 108)  BP: 157/68 (05-01-23 @ 11:24) (137/60 - 173/65)  RR: 19 (05-01-23 @ 11:24) (18 - 20)  SpO2: 100% (05-01-23 @ 11:24) (99% - 100%)  Wt(kg): --        04-30-23 @ 07:01  -  05-01-23 @ 07:00  --------------------------------------------------------  IN: 250 mL / OUT: 700 mL / NET: -450 mL      Physical Exam:  	              Gen: ill appearing elderly F, On O2   	Pulm: decrease bs  + coarse bs   - wheezing  	CV: +JVD. RRR, S1S2; no rub  	Abd: +BS, soft, nontender/nondistended  	: No bladder distention   	UE: Warm, no cyanosis  no clubbing,  no edema;  	LE: Warm, no cyanosis  no clubbing, no edema  	Neuro: dementia     LABS/STUDIES  --------------------------------------------------------------------------------              9.2    13.62 >-----------<  310      [05-01-23 @ 07:16]              28.1     152  |  104  |  115  ----------------------------<  208      [05-01-23 @ 07:15]  3.2   |  22  |  3.30        Ca     9.4     [05-01-23 @ 07:15]      Mg     2.6     [04-30-23 @ 07:10]      Phos  5.2     [04-30-23 @ 07:10]            Creatinine Trend:  SCr 3.30 [05-01 @ 07:15]  SCr 3.75 [04-30 @ 07:10]  SCr 3.87 [04-29 @ 18:27]  SCr 4.26 [04-29 @ 07:06]  SCr 4.67 [04-28 @ 11:14]              Urinalysis - [04-23-23 @ 14:43]      Color Colorless / Appearance Clear / SG 1.020 / pH 6.0      Gluc Trace / Ketone Negative  / Bili Negative / Urobili Negative       Blood Small / Protein 100 mg/dl / Leuk Est Negative / Nitrite Negative      RBC 6 / WBC 0 / Hyaline 0 / Gran  / Sq Epi  / Non Sq Epi  / Bacteria Negative      TSH 2.59      [08-09-22 @ 06:20]  Lipid: chol 216, TG 80, HDL 49, LDL --      [08-08-22 @ 06:31]

## 2023-05-01 NOTE — PROGRESS NOTE ADULT - ASSESSMENT
Alternate MRN: [id number]    88yoF w/ PMHx of advanced dementia (at B/L patient is bedbound, nonverbal, though able to tolerate PO diet - per family, patient has hearty appetite), HTN, HLD, IDDM, brought in by family for difficulty breathing over the past few days in setting of notable increased swelling of the patient's hands, feet, and face; a/w hypoxic respiratory failure 2/2 aspiration PNA and acute decompensated diastolic heart failure, with course c/b significant TOÑITO. Initially hypoxia was improving and taken off oxygen, then with recurrent hypoxia.

## 2023-05-01 NOTE — SWALLOW BEDSIDE ASSESSMENT ADULT - MUCOSAL QUALITY
WNL; moist oral mucosa
+xerostomia; thick dried secretions noted along hard palate, lingual surface, and labial surface; gentle oral care provided

## 2023-05-01 NOTE — SWALLOW BEDSIDE ASSESSMENT ADULT - SWALLOW EVAL: DIAGNOSIS
Order received for bedside swallow evaluation. Chart reviewed. Unclear as to GOC regarding family wishes for pleasure feeds versus formal bedside evaluation. As per documentation, patient is reportedly coughing during eating yet passed RN dysphagia screen. PO diet has been initiated by MD. TEAMS message sent to ELLEN Lo. This service will await response prior to addressing consult.
89yo F w/ PMHx advanced dementia (at baseline patient is bedbound, nonverbal, though able to tolerate PO diet-per family) and IDDM presents brought in from home by daughter due to difficulty breathing and increased swelling. Pt found to have acute hypoxic respiratory failure likely 2/2 aspiration PNA. Consult received and appreciated. Upon encounter, Pt lethargic and not arousable to noxious stimuli. Given current mental status, Pt is not appropriate for PO intake at this time. Consider GOC conversation w/ family to determine provisions of nutrition/hydration/medication. This service will follow up to determine eligibility for PO trials pending improvement in mental status.
87yo F w/ PMHx advanced dementia (at baseline patient is bedbound, nonverbal, though able to tolerate PO diet-per family) and IDDM presents brought in from home by daughter due to difficulty breathing and increased swelling. Pt presents w/ a suspected pharyngeal dysphagia characterized by reduced hyolaryngeal excursion, suspected delayed onset of pharyngeal swallow, and audible swallow indicative of discoordination w/ mildly thick liquids. No overt s/s of laryngeal penetration/aspiration throughout this evaluation. Given recurrent PNA and daughter's reports of increased cough intake, objective testing is recommended to assess the swallow mechanism and r/o aspiration. MBS is pending improvement in respiratory status and O2 needs.

## 2023-05-01 NOTE — PROGRESS NOTE ADULT - SUBJECTIVE AND OBJECTIVE BOX
Subjective: Patient seen and examined. No new events except as noted.   remains on NC     REVIEW OF SYSTEMS:  Unable to obtain         MEDICATIONS:  MEDICATIONS  (STANDING):  amLODIPine   Tablet 10 milliGRAM(s) Oral daily  aspirin  chewable 81 milliGRAM(s) Oral daily  atorvastatin 80 milliGRAM(s) Oral at bedtime  buMETAnide Injectable 2 milliGRAM(s) IV Push daily  cefepime   IVPB 1000 milliGRAM(s) IV Intermittent every 24 hours  dextrose 5%. 1000 milliLiter(s) (100 mL/Hr) IV Continuous <Continuous>  dextrose 5%. 1000 milliLiter(s) (50 mL/Hr) IV Continuous <Continuous>  dextrose 50% Injectable 25 Gram(s) IV Push once  dextrose 50% Injectable 12.5 Gram(s) IV Push once  dextrose 50% Injectable 25 Gram(s) IV Push once  glucagon  Injectable 1 milliGRAM(s) IntraMuscular once  heparin   Injectable 5000 Unit(s) SubCutaneous every 12 hours  insulin lispro (ADMELOG) corrective regimen sliding scale   SubCutaneous every 6 hours  latanoprost 0.005% Ophthalmic Solution 1 Drop(s) Both EYES at bedtime  metoprolol tartrate Injectable 2.5 milliGRAM(s) IV Push every 6 hours      PHYSICAL EXAM:  T(C): 36.9 (05-01-23 @ 04:25), Max: 36.9 (04-30-23 @ 21:10)  HR: 92 (05-01-23 @ 06:31) (84 - 100)  BP: 150/76 (05-01-23 @ 04:25) (137/60 - 173/65)  RR: 20 (05-01-23 @ 06:31) (18 - 24)  SpO2: 100% (05-01-23 @ 06:31) (99% - 100%)  Wt(kg): --  I&O's Summary    30 Apr 2023 07:01  -  01 May 2023 07:00  --------------------------------------------------------  IN: 250 mL / OUT: 700 mL / NET: -450 mL            Appearance: NAD non verbal 	  HEENT: Normal oral mucosa, PERRL, EOMI	  Lymphatic: No lymphadenopathy  Cardiovascular: Normal S1 S2, No JVD, No murmurs  Respiratory: decreased bs, on NC   Psychiatry: A & O x 0  Gastrointestinal:  Soft, Non-tender, + BS	  Skin: No rashes, No ecchymoses, No cyanosis	  Neurologic: Non-focal  Extremities: decreased  range of motion, No  edema  Vascular: Peripheral pulses palpable 2+ bilaterally        LABS:    CARDIAC MARKERS:                                9.2    13.62 )-----------( 310      ( 01 May 2023 07:16 )             28.1     05-01    152<H>  |  104  |  115<H>  ----------------------------<  208<H>  3.2<L>   |  22  |  3.30<H>    Ca    9.4      01 May 2023 07:15  Phos  5.2     04-30  Mg     2.6     04-30      proBNP:   Lipid Profile:   HgA1c:   TSH:             TELEMETRY: 	    ECG:  	  RADIOLOGY:   DIAGNOSTIC TESTING:  [ ] Echocardiogram:  [ ]  Catheterization:  [ ] Stress Test:    OTHER:

## 2023-05-01 NOTE — ADVANCED PRACTICE NURSE CONSULT - REASON FOR CONSULT
Wound care consult initiated by RN to assess patient's skin for a possible stage 2 on sacrum    Reason for Admission: SOB/GARRISON, swelling of hands/legs/face  History of Present Illness:   Elvia MRN = [id number]  88yoF w/ PMHx of advanced dementia (at B/L patient is bedbound, nonverbal, though able to tolerate PO diet - per family, patient has hearty appetite), HTN, HLD, IDDM, who presents from home where she lives with her , brought in by her daughter (son is at her bedside during this evaluation) w/ complaints of daughter noticing the patient has been having difficulty breathing over the past few days in setting of notable increased swelling of the patient's hands, feet, and face. Of note, patient was recently hospitalized at [facility] for aspiration PNA (and found to have demand ischemia) when she presented with similar complaints and was found to be hypoxic; at the time, S+S eval was recommended, but per documentation at the end of Jan 2023, pt's daughter declined formal/objective evaluation, and instead, understanding all risks associated with aspiration, preferred to have patient continue on her typical dysphagia diet (puree/finely cut up food). Currently, patient is calm, sleeping, though she is arousable, and becomes agitated with attempts at physical examination. Per documentation, patient's daughter notes that the patient has been eating and drinking normally but that the patient does cough a lot.     PMD Dr Joleen Olsen [phone] does home visits    ED Presenting Vitals: 99.3F, 102bpm, 204/67 --> 180/75, 24br/m, 94% on RA   ED Course: s/p Lovenox 30mg SC x1, IV Zosyn 3.375g x1, IV Azithromycin 500mg x1, Lispro 6unit x1, Lasix 20mg IVP x3, Ativan 0.5mg IVP x1, Haldol 1mg IVP x2, Metoprolol 12.5mg PO x1, Lopressor 5mg IVP x2; RRT called for hypoxia and tachycardia -- pt found to be in ?new Afib w/ RVR

## 2023-05-01 NOTE — SWALLOW BEDSIDE ASSESSMENT ADULT - SWALLOW EVAL: RECOMMENDED DIET
Continue NPO w/ alternative means of nutrition/hydration/medication
Continue puree/moderately thick liquids

## 2023-05-01 NOTE — SWALLOW BEDSIDE ASSESSMENT ADULT - SLP PERTINENT HISTORY OF CURRENT PROBLEM
88yoF w/ PMHx advanced dementia (at B/L patient is bedbound, nonverbal, though able to tolerate PO diet - per family, patient has hearty appetite), HTN, HLD, IDDM, presents from home, lives with her , brought in by her daughter (son is at her bedside during this evaluation) w/ c/o daughter noticing pt has been having difficulty breathing over the past few days; notable increased swelling of hands, feet, and face. (cont)
89yo F w/ PMHx advanced dementia (at B/L patient is bedbound, nonverbal, though able to tolerate PO diet - per family, patient has hearty appetite), HTN, HLD, IDDM, presents from home, lives with her , brought in by her daughter (son is at her bedside during this evaluation) w/ c/o daughter noticing pt has been having difficulty breathing over the past few days; notable increased swelling of hands, feet, and face. (cont)
87yo F w/ PMHx advanced dementia (at B/L patient is bedbound, nonverbal, though able to tolerate PO diet - per family, patient has hearty appetite), HTN, HLD, IDDM, presents from home, lives with her , brought in by her daughter (son is at her bedside during this evaluation) w/ c/o daughter noticing pt has been having difficulty breathing over the past few days; notable increased swelling of hands, feet, and face. (cont)

## 2023-05-01 NOTE — PROGRESS NOTE ADULT - ASSESSMENT
88yoF w/ PMHx of advanced dementia (at B/L patient is bedbound, nonverbal, though able to tolerate PO diet - per family, patient has hearty appetite), HTN, HLD, IDDM, who presents from home where she lives with her , brought in by her daughter (son is at her bedside during this evaluation) w/ complaints of daughter noticing the patient has been having difficulty breathing over the past few days in setting of notable increased swelling of the patient's hands, feet, and face. Of note, patient was recently hospitalized at [facility] for aspiration PNA (and found to have demand ischemia) when she presented with similar complaints and was found to be hypoxic; at the time, S+S eval was recommended, but per documentation at the end of Jan 2023,      1- TOÑITO   2- CHF   3- acidosis   4- hypernatremia      toñito in setting of chf as well as more likely due to contrast nephropathy and possible pneumonia   creatinine is downtrending.   her echo reveals mod to severe range AS  acidosis, improved, trend bicarb   agree with Bumex 2mg IV daily for HF  hypervolemic hypernatremia, in setting of HF/decreased po intake, add D5W @ 20 cc/hr   she is not a dialysis candidate in setting of her advance dementia   O2 supplementation  strict I/O  trend creatinine and electrolytes daily 88yoF w/ PMHx of advanced dementia (at B/L patient is bedbound, nonverbal, though able to tolerate PO diet - per family, patient has hearty appetite), HTN, HLD, IDDM, who presents from home where she lives with her , brought in by her daughter (son is at her bedside during this evaluation) w/ complaints of daughter noticing the patient has been having difficulty breathing over the past few days in setting of notable increased swelling of the patient's hands, feet, and face. Of note, patient was recently hospitalized at [facility] for aspiration PNA (and found to have demand ischemia) when she presented with similar complaints and was found to be hypoxic; at the time, S+S eval was recommended, but per documentation at the end of Jan 2023,      1- TOÑITO   2- CHF   3- acidosis   4- hypernatremia  5- PNA  6- HTN      toñito in setting of chf as well as more likely due to contrast nephropathy and possible pneumonia   creatinine is downtrending.   her echo reveals mod to severe range AS  acidosis, improved, trend bicarb   agree with Bumex 2mg IV daily for HF  hypervolemic hypernatremia, in setting of HF/decreased po intake, add D5W @ 20 cc/hr   she is not a dialysis candidate in setting of her advance dementia   continue cefepime 1G daily for PNA  unable to take po meds for HTN, metoprolol 2.6 mg IV q6h  O2 supplementation  strict I/O  trend creatinine and electrolytes daily

## 2023-05-01 NOTE — SWALLOW BEDSIDE ASSESSMENT ADULT - COMMENTS
(pmhx cont) Of note, pt was recently hospitalized at [facility] for aspiration PNA (and found to have demand ischemia); presented with similar complaints; found to be hypoxic; at the time, S+S eval was recommended, but per documentation Jan 2023, pt's daughter declined formal/objective evaluation, understanding all risks associated with aspiration, preferred to have patient continue on puree/finely cut up food. Currently, patient is calm, sleeping, arousable; becomes agitated with attempts at physical examination. Per documentation, daughter states pt has been eating and drinking normally but does cough a lot.  Ddx: PE vs. aspiration PNA w/ component of adHF.   RRT called 4/23 for hypoxia and tachycardia->lopressor and lasix for fluid overload; NRB weaned to 6L O2 NC, remained on unit  Passed RN dysphagia screen. Easy to Chew/thin diet initiated per MD 4/25. Swallow evaluation ordered, however, discontinued.  Per chart review, Pt hypoxic ---> placed on venturi mask ---> HFNC.  Initial swallow evaluation performed 4/26 w/ recommendations for puree/moderately thick liquids and MBS pending titration from HFNC. Pt planned for MBS 4/28, however, once again hypoxic and requiring HFNC; Pt made NPO.     4/26 CXR: Impression: Mildly increased pulmonary edema. Unchanged small right pleural effusion. New/increased left pleural effusion.  4/27 CT chest: Impression: Pulmonary edema.
(pmhx cont)Of note, pt was recently hospitalized at [facility] for aspiration PNA (and found to have demand ischemia); presented with similar complaints; found to be hypoxic; at the time, S+S eval was recommended, but per documentation Jan 2023, pt's daughter declined formal/objective evaluation, understanding all risks associated with aspiration, preferred to have patient continue on puree/finely cut up food. Currently, patient is calm, sleeping, arousable; becomes agitated with attempts at physical examination. Per documentation, daughter states pt has been eating and drinking normally but does cough a lot.  Ddx: PE vs. aspiration PNA w/ component of adHF.   RRT called 4/23 for hypoxia and tachycardia->lopressor and lasix for fluid overload; NRB weaned to 6L O2 NC, remained on unit  Passed RN dysphagia screen. Easy to Chew/thin diet initiated per MD 4/25. Swallow evaluation ordered, however, discontinued.  Per chart review, hypoxic and placed on venturi mask. Now on HFNC.    4/26 CXR: Impression: Mildly increased pulmonary edema. Unchanged small right pleural effusion. New/increased left pleural effusion.
(pmhx cont)Of note, pt was recently hospitalized at [facility] for aspiration PNA (and found to have demand ischemia); presented with similar complaints; found to be hypoxic; at the time, S+S eval was recommended, but per documentation Jan 2023, pt's daughter declined formal/objective evaluation, understanding all risks associated with aspiration, preferred to have patient continue on puree/finely cut up food. Currently, patient is calm, sleeping, arousable; becomes agitated with attempts at physical examination. Per documentation, daughter states pt has been eating and drinking normally but does cough a lot.  Ddx: PE vs. aspiration PNA w/ component of adHF.   RRT called 4/23 for hypoxia and tachycardia->lopressor and lasix for fluid overload; NRB weaned to 6L O2 NC, remained on unit  Passed RN dysphagia screen. Easy to Chew/thin diet initiated per MD 4/25

## 2023-05-02 NOTE — PROGRESS NOTE ADULT - ASSESSMENT
Alternate MRN: [id number]    88yoF w/ PMHx of advanced dementia (at B/L patient is bedbound, nonverbal, though able to tolerate PO diet - per family, patient has hearty appetite), HTN, HLD, IDDM, brought in by family for difficulty breathing over the past few days in setting of notable increased swelling of the patient's hands, feet, and face; a/w hypoxic respiratory failure 2/2 aspiration PNA and acute decompensated diastolic heart failure, with course c/b significant TOÑITO. Initially hypoxia was improving and taken off oxygen, then with recurrent hypoxia.

## 2023-05-02 NOTE — PROGRESS NOTE ADULT - SUBJECTIVE AND OBJECTIVE BOX
Subjective: Patient seen and examined. No new events except as noted.   Resting comfortably.     REVIEW OF SYSTEMS:  Unable to obtain.    MEDICATIONS:  MEDICATIONS  (STANDING):  amLODIPine   Tablet 10 milliGRAM(s) Oral daily  aspirin  chewable 81 milliGRAM(s) Oral daily  atorvastatin 80 milliGRAM(s) Oral at bedtime  buMETAnide Injectable 2 milliGRAM(s) IV Push daily  cefepime   IVPB 1000 milliGRAM(s) IV Intermittent every 24 hours  dextrose 5%. 1000 milliLiter(s) (100 mL/Hr) IV Continuous <Continuous>  dextrose 5%. 1000 milliLiter(s) (50 mL/Hr) IV Continuous <Continuous>  dextrose 50% Injectable 25 Gram(s) IV Push once  dextrose 50% Injectable 12.5 Gram(s) IV Push once  dextrose 50% Injectable 25 Gram(s) IV Push once  glucagon  Injectable 1 milliGRAM(s) IntraMuscular once  heparin   Injectable 5000 Unit(s) SubCutaneous every 12 hours  insulin lispro (ADMELOG) corrective regimen sliding scale   SubCutaneous every 6 hours  latanoprost 0.005% Ophthalmic Solution 1 Drop(s) Both EYES at bedtime  metoprolol tartrate Injectable 2.5 milliGRAM(s) IV Push every 6 hours    PHYSICAL EXAM:  Vital Signs Last 24 Hrs  T(C): 36.3 (02 May 2023 05:16), Max: 37.8 (01 May 2023 11:24)  T(F): 97.3 (02 May 2023 05:16), Max: 100 (01 May 2023 11:24)  HR: 98 (02 May 2023 05:16) (87 - 108)  BP: 151/74 (02 May 2023 05:16) (107/66 - 157/68)  BP(mean): --  RR: 18 (02 May 2023 05:16) (18 - 20)  SpO2: 100% (02 May 2023 05:16) (98% - 100%)    Parameters below as of 02 May 2023 05:16  Patient On (Oxygen Delivery Method): nasal cannula    I&O's Summary    01 May 2023 07:01  -  02 May 2023 07:00  --------------------------------------------------------  IN: 430 mL / OUT: 800 mL / NET: -370 mL    Appearance: NAD non verbal 	  HEENT: Normal oral mucosa, PERRL, EOMI	  Lymphatic: No lymphadenopathy  Cardiovascular: Normal S1 S2, No JVD, No murmurs  Respiratory: decreased bs, on NC   Psychiatry: A & O x 0  Gastrointestinal:  Soft, Non-tender, + BS	  Skin: No rashes, No ecchymoses, No cyanosis	  Neurologic: Non-focal  Extremities: decreased  range of motion, No  edema  Vascular: Peripheral pulses palpable 2+ bilaterally    LABS:    CARDIAC MARKERS:                        9.2    13.62 )-----------( 310      ( 01 May 2023 07:16 )             28.1     05-01    152<H>  |  104  |  115<H>  ----------------------------<  208<H>  3.2<L>   |  22  |  3.30<H>    Ca    9.4      01 May 2023 07:15    proBNP:   Lipid Profile:   HgA1c:   TSH:     TELEMETRY: SR/ST , PAT x2secs up to 140s	    ECG:  	  RADIOLOGY:   DIAGNOSTIC TESTING:  [ ] Echocardiogram:  [ ]  Catheterization:  [ ] Stress Test:    OTHER:

## 2023-05-02 NOTE — PROGRESS NOTE ADULT - PROBLEM SELECTOR PLAN 10
on sliding scale as needed while at home   - FS better controlled today   - c/w admelog 4units with meals, c/w low sliding scale   - adjust as needed
- w/ functional quadriplegia and high risk for behavioral disturbance  - encouraged family to be at bedside to help with re-orientation  - GOC to be clarified amongst patient's family and with HCP (pt's )
on sliding scale as needed while at home   - FS better controlled today   - c/w admelog 4units with meals, c/w low sliding scale   - adjust as needed
on sliding scale as needed while at home   - FS better controlled today   - c/w admelog 4units with meals, c/w low sliding scale   - adjust as needed
on sliding scale as needed while at home   - FS very elevated at this time - s/p admelog and lantus  - increase admelog to 4units with meals, c/w low sliding scale   - adjust as needed
on sliding scale as needed while at home   - FS better controlled today   - c/w admelog 4units with meals, c/w low sliding scale   - adjust as needed

## 2023-05-02 NOTE — PROGRESS NOTE ADULT - PROBLEM SELECTOR PLAN 2
contrast induced     - IVF as per nephro  c/w bumex as ordered  nephrology following   continue to monitor Cr

## 2023-05-02 NOTE — DIETITIAN INITIAL EVALUATION ADULT - ORAL INTAKE PTA/DIET HISTORY
spoke with daughter over the phone, pt was consuming mushy foods at home and thickened liquids. oatmeal, applesauce, peanut butter, coffee for breakfast. potato, rice, salmon, soup, vegetables for lunch and dinner. Glucerna x 1 daily

## 2023-05-02 NOTE — DIETITIAN INITIAL EVALUATION ADULT - NSFNSPHYEXAMSKINFT_GEN_A_CORE
Pressure Injury 1: Bilateral:,buttocks,sacrum, Suspected deep tissue injury  Pressure Injury 2: none, none  Pressure Injury 3: none, none  Pressure Injury 4: none, none  Pressure Injury 5: none, none  Pressure Injury 6: none, none  Pressure Injury 7: none, none  Pressure Injury 8: none, none  Pressure Injury 9: none, none  Pressure Injury 10: none, none  Pressure Injury 11: none, none, Pressure Injury 1: coccyx, Stage II  Pressure Injury 2: none, none  Pressure Injury 3: none, none  Pressure Injury 4: none, none  Pressure Injury 5: none, none  Pressure Injury 6: none, none  Pressure Injury 7: none, none  Pressure Injury 8: none, none  Pressure Injury 9: none, none  Pressure Injury 10: none, none  Pressure Injury 11: none, none

## 2023-05-02 NOTE — PROGRESS NOTE ADULT - PROBLEM SELECTOR PLAN 1
2/2 combination of pulm edema and aspiration PNA, improving  - weaned off HFNC, now on 3L O2  - will c/w supplemental O2, wean as tolerated   - c/w bumex, improved UOP   - c/w cefepime for now, plan for 7d course in total (4/26-5/2)

## 2023-05-02 NOTE — DIETITIAN INITIAL EVALUATION ADULT - PERTINENT LABORATORY DATA
05-01    152<H>  |  104  |  115<H>  ----------------------------<  208<H>  3.2<L>   |  22  |  3.30<H>    Ca    9.4      01 May 2023 07:15    POCT Blood Glucose.: 324 mg/dL (05-02-23 @ 05:43)  A1C with Estimated Average Glucose Result: 7.1 % (04-24-23 @ 17:32)  A1C with Estimated Average Glucose Result: 7.4 % (01-30-23 @ 06:54)  A1C with Estimated Average Glucose Result: 7.4 % (08-08-22 @ 06:31)

## 2023-05-02 NOTE — PROGRESS NOTE ADULT - ASSESSMENT
88yoF w/ PMHx of advanced dementia (at B/L patient is bedbound, nonverbal, though able to tolerate PO diet - per family, patient has hearty appetite), HTN, HLD, IDDM, who presents from home where she lives with her , brought in by her daughter (son is at her bedside during this evaluation) w/ complaints of daughter noticing the patient has been having difficulty breathing over the past few days in setting of notable increased swelling of the patient's hands, feet, and face. Of note, patient was recently hospitalized at [facility] for aspiration PNA (and found to have demand ischemia) when she presented with similar complaints and was found to be hypoxic; at the time, S+S eval was recommended, but per documentation at the end of Jan 2023,      1- TOÑITO   2- CHF   3- acidosis   4- hypernatremia  5- PNA  6- HTN      toñito in setting of chf as well as more likely due to contrast nephropathy and possible pneumonia   creatinine is downtrending.   hypokalemia supplement kcl iv   her echo reveals mod to severe range AS  acidosis, improved, trend bicarb   hypervolemic hypernatremia, in setting of HF/decreased po intake, add D5W @ 20 cc/hr   she is not a dialysis candidate in setting of her advance dementia   continue cefepime 1G daily for PNA  unable to take po meds for HTN, metoprolol 2.6 mg IV q6h  O2 supplementation  strict I/O  trend creatinine and electrolytes daily

## 2023-05-02 NOTE — DIETITIAN INITIAL EVALUATION ADULT - PERTINENT MEDS FT
Detail Level: Zone MEDICATIONS  (STANDING):  amLODIPine   Tablet 10 milliGRAM(s) Oral daily  aspirin  chewable 81 milliGRAM(s) Oral daily  atorvastatin 80 milliGRAM(s) Oral at bedtime  buMETAnide Injectable 2 milliGRAM(s) IV Push daily  cefepime   IVPB 1000 milliGRAM(s) IV Intermittent every 24 hours  dextrose 5%. 1000 milliLiter(s) (100 mL/Hr) IV Continuous <Continuous>  dextrose 5%. 1000 milliLiter(s) (50 mL/Hr) IV Continuous <Continuous>  dextrose 5%. 1000 milliLiter(s) (20 mL/Hr) IV Continuous <Continuous>  dextrose 50% Injectable 25 Gram(s) IV Push once  dextrose 50% Injectable 12.5 Gram(s) IV Push once  dextrose 50% Injectable 25 Gram(s) IV Push once  glucagon  Injectable 1 milliGRAM(s) IntraMuscular once  heparin   Injectable 5000 Unit(s) SubCutaneous every 12 hours  insulin lispro (ADMELOG) corrective regimen sliding scale   SubCutaneous every 6 hours  latanoprost 0.005% Ophthalmic Solution 1 Drop(s) Both EYES at bedtime  metoprolol tartrate Injectable 2.5 milliGRAM(s) IV Push every 6 hours    MEDICATIONS  (PRN):  dextrose Oral Gel 15 Gram(s) Oral once PRN Blood Glucose LESS THAN 70 milliGRAM(s)/deciliter  hydrALAZINE Injectable 5 milliGRAM(s) IV Push every 6 hours PRN SBP>180   

## 2023-05-02 NOTE — PROGRESS NOTE ADULT - SUBJECTIVE AND OBJECTIVE BOX
[facility] Division of Hospital Medicine  Melissa Hong MD  Pager (M-F, 3F-6W): [phone]  Other Times:  [phone]    Patient is a 88y old  Female who presents with a chief complaint of SOB/GARRISON, swelling of hands/legs/face (02 May 2023 12:45)      SUBJECTIVE / OVERNIGHT EVENTS: No acute events. Weaned down to 3L O2  ADDITIONAL REVIEW OF SYSTEMS: Unable to obtain due to underlying dementia    MEDICATIONS  (STANDING):  amLODIPine   Tablet 10 milliGRAM(s) Oral daily  aspirin  chewable 81 milliGRAM(s) Oral daily  atorvastatin 80 milliGRAM(s) Oral at bedtime  buMETAnide Injectable 2 milliGRAM(s) IV Push daily  cefepime   IVPB 1000 milliGRAM(s) IV Intermittent every 24 hours  dextrose 5%. 1000 milliLiter(s) (100 mL/Hr) IV Continuous <Continuous>  dextrose 5%. 1000 milliLiter(s) (50 mL/Hr) IV Continuous <Continuous>  dextrose 5%. 1000 milliLiter(s) (20 mL/Hr) IV Continuous <Continuous>  dextrose 50% Injectable 25 Gram(s) IV Push once  dextrose 50% Injectable 12.5 Gram(s) IV Push once  dextrose 50% Injectable 25 Gram(s) IV Push once  glucagon  Injectable 1 milliGRAM(s) IntraMuscular once  heparin   Injectable 5000 Unit(s) SubCutaneous every 12 hours  insulin lispro (ADMELOG) corrective regimen sliding scale   SubCutaneous every 6 hours  latanoprost 0.005% Ophthalmic Solution 1 Drop(s) Both EYES at bedtime  metoprolol tartrate Injectable 2.5 milliGRAM(s) IV Push every 6 hours    MEDICATIONS  (PRN):  dextrose Oral Gel 15 Gram(s) Oral once PRN Blood Glucose LESS THAN 70 milliGRAM(s)/deciliter  hydrALAZINE Injectable 5 milliGRAM(s) IV Push every 6 hours PRN SBP>180      CAPILLARY BLOOD GLUCOSE      POCT Blood Glucose.: 231 mg/dL (01 May 2023 12:52)  POCT Blood Glucose.: 210 mg/dL (01 May 2023 06:46)  POCT Blood Glucose.: 177 mg/dL (30 Apr 2023 23:37)  POCT Blood Glucose.: 197 mg/dL (30 Apr 2023 17:34)    I&O's Summary    30 Apr 2023 07:01  -  01 May 2023 07:00  --------------------------------------------------------  IN: 250 mL / OUT: 700 mL / NET: -450 mL        PHYSICAL EXAM:  Vital Signs Last 24 Hrs  T(C): 37.8 (01 May 2023 11:24), Max: 37.8 (01 May 2023 11:24)  T(F): 100 (01 May 2023 11:24), Max: 100 (01 May 2023 11:24)  HR: 108 (01 May 2023 11:24) (84 - 108)  BP: 157/68 (01 May 2023 11:24) (137/60 - 173/65)  BP(mean): --  RR: 19 (01 May 2023 11:24) (18 - 20)  SpO2: 100% (01 May 2023 11:24) (99% - 100%)    Parameters below as of 01 May 2023 11:24  Patient On (Oxygen Delivery Method): nasal cannula      CONSTITUTIONAL: NAD, chronically ill appearing elderly woman  EYES: PERRLA; conjunctiva and sclera clear  ENMT: Dry oral mucosa  RESPIRATORY: Normal respiratory effort; lungs are clear to auscultation bilaterally  CARDIOVASCULAR: Regular rate and rhythm, normal S1 and S2, no murmur/rub/gallop; No lower extremity edema  ABDOMEN: Nontender to palpation, normoactive bowel sounds, no rebound/guarding  MUSCULOSKELETAL:  Contracted   PSYCH: A+O X 0      LABS: reviewed                                          9.2    13.62 )-----------( 310      ( 01 May 2023 07:16 )             28.1       05-01    152<H>  |  104  |  115<H>  ----------------------------<  208<H>  3.2<L>   |  22  |  3.30<H>    Ca    9.4      01 May 2023 07:15                              CAPILLARY BLOOD GLUCOSE      POCT Blood Glucose.: 334 mg/dL (02 May 2023 13:08)

## 2023-05-02 NOTE — PROGRESS NOTE ADULT - PROBLEM SELECTOR PROBLEM 10
Advanced dementia
Diabetes mellitus with hyperglycemia

## 2023-05-02 NOTE — PROGRESS NOTE ADULT - PROBLEM SELECTOR PLAN 11
- w/ functional quadriplegia and high risk for behavioral disturbance  - encouraged family to be at bedside to help with re-orientation
- w/ functional quadriplegia and high risk for behavioral disturbance  - encouraged family to be at bedside to help with re-orientation
- w/ functional quadriplegia and high risk for behavioral disturbance  - encouraged family to be at bedside to help with re-orientation  - FULL CODE as per d/w dtr Petty
- w/ functional quadriplegia and high risk for behavioral disturbance  - encouraged family to be at bedside to help with re-orientation  - FULL CODE as per d/w dtr Petty
- w/ functional quadriplegia and high risk for behavioral disturbance  - encouraged family to be at bedside to help with re-orientation

## 2023-05-02 NOTE — PROGRESS NOTE ADULT - SUBJECTIVE AND OBJECTIVE BOX
[facility]   [phone]  RENAL FOLLOW UP NOTE  --------------------------------------------------------------------------------  Chief Complaint:    24 hour events/subjective:        PAST HISTORY  --------------------------------------------------------------------------------  No significant changes to PMH, PSH, FHx, SHx, unless otherwise noted    ALLERGIES & MEDICATIONS  --------------------------------------------------------------------------------  Allergies    No Known Allergies  Allergy Status Unknown    Intolerances      Standing Inpatient Medications  amLODIPine   Tablet 10 milliGRAM(s) Oral daily  aspirin  chewable 81 milliGRAM(s) Oral daily  atorvastatin 80 milliGRAM(s) Oral at bedtime  buMETAnide Injectable 2 milliGRAM(s) IV Push daily  cefepime   IVPB 1000 milliGRAM(s) IV Intermittent every 24 hours  dextrose 5%. 1000 milliLiter(s) IV Continuous <Continuous>  dextrose 5%. 1000 milliLiter(s) IV Continuous <Continuous>  dextrose 5%. 1000 milliLiter(s) IV Continuous <Continuous>  dextrose 50% Injectable 25 Gram(s) IV Push once  dextrose 50% Injectable 12.5 Gram(s) IV Push once  dextrose 50% Injectable 25 Gram(s) IV Push once  glucagon  Injectable 1 milliGRAM(s) IntraMuscular once  heparin   Injectable 5000 Unit(s) SubCutaneous every 12 hours  insulin lispro (ADMELOG) corrective regimen sliding scale   SubCutaneous every 6 hours  latanoprost 0.005% Ophthalmic Solution 1 Drop(s) Both EYES at bedtime  metoprolol tartrate Injectable 2.5 milliGRAM(s) IV Push every 6 hours    PRN Inpatient Medications  dextrose Oral Gel 15 Gram(s) Oral once PRN  hydrALAZINE Injectable 5 milliGRAM(s) IV Push every 6 hours PRN      REVIEW OF SYSTEMS  --------------------------------------------------------------------------------    Gen: denies  fevers/chills,  CVS: denies chest pain/palpitations  Resp: denies SOB/Cough  GI: Denies N/V/Abd pain  : Denies dysuria/oliguria/hematuria    All other systems were reviewed and are negative, except as noted.    VITALS/PHYSICAL EXAM  --------------------------------------------------------------------------------  T(C): 37.2 (05-02-23 @ 12:52), Max: 37.4 (05-02-23 @ 11:04)  HR: 96 (05-02-23 @ 17:28) (86 - 100)  BP: 156/66 (05-02-23 @ 12:52) (151/74 - 170/65)  RR: 16 (05-02-23 @ 17:28) (16 - 20)  SpO2: 97% (05-02-23 @ 17:28) (97% - 100%)  Wt(kg): --        05-01-23 @ 07:01  -  05-02-23 @ 07:00  --------------------------------------------------------  IN: 430 mL / OUT: 800 mL / NET: -370 mL    05-02-23 @ 07:01  -  05-02-23 @ 21:17  --------------------------------------------------------  IN: 160 mL / OUT: 0 mL / NET: 160 mL      Physical Exam:  	    Physical Exam:  	Gen: alert oriented place person and date   	Pulm: Decreased breath sounds b/l bases. no rales or ronchi or wheezing  	CV: RRR, S1/S2. no rub  	Back: No CVA tenderness; no sacral edema  	Abd: +BS, soft, nontender/nondistended  	: No suprapubic tenderness.               Extremity: No cyanosis, no edema no clubbing  	Neuro: No focal deficits  	Psych: Normal affect and mood      LABS/STUDIES  --------------------------------------------------------------------------------              9.2    13.62 >-----------<  310      [05-01-23 @ 07:16]              28.1     152  |  104  |  115  ----------------------------<  208      [05-01-23 @ 07:15]  3.2   |  22  |  3.30        Ca     9.4     [05-01-23 @ 07:15]            Creatinine Trend:  SCr 3.30 [05-01 @ 07:15]  SCr 3.75 [04-30 @ 07:10]  SCr 3.87 [04-29 @ 18:27]  SCr 4.26 [04-29 @ 07:06]  SCr 4.67 [04-28 @ 11:14]              Urinalysis - [04-23-23 @ 14:43]      Color Colorless / Appearance Clear / SG 1.020 / pH 6.0      Gluc Trace / Ketone Negative  / Bili Negative / Urobili Negative       Blood Small / Protein 100 mg/dl / Leuk Est Negative / Nitrite Negative      RBC 6 / WBC 0 / Hyaline 0 / Gran  / Sq Epi  / Non Sq Epi  / Bacteria Negative      TSH 2.59      [08-09-22 @ 06:20]  Lipid: chol 216, TG 80, HDL 49, LDL --      [08-08-22 @ 06:31]

## 2023-05-03 NOTE — CONSULT NOTE ADULT - ATTENDING COMMENTS
Agree with above. Pt with advanced dementia, now failing swallow evaluation. Family wishes to pursue PEG for non-oral feeding, risks/benefits including that PEG does not decrease aspiration risk explained. Will await correction of electrolyte abnormalities before scheduling PEG attempt.

## 2023-05-03 NOTE — PROGRESS NOTE ADULT - SUBJECTIVE AND OBJECTIVE BOX
Patient with serum glucose [facility] Division of Hospital Medicine  Melissa Hong MD  Pager (M-F, 0P-7N): [phone]  Other Times:  [phone]    Patient is a 88y old  Female who presents with a chief complaint of SOB/GARRISON, swelling of hands/legs/face (03 May 2023 12:45)      SUBJECTIVE / OVERNIGHT EVENTS: Brief episode of RVR overnight requiring IV metoprolol. BP poorly controlled as pt is NPO.  ADDITIONAL REVIEW OF SYSTEMS: Unable to obtain due to underlying dementia    MEDICATIONS  (STANDING):  aspirin  chewable 81 milliGRAM(s) Oral daily  dextrose 5%. 1000 milliLiter(s) (100 mL/Hr) IV Continuous <Continuous>  dextrose 5%. 1000 milliLiter(s) (50 mL/Hr) IV Continuous <Continuous>  dextrose 5%. 1000 milliLiter(s) (75 mL/Hr) IV Continuous <Continuous>  dextrose 50% Injectable 25 Gram(s) IV Push once  dextrose 50% Injectable 12.5 Gram(s) IV Push once  dextrose 50% Injectable 25 Gram(s) IV Push once  glucagon  Injectable 1 milliGRAM(s) IntraMuscular once  heparin   Injectable 5000 Unit(s) SubCutaneous every 12 hours  hydrALAZINE Injectable 7.5 milliGRAM(s) IV Push every 6 hours  insulin lispro (ADMELOG) corrective regimen sliding scale   SubCutaneous every 6 hours  latanoprost 0.005% Ophthalmic Solution 1 Drop(s) Both EYES at bedtime  metoprolol tartrate Injectable 5 milliGRAM(s) IV Push every 6 hours  potassium chloride  10 mEq/50 mL IVPB 10 milliEquivalent(s) IV Intermittent every 4 hours    MEDICATIONS  (PRN):  dextrose Oral Gel 15 Gram(s) Oral once PRN Blood Glucose LESS THAN 70 milliGRAM(s)/deciliter      I&O's Summary    02 May 2023 07:01  -  03 May 2023 07:00  --------------------------------------------------------  IN: 160 mL / OUT: 0 mL / NET: 160 mL      PHYSICAL EXAM:  T(C): 36.8 (05-03-23 @ 11:13), Max: 36.8 (05-03-23 @ 00:00)  T(F): 98.3 (05-03-23 @ 11:13), Max: 98.3 (05-03-23 @ 00:00)  HR: 85 (05-03-23 @ 15:20) (80 - 103)  BP: 172/72 (05-03-23 @ 11:13) (151/69 - 176/80)  RR: 18 (05-03-23 @ 15:20) (16 - 20)  SpO2: 97% (05-03-23 @ 15:20) (97% - 99%)  Wt(kg): --    CONSTITUTIONAL: NAD, chronically ill appearing elderly woman, cachectic  EYES: R eye cataracts  ENMT: Dry oral mucosa, poor oral hygiene  RESPIRATORY: Normal respiratory effort; lungs are clear to auscultation bilaterally  CARDIOVASCULAR: Regular rate and rhythm, normal S1 and S2, + murmur; No lower extremity edema  ABDOMEN: Nontender to palpation, normoactive bowel sounds, no rebound/guarding  MUSCULOSKELETAL:  Contracted   PSYCH: A+O X 0, eyes open but not responding to verbal stimuli, only withdraws to touch      LABS: reviewed                                              10.4   18.25 )-----------( 320      ( 03 May 2023 10:51 )             32.3       05-03    157<H>  |  107  |  123<H>  ----------------------------<  271<H>  2.4<LL>   |  27  |  3.13<H>    Ca    9.4      03 May 2023 10:51  Phos  4.8     05-03  Mg     2.4     05-03                              CAPILLARY BLOOD GLUCOSE      POCT Blood Glucose.: 344 mg/dL (03 May 2023 12:59)

## 2023-05-03 NOTE — PROVIDER CONTACT NOTE (OTHER) - DATE AND TIME:
02-May-2023 22:03
28-Apr-2023 04:50
29-Apr-2023 20:21
27-Apr-2023 14:36
30-Apr-2023 05:45
28-Apr-2023 06:50

## 2023-05-03 NOTE — CONSULT NOTE ADULT - SUBJECTIVE AND OBJECTIVE BOX
Chief Complaint:  Patient is a 88y old  Female who presents with a chief complaint of SOB/GARRISON, swelling of hands/legs/face (03 May 2023 08:41)      HPI: 88yoF w/ PMHx of advanced dementia (at B/L patient is bedbound, nonverbal, though PTA was able to tolerate PO diet - per family, patient had hearty appetite), HTN, HLD, IDDM, brought in by family for difficulty breathing over the past few days in setting of notable increased swelling of the patient's hands, feet, and face; a/w hypoxic respiratory failure 2/2 aspiration PNA and acute decompensated diastolic heart failure, with course c/b significant TOÑITO. Initially hypoxia was improving and taken off oxygen, then with recurrent hypoxia. Failed SLP eval  and  due to mental status and respiratory status. GI consulted for PEG eval.    Allergies:  No Known Allergies  Allergy Status Unknown      Home Medications:    Hospital Medications:  aspirin  chewable 81 milliGRAM(s) Oral daily  dextrose 5%. 1000 milliLiter(s) IV Continuous <Continuous>  dextrose 5%. 1000 milliLiter(s) IV Continuous <Continuous>  dextrose 5%. 1000 milliLiter(s) IV Continuous <Continuous>  dextrose 50% Injectable 25 Gram(s) IV Push once  dextrose 50% Injectable 12.5 Gram(s) IV Push once  dextrose 50% Injectable 25 Gram(s) IV Push once  dextrose Oral Gel 15 Gram(s) Oral once PRN  glucagon  Injectable 1 milliGRAM(s) IntraMuscular once  heparin   Injectable 5000 Unit(s) SubCutaneous every 12 hours  hydrALAZINE Injectable 7.5 milliGRAM(s) IV Push every 6 hours  insulin lispro (ADMELOG) corrective regimen sliding scale   SubCutaneous every 6 hours  latanoprost 0.005% Ophthalmic Solution 1 Drop(s) Both EYES at bedtime  metoprolol tartrate Injectable 5 milliGRAM(s) IV Push every 6 hours  potassium chloride  10 mEq/50 mL IVPB 10 milliEquivalent(s) IV Intermittent every 4 hours      PMHX/PSHX:  Diabetes Mellitus, Type 2    Glaucoma    Hyperlipidemia    HTN (Hypertension)    Diabetic retinopathy    Dementia    Cataract    Diabetes mellitus    HTN (hypertension)    HLD (hyperlipidemia)    HTN (hypertension)    Diabetes mellitus    Dementia    S/P Carpal Tunnel Release    No significant past surgical history    S/P eye surgery        Family history:  unable to obtain as pt has dementia    Social History: unable to obtain as pt has dementia    ROS: unable to obtain as pt has dementia    Vital Signs:  Vital Signs Last 24 Hrs  T(C): 36.8 (03 May 2023 11:13), Max: 36.8 (03 May 2023 00:00)  T(F): 98.3 (03 May 2023 11:13), Max: 98.3 (03 May 2023 00:00)  HR: 85 (03 May 2023 15:20) (80 - 103)  BP: 172/72 (03 May 2023 11:13) (151/69 - 176/80)  BP(mean): --  RR: 18 (03 May 2023 15:20) (16 - 20)  SpO2: 97% (03 May 2023 15:20) (97% - 99%)    Parameters below as of 03 May 2023 15:20  Patient On (Oxygen Delivery Method): nasal cannula  O2 Flow (L/min): 3    Daily     Daily Weight in k (03 May 2023 04:00)    PHYSICAL EXAM:     GENERAL:  Appears stated age, +chronically ill appearing  HEENT:  NC/AT,  conjunctivae clear and pink  CHEST:  Full & symmetric excursion, no increased effort  HEART:  Regular rhythm, S1, S2, no murmur/rub/S3/S4  ABDOMEN:  +thin, soft, non-tender, non-distended  EXTREMITIES:  no cyanosis,clubbing or edema  SKIN:  No rash/erythema/ecchymoses/petechiae/wounds/abscess/warm/dry  NEURO:  Alert, orientedx0-1      LABS:                        10.4   18.25 )-----------( 320      ( 03 May 2023 10:51 )             32.3     05-03    157<H>  |  107  |  123<H>  ----------------------------<  271<H>  2.4<LL>   |  27  |  3.13<H>    Ca    9.4      03 May 2023 10:51  Phos  4.8     05-03  Mg     2.4     05-03                Imaging:       ACC: [id number] EXAM:  CT ABDOMEN AND PELVIS IC   ORDERED BY: KATHIE CHEN     ACC: [id number] EXAM:  CT ANGIO CHEST PULM ART WAWIC   ORDERED BY:    RASHMEET GUJRAL     PROCEDURE DATE:  2023          INTERPRETATION:  CLINICAL INFORMATION: Cough, shortness of breath,   evaluate for pulmonary embolism, pneumonia.    COMPARISON: None.    CONTRAST/COMPLICATIONS:  IV Contrast: Omnipaque 350 (accession [id number]), IV contrast documented   in unlinked concurrent exam (accession [id number])  90 cc administered   10   cc discarded  Oral Contrast: NONE  Complications: None reported at time of study completion    PROCEDURE:  CT Angiography of the Chest was performed followed by portal venous phase   imaging of the Abdomen and Pelvis.  Sagittal andcoronal reformats were performed as well as 3D (MIP)   reconstructions.    FINDINGS:  CHEST:  LUNGS AND LARGE AIRWAYS: Diffuse interlobular septal thickening. Rounded   and patchy opacities prominently in the left lower lobe and to a lesser   extent the left upper lobe. Central bilateral groundglass opacities.   Bibasilar compressive atelectasis.  PLEURA: Moderate bilateral pleural effusions, right greater than left.  VESSELS: Atherosclerotic changes of the aorta and coronary arteries.  HEART: Heart size is normal. No pericardial effusion.  MEDIASTINUM AND POLLY: No lymphadenopathy.  CHEST WALL AND LOWER NECK: Within normal limits.    ABDOMEN AND PELVIS:  LIVER: Within normal limits.  BILE DUCTS: Normal caliber.  GALLBLADDER: Cholelithiasis.  SPLEEN: Within normal limits.  PANCREAS: Atrophic.  ADRENALS: Within normal limits.  KIDNEYS/URETERS: No hydronephrosis. A right renal cyst.    BLADDER: Distended.  REPRODUCTIVE ORGANS: Calcified uterine fibroids. No adnexal mass.    BOWEL: No bowel obstruction. Appendix is normal. Large stool burden   throughout the colon and rectum.  PERITONEUM: No ascites.  VESSELS: Atherosclerotic changes. Retroaortic left renal vein.  RETROPERITONEUM/LYMPH NODES: No lymphadenopathy.  ABDOMINAL WALL: Subcutaneous edema.  BONES: Degenerative changes.    IMPRESSION:  No pulmonary embolism.    Rounded patchy opacities predominantly left lower lobe, suggestive of   infection.    Interlobular septal thickening and moderate pleural effusions, suggestive   of pulmonary edema.    Large stool burden throughout the colon and rectum.               Chief Complaint:  Patient is a 88y old  Female who presents with a chief complaint of SOB/GARRISON, swelling of hands/legs/face (03 May 2023 08:41)      HPI: 87yo Farsi- speaking F w/ PMHx of advanced dementia (at B/L patient is bedbound, nonverbal, though PTA was able to tolerate PO diet - per family, patient had hearty appetite), HTN, HLD, IDDM, brought in by family for difficulty breathing x few days in setting of notable increased swelling of the patient's hands, feet, and face; a/w hypoxic respiratory failure 2/2 aspiration PNA and acute decompensated diastolic heart failure, with course c/b significant TOÑITO and new onset AFib (not on AC). History obtained from chart review as pt is non-verbal. Failed SLP eval  and  due to mental status and respiratory status. GI consulted for PEG eval.    Allergies:  No Known Allergies  Allergy Status Unknown      Home Medications:    Hospital Medications:  aspirin  chewable 81 milliGRAM(s) Oral daily  dextrose 5%. 1000 milliLiter(s) IV Continuous <Continuous>  dextrose 5%. 1000 milliLiter(s) IV Continuous <Continuous>  dextrose 5%. 1000 milliLiter(s) IV Continuous <Continuous>  dextrose 50% Injectable 25 Gram(s) IV Push once  dextrose 50% Injectable 12.5 Gram(s) IV Push once  dextrose 50% Injectable 25 Gram(s) IV Push once  dextrose Oral Gel 15 Gram(s) Oral once PRN  glucagon  Injectable 1 milliGRAM(s) IntraMuscular once  heparin   Injectable 5000 Unit(s) SubCutaneous every 12 hours  hydrALAZINE Injectable 7.5 milliGRAM(s) IV Push every 6 hours  insulin lispro (ADMELOG) corrective regimen sliding scale   SubCutaneous every 6 hours  latanoprost 0.005% Ophthalmic Solution 1 Drop(s) Both EYES at bedtime  metoprolol tartrate Injectable 5 milliGRAM(s) IV Push every 6 hours  potassium chloride  10 mEq/50 mL IVPB 10 milliEquivalent(s) IV Intermittent every 4 hours      PMHX/PSHX:  Diabetes Mellitus, Type 2    Glaucoma    Hyperlipidemia    HTN (Hypertension)    Diabetic retinopathy    Dementia    Cataract    Diabetes mellitus    HTN (hypertension)    HLD (hyperlipidemia)    HTN (hypertension)    Diabetes mellitus    Dementia    S/P Carpal Tunnel Release    No significant past surgical history    S/P eye surgery        Family history:  unable to obtain as pt has dementia    Social History: unable to obtain as pt has dementia    ROS: unable to obtain as pt has dementia    Vital Signs:  Vital Signs Last 24 Hrs  T(C): 36.8 (03 May 2023 11:13), Max: 36.8 (03 May 2023 00:00)  T(F): 98.3 (03 May 2023 11:13), Max: 98.3 (03 May 2023 00:00)  HR: 85 (03 May 2023 15:20) (80 - 103)  BP: 172/72 (03 May 2023 11:13) (151/69 - 176/80)  BP(mean): --  RR: 18 (03 May 2023 15:20) (16 - 20)  SpO2: 97% (03 May 2023 15:20) (97% - 99%)    Parameters below as of 03 May 2023 15:20  Patient On (Oxygen Delivery Method): nasal cannula  O2 Flow (L/min): 3    Daily     Daily Weight in k (03 May 2023 04:00)    PHYSICAL EXAM:     GENERAL:  Appears stated age, +chronically ill appearing  HEENT:  NC/AT, +L eye cataract  CHEST:  Full & symmetric excursion, +3L NC  HEART:  Regular rhythm, S1, S2, no murmur/rub/S3/S4  ABDOMEN:  +thin, soft, non-tender, non-distended  EXTREMITIES:  no cyanosis,clubbing or edema  SKIN:  No rash/erythema/ecchymoses/petechiae/wounds/abscess/warm/dry  NEURO:  Alert, orientedx0      LABS:                        10.4   18.25 )-----------( 320      ( 03 May 2023 10:51 )             32.3     05-03    157<H>  |  107  |  123<H>  ----------------------------<  271<H>  2.4<LL>   |  27  |  3.13<H>    Ca    9.4      03 May 2023 10:51  Phos  4.8     05-03  Mg     2.4     05-03                Imaging:       ACC: [id number] EXAM:  CT ABDOMEN AND PELVIS IC   ORDERED BY: KATHIE CHEN     ACC: [id number] EXAM:  CT ANGIO CHEST PULM ART WAWIC   ORDERED BY:    JACK ZHOU     PROCEDURE DATE:  2023          INTERPRETATION:  CLINICAL INFORMATION: Cough, shortness of breath,   evaluate for pulmonary embolism, pneumonia.    COMPARISON: None.    CONTRAST/COMPLICATIONS:  IV Contrast: Omnipaque 350 (accession [id number]), IV contrast documented   in unlinked concurrent exam (accession [id number])  90 cc administered   10   cc discarded  Oral Contrast: NONE  Complications: None reported at time of study completion    PROCEDURE:  CT Angiography of the Chest was performed followed by portal venous phase   imaging of the Abdomen and Pelvis.  Sagittal andcoronal reformats were performed as well as 3D (MIP)   reconstructions.    FINDINGS:  CHEST:  LUNGS AND LARGE AIRWAYS: Diffuse interlobular septal thickening. Rounded   and patchy opacities prominently in the left lower lobe and to a lesser   extent the left upper lobe. Central bilateral groundglass opacities.   Bibasilar compressive atelectasis.  PLEURA: Moderate bilateral pleural effusions, right greater than left.  VESSELS: Atherosclerotic changes of the aorta and coronary arteries.  HEART: Heart size is normal. No pericardial effusion.  MEDIASTINUM AND POLLY: No lymphadenopathy.  CHEST WALL AND LOWER NECK: Within normal limits.    ABDOMEN AND PELVIS:  LIVER: Within normal limits.  BILE DUCTS: Normal caliber.  GALLBLADDER: Cholelithiasis.  SPLEEN: Within normal limits.  PANCREAS: Atrophic.  ADRENALS: Within normal limits.  KIDNEYS/URETERS: No hydronephrosis. A right renal cyst.    BLADDER: Distended.  REPRODUCTIVE ORGANS: Calcified uterine fibroids. No adnexal mass.    BOWEL: No bowel obstruction. Appendix is normal. Large stool burden   throughout the colon and rectum.  PERITONEUM: No ascites.  VESSELS: Atherosclerotic changes. Retroaortic left renal vein.  RETROPERITONEUM/LYMPH NODES: No lymphadenopathy.  ABDOMINAL WALL: Subcutaneous edema.  BONES: Degenerative changes.    IMPRESSION:  No pulmonary embolism.    Rounded patchy opacities predominantly left lower lobe, suggestive of   infection.    Interlobular septal thickening and moderate pleural effusions, suggestive   of pulmonary edema.    Large stool burden throughout the colon and rectum.

## 2023-05-03 NOTE — PROGRESS NOTE ADULT - PROBLEM SELECTOR PLAN 2
2/2 combination of pulm edema and aspiration PNA, improving  - weaned off HFNC, now on 3L O2  - will c/w supplemental O2, wean as tolerated   - s/p cefepime for now, plan for 7d course in total (4/26-5/2)

## 2023-05-03 NOTE — CHART NOTE - NSCHARTNOTEFT_GEN_A_CORE
Contacted by RN patient hyperglycemia. . Patient tube feeds started tonight.     #Hyperglycemia  - per nephrologist Dr. Navarro: d/c D5W, initiate free water 200cc Q4hrs   - Will continue to monitor  - Will endorse to day team     Morena Cash PA-C  Internal Medicine   [phone]

## 2023-05-03 NOTE — PROVIDER CONTACT NOTE (OTHER) - SITUATION
Pt had PAT up to 150 for 2.7 sec
Pt converted to afib from NSR, new onset afib 4/28 with conversion after lopressor
Pt converted back to SR on tele still tachycardic 110s. Pt's o2 also went down again to mid 80s.
Pt with 6beats WCT on tele first time
Tele notified pt HR went up to 130-150s going up and down and rhythm had converted into Afib.
pt had PAT up to 192 for 4.1 seconds

## 2023-05-03 NOTE — CHART NOTE - NSCHARTNOTEFT_GEN_A_CORE
pt seen as requested by provider for tube feed recommendation if in line with family wishes. Recommend Suplena @ goal rate 35ml/hr x 24hr. provides 1512kcal/37grams protein. 33kcal/kg and 0.8grams protein/kg. based on IBW 45.3kg. In addition recommend Pro source TF Liquid protein 2 packs daily. pt may be at risk for REFEEDING, replete potassium, recommend thiamine 100mg and Nephro-Bruce daily. monitor potassium, magnesium and phosphorous and replete if indicated. pt seen as requested by provider for tube feed recommendation if in line with family wishes. Recommend Suplena @ goal rate 35ml/hr x 24hr. provides 1512kcal/37grams protein. 33kcal/kg and 0.8grams protein/kg. based on IBW 45.3kg. In addition recommend Pro source TF Liquid protein 2 packs daily. pt may be at risk for REFEEDING, replete potassium, recommend thiamine 200-300mg and Nephro-Bruce daily. monitor potassium, magnesium and phosphorous and replete if indicated. start feeds slowly at 10ml/hr x 24hr and increase by 10ml every 24hr until goal rate is reached.

## 2023-05-03 NOTE — PROGRESS NOTE ADULT - ASSESSMENT
88yoF w/ PMHx of advanced dementia (at B/L patient is bedbound, nonverbal, though able to tolerate PO diet - per family, patient has hearty appetite), HTN, HLD, IDDM, who presents from home where she lives with her , brought in by her daughter (son is at her bedside during this evaluation) w/ complaints of daughter noticing the patient has been having difficulty breathing over the past few days in setting of notable increased swelling of the patient's hands, feet, and face. Of note, patient was recently hospitalized at [facility] for aspiration PNA (and found to have demand ischemia) when she presented with similar complaints and was found to be hypoxic; at the time, S+S eval was recommended, but per documentation at the end of Jan 2023,      1- TOÑITO   2- CHF   3- acidosis   4- hypernatremia  5- PNA  6- HTN      toñito in setting of chf as well as more likely due to contrast nephropathy and possible pneumonia   creatinine is still elevated  hypernatremia start d5W@ 75cchr and add free water   dc bumex   her echo reveals mod to severe range AS  acidosis, improved, trend bicarb   hypokalemia supplement kcl   she is not a dialysis candidate in setting of her advance dementia   unable to take po meds for HTN, metoprolol 2.6 mg IV q6h  O2 supplementation  strict I/O  trend creatinine and electrolytes daily

## 2023-05-03 NOTE — PROGRESS NOTE ADULT - ASSESSMENT
Alternate MRN: [id number]    88yoF w/ PMHx of advanced dementia (at B/L patient is bedbound, nonverbal, though able to tolerate PO diet - per family, patient has hearty appetite), HTN, HLD, IDDM, brought in by family for difficulty breathing over the past few days in setting of notable increased swelling of the patient's hands, feet, and face; a/w hypoxic respiratory failure 2/2 aspiration PNA and acute decompensated diastolic heart failure, with course c/b significant TOÑITO. Initially hypoxia was improving and taken off oxygen, then with recurrent hypoxia.

## 2023-05-03 NOTE — PROGRESS NOTE ADULT - SUBJECTIVE AND OBJECTIVE BOX
Subjective: Patient seen and examined. No new events except as noted.     REVIEW OF SYSTEMS:  Unable to obtain       MEDICATIONS:  MEDICATIONS  (STANDING):  amLODIPine   Tablet 10 milliGRAM(s) Oral daily  aspirin  chewable 81 milliGRAM(s) Oral daily  atorvastatin 80 milliGRAM(s) Oral at bedtime  buMETAnide Injectable 2 milliGRAM(s) IV Push daily  dextrose 5%. 1000 milliLiter(s) (100 mL/Hr) IV Continuous <Continuous>  dextrose 5%. 1000 milliLiter(s) (50 mL/Hr) IV Continuous <Continuous>  dextrose 5%. 1000 milliLiter(s) (20 mL/Hr) IV Continuous <Continuous>  dextrose 50% Injectable 25 Gram(s) IV Push once  dextrose 50% Injectable 12.5 Gram(s) IV Push once  dextrose 50% Injectable 25 Gram(s) IV Push once  glucagon  Injectable 1 milliGRAM(s) IntraMuscular once  heparin   Injectable 5000 Unit(s) SubCutaneous every 12 hours  insulin lispro (ADMELOG) corrective regimen sliding scale   SubCutaneous every 6 hours  latanoprost 0.005% Ophthalmic Solution 1 Drop(s) Both EYES at bedtime  metoprolol tartrate Injectable 2.5 milliGRAM(s) IV Push every 6 hours      PHYSICAL EXAM:  T(C): 36.8 (05-03-23 @ 04:00), Max: 37.4 (05-02-23 @ 11:04)  HR: 80 (05-03-23 @ 04:00) (80 - 103)  BP: 151/69 (05-03-23 @ 04:00) (151/69 - 176/80)  RR: 18 (05-03-23 @ 04:00) (16 - 20)  SpO2: 98% (05-03-23 @ 04:00) (97% - 100%)  Wt(kg): --  I&O's Summary    02 May 2023 07:01  -  03 May 2023 07:00  --------------------------------------------------------  IN: 160 mL / OUT: 0 mL / NET: 160 mL          Appearance: NAD non verbal 	  HEENT: Normal oral mucosa, PERRL, EOMI	  Lymphatic: No lymphadenopathy  Cardiovascular: Normal S1 S2, No JVD, No murmurs  Respiratory: decreased bs, on NC   Psychiatry: A & O x 0  Gastrointestinal:  Soft, Non-tender, + BS	  Skin: No rashes, No ecchymoses, No cyanosis	  Neurologic: Non-focal  Extremities: decreased  range of motion, No  edema  Vascular: Peripheral pulses palpable 2+ bilaterally      LABS:    CARDIAC MARKERS:              TELEMETRY: 	    ECG:  	  RADIOLOGY:   DIAGNOSTIC TESTING:  [ ] Echocardiogram:  [ ]  Catheterization:  [ ] Stress Test:    OTHER:

## 2023-05-03 NOTE — CHART NOTE - NSCHARTNOTEFT_GEN_A_CORE
Patient is a 88y old  Female who presents with a chief complaint of SOB/GARRISON, swelling of hands/legs/face (03 May 2023 15:38)  Attending spoke with pt's daughter and son re: goals of care and feedings.  Pt is lethargic but does respond to deep tactile stimuli.      HPI:    Vital Signs Last 24 Hrs  T(C): 36.8 (03 May 2023 11:13), Max: 36.8 (03 May 2023 00:00)  T(F): 98.3 (03 May 2023 11:13), Max: 98.3 (03 May 2023 00:00)  HR: 85 (03 May 2023 15:20) (80 - 103)  BP: 172/72 (03 May 2023 11:13) (151/69 - 176/80)  BP(mean): --  RR: 18 (03 May 2023 15:20) (17 - 20)  SpO2: 97% (03 May 2023 15:20) (97% - 99%)    Parameters below as of 03 May 2023 15:20  Patient On (Oxygen Delivery Method): nasal cannula  O2 Flow (L/min): 3    Gen:  89 y/o female wd/very thin and cachexic in appearance.   Skin: Acyanotic, cool and dry.   Resp: CTA without adventitious sound.  Pt tolerating 3 LNC.   CV: S1S2   Chest: Symmetrical, equal lung expansion.   Abdomen: Soft, Nd/nt (+) bs x 4  Neuro: Pt is responsive to  deep tactile stimuli as opposed to verbal stimuli. Does not follows commands.       Laboratory:                            10.4   18.25 )-----------( 320      ( 03 May 2023 10:51 )             32.3       Impression:   AHRF 2/2 CHF  New onset AFib  Constipaiton   TOÑITO on CKD    Plan:   -Pt seen by Nutrition.   -NGT placed and placement confirmed by Radiology.   -Pt will start on Suplena at 10ml/hr x 24 hour then gradually increase q24 hours until reach goal rate of 35 ml/hr.   -Check labs daily to avoid refeeding symdrome.   -Continue with Cefrpime for aspiration pna.   -Will continue to monitor pt closely.        Samara Jorgensen ANP-BC  [employer] #[id number]

## 2023-05-03 NOTE — PROVIDER CONTACT NOTE (OTHER) - ASSESSMENT
Pt Aox0 responds to stimuli and movement spontaneously. no s/sx of pt in distress at this time. pt appeared calm in bed. VS as noted. pt NPO, on bumex drip, tolerating HFNC and NRB.
Pt Aox0 responds to stimuli and movement spontaneously. no s/sx of pt in distress at this time. pt appeared calm in bed. VS noted with BP elevated. pt NPO, on bumex drip, tolerating HFNC and NRB.
Pt restless and yelling incoherently. No s/s of pain or discomfort. VSS.
Pt stable. Pt eating at time of event. Pt now back in NSR 88
Pt at baseline at A&O x0. No s/s of pain or discomfort. Pt oxygen sat lowered to mid 80s and then went up again to 90s. Pt on HFNC at 80% 40L.
Pt appears to be sleeping asymptomatic

## 2023-05-03 NOTE — PROGRESS NOTE ADULT - SUBJECTIVE AND OBJECTIVE BOX
[facility]   [phone]  RENAL FOLLOW UP NOTE  --------------------------------------------------------------------------------  Chief Complaint:    24 hour events/subjective:    seen earlier   non interactive   on O2  + ngt     PAST HISTORY  --------------------------------------------------------------------------------  No significant changes to PMH, PSH, FHx, SHx, unless otherwise noted    ALLERGIES & MEDICATIONS  --------------------------------------------------------------------------------  Allergies    No Known Allergies  Allergy Status Unknown    Intolerances      Standing Inpatient Medications  aspirin  chewable 81 milliGRAM(s) Oral daily  dextrose 5%. 1000 milliLiter(s) IV Continuous <Continuous>  dextrose 5%. 1000 milliLiter(s) IV Continuous <Continuous>  dextrose 5%. 1000 milliLiter(s) IV Continuous <Continuous>  dextrose 50% Injectable 25 Gram(s) IV Push once  dextrose 50% Injectable 12.5 Gram(s) IV Push once  dextrose 50% Injectable 25 Gram(s) IV Push once  glucagon  Injectable 1 milliGRAM(s) IntraMuscular once  heparin   Injectable 5000 Unit(s) SubCutaneous every 12 hours  hydrALAZINE Injectable 7.5 milliGRAM(s) IV Push every 6 hours  insulin glargine Injectable (LANTUS) 3 Unit(s) SubCutaneous at bedtime  insulin lispro (ADMELOG) corrective regimen sliding scale   SubCutaneous every 6 hours  insulin lispro Injectable (ADMELOG) 2 Unit(s) SubCutaneous every 6 hours  latanoprost 0.005% Ophthalmic Solution 1 Drop(s) Both EYES at bedtime  metoprolol tartrate Injectable 5 milliGRAM(s) IV Push every 6 hours  Nephro-penny 1 Tablet(s) Oral daily  potassium chloride  10 mEq/50 mL IVPB 10 milliEquivalent(s) IV Intermittent every 4 hours  thiamine 100 milliGRAM(s) Oral daily    PRN Inpatient Medications  dextrose Oral Gel 15 Gram(s) Oral once PRN      REVIEW OF SYSTEMS  --------------------------------------------------------------------------------        VITALS/PHYSICAL EXAM  --------------------------------------------------------------------------------  T(C): 36.8 (05-03-23 @ 21:05), Max: 36.8 (05-03-23 @ 00:00)  HR: 91 (05-03-23 @ 21:05) (80 - 103)  BP: 163/62 (05-03-23 @ 21:05) (151/69 - 172/72)  RR: 18 (05-03-23 @ 21:05) (18 - 20)  SpO2: 100% (05-03-23 @ 21:05) (97% - 100%)  Wt(kg): --        05-02-23 @ 07:01  -  05-03-23 @ 07:00  --------------------------------------------------------  IN: 160 mL / OUT: 0 mL / NET: 160 mL    05-03-23 @ 07:01  -  05-03-23 @ 21:40  --------------------------------------------------------  IN: 750 mL / OUT: 0 mL / NET: 750 mL      Physical Exam:  	         Gen: ill appearing elderly F, On O2   	Pulm: decrease bs  + coarse bs   - wheezing  	CV: +JVD. RRR, S1S2; no rub  	Abd: +BS, soft, nontender/nondistended  	: No bladder distention   	UE: Warm, no cyanosis  no clubbing,  no edema;  	LE: Warm, no cyanosis  no clubbing, no edema  	Neuro: dementia       LABS/STUDIES  --------------------------------------------------------------------------------              10.4   18.25 >-----------<  320      [05-03-23 @ 10:51]              32.3     157  |  107  |  123  ----------------------------<  271      [05-03-23 @ 10:51]  2.4   |  27  |  3.13        Ca     9.4     [05-03-23 @ 10:51]      Mg     2.4     [05-03-23 @ 10:51]      Phos  4.8     [05-03-23 @ 10:51]            Creatinine Trend:  SCr 3.13 [05-03 @ 10:51]  SCr 3.30 [05-01 @ 07:15]  SCr 3.75 [04-30 @ 07:10]  SCr 3.87 [04-29 @ 18:27]  SCr 4.26 [04-29 @ 07:06]              Urinalysis - [04-23-23 @ 14:43]      Color Colorless / Appearance Clear / SG 1.020 / pH 6.0      Gluc Trace / Ketone Negative  / Bili Negative / Urobili Negative       Blood Small / Protein 100 mg/dl / Leuk Est Negative / Nitrite Negative      RBC 6 / WBC 0 / Hyaline 0 / Gran  / Sq Epi  / Non Sq Epi  / Bacteria Negative      TSH 2.59      [08-09-22 @ 06:20]  Lipid: chol 216, TG 80, HDL 49, LDL --      [08-08-22 @ 06:31]

## 2023-05-03 NOTE — PROVIDER CONTACT NOTE (OTHER) - NAME OF MD/NP/PA/DO NOTIFIED:
Jazmyne Vance NP
Margarita Junior
Ni-Felicia Faulk PA
NP Carl Grigsby
Ni-Felicia [place] PA
NP Carl Grigsby

## 2023-05-03 NOTE — CONSULT NOTE ADULT - ASSESSMENT
#PEG eval for FTT and failed SLP evals 2/2 mental status and AHRF  #dementia      Recs:  Risks vs benefits of PEG placement were explained. Explained that PEG does not reduce risk of aspiration. Risks of PEG placement include but are note limited to bleeding, perforation, infection. During the time of the procedure it may be possible that we might not find a safe window for placement, which was also explained.      **THIS NOTE IS NOT FINALIZED UNTIL SIGNED BY THE ATTENDING**    Radha Taveras MD  GI Fellow, PGY-5  Available via Microsoft Teams    NON-URGENT CONSULTS:  Please email [email or web address]  [email or web address]  AT NIGHT AND ON WEEKENDS:  Contact on-call GI fellow via answering service ([phone]) from 5pm-8am and on weekends/holidays  MONDAY-FRIDAY 8AM-5PM:  Pager# [phone] ([facility]) or [phone] ([facility])  GI Phone# [phone] ([facility])   89yo Farsi- speaking F w/ PMHx of advanced dementia (at B/L patient is bedbound, nonverbal, though PTA was able to tolerate PO diet - per family, patient had hearty appetite), HTN, HLD, IDDM, brought in by family for difficulty breathing x few days in setting of notable increased swelling of the patient's hands, feet, and face; a/w hypoxic respiratory failure 2/2 aspiration PNA and acute decompensated diastolic heart failure, with course c/b significant TOÑITO and new onset AFib (not on AC). History obtained from chart review as pt is non-verbal. Failed SLP eval 4/28 and 5/1 due to mental status and respiratory status. GI consulted for PEG eval.    #PEG eval for FTT and failed SLP evals 2/2 mental status and AHRF  #dementia (at B/L patient is bedbound, nonverbal, though PTA was able to tolerate PO diet - per family, patient had hearty appetite)  #asp PNA + pulm edema 2/2 HF exacerbation- on 3 L NC    Recs:  - Risks vs benefits of PEG placement were explained. Explained that PEG does not reduce risk of aspiration. Risks of PEG placement include but are note limited to bleeding, perforation, infection. During the time of the procedure it may be possible that we might not find a safe window for placement, which was also explained.  - spoke with pt's son and daughter re: above risks and are agreeable to PEG placement and DNR suspension at time of procedure  - plan for PEG once optimized from cardiopulmonary standpoint    **THIS NOTE IS NOT FINALIZED UNTIL SIGNED BY THE ATTENDING**    Radha Taveras MD  GI Fellow, PGY-5  Available via Microsoft Teams    NON-URGENT CONSULTS:  Please email [email or web address]  [email or web address]  AT NIGHT AND ON WEEKENDS:  Contact on-call GI fellow via answering service ([phone]) from 5pm-8am and on weekends/holidays  MONDAY-FRIDAY 8AM-5PM:  Pager# [phone] ([facility]) or [phone] ([facility])  GI Phone# [phone] ([facility])   87yo Farsi- speaking F w/ PMHx of advanced dementia (at B/L patient is bedbound, nonverbal, though PTA was able to tolerate PO diet - per family, patient had hearty appetite), HTN, HLD, IDDM, brought in by family for difficulty breathing x few days in setting of notable increased swelling of the patient's hands, feet, and face; a/w hypoxic respiratory failure 2/2 aspiration PNA and acute decompensated diastolic heart failure, with course c/b significant TOÑITO and new onset AFib (not on AC). History obtained from chart review as pt is non-verbal. Failed SLP eval 4/28 and 5/1 due to mental status and respiratory status. GI consulted for PEG eval.    #PEG eval for FTT and failed SLP evals 2/2 mental status and AHRF  #dementia (at B/L patient is bedbound, nonverbal, though PTA was able to tolerate PO diet - per family, patient had hearty appetite)  #asp PNA + pulm edema 2/2 HF exacerbation- on 3 L NC    Recs:  - Risks vs benefits of PEG placement were explained. Explained that PEG does not reduce risk of aspiration. Risks of PEG placement include but are note limited to bleeding, perforation, infection. During the time of the procedure it may be possible that we might not find a safe window for placement, which was also explained.  - spoke with pt's son and daughter re: above risks and are agreeable to PEG placement and DNR suspension at time of procedure  - plan for PEG once optimized from cardiopulmonary standpoint  - maintain aspiration precautions- elevate HOB > 45 degrees  - ok to place dobhoff for now and start TFs    **THIS NOTE IS NOT FINALIZED UNTIL SIGNED BY THE ATTENDING**    Radha Taveras MD  GI Fellow, PGY-5  Available via [employer] Teams    NON-URGENT CONSULTS:  Please email [email or web address]  AT NIGHT AND ON WEEKENDS:  Contact on-call GI fellow via answering service ([phone]) from 5pm-8am and on weekends/holidays  MONDAY-FRIDAY 8AM-5PM:  Pager# [phone] ([facility]) or [phone] ([facility])  GI Phone# [phone] ([facility])   89yo Farsi- speaking F w/ PMHx of advanced dementia (at B/L patient is bedbound, nonverbal, though PTA was able to tolerate PO diet - per family, patient had hearty appetite), HTN, HLD, IDDM, brought in by family for difficulty breathing x few days in setting of notable increased swelling of the patient's hands, feet, and face; a/w hypoxic respiratory failure 2/2 aspiration PNA and acute decompensated diastolic heart failure, with course c/b significant TOÑITO and new onset AFib (not on AC). History obtained from chart review as pt is non-verbal. Failed SLP eval 4/28 and 5/1 due to mental status and respiratory status. GI consulted for PEG eval.    #PEG eval for FTT and failed SLP evals 2/2 mental status and AHRF  #dementia (at B/L patient is bedbound, nonverbal, though PTA was able to tolerate PO diet - per family, patient had hearty appetite)- *HCP pt's daughter Petty Perez   #asp PNA + pulm edema 2/2 HF exacerbation- on 3 L NC    Recs:  - Risks vs benefits of PEG placement were explained. Explained that PEG does not reduce risk of aspiration. Risks of PEG placement include but are note limited to bleeding, perforation, infection. During the time of the procedure it may be possible that we might not find a safe window for placement, which was also explained.  - spoke with pt's son and daughter re: above risks and are agreeable to PEG placement and DNR suspension at time of procedure  - plan for PEG once optimized from cardiopulmonary standpoint  - maintain aspiration precautions- elevate HOB > 45 degrees  - ok to place dobhoff for now and start TFs    *HCP pt's daughter Petty Perez [phone]    **THIS NOTE IS NOT FINALIZED UNTIL SIGNED BY THE ATTENDING**    Radha Taveras MD  GI Fellow, PGY-5  Available via [employer] Teams    NON-URGENT CONSULTS:  Please email [email or web address]  [email or web address]  AT NIGHT AND ON WEEKENDS:  Contact on-call GI fellow via answering service ([phone]) from 5pm-8am and on weekends/holidays  MONDAY-FRIDAY 8AM-5PM:  Pager# [phone] ([facility]) or [phone] ([facility])  GI Phone# [phone] ([facility])   87yo Farsi- speaking F w/ PMHx of advanced dementia (at B/L patient is bedbound, nonverbal, though PTA was able to tolerate PO diet - per family, patient had hearty appetite), HTN, HLD, IDDM, brought in by family for difficulty breathing x few days in setting of notable increased swelling of the patient's hands, feet, and face; a/w hypoxic respiratory failure 2/2 aspiration PNA and acute decompensated diastolic heart failure, with course c/b significant TOÑITO and new onset AFib (not on AC). History obtained from chart review as pt is non-verbal. Failed SLP eval 4/28 and 5/1 due to mental status and respiratory status. GI consulted for PEG eval.    #PEG eval for FTT and failed SLP evals 2/2 mental status and AHRF  #dementia (at B/L patient is bedbound, nonverbal, though PTA was able to tolerate PO diet - per family, patient had hearty appetite)- *HCP pt's daughter Petty Perez   #asp PNA + pulm edema 2/2 HF exacerbation- on 3 L NC  #hypernatremia- Na 157    Recs:  - Risks vs benefits of PEG placement were explained. Explained that PEG does not reduce risk of aspiration. Risks of PEG placement include but are note limited to bleeding, perforation, infection. During the time of the procedure it may be possible that we might not find a safe window for placement, which was also explained.  - spoke with pt's son and daughter re: above risks and are agreeable to PEG placement and DNR suspension at time of procedure  - plan for PEG once optimized, likely early next week  - maintain aspiration precautions- elevate HOB > 45 degrees  - ok to place dobhoff for now and start TFs + FWF  - correct electrolyte derangements    *HCP pt's daughter Petty Perez [phone]    **THIS NOTE IS NOT FINALIZED UNTIL SIGNED BY THE ATTENDING**    Radha Taveras MD  GI Fellow, PGY-5  Available via [employer] Teams    NON-URGENT CONSULTS:  Please email [email or web address]  [email or web address]  AT NIGHT AND ON WEEKENDS:  Contact on-call GI fellow via answering service ([phone]) from 5pm-8am and on weekends/holidays  MONDAY-FRIDAY 8AM-5PM:  Pager# [phone] ([facility]) or [phone] ([facility])  GI Phone# [phone] ([facility])   89yo Farsi- speaking F w/ PMHx of advanced dementia (at B/L patient is bedbound, nonverbal, though PTA was able to tolerate PO diet - per family, patient had hearty appetite), HTN, HLD, IDDM, brought in by family for difficulty breathing x few days in setting of notable increased swelling of the patient's hands, feet, and face; a/w hypoxic respiratory failure 2/2 aspiration PNA and acute decompensated diastolic heart failure, with course c/b significant TOÑITO and new onset AFib (not on AC). History obtained from chart review as pt is non-verbal. Failed SLP eval 4/28 and 5/1 due to mental status and respiratory status. GI consulted for PEG eval.    #PEG eval for FTT and failed SLP evals 2/2 mental status and AHRF  #dementia (at B/L patient is bedbound, nonverbal, though PTA was able to tolerate PO diet - per family, patient had hearty appetite)- *HCP pt's daughter Petty Perez   #asp PNA + pulm edema 2/2 HF exacerbation- on 3 L NC  #hypernatremia- Na 157    Recs:  - Risks vs benefits of PEG placement were explained. Explained that PEG does not reduce risk of aspiration. Risks of PEG placement include but are note limited to bleeding, perforation, infection. During the time of the procedure it may be possible that we might not find a safe window for placement, which was also explained.  - spoke with pt's son and daughter re: above risks and are agreeable to PEG placement and DNR suspension at time of procedure  - plan for PEG once optimized - currently needs correction of sodium, glucose and electrolytes before consideration of PEG placement  - maintain aspiration precautions- elevate HOB > 45 degrees  - ok to place dobhoff for now and start TFs + FWF  - correct electrolyte derangements    *HCP pt's daughter Petty Perez [phone]    **THIS NOTE IS NOT FINALIZED UNTIL SIGNED BY THE ATTENDING**    Radha Taveras MD  GI Fellow, PGY-5  Available via [employer] Teams    NON-URGENT CONSULTS:  Please email [email or web address]  AT NIGHT AND ON WEEKENDS:  Contact on-call GI fellow via answering service ([phone]) from 5pm-8am and on weekends/holidays  MONDAY-FRIDAY 8AM-5PM:  Pager# [phone] ([facility]) or [phone] ([facility])  GI Phone# [phone] ([facility])

## 2023-05-03 NOTE — PROGRESS NOTE ADULT - PROBLEM SELECTOR PLAN 5
Pt has been on ISS while NPO, however FS markedly elevated, c/f starvation ketoacidosis  - Start lantus 3u QHS, lispro 2U TID (after K repleted)  - adjust as needed

## 2023-05-03 NOTE — PROVIDER CONTACT NOTE (OTHER) - REASON
Afib conversion
Pt had PAT up to 150 for 2.7 sec
Tele Event: Pt converted back to SR. Desat o2 again mid 80s.
pt had PAT up to 192 for 4.1 seconds
Pt with 6beats WCT on tele first time
Tele even; Pt converted to Afib RVR

## 2023-05-03 NOTE — PROGRESS NOTE ADULT - CONVERSATION/DISCUSSION
Diagnosis/Prognosis/Treatment Options
Diagnosis/Prognosis/Treatment Options
Diagnosis/MOLST Discussed

## 2023-05-03 NOTE — PROGRESS NOTE ADULT - CONVERSATION DETAILS
Discussed pt's advanced dementia, lack of cognitive improvement while clinically improving on IV antibiotics, risk of recurrent aspirations, malnourishment/FTT and need to address ways to optimize nutritional status, as pt has not eaten in several days, has reportedly lost 10 lbs, and continues to fail SLP eval. Given options of NG tube (temporary but will buy time in case theres any neurologic improvement in next few days, although it is likely that this is pt's new baseline), PEG tube (however has not been shown to improve mortality in elderly dementia and has multiple risks including pt pulling it out) or comfort care and pleasure feeds. Aspiration risk is still high with all options, but the path to least suffering and preserving quality of life is transitioning to hospice. Pt's daughter to discuss with rest of family prior to making decision on what pt would want if able to make decisions for herself.
Spoke to family on three way call extensively. Family requesting PEG tube placement based on conversation with Dr Rico. Discussed risks of PEG tube placement and need for temporary NGT given signs of starvation on lab work. Family agrees to risk - wants NGT to be placed today. Family continues to decline suggestion of hospice. Also consents to use of mittens to prevent pt pulling out tube. All questions thoroughly addressed
Discussed with dtr and family regarding advanced care planning - family would like pt to be FULL CODE at this time. They would like to attempt resuscitation and would like a trial of intubation. If it appears she may not make functional recovery or be able to be taken off a ventilator, they would consider re-escalating at that time.

## 2023-05-04 NOTE — PROGRESS NOTE ADULT - ASSESSMENT
88yoF w/ PMHx of advanced dementia (at B/L patient is bedbound, nonverbal, though able to tolerate PO diet - per family, patient has hearty appetite), HTN, HLD, IDDM, who presents from home where she lives with her , brought in by her daughter (son is at her bedside during this evaluation) w/ complaints of daughter noticing the patient has been having difficulty breathing over the past few days in setting of notable increased swelling of the patient's hands, feet, and face. Of note, patient was recently hospitalized at [facility] for aspiration PNA (and found to have demand ischemia) when she presented with similar complaints and was found to be hypoxic; at the time, S+S eval was recommended, but per documentation at the end of Jan 2023,      1- TOÑITO   2- CHF   3- acidosis, resolved  4- hypernatremia  5- PNA  6- HTN      toñito in setting of chf as well as more likely due to contrast nephropathy and possible pneumonia   creatinine is still elevated  hypernatremia, off D5w due to hyperglycemia  free water 200 cc q4h, sodium is improving.   bumex is off  her echo reveals mod to severe range AS  hypokalemia supplement kcl, po supplementation through NGT  she is not a dialysis candidate in setting of her advance dementia   transition BP meds to oral via NGT  O2 supplementation as needed  strict I/O  trend creatinine and electrolytes daily

## 2023-05-04 NOTE — PROGRESS NOTE ADULT - SUBJECTIVE AND OBJECTIVE BOX
[facility] Division of Hospital Medicine  Melissa Hong MD  Pager (M-F, 5X-6G): [phone]  Other Times:  [phone]    Patient is a 88y old  Female who presents with a chief complaint of SOB/GARRISON, swelling of hands/legs/face (04 May 2023 12:45)      SUBJECTIVE / OVERNIGHT EVENTS:   ADDITIONAL REVIEW OF SYSTEMS: Unable to obtain due to underlying dementia    MEDICATIONS  (STANDING):  aspirin  chewable 81 milliGRAM(s) Oral daily  dextrose 5%. 1000 milliLiter(s) (100 mL/Hr) IV Continuous <Continuous>  dextrose 5%. 1000 milliLiter(s) (50 mL/Hr) IV Continuous <Continuous>  dextrose 50% Injectable 25 Gram(s) IV Push once  dextrose 50% Injectable 12.5 Gram(s) IV Push once  dextrose 50% Injectable 25 Gram(s) IV Push once  glucagon  Injectable 1 milliGRAM(s) IntraMuscular once  heparin   Injectable 5000 Unit(s) SubCutaneous every 12 hours  hydrALAZINE Injectable 7.5 milliGRAM(s) IV Push every 6 hours  insulin glargine Injectable (LANTUS) 5 Unit(s) SubCutaneous at bedtime  insulin lispro (ADMELOG) corrective regimen sliding scale   SubCutaneous every 6 hours  insulin lispro Injectable (ADMELOG) 3 Unit(s) SubCutaneous every 6 hours  latanoprost 0.005% Ophthalmic Solution 1 Drop(s) Both EYES at bedtime  metoprolol tartrate Injectable 5 milliGRAM(s) IV Push every 6 hours  Nephro-penny 1 Tablet(s) Oral daily  potassium chloride   Solution 20 milliEquivalent(s) Oral once  potassium chloride  10 mEq/100 mL IVPB 10 milliEquivalent(s) IV Intermittent every 1 hour  thiamine 100 milliGRAM(s) Oral daily    MEDICATIONS  (PRN):  dextrose Oral Gel 15 Gram(s) Oral once PRN Blood Glucose LESS THAN 70 milliGRAM(s)/deciliter      I&O's Summary    03 May 2023 07:01  -  04 May 2023 07:00  --------------------------------------------------------  IN: 1585 mL / OUT: 500 mL / NET: 1085 mL          PHYSICAL EXAM:  T(C): 36.8 (05-03-23 @ 11:13), Max: 36.8 (05-03-23 @ 00:00)  T(F): 98.3 (05-03-23 @ 11:13), Max: 98.3 (05-03-23 @ 00:00)  HR: 85 (05-03-23 @ 15:20) (80 - 103)  BP: 172/72 (05-03-23 @ 11:13) (151/69 - 176/80)  RR: 18 (05-03-23 @ 15:20) (16 - 20)  SpO2: 97% (05-03-23 @ 15:20) (97% - 99%)  Wt(kg): --    CONSTITUTIONAL: NAD, chronically ill appearing elderly woman, cachectic  EYES: R eye cataracts  ENMT: NGT in place  RESPIRATORY: Normal respiratory effort; lungs are clear to auscultation bilaterally  CARDIOVASCULAR: Regular rate and rhythm, normal S1 and S2, + murmur; No lower extremity edema  ABDOMEN: Nontender to palpation, normoactive bowel sounds, no rebound/guarding  MUSCULOSKELETAL:  Contracted   PSYCH: A+O X 0, eyes open but not responding to verbal stimuli, only withdraws to touch      LABS: reviewed                                 9.6    15.42 )-----------( 296      ( 04 May 2023 11:35 )             30.8       05-04    151<H>  |  103  |  130<H>  ----------------------------<  239<H>  2.7<LL>   |  27  |  3.47<H>    Ca    9.5      04 May 2023 11:38  Phos  4.8     05-03  Mg     2.4     05-03            < from: Xray Chest 1 View- PORTABLE-Urgent (Xray Chest 1 View- PORTABLE-Urgent .) (05.03.23 @ 19:03) >    FINDINGS:    Enteric tube seen coursing below the level of diaphragm with the tip   terminating in the distal stomach.  The heart is not accurately assessed in this AP projection.  Interval clearing of perihilar opacities.  There is no pleural effusion.  There is no pneumothorax.  No acute bony abnormality.    IMPRESSION:  Enteric tube seen coursing below the level of diaphragm with the tip   terminating in the distal stomach .  Interval clearing of perihilar opacities.    < end of copied text >

## 2023-05-04 NOTE — PROGRESS NOTE ADULT - PROBLEM SELECTOR PLAN 1
Pt not compliant with SLP eval, with high aspiration risk, severe dysphagia, electrolyte derangements  -Failed several swallowing evals, SLP continues to recommend NPO  -Family discussion about GOC 5/2 and 5/3: requesting PEG  -NGT placed 5/3, confirmed with CXR, tolerating feeds  -BMP BID, monitor for refeeding syndrome  -Replete electrolytes aggressively  -Leukocytosis likely 2/2 hemoconcentration given that pt was on cefepime, now downtrending. CXR demonstrates improved opacities, RVP negative.

## 2023-05-04 NOTE — PROGRESS NOTE ADULT - SUBJECTIVE AND OBJECTIVE BOX
Subjective: Patient seen and examined. No new events except as noted.     REVIEW OF SYSTEMS:    Unable to obtain       MEDICATIONS:  MEDICATIONS  (STANDING):  aspirin  chewable 81 milliGRAM(s) Oral daily  dextrose 5%. 1000 milliLiter(s) (100 mL/Hr) IV Continuous <Continuous>  dextrose 5%. 1000 milliLiter(s) (50 mL/Hr) IV Continuous <Continuous>  dextrose 50% Injectable 25 Gram(s) IV Push once  dextrose 50% Injectable 12.5 Gram(s) IV Push once  dextrose 50% Injectable 25 Gram(s) IV Push once  glucagon  Injectable 1 milliGRAM(s) IntraMuscular once  heparin   Injectable 5000 Unit(s) SubCutaneous every 12 hours  hydrALAZINE Injectable 7.5 milliGRAM(s) IV Push every 6 hours  insulin glargine Injectable (LANTUS) 3 Unit(s) SubCutaneous at bedtime  insulin lispro (ADMELOG) corrective regimen sliding scale   SubCutaneous every 6 hours  insulin lispro Injectable (ADMELOG) 2 Unit(s) SubCutaneous every 6 hours  latanoprost 0.005% Ophthalmic Solution 1 Drop(s) Both EYES at bedtime  metoprolol tartrate Injectable 5 milliGRAM(s) IV Push every 6 hours  Nephro-penny 1 Tablet(s) Oral daily  thiamine 100 milliGRAM(s) Oral daily      PHYSICAL EXAM:  T(C): 36.8 (05-04-23 @ 04:08), Max: 36.8 (05-03-23 @ 11:13)  HR: 89 (05-04-23 @ 04:08) (85 - 93)  BP: 136/67 (05-04-23 @ 04:08) (136/67 - 172/72)  RR: 18 (05-04-23 @ 04:08) (18 - 20)  SpO2: 99% (05-04-23 @ 04:08) (97% - 100%)  Wt(kg): --  I&O's Summary    03 May 2023 07:01  -  04 May 2023 07:00  --------------------------------------------------------  IN: 1585 mL / OUT: 500 mL / NET: 1085 mL            Appearance: NAD non verbal 	  HEENT: Normal oral mucosa, PERRL, EOMI	  Lymphatic: No lymphadenopathy +NGT   Cardiovascular: Normal S1 S2, No JVD, No murmurs  Respiratory: decreased bs, on NC   Psychiatry: A & O x 0  Gastrointestinal:  Soft, Non-tender, + BS	  Skin: No rashes, No ecchymoses, No cyanosis	  Neurologic: Non-focal  Extremities: decreased  range of motion, No  edema  Vascular: Peripheral pulses palpable 2+ bilaterally    LABS:    CARDIAC MARKERS:                                10.4   18.25 )-----------( 320      ( 03 May 2023 10:51 )             32.3     05-03    157<H>  |  107  |  123<H>  ----------------------------<  271<H>  2.4<LL>   |  27  |  3.13<H>    Ca    9.4      03 May 2023 10:51  Phos  4.8     05-03  Mg     2.4     05-03        TELEMETRY: 	    ECG:  	  RADIOLOGY:   DIAGNOSTIC TESTING:  [ ] Echocardiogram:  [ ]  Catheterization:  [ ] Stress Test:    OTHER:

## 2023-05-04 NOTE — PROGRESS NOTE ADULT - SUBJECTIVE AND OBJECTIVE BOX
[facility]   [phone]  RENAL FOLLOW UP NOTE  --------------------------------------------------------------------------------  Chief Complaint:    24 hour events/subjective:    patient seen and examined.   appears comfortable    PAST HISTORY  --------------------------------------------------------------------------------  No significant changes to PMH, PSH, FHx, SHx, unless otherwise noted    ALLERGIES & MEDICATIONS  --------------------------------------------------------------------------------  Allergies    No Known Allergies  Allergy Status Unknown    Intolerances      Standing Inpatient Medications  aspirin  chewable 81 milliGRAM(s) Oral daily  dextrose 5%. 1000 milliLiter(s) IV Continuous <Continuous>  dextrose 5%. 1000 milliLiter(s) IV Continuous <Continuous>  dextrose 50% Injectable 25 Gram(s) IV Push once  dextrose 50% Injectable 12.5 Gram(s) IV Push once  dextrose 50% Injectable 25 Gram(s) IV Push once  glucagon  Injectable 1 milliGRAM(s) IntraMuscular once  heparin   Injectable 5000 Unit(s) SubCutaneous every 12 hours  hydrALAZINE Injectable 7.5 milliGRAM(s) IV Push every 6 hours  insulin glargine Injectable (LANTUS) 5 Unit(s) SubCutaneous at bedtime  insulin lispro (ADMELOG) corrective regimen sliding scale   SubCutaneous every 6 hours  insulin lispro Injectable (ADMELOG) 3 Unit(s) SubCutaneous every 6 hours  latanoprost 0.005% Ophthalmic Solution 1 Drop(s) Both EYES at bedtime  metoprolol tartrate Injectable 5 milliGRAM(s) IV Push every 6 hours  Nephro-penny 1 Tablet(s) Oral daily  potassium chloride  10 mEq/100 mL IVPB 10 milliEquivalent(s) IV Intermittent every 1 hour  thiamine 100 milliGRAM(s) Oral daily    PRN Inpatient Medications  dextrose Oral Gel 15 Gram(s) Oral once PRN      REVIEW OF SYSTEMS  --------------------------------------------------------------------------------    patient is unable to give ROS    VITALS/PHYSICAL EXAM  --------------------------------------------------------------------------------  T(C): 36.9 (05-04-23 @ 12:21), Max: 36.9 (05-04-23 @ 12:21)  HR: 90 (05-04-23 @ 12:21) (85 - 93)  BP: 133/67 (05-04-23 @ 12:21) (133/67 - 163/62)  RR: 18 (05-04-23 @ 12:21) (18 - 18)  SpO2: 98% (05-04-23 @ 12:21) (97% - 100%)  Wt(kg): --        05-03-23 @ 07:01  -  05-04-23 @ 07:00  --------------------------------------------------------  IN: 1585 mL / OUT: 500 mL / NET: 1085 mL      Physical Exam:  	               Gen: ill appearing elderly F, On O2   	Pulm: decrease bs, +coarse bs, no wheezing  	CV: no JVD. RRR, S1S2; no rub  	Abd: +BS, soft, nontender/nondistended, +NGT  	: No bladder distention   	UE: Warm, no cyanosis  no clubbing,  no edema;  	LE: Warm, no cyanosis  no clubbing, no edema    LABS/STUDIES  --------------------------------------------------------------------------------              9.6    15.42 >-----------<  296      [05-04-23 @ 11:35]              30.8     151  |  103  |  130  ----------------------------<  239      [05-04-23 @ 11:38]  2.7   |  27  |  3.47        Ca     9.5     [05-04-23 @ 11:38]      Mg     2.4     [05-03-23 @ 10:51]      Phos  4.8     [05-03-23 @ 10:51]            Creatinine Trend:  SCr 3.47 [05-04 @ 11:38]  SCr 3.13 [05-03 @ 10:51]  SCr 3.30 [05-01 @ 07:15]  SCr 3.75 [04-30 @ 07:10]  SCr 3.87 [04-29 @ 18:27]              Urinalysis - [04-23-23 @ 14:43]      Color Colorless / Appearance Clear / SG 1.020 / pH 6.0      Gluc Trace / Ketone Negative  / Bili Negative / Urobili Negative       Blood Small / Protein 100 mg/dl / Leuk Est Negative / Nitrite Negative      RBC 6 / WBC 0 / Hyaline 0 / Gran  / Sq Epi  / Non Sq Epi  / Bacteria Negative      TSH 2.59      [08-09-22 @ 06:20]  Lipid: chol 216, TG 80, HDL 49, LDL --      [08-08-22 @ 06:31]

## 2023-05-04 NOTE — PROGRESS NOTE ADULT - PROBLEM SELECTOR PLAN 5
Pt has been on ISS while NPO, however FS markedly elevated, c/f starvation ketoacidosis  - Increase lantus 5u QHS, lispro 3U TID (after K repleted)  - adjust as needed

## 2023-05-04 NOTE — PROGRESS NOTE ADULT - PROBLEM SELECTOR PLAN 2
2/2 combination of pulm edema and aspiration PNA, improving  - now weaned off supplemental O2  - s/p cefepime for now, plan for 7d course in total (4/26-5/2)

## 2023-05-04 NOTE — PROGRESS NOTE ADULT - ASSESSMENT
Alternate MRN: [id number]    88yoF w/ PMHx of advanced dementia (at B/L patient is bedbound, nonverbal, though able to tolerate PO diet - per family, patient has hearty appetite), HTN, HLD, IDDM, brought in by family for difficulty breathing over the past few days in setting of notable increased swelling of the patient's hands, feet, and face; a/w hypoxic respiratory failure 2/2 aspiration PNA and acute decompensated diastolic heart failure, with course c/b significant TOÑITO.

## 2023-05-05 NOTE — PROGRESS NOTE ADULT - ASSESSMENT
88yoF w/ PMHx of advanced dementia (at B/L patient is bedbound, nonverbal, though able to tolerate PO diet - per family, patient has hearty appetite), HTN, HLD, IDDM, who presents from home where she lives with her , brought in by her daughter (son is at her bedside during this evaluation) w/ complaints of daughter noticing the patient has been having difficulty breathing over the past few days in setting of notable increased swelling of the patient's hands, feet, and face. Of note, patient was recently hospitalized at [facility] for aspiration PNA (and found to have demand ischemia) when she presented with similar complaints and was found to be hypoxic; at the time, S+S eval was recommended, but per documentation at the end of Jan 2023,      1- TOÑITO   2- CHF   3- acidosis, resolved  4- hypernatremia  5- PNA  6- HTN      toñito in setting of chf as well as more likely due to contrast nephropathy and possible pneumonia   creatinine is still elevated  urinary retention requiring intermittent catheterization, castillo placement if continues to retain.   hypernatremia improving,   free water 200 cc q4h, now on hold for peg placement today  kcl supplementation   bumex is off  her echo reveals mod to severe range AS  she is not a dialysis candidate in setting of her advance dementia   transition BP meds to oral via NGT, when able  O2 supplementation as needed  strict I/O  trend creatinine and electrolytes daily  plan for peg placement.

## 2023-05-05 NOTE — PROGRESS NOTE ADULT - PROBLEM SELECTOR PLAN 5
Pt has been on ISS while NPO, however FS markedly elevated, c/f starvation ketoacidosis  - Despite insulin adjustmets, hyperglycemia persists;  - Endocrine consulted

## 2023-05-05 NOTE — CONSULT NOTE ADULT - ASSESSMENT
Assessment  DMT2: 88y Female with DM T2 with hyperglycemia, A1C7.1% , was on insulin at home sliding scale, now on bolus insulin with coverage, blood sugars running high. Tube feeds via NG tube.   Dysphagia: NG tube on tube feeds, GI eval for PEG, pending optimization.   HTN: on antihypertensive medications, monitored, asymptomatic.  HLD: on statin, diet controlled.  TSH 0.68 Euthyroid      Discussed plan and management wit Dr Angel Mackenzie NP-TEAMS  Marianne Ortiz MD  Cell: [phone]  Office: [phone]               Assessment  DMT2: 88y Female with DM T2 with hyperglycemia, A1C7.1% , was on insulin at home sliding scale, now on bolus insulin with coverage, blood sugars  running high. Tube feeds via NG tube.   Dysphagia: NG tube on tube feeds, GI eval for PEG, pending optimization.   HTN: on antihypertensive medications, monitored, asymptomatic.  HLD: on statin, diet controlled.  TSH 0.68 Euthyroid      Discussed plan and management wit Dr Angel Mackenzie NP-TEAMS  Marianne Ortiz MD  Cell: [phone]  Office: [phone]

## 2023-05-05 NOTE — PROGRESS NOTE ADULT - SUBJECTIVE AND OBJECTIVE BOX
Subjective: Patient seen and examined. No new events except as noted.     REVIEW OF SYSTEMS:  Unable to obtain       MEDICATIONS:  MEDICATIONS  (STANDING):  aspirin  chewable 81 milliGRAM(s) Oral daily  dextrose 5%. 1000 milliLiter(s) (50 mL/Hr) IV Continuous <Continuous>  dextrose 5%. 1000 milliLiter(s) (100 mL/Hr) IV Continuous <Continuous>  dextrose 50% Injectable 25 Gram(s) IV Push once  dextrose 50% Injectable 12.5 Gram(s) IV Push once  dextrose 50% Injectable 25 Gram(s) IV Push once  glucagon  Injectable 1 milliGRAM(s) IntraMuscular once  heparin   Injectable 5000 Unit(s) SubCutaneous every 12 hours  hydrALAZINE Injectable 7.5 milliGRAM(s) IV Push every 6 hours  insulin glargine Injectable (LANTUS) 8 Unit(s) SubCutaneous at bedtime  insulin lispro (ADMELOG) corrective regimen sliding scale   SubCutaneous every 6 hours  insulin lispro Injectable (ADMELOG) 3 Unit(s) SubCutaneous every 6 hours  latanoprost 0.005% Ophthalmic Solution 1 Drop(s) Both EYES at bedtime  metoprolol tartrate Injectable 5 milliGRAM(s) IV Push every 6 hours  Nephro-penny 1 Tablet(s) Oral daily  potassium chloride  10 mEq/100 mL IVPB 10 milliEquivalent(s) IV Intermittent every 1 hour  thiamine 100 milliGRAM(s) Oral daily      PHYSICAL EXAM:  T(C): 36.5 (05-05-23 @ 03:59), Max: 36.9 (05-04-23 @ 12:21)  HR: 95 (05-05-23 @ 03:59) (80 - 95)  BP: 122/66 (05-05-23 @ 03:59) (122/66 - 138/62)  RR: 18 (05-05-23 @ 03:59) (18 - 18)  SpO2: 95% (05-05-23 @ 03:59) (95% - 98%)  Wt(kg): --  I&O's Summary    04 May 2023 07:01  -  05 May 2023 07:00  --------------------------------------------------------  IN: 1020 mL / OUT: 300 mL / NET: 720 mL              Appearance: NAD non verbal 	  HEENT: Normal oral mucosa, PERRL, EOMI	  Lymphatic: No lymphadenopathy +NGT   Cardiovascular: Normal S1 S2, No JVD, No murmurs  Respiratory: decreased bs, on NC   Psychiatry: A & O x 0  Gastrointestinal:  Soft, Non-tender, + BS	  Skin: No rashes, No ecchymoses, No cyanosis	  Neurologic: Non-focal  Extremities: decreased  range of motion, No  edema  Vascular: Peripheral pulses palpable 2+ bilaterally        LABS:    CARDIAC MARKERS:                                8.9    17.60 )-----------( 272      ( 05 May 2023 07:12 )             28.8     05-05    148<H>  |  104  |  130<H>  ----------------------------<  267<H>  3.2<L>   |  26  |  3.30<H>    Ca    9.0      05 May 2023 07:12  Phos  4.5     05-05  Mg     2.4     05-05      proBNP:   Lipid Profile:   HgA1c:   TSH: Thyroid Stimulating Hormone, Serum: 0.68 uIU/mL (05-04 @ 11:38)              TELEMETRY: 	    ECG:  	  RADIOLOGY:   DIAGNOSTIC TESTING:  [ ] Echocardiogram:  [ ]  Catheterization:  [ ] Stress Test:    OTHER:

## 2023-05-05 NOTE — PROGRESS NOTE ADULT - PROBLEM SELECTOR PLAN 1
Pt not compliant with SLP eval, with high aspiration risk, severe dysphagia, electrolyte derangements  -Failed several swallowing evals, SLP continues to recommend NPO  -Family discussion about GOC 5/2 and 5/3: requesting PEG  -NGT placed 5/3, confirmed with CXR, tolerating feeds  -BMP BID, monitor for refeeding syndrome  -Replete electrolytes aggressively  -Leukocytosis likely 2/2 hemoconcentration given that pt was on cefepime, now downtrending. CXR demonstrates improved opacities, RVP negative.  -PEG tube placement likely monday

## 2023-05-05 NOTE — PROGRESS NOTE ADULT - ASSESSMENT
Alternate MRN: [id number]    88yoF w/ PMHx of advanced dementia (at B/L patient is bedbound, nonverbal, though able to tolerate PO diet - per family, patient has hearty appetite), HTN, HLD, IDDM, brought in by family for difficulty breathing over the past few days in setting of notable increased swelling of the patient's hands, feet, and face; a/w hypoxic respiratory failure 2/2 aspiration PNA and acute decompensated diastolic heart failure, with course c/b significant TOÑITO.

## 2023-05-05 NOTE — CONSULT NOTE ADULT - PROBLEM SELECTOR RECOMMENDATION 3
PEG eval, GI eval,  Suggest to continue medications, monitoring, FU primary team recommendations.
RISS

## 2023-05-05 NOTE — PROGRESS NOTE ADULT - SUBJECTIVE AND OBJECTIVE BOX
[facility]   [phone]  RENAL FOLLOW UP NOTE  --------------------------------------------------------------------------------  Chief Complaint:    24 hour events/subjective:    patient seen and examined.   appears comfortable    PAST HISTORY  --------------------------------------------------------------------------------  No significant changes to PMH, PSH, FHx, SHx, unless otherwise noted    ALLERGIES & MEDICATIONS  --------------------------------------------------------------------------------  Allergies    No Known Allergies  Allergy Status Unknown    Intolerances      Standing Inpatient Medications  aspirin  chewable 81 milliGRAM(s) Oral daily  dextrose 5%. 1000 milliLiter(s) IV Continuous <Continuous>  dextrose 5%. 1000 milliLiter(s) IV Continuous <Continuous>  dextrose 50% Injectable 25 Gram(s) IV Push once  dextrose 50% Injectable 12.5 Gram(s) IV Push once  dextrose 50% Injectable 25 Gram(s) IV Push once  glucagon  Injectable 1 milliGRAM(s) IntraMuscular once  heparin   Injectable 5000 Unit(s) SubCutaneous every 12 hours  hydrALAZINE Injectable 7.5 milliGRAM(s) IV Push every 6 hours  insulin glargine Injectable (LANTUS) 8 Unit(s) SubCutaneous at bedtime  insulin lispro (ADMELOG) corrective regimen sliding scale   SubCutaneous every 6 hours  insulin lispro Injectable (ADMELOG) 3 Unit(s) SubCutaneous every 6 hours  latanoprost 0.005% Ophthalmic Solution 1 Drop(s) Both EYES at bedtime  metoprolol tartrate Injectable 5 milliGRAM(s) IV Push every 6 hours  Nephro-penny 1 Tablet(s) Oral daily  potassium chloride  10 mEq/100 mL IVPB 10 milliEquivalent(s) IV Intermittent every 1 hour  thiamine 100 milliGRAM(s) Oral daily    PRN Inpatient Medications  dextrose Oral Gel 15 Gram(s) Oral once PRN      REVIEW OF SYSTEMS  --------------------------------------------------------------------------------    patient is unable to give ROS    VITALS/PHYSICAL EXAM  --------------------------------------------------------------------------------  T(C): 36.5 (05-05-23 @ 03:59), Max: 36.9 (05-04-23 @ 12:21)  HR: 95 (05-05-23 @ 03:59) (80 - 95)  BP: 122/66 (05-05-23 @ 03:59) (122/66 - 138/62)  RR: 18 (05-05-23 @ 03:59) (18 - 18)  SpO2: 95% (05-05-23 @ 03:59) (95% - 98%)  Wt(kg): --        05-04-23 @ 07:01  -  05-05-23 @ 07:00  --------------------------------------------------------  IN: 1020 mL / OUT: 300 mL / NET: 720 mL      Physical Exam:  	               Gen: ill appearing elderly F, On O2   	Pulm: decrease bs, +coarse bs, no wheezing  	CV: no JVD. RRR, S1S2; no rub  	Abd: +BS, soft, nontender/nondistended, +NGT  	: No bladder distention   	UE: Warm, no cyanosis  no clubbing,  no edema;  	LE: Warm, no cyanosis  no clubbing, no edema    LABS/STUDIES  --------------------------------------------------------------------------------              8.9    17.60 >-----------<  272      [05-05-23 @ 07:12]              28.8     148  |  104  |  130  ----------------------------<  267      [05-05-23 @ 07:12]  3.2   |  26  |  3.30        Ca     9.0     [05-05-23 @ 07:12]      Mg     2.4     [05-05-23 @ 07:12]      Phos  4.5     [05-05-23 @ 07:12]            Creatinine Trend:  SCr 3.30 [05-05 @ 07:12]  SCr 3.37 [05-04 @ 22:44]  SCr 3.47 [05-04 @ 11:38]  SCr 3.13 [05-03 @ 10:51]  SCr 3.30 [05-01 @ 07:15]              Urinalysis - [04-23-23 @ 14:43]      Color Colorless / Appearance Clear / SG 1.020 / pH 6.0      Gluc Trace / Ketone Negative  / Bili Negative / Urobili Negative       Blood Small / Protein 100 mg/dl / Leuk Est Negative / Nitrite Negative      RBC 6 / WBC 0 / Hyaline 0 / Gran  / Sq Epi  / Non Sq Epi  / Bacteria Negative      TSH 0.68      [05-04-23 @ 11:38]  Lipid: chol 216, TG 80, HDL 49, LDL --      [08-08-22 @ 06:31]

## 2023-05-05 NOTE — PROGRESS NOTE ADULT - SUBJECTIVE AND OBJECTIVE BOX
Chief Complaint:  Patient is a 88y old  Female who presents with a chief complaint of SOB/GARRISON, swelling of hands/legs/face (05 May 2023 10:17)      Interval Events: Electrolytes improving but still hyperglycemic. Unable to perform PEG today. Weaned off NC to RA.    Hospital Medications:  aspirin  chewable 81 milliGRAM(s) Oral daily  bisacodyl Suppository 10 milliGRAM(s) Rectal once  dextrose 5%. 1000 milliLiter(s) IV Continuous <Continuous>  dextrose 5%. 1000 milliLiter(s) IV Continuous <Continuous>  dextrose 50% Injectable 25 Gram(s) IV Push once  dextrose 50% Injectable 12.5 Gram(s) IV Push once  dextrose 50% Injectable 25 Gram(s) IV Push once  dextrose Oral Gel 15 Gram(s) Oral once PRN  glucagon  Injectable 1 milliGRAM(s) IntraMuscular once  heparin   Injectable 5000 Unit(s) SubCutaneous every 12 hours  hydrALAZINE Injectable 7.5 milliGRAM(s) IV Push every 6 hours  insulin glargine Injectable (LANTUS) 8 Unit(s) SubCutaneous at bedtime  insulin lispro (ADMELOG) corrective regimen sliding scale   SubCutaneous every 6 hours  insulin lispro Injectable (ADMELOG) 3 Unit(s) SubCutaneous every 6 hours  latanoprost 0.005% Ophthalmic Solution 1 Drop(s) Both EYES at bedtime  metoprolol tartrate Injectable 5 milliGRAM(s) IV Push every 6 hours  Nephro-penny 1 Tablet(s) Oral daily  thiamine 100 milliGRAM(s) Oral daily      PMHX/PSHX:  Diabetes Mellitus, Type 2    Glaucoma    Hyperlipidemia    HTN (Hypertension)    Diabetic retinopathy    Dementia    Cataract    Diabetes mellitus    HTN (hypertension)    HLD (hyperlipidemia)    HTN (hypertension)    Diabetes mellitus    Dementia    S/P Carpal Tunnel Release    No significant past surgical history    S/P eye surgery            ROS: unable to obtain as pt is nonverbal and has dementia      PHYSICAL EXAM:     GENERAL:  +elderly female, +chronically ill appearing; NAD  HEENT:  NC/AT, +L eye cataract  CHEST:  Full & symmetric excursion, no increased effort w/ respirations  HEART:  Regular rhythm & rate  ABDOMEN:  +thin, soft, non-tender, non-distended  EXTREMITIES:  no LE edema  SKIN:  No rash/erythema/ecchymoses/petechiae/wounds/jaundice  NEURO:  Alert, orientedx0; non-verbal    Vital Signs:  Vital Signs Last 24 Hrs  T(C): 36.5 (05 May 2023 03:59), Max: 36.9 (04 May 2023 12:21)  T(F): 97.7 (05 May 2023 03:59), Max: 98.4 (04 May 2023 12:21)  HR: 95 (05 May 2023 03:59) (80 - 95)  BP: 122/66 (05 May 2023 03:59) (122/66 - 138/62)  BP(mean): --  RR: 18 (05 May 2023 03:59) (18 - 18)  SpO2: 95% (05 May 2023 03:59) (95% - 98%)    Parameters below as of 05 May 2023 03:59  Patient On (Oxygen Delivery Method): room air      Daily     Daily     LABS:                        8.9    17.60 )-----------( 272      ( 05 May 2023 07:12 )             28.8     05-05    148<H>  |  104  |  130<H>  ----------------------------<  267<H>  3.2<L>   |  26  |  3.30<H>    Ca    9.0      05 May 2023 07:12  Phos  4.5     05-05  Mg     2.4     05-05                Imaging: No new abdominal imaging

## 2023-05-05 NOTE — PROGRESS NOTE ADULT - ATTENDING COMMENTS
Agree with above. Pt with still hyperglycemia/hypokalemia and other electrolyte abnormalities. Will plan for EGD/PEG early next week when these are corrected. Continue NGT feeds in the meantime.

## 2023-05-05 NOTE — PROGRESS NOTE ADULT - ASSESSMENT
87yo Farsi- speaking F w/ PMHx of advanced dementia (at B/L patient is bedbound, nonverbal, though PTA was able to tolerate PO diet - per family, patient had hearty appetite), HTN, HLD, IDDM, brought in by family for difficulty breathing x few days in setting of notable increased swelling of the patient's hands, feet, and face; a/w hypoxic respiratory failure 2/2 aspiration PNA and acute decompensated diastolic heart failure, with course c/b significant TOÑITO and new onset AFib (not on AC). History obtained from chart review as pt is non-verbal. Failed SLP eval 4/28 and 5/1 due to mental status and respiratory status. GI consulted for PEG eval.    #PEG eval for FTT and failed SLP evals 2/2 mental status and AHRF  #dementia (at B/L patient is bedbound, nonverbal, though PTA was able to tolerate PO diet - per family, patient had hearty appetite)- *HCP pt's daughter Petty Perez   #asp PNA + pulm edema 2/2 HF exacerbation- improving, now on RA    #hypernatremia- improving  #hypokalemia- improving  #hyperglycemia- BG in 300s     Recs:  - Risks vs benefits of PEG placement were explained. Explained that PEG does not reduce risk of aspiration. Risks of PEG placement include but are note limited to bleeding, perforation, infection. During the time of the procedure it may be possible that we might not find a safe window for placement, which was also explained.  - spoke with pt's son and daughter re: above risks and are agreeable to PEG placement and DNR suspension at time of procedure  - plan for PEG once optimized - currently needs correction of sodium, glucose and electrolytes before consideration of PEG placement  - maintain aspiration precautions- elevate HOB > 45 degrees  - ok to place dobhoff for now and start TFs + FWF  - needs improved glycemic control prior to EGD + PEG (BG <300)    *HCP pt's daughter Petty Perez [phone] (spoke with and updated pt's daughter today re: above plan)    **THIS NOTE IS NOT FINALIZED UNTIL SIGNED BY THE ATTENDING**    Radha Taveras MD  GI Fellow, PGY-5  Available via [employer] Teams    NON-URGENT CONSULTS:  Please email [email or web address]  AT NIGHT AND ON WEEKENDS:  Contact on-call GI fellow via answering service ([phone]) from 5pm-8am and on weekends/holidays  MONDAY-FRIDAY 8AM-5PM:  Pager# [phone] ([facility]) or [phone] ([facility])  GI Phone# [phone] ([facility])   87yo Farsi- speaking F w/ PMHx of advanced dementia (at B/L patient is bedbound, nonverbal, though PTA was able to tolerate PO diet - per family, patient had hearty appetite), HTN, HLD, IDDM, brought in by family for difficulty breathing x few days in setting of notable increased swelling of the patient's hands, feet, and face; a/w hypoxic respiratory failure 2/2 aspiration PNA and acute decompensated diastolic heart failure, with course c/b significant TOÑITO and new onset AFib (not on AC). History obtained from chart review as pt is non-verbal. Failed SLP eval 4/28 and 5/1 due to mental status and respiratory status. GI consulted for PEG eval.    #PEG eval for FTT and failed SLP evals 2/2 mental status and AHRF  #dementia (at B/L patient is bedbound, nonverbal, though PTA was able to tolerate PO diet - per family, patient had hearty appetite)- *HCP pt's daughter Petty Perez   #asp PNA + pulm edema 2/2 HF exacerbation- improving, now on RA    #hypernatremia- improving  #hypokalemia- improving  #hyperglycemia- BG in 300s     Recs:  - Risks vs benefits of PEG placement were explained. Explained that PEG does not reduce risk of aspiration. Risks of PEG placement include but are note limited to bleeding, perforation, infection. During the time of the procedure it may be possible that we might not find a safe window for placement, which was also explained.  - spoke with pt's son and daughter re: above risks and are agreeable to PEG placement and DNR suspension at time of procedure  - plan for PEG once optimized - currently needs correction of sodium, glucose and electrolytes before consideration of PEG placement; will plan for early next week  - maintain aspiration precautions- elevate HOB > 45 degrees  - ok to place dobhoff for now and start TFs + FWF  - needs improved glycemic control prior to EGD + PEG (BG <300)    *HCP pt's daughter Petty Perez [phone] (spoke with and updated pt's daughter today re: above plan)    **THIS NOTE IS NOT FINALIZED UNTIL SIGNED BY THE ATTENDING**    Radha Taveras MD  GI Fellow, PGY-5  Available via [employer] Teams    NON-URGENT CONSULTS:  Please email [email or web address]  AT NIGHT AND ON WEEKENDS:  Contact on-call GI fellow via answering service ([phone]) from 5pm-8am and on weekends/holidays  MONDAY-FRIDAY 8AM-5PM:  Pager# [phone] ([facility]) or [phone] ([facility])  GI Phone# [phone] ([facility])

## 2023-05-05 NOTE — CONSULT NOTE ADULT - SUBJECTIVE AND OBJECTIVE BOX
HPI:  Elvia MRN = [id number]  88yoF w/ PMHx of advanced dementia (at B/L patient is bedbound, nonverbal, though able to tolerate PO diet - per family, patient has hearty appetite), HTN, HLD, IDDM, who presents from home where she lives with her , brought in by her daughter (son is at her bedside during this evaluation) w/ complaints of daughter noticing the patient has been having difficulty breathing over the past few days in setting of notable increased swelling of the patient's hands, feet, and face. Of note, patient was recently hospitalized at [facility] for aspiration PNA (and found to have demand ischemia) when she presented with similar complaints and was found to be hypoxic; at the time, S+S eval was recommended, but per documentation at the end of Jan 2023, pt's daughter declined formal/objective evaluation, and instead, understanding all risks associated with aspiration, preferred to have patient continue on her typical dysphagia diet (puree/finely cut up food). Currently, patient is calm, sleeping, though she is arousable, and becomes agitated with attempts at physical examination. Per documentation, patient's daughter notes that the patient has been eating and drinking normally but that the patient does cough a lot.     PMD Dr Joleen Olsen [phone] does home visits    ED Presenting Vitals: 99.3F, 102bpm, 204/67 --> 180/75, 24br/m, 94% on RA   ED Course: s/p Lovenox 30mg SC x1, IV Zosyn 3.375g x1, IV Azithromycin 500mg x1, Lispro 6unit x1, Lasix 20mg IVP x3, Ativan 0.5mg IVP x1, Haldol 1mg IVP x2, Metoprolol 12.5mg PO x1, Lopressor 5mg IVP x2; RRT called for hypoxia and tachycardia -- pt found to be in ?new Afib w/ RVR (23 Apr 2023 16:04)      Patient has history of diabetes, A1C  7.1% on home insulin. Endo was consulted for glycemic control.      PAST MEDICAL & SURGICAL HISTORY:  Diabetes Mellitus, Type 2      Glaucoma      Hyperlipidemia      HTN (Hypertension)      Diabetic retinopathy      Dementia      Cataract      Diabetes mellitus      HTN (hypertension)      HLD (hyperlipidemia)      HTN (hypertension)      Diabetes mellitus      Dementia      S/P Carpal Tunnel Release  right wrist 5/6/2009      No significant past surgical history      S/P eye surgery          FAMILY HISTORY:  Family history of bipolar disorder (Child)    No pertinent family history in first degree relatives    No pertinent family history in first degree relatives        Social History:  Lives with her , has 24/7 HHA. (23 Apr 2023 16:04)            HOME MEDICATIONS:  Home Medications:  Admelog 100 units/mL injectable solution: 1-4 unit(s) injectable 3 times a day (with meals) (26 Apr 2023 12:57)  amLODIPine 10 mg oral tablet: 1 tab(s) orally once a day (23 Apr 2023 12:27)  amLODIPine 10 mg oral tablet: 1 tab(s) orally once a day (26 Apr 2023 12:57)  aspirin 81 mg oral delayed release tablet: 1 tab(s) orally once a day (26 Apr 2023 12:57)  aspirin 81 mg oral delayed release tablet: 1 tab(s) orally once a day (26 Apr 2023 12:57)  aspirin 81 mg oral tablet: 1 tab(s) orally once a day (23 Apr 2023 12:27)  atorvastatin 80 mg oral tablet: 1 tab(s) orally once a day (26 Apr 2023 12:57)  Combigan 0.2%-0.5% ophthalmic solution: 1 drop(s) in each affected eye 2 times a day (23 Apr 2023 12:32)  escitalopram 10 mg oral tablet: 1 tab(s) orally once a day (26 Apr 2023 12:57)  HumaLOG 100 units/mL injectable solution: injectable as needed as per daughter (23 Apr 2023 12:32)  hydroCHLOROthiazide 12.5 mg oral capsule: 1 tab(s) orally once a day (23 Apr 2023 12:32)  hydroCHLOROthiazide 12.5 mg oral capsule: 1 cap(s) orally once a day (26 Apr 2023 12:57)  Lipitor 80 mg oral tablet: 1 tab(s) orally once a day (23 Apr 2023 12:32)  losartan 100 mg oral tablet: 1 tab(s) orally once a day (26 Apr 2023 12:57)  losartan 100 mg oral tablet: 1 tab(s) orally once a day (23 Apr 2023 12:32)  Lumigan 0.01% ophthalmic solution: 1 drop(s) in each affected eye once a day (23 Apr 2023 12:32)  Melatonin 10 mg oral capsule: 1 cap(s) orally once a day (at bedtime) (26 Apr 2023 12:57)  QUEtiapine 50 mg oral tablet: 1 tab(s) orally once a day, As Needed (26 Apr 2023 12:57)  ramelteon 8 mg oral tablet: 1 tab(s) orally once a day (at bedtime), As Needed (26 Apr 2023 12:57)  ramelteon 8 mg oral tablet: 1 tab(s) orally once a day (at bedtime) (23 Apr 2023 12:32)  SEROquel XR 50 mg oral tablet, extended release: 1 tab(s) orally once a day (26 Apr 2023 12:57)            MEDICATIONS  (STANDING):  aspirin  chewable 81 milliGRAM(s) Oral daily  dextrose 5%. 1000 milliLiter(s) (50 mL/Hr) IV Continuous <Continuous>  dextrose 5%. 1000 milliLiter(s) (100 mL/Hr) IV Continuous <Continuous>  dextrose 50% Injectable 25 Gram(s) IV Push once  dextrose 50% Injectable 25 Gram(s) IV Push once  dextrose 50% Injectable 12.5 Gram(s) IV Push once  glucagon  Injectable 1 milliGRAM(s) IntraMuscular once  heparin   Injectable 5000 Unit(s) SubCutaneous every 12 hours  hydrALAZINE Injectable 7.5 milliGRAM(s) IV Push every 6 hours  insulin glargine Injectable (LANTUS) 14 Unit(s) SubCutaneous at bedtime  insulin lispro (ADMELOG) corrective regimen sliding scale   SubCutaneous every 6 hours  latanoprost 0.005% Ophthalmic Solution 1 Drop(s) Both EYES at bedtime  metoprolol tartrate Injectable 5 milliGRAM(s) IV Push every 6 hours  Nephro-penny 1 Tablet(s) Oral daily  thiamine 100 milliGRAM(s) Oral daily    MEDICATIONS  (PRN):  dextrose Oral Gel 15 Gram(s) Oral once PRN Blood Glucose LESS THAN 70 milliGRAM(s)/deciliter      Allergies    No Known Allergies  Allergy Status Unknown    Intolerances        Review of Systems:  Neuro: No HA, no dizziness  Cardiovascular: No chest pain, no palpitations  Respiratory: no SOB, no cough  GI: No nausea, vomiting, abdominal pain  MSK: Denies joint/muscle pain      ALL OTHER SYSTEMS REVIEWED AND NEGATIVE    PHYSICAL EXAM:  VITALS: T(C): 36.5 (05-05-23 @ 12:18)  T(F): 97.7 (05-05-23 @ 12:18), Max: 97.7 (05-04-23 @ 21:06)  HR: 74 (05-05-23 @ 12:18) (74 - 95)  BP: 138/64 (05-05-23 @ 12:18) (122/66 - 138/64)  RR:  (18 - 18)  SpO2:  (95% - 98%)  Wt(kg): --  GENERAL: NAD, well-groomed, well-developed  NEURO:  alert and oriented  RESPIRATORY: Clear to auscultation bilaterally; No rales, rhonchi, wheezing  CARDIOVASCULAR: Si S2  GI: Soft, non distended, normal bowel sounds  MUSCULOSKELETAL: Moves all extremities equally       POCT Blood Glucose.: 281 mg/dL (05-05-23 @ 14:18)  POCT Blood Glucose.: 322 mg/dL (05-05-23 @ 06:20)  POCT Blood Glucose.: 307 mg/dL (05-05-23 @ 00:11)  POCT Blood Glucose.: 277 mg/dL (05-04-23 @ 22:35)  POCT Blood Glucose.: 375 mg/dL (05-04-23 @ 17:21)  POCT Blood Glucose.: 296 mg/dL (05-04-23 @ 13:12)  POCT Blood Glucose.: 255 mg/dL (05-04-23 @ 05:56)  POCT Blood Glucose.: 378 mg/dL (05-03-23 @ 23:33)  POCT Blood Glucose.: 377 mg/dL (05-03-23 @ 22:12)  POCT Blood Glucose.: 462 mg/dL (05-03-23 @ 17:28)  POCT Blood Glucose.: 504 mg/dL (05-03-23 @ 17:26)  POCT Blood Glucose.: 344 mg/dL (05-03-23 @ 12:59)  POCT Blood Glucose.: 310 mg/dL (05-03-23 @ 06:05)  POCT Blood Glucose.: 330 mg/dL (05-03-23 @ 00:15)  POCT Blood Glucose.: 274 mg/dL (05-02-23 @ 21:52)  POCT Blood Glucose.: 346 mg/dL (05-02-23 @ 18:14)  POCT Blood Glucose.: >600 mg/dL (05-02-23 @ 18:12)                            8.9    17.60 )-----------( 272      ( 05 May 2023 07:12 )             28.8       05-05    148<H>  |  104  |  130<H>  ----------------------------<  267<H>  3.2<L>   |  26  |  3.30<H>    eGFR: 13<L>    Ca    9.0      05-05  Mg     2.4     05-05  Phos  4.5     05-05        Thyroid Function Tests:  05-04 @ 11:38 TSH 0.68 FreeT4 -- T3 -- Anti TPO -- Anti Thyroglobulin Ab -- TSI --    Diet, NPO with Tube Feed:   Tube Feeding Modality: Nasogastric  Suplena with Carb Steady (SUPLENA)  Total Volume for 24 Hours (mL): 840  Continuous  Starting Tube Feed Rate mL per Hour: 35  Until Goal Tube Feed Rate (mL per Hour): 35  Tube Feed Duration (in Hours): 24  Tube Feed Start Time: 10:00 (05-05-23 @ 10:53) [Active]          A1C with Estimated Average Glucose Result: 7.1 % (04-24-23 @ 17:32)  A1C with Estimated Average Glucose Result: 7.4 % (01-30-23 @ 06:54)  A1C with Estimated Average Glucose Result: 7.4 % (08-08-22 @ 06:31)  A1C with Estimated Average Glucose Result: 7.4 % (08-07-22 @ 09:39)                     HPI:  Elvia MRN = [id number]  88yoF w/ PMHx of advanced dementia (at B/L patient is bedbound, nonverbal, though able to tolerate PO diet - per family, patient has hearty  appetite), HTN, HLD, IDDM, who presents from home where she lives with her , brought in by her daughter (son is at her bedside during this evaluation) w/ complaints of daughter noticing the patient has been having difficulty breathing over the past few days in setting of notable increased swelling of the patient's hands, feet, and face. Of note, patient was recently hospitalized at [facility] for aspiration PNA (and found to have demand ischemia) when she presented with similar complaints and was found to be hypoxic; at the time, S+S eval was recommended, but per documentation at the end of Jan 2023, pt's daughter declined formal/objective evaluation, and instead, understanding all risks associated with aspiration, preferred to have patient continue on her typical dysphagia diet (puree/finely cut up food). Currently, patient is calm, sleeping, though she is arousable, and becomes agitated with attempts at physical examination. Per documentation, patient's daughter notes that the patient has been eating and drinking normally but that the patient does cough a lot.     PMD Dr Joleen Olsen [phone] does home visits    ED Presenting Vitals: 99.3F, 102bpm, 204/67 --> 180/75, 24br/m, 94% on RA   ED Course: s/p Lovenox 30mg SC x1, IV Zosyn 3.375g x1, IV Azithromycin 500mg x1, Lispro 6unit x1, Lasix 20mg IVP x3, Ativan 0.5mg IVP x1, Haldol 1mg IVP x2, Metoprolol 12.5mg PO x1, Lopressor 5mg IVP x2; RRT called for hypoxia and tachycardia -- pt found to be in ?new Afib w/ RVR (23 Apr 2023 16:04)      Patient has history of diabetes, A1C  7.1% on home insulin. Endo was consulted for glycemic control.      PAST MEDICAL & SURGICAL HISTORY:  Diabetes Mellitus, Type 2      Glaucoma      Hyperlipidemia      HTN (Hypertension)      Diabetic retinopathy      Dementia      Cataract      Diabetes mellitus      HTN (hypertension)      HLD (hyperlipidemia)      HTN (hypertension)      Diabetes mellitus      Dementia      S/P Carpal Tunnel Release  right wrist 5/6/2009      No significant past surgical history      S/P eye surgery          FAMILY HISTORY:  Family history of bipolar disorder (Child)    No pertinent family history in first degree relatives    No pertinent family history in first degree relatives        Social History:  Lives with her , has 24/7 HHA. (23 Apr 2023 16:04)            HOME MEDICATIONS:  Home Medications:  Admelog 100 units/mL injectable solution: 1-4 unit(s) injectable 3 times a day (with meals) (26 Apr 2023 12:57)  amLODIPine 10 mg oral tablet: 1 tab(s) orally once a day (23 Apr 2023 12:27)  amLODIPine 10 mg oral tablet: 1 tab(s) orally once a day (26 Apr 2023 12:57)  aspirin 81 mg oral delayed release tablet: 1 tab(s) orally once a day (26 Apr 2023 12:57)  aspirin 81 mg oral delayed release tablet: 1 tab(s) orally once a day (26 Apr 2023 12:57)  aspirin 81 mg oral tablet: 1 tab(s) orally once a day (23 Apr 2023 12:27)  atorvastatin 80 mg oral tablet: 1 tab(s) orally once a day (26 Apr 2023 12:57)  Combigan 0.2%-0.5% ophthalmic solution: 1 drop(s) in each affected eye 2 times a day (23 Apr 2023 12:32)  escitalopram 10 mg oral tablet: 1 tab(s) orally once a day (26 Apr 2023 12:57)  HumaLOG 100 units/mL injectable solution: injectable as needed as per daughter (23 Apr 2023 12:32)  hydroCHLOROthiazide 12.5 mg oral capsule: 1 tab(s) orally once a day (23 Apr 2023 12:32)  hydroCHLOROthiazide 12.5 mg oral capsule: 1 cap(s) orally once a day (26 Apr 2023 12:57)  Lipitor 80 mg oral tablet: 1 tab(s) orally once a day (23 Apr 2023 12:32)  losartan 100 mg oral tablet: 1 tab(s) orally once a day (26 Apr 2023 12:57)  losartan 100 mg oral tablet: 1 tab(s) orally once a day (23 Apr 2023 12:32)  Lumigan 0.01% ophthalmic solution: 1 drop(s) in each affected eye once a day (23 Apr 2023 12:32)  Melatonin 10 mg oral capsule: 1 cap(s) orally once a day (at bedtime) (26 Apr 2023 12:57)  QUEtiapine 50 mg oral tablet: 1 tab(s) orally once a day, As Needed (26 Apr 2023 12:57)  ramelteon 8 mg oral tablet: 1 tab(s) orally once a day (at bedtime), As Needed (26 Apr 2023 12:57)  ramelteon 8 mg oral tablet: 1 tab(s) orally once a day (at bedtime) (23 Apr 2023 12:32)  SEROquel XR 50 mg oral tablet, extended release: 1 tab(s) orally once a day (26 Apr 2023 12:57)            MEDICATIONS  (STANDING):  aspirin  chewable 81 milliGRAM(s) Oral daily  dextrose 5%. 1000 milliLiter(s) (50 mL/Hr) IV Continuous <Continuous>  dextrose 5%. 1000 milliLiter(s) (100 mL/Hr) IV Continuous <Continuous>  dextrose 50% Injectable 25 Gram(s) IV Push once  dextrose 50% Injectable 25 Gram(s) IV Push once  dextrose 50% Injectable 12.5 Gram(s) IV Push once  glucagon  Injectable 1 milliGRAM(s) IntraMuscular once  heparin   Injectable 5000 Unit(s) SubCutaneous every 12 hours  hydrALAZINE Injectable 7.5 milliGRAM(s) IV Push every 6 hours  insulin glargine Injectable (LANTUS) 14 Unit(s) SubCutaneous at bedtime  insulin lispro (ADMELOG) corrective regimen sliding scale   SubCutaneous every 6 hours  latanoprost 0.005% Ophthalmic Solution 1 Drop(s) Both EYES at bedtime  metoprolol tartrate Injectable 5 milliGRAM(s) IV Push every 6 hours  Nephro-penny 1 Tablet(s) Oral daily  thiamine 100 milliGRAM(s) Oral daily    MEDICATIONS  (PRN):  dextrose Oral Gel 15 Gram(s) Oral once PRN Blood Glucose LESS THAN 70 milliGRAM(s)/deciliter      Allergies    No Known Allergies  Allergy Status Unknown    Intolerances        Review of Systems:  Neuro: No HA, no dizziness  Cardiovascular: No chest pain, no palpitations  Respiratory: no SOB, no cough  GI: No nausea, vomiting, abdominal pain  MSK: Denies joint/muscle pain      ALL OTHER SYSTEMS REVIEWED AND NEGATIVE    PHYSICAL EXAM:  VITALS: T(C): 36.5 (05-05-23 @ 12:18)  T(F): 97.7 (05-05-23 @ 12:18), Max: 97.7 (05-04-23 @ 21:06)  HR: 74 (05-05-23 @ 12:18) (74 - 95)  BP: 138/64 (05-05-23 @ 12:18) (122/66 - 138/64)  RR:  (18 - 18)  SpO2:  (95% - 98%)  Wt(kg): --  GENERAL: NAD, well-groomed, well-developed  NEURO:  alert and oriented  RESPIRATORY: Clear to auscultation bilaterally; No rales, rhonchi, wheezing  CARDIOVASCULAR: Si S2  GI: Soft, non distended, normal bowel sounds  MUSCULOSKELETAL: Moves all extremities equally       POCT Blood Glucose.: 281 mg/dL (05-05-23 @ 14:18)  POCT Blood Glucose.: 322 mg/dL (05-05-23 @ 06:20)  POCT Blood Glucose.: 307 mg/dL (05-05-23 @ 00:11)  POCT Blood Glucose.: 277 mg/dL (05-04-23 @ 22:35)  POCT Blood Glucose.: 375 mg/dL (05-04-23 @ 17:21)  POCT Blood Glucose.: 296 mg/dL (05-04-23 @ 13:12)  POCT Blood Glucose.: 255 mg/dL (05-04-23 @ 05:56)  POCT Blood Glucose.: 378 mg/dL (05-03-23 @ 23:33)  POCT Blood Glucose.: 377 mg/dL (05-03-23 @ 22:12)  POCT Blood Glucose.: 462 mg/dL (05-03-23 @ 17:28)  POCT Blood Glucose.: 504 mg/dL (05-03-23 @ 17:26)  POCT Blood Glucose.: 344 mg/dL (05-03-23 @ 12:59)  POCT Blood Glucose.: 310 mg/dL (05-03-23 @ 06:05)  POCT Blood Glucose.: 330 mg/dL (05-03-23 @ 00:15)  POCT Blood Glucose.: 274 mg/dL (05-02-23 @ 21:52)  POCT Blood Glucose.: 346 mg/dL (05-02-23 @ 18:14)  POCT Blood Glucose.: >600 mg/dL (05-02-23 @ 18:12)                            8.9    17.60 )-----------( 272      ( 05 May 2023 07:12 )             28.8       05-05    148<H>  |  104  |  130<H>  ----------------------------<  267<H>  3.2<L>   |  26  |  3.30<H>    eGFR: 13<L>    Ca    9.0      05-05  Mg     2.4     05-05  Phos  4.5     05-05        Thyroid Function Tests:  05-04 @ 11:38 TSH 0.68 FreeT4 -- T3 -- Anti TPO -- Anti Thyroglobulin Ab -- TSI --    Diet, NPO with Tube Feed:   Tube Feeding Modality: Nasogastric  Suplena with Carb Steady (SUPLENA)  Total Volume for 24 Hours (mL): 840  Continuous  Starting Tube Feed Rate mL per Hour: 35  Until Goal Tube Feed Rate (mL per Hour): 35  Tube Feed Duration (in Hours): 24  Tube Feed Start Time: 10:00 (05-05-23 @ 10:53) [Active]          A1C with Estimated Average Glucose Result: 7.1 % (04-24-23 @ 17:32)  A1C with Estimated Average Glucose Result: 7.4 % (01-30-23 @ 06:54)  A1C with Estimated Average Glucose Result: 7.4 % (08-08-22 @ 06:31)  A1C with Estimated Average Glucose Result: 7.4 % (08-07-22 @ 09:39)

## 2023-05-05 NOTE — CONSULT NOTE ADULT - PROBLEM SELECTOR RECOMMENDATION 2
Suggest to continue medications, monitoring, FU primary team recommendations.
Check CT chest ideally with contrast (PE protocol)   aspiration precautions   Hold off on Abx for now   Supplemental oxygen as needed SPO > 92%  DVT ppx

## 2023-05-05 NOTE — PROGRESS NOTE ADULT - SUBJECTIVE AND OBJECTIVE BOX
[facility] Division of Hospital Medicine  Melissa Hong MD  Pager (M-F, 3F-4R): [phone]  Other Times:  [phone]    Patient is a 88y old  Female who presents with a chief complaint of SOB/GARRISON, swelling of hands/legs/face (05 May 2023 12:45)      SUBJECTIVE / OVERNIGHT EVENTS: No acute events. Pt more alert and responsive today  ADDITIONAL REVIEW OF SYSTEMS: Unable to obtain due to underlying dementia    MEDICATIONS  (STANDING):  aspirin  chewable 81 milliGRAM(s) Oral daily  dextrose 5%. 1000 milliLiter(s) (50 mL/Hr) IV Continuous <Continuous>  dextrose 5%. 1000 milliLiter(s) (100 mL/Hr) IV Continuous <Continuous>  dextrose 50% Injectable 25 Gram(s) IV Push once  dextrose 50% Injectable 25 Gram(s) IV Push once  dextrose 50% Injectable 12.5 Gram(s) IV Push once  glucagon  Injectable 1 milliGRAM(s) IntraMuscular once  heparin   Injectable 5000 Unit(s) SubCutaneous every 12 hours  hydrALAZINE Injectable 7.5 milliGRAM(s) IV Push every 6 hours  insulin glargine Injectable (LANTUS) 14 Unit(s) SubCutaneous at bedtime  insulin lispro (ADMELOG) corrective regimen sliding scale   SubCutaneous every 6 hours  latanoprost 0.005% Ophthalmic Solution 1 Drop(s) Both EYES at bedtime  metoprolol tartrate Injectable 5 milliGRAM(s) IV Push every 6 hours  Nephro-penny 1 Tablet(s) Oral daily  thiamine 100 milliGRAM(s) Oral daily    MEDICATIONS  (PRN):  dextrose Oral Gel 15 Gram(s) Oral once PRN Blood Glucose LESS THAN 70 milliGRAM(s)/deciliter    I&O's Summary    04 May 2023 07:01  -  05 May 2023 07:00  --------------------------------------------------------  IN: 1020 mL / OUT: 300 mL / NET: 720 mL    05 May 2023 07:01  -  05 May 2023 16:05  --------------------------------------------------------  IN: 100 mL / OUT: 350 mL / NET: -250 mL      PHYSICAL EXAM:  T(C): 36.5 (05-05-23 @ 12:18), Max: 36.5 (05-04-23 @ 21:06)  T(F): 97.7 (05-05-23 @ 12:18), Max: 97.7 (05-04-23 @ 21:06)  HR: 74 (05-05-23 @ 12:18) (74 - 95)  BP: 138/64 (05-05-23 @ 12:18) (122/66 - 138/64)  RR: 18 (05-05-23 @ 12:18) (18 - 18)  SpO2: 98% (05-05-23 @ 12:18) (95% - 98%)  Wt(kg): --    CONSTITUTIONAL: NAD, chronically ill appearing elderly woman, cachectic  EYES: R eye cataracts  ENMT: NGT in place, poor oral care  RESPIRATORY: Normal respiratory effort; lungs are clear to auscultation bilaterally  CARDIOVASCULAR: Regular rate and rhythm, normal S1 and S2, + murmur; No lower extremity edema  ABDOMEN: Nontender to palpation, normoactive bowel sounds, no rebound/guarding  MUSCULOSKELETAL:  Contracted   PSYCH: A+O X 1, answered her name, speaking farsi       LABS: reviewed                                                 8.9    17.60 )-----------( 272      ( 05 May 2023 07:12 )             28.8       05-05    148<H>  |  104  |  130<H>  ----------------------------<  267<H>  3.2<L>   |  26  |  3.30<H>    Ca    9.0      05 May 2023 07:12  Phos  4.5     05-05  Mg     2.4     05-05                              CAPILLARY BLOOD GLUCOSE      POCT Blood Glucose.: 281 mg/dL (05 May 2023 14:18)

## 2023-05-06 NOTE — CONSULT NOTE ADULT - PROBLEM SELECTOR RECOMMENDATION 9
f/u CXR
Lasix 40 mg IV daily for now   Monitor UO   Monitor Cr  check TTE
Will increase Lantus to 14 units at bed time.  Continue  Admelog correction scale coverage.  Will continue monitoring FS, log, and glucose trends, will Follow up.  Patient counseled for compliance with consistent low carb diet and exercise as tolerated outpatient.

## 2023-05-06 NOTE — PROGRESS NOTE ADULT - ASSESSMENT
Assessment  DMT2: 88y Female with DM T2 with hyperglycemia, A1C7.1% , was on insulin at home sliding scale, now on bolus insulin with coverage, blood sugars running high. Tube feeds via NG tube.   Dysphagia: NG tube on tube feeds, GI eval for PEG, pending optimization.   HTN: on antihypertensive medications, monitored, asymptomatic.  HLD: on statin, diet controlled.  TSH 0.68 Euthyroid      Marianne Ortiz MD  Cell: [phone]  Office: [phone]

## 2023-05-06 NOTE — PROGRESS NOTE ADULT - SUBJECTIVE AND OBJECTIVE BOX
[facility]   [phone]  RENAL FOLLOW UP NOTE  --------------------------------------------------------------------------------  Chief Complaint:    24 hour events/subjective:    seen earlier   non interactive   + feeding tube     PAST HISTORY  --------------------------------------------------------------------------------  No significant changes to PMH, PSH, FHx, SHx, unless otherwise noted    ALLERGIES & MEDICATIONS  --------------------------------------------------------------------------------  Allergies    No Known Allergies  Allergy Status Unknown    Intolerances      Standing Inpatient Medications  aspirin  chewable 81 milliGRAM(s) Oral daily  dextrose 5%. 1000 milliLiter(s) IV Continuous <Continuous>  dextrose 5%. 1000 milliLiter(s) IV Continuous <Continuous>  dextrose 50% Injectable 25 Gram(s) IV Push once  dextrose 50% Injectable 12.5 Gram(s) IV Push once  dextrose 50% Injectable 25 Gram(s) IV Push once  glucagon  Injectable 1 milliGRAM(s) IntraMuscular once  heparin   Injectable 5000 Unit(s) SubCutaneous every 12 hours  hydrALAZINE Injectable 7.5 milliGRAM(s) IV Push every 6 hours  insulin lispro (ADMELOG) corrective regimen sliding scale   SubCutaneous every 6 hours  insulin NPH human recombinant 6 Unit(s) SubCutaneous every 6 hours  latanoprost 0.005% Ophthalmic Solution 1 Drop(s) Both EYES at bedtime  metoprolol tartrate Injectable 5 milliGRAM(s) IV Push every 6 hours  Nephro-penny 1 Tablet(s) Oral daily  potassium chloride   Solution 40 milliEquivalent(s) Oral once  thiamine 100 milliGRAM(s) Oral daily    PRN Inpatient Medications  dextrose Oral Gel 15 Gram(s) Oral once PRN      REVIEW OF SYSTEMS  --------------------------------------------------------------------------------        VITALS/PHYSICAL EXAM  --------------------------------------------------------------------------------  T(C): 36.9 (05-06-23 @ 18:55), Max: 37.4 (05-06-23 @ 04:02)  HR: 93 (05-06-23 @ 18:55) (79 - 93)  BP: 149/62 (05-06-23 @ 18:55) (127/64 - 151/69)  RR: 19 (05-06-23 @ 18:55) (18 - 19)  SpO2: 95% (05-06-23 @ 18:55) (95% - 100%)  Wt(kg): --        05-05-23 @ 07:01  -  05-06-23 @ 07:00  --------------------------------------------------------  IN: 100 mL / OUT: 350 mL / NET: -250 mL      Physical Exam:  	           Gen: ill appearing elderly F, On O2  + feeding tube   	Pulm: decrease bs, +coarse bs, no wheezing  	CV: no JVD. RRR, S1S2; no rub  	Abd: +BS, soft, nontender/nondistended,  	: No bladder distention   	UE: Warm, no cyanosis  no clubbing,  no edema;  	LE: Warm, no cyanosis  no clubbing, no edema      LABS/STUDIES  --------------------------------------------------------------------------------              9.2    14.98 >-----------<  262      [05-06-23 @ 10:03]              29.1     144  |  103  |  122  ----------------------------<  240      [05-06-23 @ 10:03]  3.6   |  24  |  2.97        Ca     9.1     [05-06-23 @ 10:03]      Mg     2.4     [05-05-23 @ 07:12]      Phos  4.5     [05-05-23 @ 07:12]            Creatinine Trend:  SCr 2.97 [05-06 @ 10:03]  SCr 3.21 [05-05 @ 18:14]  SCr 3.30 [05-05 @ 07:12]  SCr 3.37 [05-04 @ 22:44]  SCr 3.47 [05-04 @ 11:38]              Urinalysis - [04-23-23 @ 14:43]      Color Colorless / Appearance Clear / SG 1.020 / pH 6.0      Gluc Trace / Ketone Negative  / Bili Negative / Urobili Negative       Blood Small / Protein 100 mg/dl / Leuk Est Negative / Nitrite Negative      RBC 6 / WBC 0 / Hyaline 0 / Gran  / Sq Epi  / Non Sq Epi  / Bacteria Negative      TSH 0.68      [05-04-23 @ 11:38]  Lipid: chol 216, TG 80, HDL 49, LDL --      [08-08-22 @ 06:31]

## 2023-05-06 NOTE — PROGRESS NOTE ADULT - ASSESSMENT
Alternate MRN: [id number]    88yoF w/ PMHx of advanced dementia (at B/L patient is bedbound, nonverbal, though able to tolerate PO diet - per family, patient has hearty appetite), HTN, HLD, IDDM, brought in by family for difficulty breathing over the past few days in setting of notable increased swelling of the patient's hands, feet, and face; a/w hypoxic respiratory failure 2/2 aspiration PNA and acute decompensated diastolic heart failure, with course c/b significant TOÑITO, poor oral intake now s/p NGT and pending PEG.

## 2023-05-06 NOTE — CHART NOTE - NSCHARTNOTEFT_GEN_A_CORE
MEDICINE NP    BRENNEN ISBELL  88y Female    Patient is a 88y old  Female who presents with a chief complaint of SOB/GARRISON, swelling of hands/legs/face (05 May 2023 16:03)         > Event Summary:   Notified by RN, Patient pulled out NGT   -NGT / Brooke Sump placed via RT Nare.  Patient tolerated procedure well without complication.   - KUB ordered -FOLLOW UP RESULTS  -ENDORSED TO DAY PROVIDER        KAYLA Brenner-BC  Medicine Department

## 2023-05-06 NOTE — PROGRESS NOTE ADULT - ASSESSMENT
88yoF w/ PMHx of advanced dementia (at B/L patient is bedbound, nonverbal, though able to tolerate PO diet - per family, patient has hearty appetite), HTN, HLD, IDDM, who presents from home where she lives with her , brought in by her daughter (son is at her bedside during this evaluation) w/ complaints of daughter noticing the patient has been having difficulty breathing over the past few days in setting of notable increased swelling of the patient's hands, feet, and face. Of note, patient was recently hospitalized at [facility] for aspiration PNA (and found to have demand ischemia) when she presented with similar complaints and was found to be hypoxic; at the time, S+S eval was recommended, but per documentation at the end of Jan 2023,      1- TOÑITO   2- CHF   3- acidosis, resolved  4- hypernatremia  5- PNA  6- HTN      toñito in setting of chf as well as more likely due to contrast nephropathy and possible pneumonia   creatinine is bow slowly improving   urinary retention requiring intermittent catheterization, castillo placement if continues to retain.   hypernatremia improving,   free water 200 cc q4h,   bumex is off for now   her echo reveals mod to severe range AS  she is not a dialysis candidate in setting of her advance dementia   transition BP meds to oral via NGT, when able  O2 supplementation as needed  strict I/O  trend creatinine and electrolytes daily

## 2023-05-06 NOTE — PROGRESS NOTE ADULT - SUBJECTIVE AND OBJECTIVE BOX
[facility] Division of Hospital Medicine  Melissa Hong MD  Pager (M-F, 7P-3Z): [phone]  Other Times:  [phone]    Patient is a 88y old  Female who presents with a chief complaint of SOB/GARRISON, swelling of hands/legs/face (06 May 2023 12:45)      SUBJECTIVE / OVERNIGHT EVENTS: Pulled NGT overnight, now replaced  ADDITIONAL REVIEW OF SYSTEMS: Unable to obtain due to underlying dementia    MEDICATIONS  (STANDING):  aspirin  chewable 81 milliGRAM(s) Oral daily  dextrose 5%. 1000 milliLiter(s) (50 mL/Hr) IV Continuous <Continuous>  dextrose 5%. 1000 milliLiter(s) (100 mL/Hr) IV Continuous <Continuous>  dextrose 50% Injectable 25 Gram(s) IV Push once  dextrose 50% Injectable 25 Gram(s) IV Push once  dextrose 50% Injectable 12.5 Gram(s) IV Push once  glucagon  Injectable 1 milliGRAM(s) IntraMuscular once  heparin   Injectable 5000 Unit(s) SubCutaneous every 12 hours  hydrALAZINE Injectable 7.5 milliGRAM(s) IV Push every 6 hours  insulin lispro (ADMELOG) corrective regimen sliding scale   SubCutaneous every 6 hours  insulin NPH human recombinant 6 Unit(s) SubCutaneous every 6 hours  latanoprost 0.005% Ophthalmic Solution 1 Drop(s) Both EYES at bedtime  metoprolol tartrate Injectable 5 milliGRAM(s) IV Push every 6 hours  Nephro-penny 1 Tablet(s) Oral daily  thiamine 100 milliGRAM(s) Oral daily    MEDICATIONS  (PRN):  dextrose Oral Gel 15 Gram(s) Oral once PRN Blood Glucose LESS THAN 70 milliGRAM(s)/deciliter      I&O's Summary    05 May 2023 07:01  -  06 May 2023 07:00  --------------------------------------------------------  IN: 100 mL / OUT: 350 mL / NET: -250 mL        PHYSICAL EXAM:  T(C): 36.2 (05-06-23 @ 11:44), Max: 37.4 (05-06-23 @ 04:02)  T(F): 97.1 (05-06-23 @ 11:44), Max: 99.4 (05-06-23 @ 04:02)  HR: 84 (05-06-23 @ 11:44) (79 - 84)  BP: 151/69 (05-06-23 @ 11:44) (127/64 - 151/69)  RR: 18 (05-06-23 @ 11:44) (18 - 18)  SpO2: 98% (05-06-23 @ 11:44) (98% - 100%)  Wt(kg): --    CONSTITUTIONAL: NAD, chronically ill appearing elderly woman, cachectic  EYES: R eye cataracts  ENMT: NGT in place, poor oral care  RESPIRATORY: Normal respiratory effort; lungs are clear to auscultation bilaterally  CARDIOVASCULAR: Regular rate and rhythm, normal S1 and S2, + murmur; No lower extremity edema  ABDOMEN: Nontender to palpation, normoactive bowel sounds, no rebound/guarding  MUSCULOSKELETAL:  Contracted   PSYCH: A+O X 1, answered her name, speaking farsi       LABS: reviewed                                               9.2    14.98 )-----------( 262      ( 06 May 2023 10:03 )             29.1       05-06    144  |  103  |  122<H>  ----------------------------<  240<H>  3.6   |  24  |  2.97<H>    Ca    9.1      06 May 2023 10:03  Phos  4.5     05-05  Mg     2.4     05-05                              CAPILLARY BLOOD GLUCOSE      POCT Blood Glucose.: 246 mg/dL (06 May 2023 13:06)

## 2023-05-06 NOTE — CONSULT NOTE ADULT - SUBJECTIVE AND OBJECTIVE BOX
CC: ng tube     HPI:88yoF w/ PMHx of advanced dementia (at B/L patient is bedbound, nonverbal, though able to tolerate PO diet - per family, patient has hearty appetite), HTN, HLD, IDDM, brought in by family for difficulty breathing over the past few days in setting of notable increased swelling of the patient's hands, feet, and face; a/w hypoxic respiratory failure 2/2 aspiration PNA and acute decompensated diastolic heart failure, with course c/b significant TOÑITO, poor oral intake now s/p NGT however pulled out and multiple failed attempts by primary team for replacement  and pending PEG.    PAST MEDICAL & SURGICAL HISTORY:  Diabetes Mellitus, Type 2      Glaucoma      Hyperlipidemia      HTN (Hypertension)      Diabetic retinopathy      Dementia      Cataract      Diabetes mellitus      HTN (hypertension)      HLD (hyperlipidemia)      HTN (hypertension)      Diabetes mellitus      Dementia      S/P Carpal Tunnel Release  right wrist 5/6/2009      No significant past surgical history      S/P eye surgery        Allergies    No Known Allergies  Allergy Status Unknown    Intolerances      MEDICATIONS  (STANDING):  aspirin  chewable 81 milliGRAM(s) Oral daily  dextrose 5%. 1000 milliLiter(s) (50 mL/Hr) IV Continuous <Continuous>  dextrose 5%. 1000 milliLiter(s) (100 mL/Hr) IV Continuous <Continuous>  dextrose 50% Injectable 25 Gram(s) IV Push once  dextrose 50% Injectable 25 Gram(s) IV Push once  dextrose 50% Injectable 12.5 Gram(s) IV Push once  glucagon  Injectable 1 milliGRAM(s) IntraMuscular once  heparin   Injectable 5000 Unit(s) SubCutaneous every 12 hours  hydrALAZINE Injectable 7.5 milliGRAM(s) IV Push every 6 hours  insulin lispro (ADMELOG) corrective regimen sliding scale   SubCutaneous every 6 hours  insulin NPH human recombinant 6 Unit(s) SubCutaneous every 6 hours  latanoprost 0.005% Ophthalmic Solution 1 Drop(s) Both EYES at bedtime  metoprolol tartrate Injectable 5 milliGRAM(s) IV Push every 6 hours  Nephro-penny 1 Tablet(s) Oral daily  potassium chloride   Solution 40 milliEquivalent(s) Oral once  thiamine 100 milliGRAM(s) Oral daily    MEDICATIONS  (PRN):  dextrose Oral Gel 15 Gram(s) Oral once PRN Blood Glucose LESS THAN 70 milliGRAM(s)/deciliter      Social History: no tobacco, no etoh     Family history: Pt denies any sign FHx    ROS:   ENT: all negative except as noted in HPI   CV: denies palpitations  Pulm: denies SOB, cough, hemoptysis  GI: denies change in apetite, indigestion, n/v  : denies pertinent urinary symptoms, urgency  Neuro: denies numbness/tingling, loss of sensation  Psych: denies anxiety  MS: denies muscle weakness, instability  Heme: denies easy bruising or bleeding  Endo: denies heat/cold intolerance, excessive sweating  Vascular: denies LE edema    Vital Signs Last 24 Hrs  T(C): 36.3 (06 May 2023 21:32), Max: 37.4 (06 May 2023 04:02)  T(F): 97.3 (06 May 2023 21:32), Max: 99.4 (06 May 2023 04:02)  HR: 82 (06 May 2023 21:32) (79 - 93)  BP: 151/64 (06 May 2023 21:32) (132/64 - 151/69)  BP(mean): --  RR: 18 (06 May 2023 21:32) (18 - 19)  SpO2: 99% (06 May 2023 21:32) (95% - 99%)    Parameters below as of 06 May 2023 21:32  Patient On (Oxygen Delivery Method): room air                              9.2    14.98 )-----------( 262      ( 06 May 2023 10:03 )             29.1    05-06    144  |  103  |  122<H>  ----------------------------<  240<H>  3.6   |  24  |  2.97<H>    Ca    9.1      06 May 2023 10:03  Phos  4.5     05-05  Mg     2.4     05-05         PHYSICAL EXAM:  Gen: NAD  Skin: No rashes, bruises, or lesions  Head: Normocephalic, Atraumatic  Face: no edema, erythema, or fluctuance. Parotid glands soft without mass  Eyes: no scleral injection  Nose: Nares bilaterally patent, no discharge, ng tube in right nare   Mouth: No Stridor / Drooling / Trismus.  Mucosa moist, tongue/uvula midline, oropharynx clear  Neck: Flat, supple, no lymphadenopathy, trachea midline, no masses  Lymphatic: No lymphadenopathy  Resp: breathing easily, no stridor  CV: no peripheral edema/cyanosis  GI: nondistended   Peripheral vascular: no JVD or edema  Neuro: facial nerve intact, no facial droop        Procedure Note:  Procedure: NGT placement  Diagnosis: dysphagia  Verbal consent obtained from patient. Lube applied to small keofeed tube. Keofeed advanced through right nare without resistance under visualization using indirect laryngoscope. Tip of tube visualized in pyriform sinus. Pt asked to swallow. Tube advanced through esophageal inlet without resistance. Placement confirmed with auscultation of air in stomach. Pending CXR confirmation.

## 2023-05-06 NOTE — PROGRESS NOTE ADULT - SUBJECTIVE AND OBJECTIVE BOX
Chief complaint    Patient is a 88y old  Female who presents with a chief complaint of SOB/GARRISON, swelling of hands/legs/face (05 May 2023 16:03)   Review of systems  Patient appears comfortable.    Labs and Fingersticks  CAPILLARY BLOOD GLUCOSE      POCT Blood Glucose.: 343 mg/dL (06 May 2023 04:50)  POCT Blood Glucose.: 393 mg/dL (06 May 2023 00:57)  POCT Blood Glucose.: 428 mg/dL (06 May 2023 00:28)  POCT Blood Glucose.: 403 mg/dL (05 May 2023 22:26)  POCT Blood Glucose.: 360 mg/dL (05 May 2023 22:24)  POCT Blood Glucose.: 388 mg/dL (05 May 2023 18:41)  POCT Blood Glucose.: 381 mg/dL (05 May 2023 17:35)  POCT Blood Glucose.: 281 mg/dL (05 May 2023 14:18)      Anion Gap, Serum: 17 (05-05 @ 18:14)  Anion Gap, Serum: 18 *H* (05-05 @ 07:12)  Anion Gap, Serum: 19 *H* (05-04 @ 22:44)  Anion Gap, Serum: 21 *H* (05-04 @ 11:38)      Calcium, Total Serum: 9.2 (05-05 @ 18:14)  Calcium, Total Serum: 9.0 (05-05 @ 07:12)  Calcium, Total Serum: 9.4 (05-04 @ 22:44)  Calcium, Total Serum: 9.5 (05-04 @ 11:38)          05-05    145  |  103  |  124<H>  ----------------------------<  354<H>  4.7   |  25  |  3.21<H>    Ca    9.2      05 May 2023 18:14  Phos  4.5     05-05  Mg     2.4     05-05                          8.9    17.60 )-----------( 272      ( 05 May 2023 07:12 )             28.8     Medications  MEDICATIONS  (STANDING):  aspirin  chewable 81 milliGRAM(s) Oral daily  dextrose 5%. 1000 milliLiter(s) (50 mL/Hr) IV Continuous <Continuous>  dextrose 5%. 1000 milliLiter(s) (100 mL/Hr) IV Continuous <Continuous>  dextrose 50% Injectable 25 Gram(s) IV Push once  dextrose 50% Injectable 25 Gram(s) IV Push once  dextrose 50% Injectable 12.5 Gram(s) IV Push once  glucagon  Injectable 1 milliGRAM(s) IntraMuscular once  heparin   Injectable 5000 Unit(s) SubCutaneous every 12 hours  hydrALAZINE Injectable 7.5 milliGRAM(s) IV Push every 6 hours  insulin lispro (ADMELOG) corrective regimen sliding scale   SubCutaneous every 6 hours  insulin NPH human recombinant 6 Unit(s) SubCutaneous every 6 hours  latanoprost 0.005% Ophthalmic Solution 1 Drop(s) Both EYES at bedtime  metoprolol tartrate Injectable 5 milliGRAM(s) IV Push every 6 hours  Nephro-penny 1 Tablet(s) Oral daily  thiamine 100 milliGRAM(s) Oral daily      Physical Exam  General: Patient appears comfortable.  Vital Signs Last 12 Hrs  T(F): 99.4 (05-06-23 @ 04:02), Max: 99.4 (05-06-23 @ 04:02)  HR: 79 (05-06-23 @ 04:02) (79 - 79)  BP: 132/64 (05-06-23 @ 04:02) (132/64 - 132/64)  BP(mean): --  RR: 18 (05-06-23 @ 04:02) (18 - 18)  SpO2: 99% (05-06-23 @ 04:02) (99% - 99%)  Neck: No palpable thyroid nodules.  CVS: S1S2, No murmurs  Respiratory: No wheezing, no crepitations  GI: Abdomen soft, non tender.    Diagnostics        Radiology:

## 2023-05-07 NOTE — PROGRESS NOTE ADULT - PROBLEM SELECTOR PLAN 1
Pt not compliant with SLP eval, with high aspiration risk, severe dysphagia, electrolyte derangements  -Failed several swallowing evals, SLP continues to recommend NPO  -Family discussion about GOC 5/2 and 5/3: requesting PEG  -NGT placed  -BMP BID, monitor for refeeding syndrome  -Replete electrolytes aggressively  -Leukocytosis likely 2/2 hemoconcentration given that pt was on cefepime, now downtrending. CXR demonstrates improved opacities, RVP negative.  -PEG tube placement likely monday

## 2023-05-07 NOTE — PROGRESS NOTE ADULT - SUBJECTIVE AND OBJECTIVE BOX
Subjective: Patient seen and examined. No new events except as noted.     REVIEW OF SYSTEMS:  Unable to obtain     MEDICATIONS:  MEDICATIONS  (STANDING):  aspirin  chewable 81 milliGRAM(s) Oral daily  dextrose 5%. 1000 milliLiter(s) (50 mL/Hr) IV Continuous <Continuous>  dextrose 5%. 1000 milliLiter(s) (100 mL/Hr) IV Continuous <Continuous>  dextrose 50% Injectable 25 Gram(s) IV Push once  dextrose 50% Injectable 25 Gram(s) IV Push once  dextrose 50% Injectable 12.5 Gram(s) IV Push once  glucagon  Injectable 1 milliGRAM(s) IntraMuscular once  heparin   Injectable 5000 Unit(s) SubCutaneous every 12 hours  hydrALAZINE Injectable 7.5 milliGRAM(s) IV Push every 6 hours  insulin lispro (ADMELOG) corrective regimen sliding scale   SubCutaneous every 6 hours  latanoprost 0.005% Ophthalmic Solution 1 Drop(s) Both EYES at bedtime  metoprolol tartrate Injectable 5 milliGRAM(s) IV Push every 6 hours  Nephro-penny 1 Tablet(s) Oral daily  thiamine 100 milliGRAM(s) Oral daily      PHYSICAL EXAM:  T(C): 36.5 (05-07-23 @ 21:22), Max: 37.1 (05-06-23 @ 23:56)  HR: 85 (05-07-23 @ 21:22) (80 - 96)  BP: 154/69 (05-07-23 @ 21:22) (110/59 - 166/61)  RR: 18 (05-07-23 @ 21:22) (18 - 18)  SpO2: 99% (05-07-23 @ 21:22) (99% - 99%)  Wt(kg): --  I&O's Summary    06 May 2023 07:01  -  07 May 2023 07:00  --------------------------------------------------------  IN: 0 mL / OUT: 350 mL / NET: -350 mL    07 May 2023 07:01  -  07 May 2023 23:19  --------------------------------------------------------  IN: 0 mL / OUT: 150 mL / NET: -150 mL            Appearance: NAD non verbal 	  HEENT: Normal oral mucosa, PERRL, EOMI	  Lymphatic: No lymphadenopathy +NGT   Cardiovascular: Normal S1 S2, No JVD, No murmurs  Respiratory: decreased bs, on NC   Psychiatry: A & O x 0  Gastrointestinal:  Soft, Non-tender, + BS	  Skin: No rashes, No ecchymoses, No cyanosis	  Neurologic: Non-focal  Extremities: decreased  range of motion, No  edema  Vascular: Peripheral pulses palpable 2+ bilaterally    LABS:    CARDIAC MARKERS:                                9.2    14.98 )-----------( 262      ( 06 May 2023 10:03 )             29.1     05-07    143  |  106  |  112<H>  ----------------------------<  261<H>  4.4   |  21<L>  |  2.66<H>    Ca    9.1      07 May 2023 10:38  Phos  3.0     05-07  Mg     2.3     05-07      proBNP:   Lipid Profile:   HgA1c:   TSH:             TELEMETRY: 	    ECG:  	  RADIOLOGY:   DIAGNOSTIC TESTING:  [ ] Echocardiogram:  [ ]  Catheterization:  [ ] Stress Test:    OTHER:

## 2023-05-07 NOTE — PROGRESS NOTE ADULT - SUBJECTIVE AND OBJECTIVE BOX
Chief complaint    Patient is a 88y old  Female who presents with a chief complaint of SOB/GARRISON, swelling of hands/legs/face (06 May 2023 22:36)   Review of systems  Patient appears comfortable.    Labs and Fingersticks  CAPILLARY BLOOD GLUCOSE      POCT Blood Glucose.: 422 mg/dL (07 May 2023 12:53)  POCT Blood Glucose.: 314 mg/dL (07 May 2023 05:54)  POCT Blood Glucose.: 234 mg/dL (06 May 2023 23:47)  POCT Blood Glucose.: 153 mg/dL (06 May 2023 17:31)  POCT Blood Glucose.: 246 mg/dL (06 May 2023 13:06)      Anion Gap, Serum: 16 (05-07 @ 10:38)  Anion Gap, Serum: 17 (05-06 @ 10:03)  Anion Gap, Serum: 17 (05-05 @ 18:14)      Calcium, Total Serum: 9.1 (05-07 @ 10:38)  Calcium, Total Serum: 9.1 (05-06 @ 10:03)  Calcium, Total Serum: 9.2 (05-05 @ 18:14)          05-07    143  |  106  |  112<H>  ----------------------------<  261<H>  4.4   |  21<L>  |  2.66<H>    Ca    9.1      07 May 2023 10:38  Phos  3.0     05-07  Mg     2.3     05-07                          9.2    14.98 )-----------( 262      ( 06 May 2023 10:03 )             29.1     Medications  MEDICATIONS  (STANDING):  aspirin  chewable 81 milliGRAM(s) Oral daily  dextrose 5%. 1000 milliLiter(s) (50 mL/Hr) IV Continuous <Continuous>  dextrose 5%. 1000 milliLiter(s) (100 mL/Hr) IV Continuous <Continuous>  dextrose 50% Injectable 25 Gram(s) IV Push once  dextrose 50% Injectable 25 Gram(s) IV Push once  dextrose 50% Injectable 12.5 Gram(s) IV Push once  glucagon  Injectable 1 milliGRAM(s) IntraMuscular once  heparin   Injectable 5000 Unit(s) SubCutaneous every 12 hours  hydrALAZINE Injectable 7.5 milliGRAM(s) IV Push every 6 hours  insulin lispro (ADMELOG) corrective regimen sliding scale   SubCutaneous every 6 hours  insulin NPH human recombinant 6 Unit(s) SubCutaneous every 6 hours  latanoprost 0.005% Ophthalmic Solution 1 Drop(s) Both EYES at bedtime  metoprolol tartrate Injectable 5 milliGRAM(s) IV Push every 6 hours  Nephro-penny 1 Tablet(s) Oral daily  thiamine 100 milliGRAM(s) Oral daily      Physical Exam  General: Patient appears comfortable.  Vital Signs Last 12 Hrs  T(F): 98.3 (05-07-23 @ 11:22), Max: 98.3 (05-07-23 @ 11:22)  HR: 96 (05-07-23 @ 11:22) (90 - 96)  BP: 166/61 (05-07-23 @ 11:22) (138/60 - 166/61)  BP(mean): --  RR: 18 (05-07-23 @ 11:22) (18 - 18)  SpO2: 99% (05-07-23 @ 11:22) (99% - 99%)  Neck: No palpable thyroid nodules.  CVS: S1S2, No murmurs  Respiratory: No wheezing, no crepitations  GI: Abdomen soft, non tender.    Diagnostics        Radiology:

## 2023-05-07 NOTE — PROGRESS NOTE ADULT - SUBJECTIVE AND OBJECTIVE BOX
[facility]   [phone]  RENAL FOLLOW UP NOTE  --------------------------------------------------------------------------------  Chief Complaint:    24 hour events/subjective:    seen earlier  +ngT    PAST HISTORY  --------------------------------------------------------------------------------  No significant changes to PMH, PSH, FHx, SHx, unless otherwise noted    ALLERGIES & MEDICATIONS  --------------------------------------------------------------------------------  Allergies    No Known Allergies  Allergy Status Unknown    Intolerances      Standing Inpatient Medications  aspirin  chewable 81 milliGRAM(s) Oral daily  dextrose 5%. 1000 milliLiter(s) IV Continuous <Continuous>  dextrose 5%. 1000 milliLiter(s) IV Continuous <Continuous>  dextrose 50% Injectable 25 Gram(s) IV Push once  dextrose 50% Injectable 12.5 Gram(s) IV Push once  dextrose 50% Injectable 25 Gram(s) IV Push once  glucagon  Injectable 1 milliGRAM(s) IntraMuscular once  heparin   Injectable 5000 Unit(s) SubCutaneous every 12 hours  hydrALAZINE Injectable 7.5 milliGRAM(s) IV Push every 6 hours  insulin lispro (ADMELOG) corrective regimen sliding scale   SubCutaneous every 6 hours  insulin NPH human recombinant 6 Unit(s) SubCutaneous every 6 hours  latanoprost 0.005% Ophthalmic Solution 1 Drop(s) Both EYES at bedtime  metoprolol tartrate Injectable 5 milliGRAM(s) IV Push every 6 hours  Nephro-penny 1 Tablet(s) Oral daily  thiamine 100 milliGRAM(s) Oral daily    PRN Inpatient Medications  dextrose Oral Gel 15 Gram(s) Oral once PRN      REVIEW OF SYSTEMS  --------------------------------------------------------------------------------    Non interactive     VITALS/PHYSICAL EXAM  --------------------------------------------------------------------------------  T(C): 36.5 (05-07-23 @ 16:09), Max: 37.1 (05-06-23 @ 23:56)  HR: 83 (05-07-23 @ 16:09) (80 - 96)  BP: 160/54 (05-07-23 @ 16:09) (110/59 - 166/61)  RR: 18 (05-07-23 @ 16:09) (18 - 19)  SpO2: 99% (05-07-23 @ 16:09) (95% - 99%)  Wt(kg): --        05-06-23 @ 07:01  -  05-07-23 @ 07:00  --------------------------------------------------------  IN: 0 mL / OUT: 350 mL / NET: -350 mL    05-07-23 @ 07:01  -  05-07-23 @ 17:36  --------------------------------------------------------  IN: 0 mL / OUT: 150 mL / NET: -150 mL      Physical Exam:  	          Gen: ill appearing elderly F, On O2  + feeding tube   	Pulm: decrease bs, +coarse bs, no wheezing  	CV: no JVD. RRR, S1S2; no rub  	Abd: +BS, soft, nontender/nondistended,  	: No bladder distention   	UE: Warm, no cyanosis  no clubbing,  no edema;  	LE: Warm, no cyanosis  no clubbing, no edema      LABS/STUDIES  --------------------------------------------------------------------------------              9.2    14.98 >-----------<  262      [05-06-23 @ 10:03]              29.1     143  |  106  |  112  ----------------------------<  261      [05-07-23 @ 10:38]  4.4   |  21  |  2.66        Ca     9.1     [05-07-23 @ 10:38]      Mg     2.3     [05-07-23 @ 10:38]      Phos  3.0     [05-07-23 @ 10:38]            Creatinine Trend:  SCr 2.66 [05-07 @ 10:38]  SCr 2.97 [05-06 @ 10:03]  SCr 3.21 [05-05 @ 18:14]  SCr 3.30 [05-05 @ 07:12]  SCr 3.37 [05-04 @ 22:44]              Urinalysis - [04-23-23 @ 14:43]      Color Colorless / Appearance Clear / SG 1.020 / pH 6.0      Gluc Trace / Ketone Negative  / Bili Negative / Urobili Negative       Blood Small / Protein 100 mg/dl / Leuk Est Negative / Nitrite Negative      RBC 6 / WBC 0 / Hyaline 0 / Gran  / Sq Epi  / Non Sq Epi  / Bacteria Negative      TSH 0.68      [05-04-23 @ 11:38]  Lipid: chol 216, TG 80, HDL 49, LDL --      [08-08-22 @ 06:31]

## 2023-05-07 NOTE — PROGRESS NOTE ADULT - ASSESSMENT
Alternate MRN: [id number]    88yoF w/ PMHx of advanced dementia (at B/L patient is bedbound, nonverbal, though able to tolerate PO diet - per family, patient has hearty appetite), HTN, HLD, IDDM, brought in by family for difficulty breathing over the past few days in setting of notable increased swelling of the patient's hands, feet, and face; a/w hypoxic respiratory failure 2/2 aspiration PNA and acute decompensated diastolic heart failure, with course c/b significant TOÑITO, poor oral intake now s/p NGT and pending PEG.

## 2023-05-07 NOTE — PROGRESS NOTE ADULT - SUBJECTIVE AND OBJECTIVE BOX
Kobi De MD  Division of Hospital Medicine  Available on MS teams until 7pm  If no response or off-hours, page [phone]  -------------------------------------    Patient is a 88y old  Female who presents with a chief complaint of SOB/GARRISON, swelling of hands/legs/face (07 May 2023 12:55)    SUBJECTIVE / OVERNIGHT EVENTS: NG coiled in mouth, replaced by ENT  ADDITIONAL REVIEW OF SYSTEMS: pt s/p NGT, nonverbal, intermittently grunting, unable to obtain meaningful ROS    MEDICATIONS  (STANDING):  aspirin  chewable 81 milliGRAM(s) Oral daily  dextrose 5%. 1000 milliLiter(s) (50 mL/Hr) IV Continuous <Continuous>  dextrose 5%. 1000 milliLiter(s) (100 mL/Hr) IV Continuous <Continuous>  dextrose 50% Injectable 25 Gram(s) IV Push once  dextrose 50% Injectable 25 Gram(s) IV Push once  dextrose 50% Injectable 12.5 Gram(s) IV Push once  glucagon  Injectable 1 milliGRAM(s) IntraMuscular once  heparin   Injectable 5000 Unit(s) SubCutaneous every 12 hours  hydrALAZINE Injectable 7.5 milliGRAM(s) IV Push every 6 hours  insulin lispro (ADMELOG) corrective regimen sliding scale   SubCutaneous every 6 hours  insulin NPH human recombinant 6 Unit(s) SubCutaneous every 6 hours  latanoprost 0.005% Ophthalmic Solution 1 Drop(s) Both EYES at bedtime  metoprolol tartrate Injectable 5 milliGRAM(s) IV Push every 6 hours  Nephro-penny 1 Tablet(s) Oral daily  thiamine 100 milliGRAM(s) Oral daily    MEDICATIONS  (PRN):  dextrose Oral Gel 15 Gram(s) Oral once PRN Blood Glucose LESS THAN 70 milliGRAM(s)/deciliter      CAPILLARY BLOOD GLUCOSE      POCT Blood Glucose.: 422 mg/dL (07 May 2023 12:53)  POCT Blood Glucose.: 314 mg/dL (07 May 2023 05:54)  POCT Blood Glucose.: 234 mg/dL (06 May 2023 23:47)  POCT Blood Glucose.: 153 mg/dL (06 May 2023 17:31)    I&O's Summary    06 May 2023 07:01  -  07 May 2023 07:00  --------------------------------------------------------  IN: 0 mL / OUT: 350 mL / NET: -350 mL    07 May 2023 07:01  -  07 May 2023 14:57  --------------------------------------------------------  IN: 0 mL / OUT: 150 mL / NET: -150 mL        PHYSICAL EXAM:  Vital Signs Last 24 Hrs  T(C): 36.8 (07 May 2023 11:22), Max: 37.1 (06 May 2023 23:56)  T(F): 98.3 (07 May 2023 11:22), Max: 98.7 (06 May 2023 23:56)  HR: 96 (07 May 2023 11:22) (80 - 96)  BP: 166/61 (07 May 2023 11:22) (110/59 - 166/61)  BP(mean): --  RR: 18 (07 May 2023 11:22) (18 - 19)  SpO2: 99% (07 May 2023 11:22) (95% - 99%)    Parameters below as of 07 May 2023 11:22  Patient On (Oxygen Delivery Method): room air      CONSTITUTIONAL: NAD, frail   EYES: PERRLA; conjunctiva and sclera clear  ENMT: NGT  NECK: Supple  RESPIRATORY: Normal respiratory effort; CTAB  CARDIOVASCULAR: RRR, no JVD, no peripheral edema   ABDOMEN: Nontender to palpation, normoactive BS, no guarding/rigidity  MUSCLOSKELETAL:  no clubbing/cyanosis, no joint swelling or tenderness to palpation  PSYCH: AO  x 0 , unable to fully assess  NEUROLOGY: CN 2-12 are intact and symmetric;, unable to fully assess  SKIN: No rashes; no palpable lesions    LABS:                        9.2    14.98 )-----------( 262      ( 06 May 2023 10:03 )             29.1     05-07    143  |  106  |  112<H>  ----------------------------<  261<H>  4.4   |  21<L>  |  2.66<H>    Ca    9.1      07 May 2023 10:38  Phos  3.0     05-07  Mg     2.3     05-07                  RADIOLOGY & ADDITIONAL TESTS:  Results Reviewed:   Imaging Personally Reviewed:  Electrocardiogram Personally Reviewed:    COORDINATION OF CARE:  Care Discussed with Consultants/Other Providers [Y/N]:  Prior or Outpatient Records Reviewed [Y/N]:

## 2023-05-07 NOTE — PROGRESS NOTE ADULT - ASSESSMENT
88yoF w/ PMHx of advanced dementia (at B/L patient is bedbound, nonverbal, though able to tolerate PO diet - per family, patient has hearty appetite), HTN, HLD, IDDM, who presents from home where she lives with her , brought in by her daughter (son is at her bedside during this evaluation) w/ complaints of daughter noticing the patient has been having difficulty breathing over the past few days in setting of notable increased swelling of the patient's hands, feet, and face. Of note, patient was recently hospitalized at [facility] for aspiration PNA (and found to have demand ischemia) when she presented with similar complaints and was found to be hypoxic; at the time, S+S eval was recommended, but per documentation at the end of Jan 2023,      1- TOÑITO   2- CHF   3- HTN  4- hypernatremia  5- PNA        toñito in setting of chf as well as more likely due to contrast nephropathy and possible pneumonia   creatinine is  slowly improving   urinary retention requiring intermittent catheterization, castillo placement if continues to retain.   hypernatremia improving,   free water 200 cc q4h,   bumex hold today   re eval in near future during hosp   her echo reveals mod to severe range AS  she is not a dialysis candidate in setting of her advance dementia   transition BP meds to oral via NGT, when able change hydralazine to 10 mg po tid   O2 supplementation as needed  strict I/O  trend creatinine and electrolytes daily

## 2023-05-07 NOTE — PROGRESS NOTE ADULT - ASSESSMENT
Assessment  DMT2: 88y Female with DM T2 with hyperglycemia, A1C7.1% , was on insulin at home sliding scale, now on bolus insulin with coverage, blood sugars are still running high. She missed her NPH, Tube feeds via NG tube.   Dysphagia: NG tube on tube feeds, GI eval for PEG, pending optimization.   HTN: on antihypertensive medications, monitored, asymptomatic.  HLD: on statin, diet controlled.  TSH 0.68 Euthyroid      Marianne Ortiz MD  Cell: [phone]  Office: [phone]

## 2023-05-08 NOTE — PROGRESS NOTE ADULT - SUBJECTIVE AND OBJECTIVE BOX
Subjective: Patient seen and examined. No new events except as noted.     REVIEW OF SYSTEMS:    Unable to obtain     MEDICATIONS:  MEDICATIONS  (STANDING):  aspirin  chewable 81 milliGRAM(s) Oral daily  dextrose 5% + sodium chloride 0.45%. 1000 milliLiter(s) (50 mL/Hr) IV Continuous <Continuous>  dextrose 5%. 1000 milliLiter(s) (50 mL/Hr) IV Continuous <Continuous>  dextrose 5%. 1000 milliLiter(s) (100 mL/Hr) IV Continuous <Continuous>  dextrose 50% Injectable 25 Gram(s) IV Push once  dextrose 50% Injectable 25 Gram(s) IV Push once  dextrose 50% Injectable 12.5 Gram(s) IV Push once  glucagon  Injectable 1 milliGRAM(s) IntraMuscular once  heparin   Injectable 5000 Unit(s) SubCutaneous every 12 hours  hydrALAZINE Injectable 7.5 milliGRAM(s) IV Push every 6 hours  insulin lispro (ADMELOG) corrective regimen sliding scale   SubCutaneous every 6 hours  latanoprost 0.005% Ophthalmic Solution 1 Drop(s) Both EYES at bedtime  metoprolol tartrate Injectable 5 milliGRAM(s) IV Push every 6 hours  Nephro-penny 1 Tablet(s) Oral daily  thiamine 100 milliGRAM(s) Oral daily      PHYSICAL EXAM:  T(C): 36.6 (05-08-23 @ 04:14), Max: 37.1 (05-08-23 @ 00:02)  HR: 84 (05-08-23 @ 04:14) (82 - 99)  BP: 132/66 (05-08-23 @ 04:14) (132/66 - 166/61)  RR: 17 (05-08-23 @ 04:14) (16 - 18)  SpO2: 95% (05-08-23 @ 04:14) (95% - 99%)  Wt(kg): --  I&O's Summary    07 May 2023 07:01  -  08 May 2023 07:00  --------------------------------------------------------  IN: 390 mL / OUT: 150 mL / NET: 240 mL    08 May 2023 07:01  -  08 May 2023 11:02  --------------------------------------------------------  IN: 0 mL / OUT: 175 mL / NET: -175 mL          Appearance: NAD non verbal 	  HEENT: Normal oral mucosa, PERRL, EOMI	  Lymphatic: No lymphadenopathy +NGT   Cardiovascular: Normal S1 S2, No JVD, No murmurs  Respiratory: decreased bs, on NC   Psychiatry: A & O x 0  Gastrointestinal:  Soft, Non-tender, + BS	  Skin: No rashes, No ecchymoses, No cyanosis	  Neurologic: Non-focal  Extremities: decreased  range of motion, No  edema  Vascular: Peripheral pulses palpable 2+ bilaterally        LABS:    CARDIAC MARKERS:            05-07    143  |  106  |  112<H>  ----------------------------<  261<H>  4.4   |  21<L>  |  2.66<H>    Ca    9.1      07 May 2023 10:38  Phos  3.0     05-07  Mg     2.3     05-07      proBNP:   Lipid Profile:   HgA1c:   TSH:             TELEMETRY: 	    ECG:  	  RADIOLOGY:   DIAGNOSTIC TESTING:  [ ] Echocardiogram:  [ ]  Catheterization:  [ ] Stress Test:    OTHER:

## 2023-05-08 NOTE — PROGRESS NOTE ADULT - SUBJECTIVE AND OBJECTIVE BOX
Kobi De MD  Division of Hospital Medicine  Available on MS teams until 7pm  If no response or off-hours, page [phone]  -------------------------------------    Patient is a 88y old  Female who presents with a chief complaint of SOB/GARRISON, swelling of hands/legs/face (08 May 2023 15:10)    SUBJECTIVE / OVERNIGHT EVENTS: none acute  ADDITIONAL REVIEW OF SYSTEMS: pt sleeping, somewhat arousable to tactile stimli, grunts but doesn't open eyes.     MEDICATIONS  (STANDING):  aspirin  chewable 81 milliGRAM(s) Oral daily  dextrose 5%. 1000 milliLiter(s) (50 mL/Hr) IV Continuous <Continuous>  dextrose 5%. 1000 milliLiter(s) (100 mL/Hr) IV Continuous <Continuous>  dextrose 50% Injectable 25 Gram(s) IV Push once  dextrose 50% Injectable 25 Gram(s) IV Push once  dextrose 50% Injectable 12.5 Gram(s) IV Push once  glucagon  Injectable 1 milliGRAM(s) IntraMuscular once  heparin   Injectable 5000 Unit(s) SubCutaneous every 12 hours  hydrALAZINE 10 milliGRAM(s) Oral every 8 hours  insulin lispro (ADMELOG) corrective regimen sliding scale   SubCutaneous every 6 hours  insulin NPH human recombinant 6 Unit(s) SubCutaneous every 6 hours  latanoprost 0.005% Ophthalmic Solution 1 Drop(s) Both EYES at bedtime  metoprolol tartrate Injectable 5 milliGRAM(s) IV Push every 6 hours  Nephro-penny 1 Tablet(s) Oral daily  thiamine 100 milliGRAM(s) Oral daily    MEDICATIONS  (PRN):  dextrose Oral Gel 15 Gram(s) Oral once PRN Blood Glucose LESS THAN 70 milliGRAM(s)/deciliter      CAPILLARY BLOOD GLUCOSE      POCT Blood Glucose.: 202 mg/dL (08 May 2023 13:13)  POCT Blood Glucose.: 166 mg/dL (08 May 2023 11:19)  POCT Blood Glucose.: 73 mg/dL (08 May 2023 06:33)  POCT Blood Glucose.: 256 mg/dL (07 May 2023 23:51)  POCT Blood Glucose.: 358 mg/dL (07 May 2023 17:49)    I&O's Summary    07 May 2023 07:01  -  08 May 2023 07:00  --------------------------------------------------------  IN: 390 mL / OUT: 150 mL / NET: 240 mL    08 May 2023 07:01  -  08 May 2023 15:43  --------------------------------------------------------  IN: 0 mL / OUT: 175 mL / NET: -175 mL        PHYSICAL EXAM:  Vital Signs Last 24 Hrs  T(C): 36.6 (08 May 2023 12:11), Max: 37.1 (08 May 2023 00:02)  T(F): 97.9 (08 May 2023 12:11), Max: 98.8 (08 May 2023 00:02)  HR: 91 (08 May 2023 12:11) (82 - 99)  BP: 156/61 (08 May 2023 12:11) (132/66 - 160/54)  BP(mean): --  RR: 18 (08 May 2023 12:11) (16 - 18)  SpO2: 95% (08 May 2023 12:11) (95% - 99%)    Parameters below as of 08 May 2023 12:11  Patient On (Oxygen Delivery Method): room air      CONSTITUTIONAL: NAD, frail  EYES: PERRLA; conjunctiva and sclera clear  ENMT: MMM  NECK: Supple  RESPIRATORY: Normal respiratory effort; CTAB  CARDIOVASCULAR: RRR, no JVD, no peripheral edema   ABDOMEN: Nontender to palpation, normoactive BS, no guarding/rigidity  MUSCLOSKELETAL:  no clubbing/cyanosis, no joint swelling or tenderness to palpation  PSYCH: A+O x 0, unable to assess  NEUROLOGY: unable to assess  SKIN: No rashes; no palpable lesions    LABS:                        9.1    24.03 )-----------( 220      ( 08 May 2023 12:13 )             28.5     05-08    141  |  102  |  103<H>  ----------------------------<  146<H>  4.1   |  23  |  2.35<H>    Ca    9.1      08 May 2023 12:13  Phos  3.0     05-07  Mg     2.3     05-07                  RADIOLOGY & ADDITIONAL TESTS:  Results Reviewed:   Imaging Personally Reviewed:  Electrocardiogram Personally Reviewed:    COORDINATION OF CARE:  Care Discussed with Consultants/Other Providers [Y/N]:  Prior or Outpatient Records Reviewed [Y/N]:

## 2023-05-08 NOTE — CHART NOTE - NSCHARTNOTEFT_GEN_A_CORE
Patient scheduled for PEG today however labs notable for significant increase in leukocytosis to 24 today. Afebrile however has been admitted with infection and off antibiotics.   - would recommend eval for infection today  - if all negative, no s/s and improvement in leukocytosis tomorrow; can plan for EGD/PEG tomorrow  - hold TFs after midnight      Marissa Frias PGY-6  Gastroenterology/Hepatology Fellow  Pager #[phone] ([facility]) or [phone] ([facility])  Available on [employer] Teams.  Please contact on-call GI fellow via answering service ([phone]) after 5pm and before 8am, and on weekends.

## 2023-05-08 NOTE — PRE-OP CHECKLIST - NS PREOP CHK TRT ENDOSCOPY TIME
08-May-2023 13:30

## 2023-05-08 NOTE — PROGRESS NOTE ADULT - PROBLEM SELECTOR PLAN 1
Will continue current insulin regimen for now.   Will continue monitoring  blood sugars, will Follow up.

## 2023-05-08 NOTE — PROGRESS NOTE ADULT - ASSESSMENT
Alternate MRN: [id number]    88yoF w/ PMHx of advanced dementia (at B/L patient is bedbound, nonverbal, though able to tolerate PO diet - per family, patient has hearty appetite), HTN, HLD, IDDM, brought in by family for difficulty breathing over the past few days in setting of notable increased swelling of the patient's hands, feet, and face; a/w hypoxic respiratory failure 2/2 aspiration PNA and acute decompensated diastolic heart failure, with course c/b significant TOÑITO, poor oral intake now s/p NGT and pending PEG.

## 2023-05-08 NOTE — PROGRESS NOTE ADULT - ASSESSMENT
88yoF w/ PMHx of advanced dementia (at B/L patient is bedbound, nonverbal, though able to tolerate PO diet - per family, patient has hearty appetite), HTN, HLD, IDDM, who presents from home where she lives with her , brought in by her daughter (son is at her bedside during this evaluation) w/ complaints of daughter noticing the patient has been having difficulty breathing over the past few days in setting of notable increased swelling of the patient's hands, feet, and face. Of note, patient was recently hospitalized at [facility] for aspiration PNA (and found to have demand ischemia) when she presented with similar complaints and was found to be hypoxic; at the time, S+S eval was recommended, but per documentation at the end of Jan 2023,      1- TOÑITO   2- CHF   3- HTN  4- hypernatremia  5- PNA        toñito in setting of chf as well as more likely due to contrast nephropathy and possible pneumonia   creatinine is slowly improving   urinary retention requiring intermittent catheterization,.   sodium improved with free water, decrease to 200 cc q8h  bumex hold today   re eval in near future during hosp   her echo reveals mod to severe range AS  she is not a dialysis candidate in setting of her advance dementia   transition BP meds to oral via NGT, when able change hydralazine to 10 mg po tid  anemia, trend hgb   O2 supplementation as needed  strict I/O  trend creatinine and electrolytes daily

## 2023-05-08 NOTE — PROGRESS NOTE ADULT - SUBJECTIVE AND OBJECTIVE BOX
[facility]   [phone]  RENAL FOLLOW UP NOTE  --------------------------------------------------------------------------------  Chief Complaint:    24 hour events/subjective:    patient seen and examined.   appears comfortable    PAST HISTORY  --------------------------------------------------------------------------------  No significant changes to PMH, PSH, FHx, SHx, unless otherwise noted    ALLERGIES & MEDICATIONS  --------------------------------------------------------------------------------  Allergies    No Known Allergies  Allergy Status Unknown    Intolerances      Standing Inpatient Medications  aspirin  chewable 81 milliGRAM(s) Oral daily  dextrose 5% + sodium chloride 0.45%. 1000 milliLiter(s) IV Continuous <Continuous>  dextrose 5%. 1000 milliLiter(s) IV Continuous <Continuous>  dextrose 5%. 1000 milliLiter(s) IV Continuous <Continuous>  dextrose 50% Injectable 25 Gram(s) IV Push once  dextrose 50% Injectable 25 Gram(s) IV Push once  dextrose 50% Injectable 12.5 Gram(s) IV Push once  glucagon  Injectable 1 milliGRAM(s) IntraMuscular once  heparin   Injectable 5000 Unit(s) SubCutaneous every 12 hours  hydrALAZINE Injectable 7.5 milliGRAM(s) IV Push every 6 hours  insulin lispro (ADMELOG) corrective regimen sliding scale   SubCutaneous every 6 hours  latanoprost 0.005% Ophthalmic Solution 1 Drop(s) Both EYES at bedtime  metoprolol tartrate Injectable 5 milliGRAM(s) IV Push every 6 hours  Nephro-penny 1 Tablet(s) Oral daily  thiamine 100 milliGRAM(s) Oral daily    PRN Inpatient Medications  dextrose Oral Gel 15 Gram(s) Oral once PRN      REVIEW OF SYSTEMS  --------------------------------------------------------------------------------    patient is unable to give ROS    VITALS/PHYSICAL EXAM  --------------------------------------------------------------------------------  T(C): 36.6 (05-08-23 @ 12:11), Max: 37.1 (05-08-23 @ 00:02)  HR: 91 (05-08-23 @ 12:11) (82 - 99)  BP: 156/61 (05-08-23 @ 12:11) (132/66 - 160/54)  RR: 18 (05-08-23 @ 12:11) (16 - 18)  SpO2: 95% (05-08-23 @ 12:11) (95% - 99%)  Wt(kg): --        05-07-23 @ 07:01  -  05-08-23 @ 07:00  --------------------------------------------------------  IN: 390 mL / OUT: 150 mL / NET: 240 mL    05-08-23 @ 07:01  -  05-08-23 @ 15:10  --------------------------------------------------------  IN: 0 mL / OUT: 175 mL / NET: -175 mL      Physical Exam:  	              Gen: ill appearing elderly F, On O2  + feeding tube   	Pulm: decrease bs, +coarse bs, no wheezing  	CV: no JVD. RRR, S1S2; no rub  	Abd: +BS, soft, nontender/nondistended, +NGT  	: No bladder distention   	UE: Warm, no cyanosis  no clubbing,  no edema;  	LE: Warm, no cyanosis  no clubbing, no edema      LABS/STUDIES  --------------------------------------------------------------------------------              9.1    24.03 >-----------<  220      [05-08-23 @ 12:13]              28.5     141  |  102  |  103  ----------------------------<  146      [05-08-23 @ 12:13]  4.1   |  23  |  2.35        Ca     9.1     [05-08-23 @ 12:13]      Mg     2.3     [05-07-23 @ 10:38]      Phos  3.0     [05-07-23 @ 10:38]            Creatinine Trend:  SCr 2.35 [05-08 @ 12:13]  SCr 2.66 [05-07 @ 10:38]  SCr 2.97 [05-06 @ 10:03]  SCr 3.21 [05-05 @ 18:14]  SCr 3.30 [05-05 @ 07:12]              Urinalysis - [04-23-23 @ 14:43]      Color Colorless / Appearance Clear / SG 1.020 / pH 6.0      Gluc Trace / Ketone Negative  / Bili Negative / Urobili Negative       Blood Small / Protein 100 mg/dl / Leuk Est Negative / Nitrite Negative      RBC 6 / WBC 0 / Hyaline 0 / Gran  / Sq Epi  / Non Sq Epi  / Bacteria Negative      TSH 0.68      [05-04-23 @ 11:38]  Lipid: chol 216, TG 80, HDL 49, LDL --      [08-08-22 @ 06:31]

## 2023-05-08 NOTE — PROGRESS NOTE ADULT - PROBLEM SELECTOR PLAN 1
Pt not compliant with SLP eval, with high aspiration risk, severe dysphagia, electrolyte derangements  -Failed several swallowing evals, SLP continues to recommend NPO  -Family discussion about GOC 5/2 and 5/3: requesting PEG  -NGT placed  -BMP BID, monitor for refeeding syndrome  -Replete electrolytes aggressively  -Leukocytosis likely 2/2 hemoconcentration given that pt was on cefepime, now downtrending. CXR demonstrates improved opacities, RVP negative.  -PEG tube placement postponsed due to worsening leukocytosis

## 2023-05-08 NOTE — PROGRESS NOTE ADULT - PROBLEM SELECTOR PLAN 2
2/2 combination of pulm edema and aspiration PNA, improving  - now weaned off supplemental O2  - s/p cefepime for now, plan for 7d course in total (4/26-5/2)    #leukocytosis- initially improving, now 24k 5/8  - pt more sleepy today, possible new infection  - aspiration vs. uti  - no signs of phlebitis/cellulitis discernable  - f/u repeat CXR, UA  - will start empiric zosyn once cultures all sent 2/2 combination of pulm edema and aspiration PNA, improving  - now weaned off supplemental O2  - s/p cefepime for now, plan for 7d course in total (4/26-5/2)    #leukocytosis- initially improving, now 24k 5/8  - pt more sleepy today, possible new infection  - aspiration vs. uti  - no signs of phlebitis/cellulitis discernable  - f/u repeat CXR, UA  - will start empiric cefepime renally dosed once cultures all sent

## 2023-05-08 NOTE — PROGRESS NOTE ADULT - SUBJECTIVE AND OBJECTIVE BOX
Chief complaint  Patient is a 88y old  Female who presents with a chief complaint of SOB/GARRISON, swelling of hands/legs/face (08 May 2023 11:02)         Labs and Fingersticks  CAPILLARY BLOOD GLUCOSE      POCT Blood Glucose.: 202 mg/dL (08 May 2023 13:13)  POCT Blood Glucose.: 166 mg/dL (08 May 2023 11:19)  POCT Blood Glucose.: 73 mg/dL (08 May 2023 06:33)  POCT Blood Glucose.: 256 mg/dL (07 May 2023 23:51)  POCT Blood Glucose.: 358 mg/dL (07 May 2023 17:49)      Anion Gap, Serum: 16 (05-08 @ 12:13)  Anion Gap, Serum: 16 (05-07 @ 10:38)      Calcium, Total Serum: 9.1 (05-08 @ 12:13)  Calcium, Total Serum: 9.1 (05-07 @ 10:38)          05-08    141  |  102  |  103<H>  ----------------------------<  146<H>  4.1   |  23  |  2.35<H>    Ca    9.1      08 May 2023 12:13  Phos  3.0     05-07  Mg     2.3     05-07                          9.1    24.03 )-----------( 220      ( 08 May 2023 12:13 )             28.5     Medications  MEDICATIONS  (STANDING):  aspirin  chewable 81 milliGRAM(s) Oral daily  dextrose 5% + sodium chloride 0.45%. 1000 milliLiter(s) (50 mL/Hr) IV Continuous <Continuous>  dextrose 5%. 1000 milliLiter(s) (100 mL/Hr) IV Continuous <Continuous>  dextrose 5%. 1000 milliLiter(s) (50 mL/Hr) IV Continuous <Continuous>  dextrose 50% Injectable 25 Gram(s) IV Push once  dextrose 50% Injectable 12.5 Gram(s) IV Push once  dextrose 50% Injectable 25 Gram(s) IV Push once  glucagon  Injectable 1 milliGRAM(s) IntraMuscular once  heparin   Injectable 5000 Unit(s) SubCutaneous every 12 hours  hydrALAZINE Injectable 7.5 milliGRAM(s) IV Push every 6 hours  insulin lispro (ADMELOG) corrective regimen sliding scale   SubCutaneous every 6 hours  latanoprost 0.005% Ophthalmic Solution 1 Drop(s) Both EYES at bedtime  metoprolol tartrate Injectable 5 milliGRAM(s) IV Push every 6 hours  Nephro-penny 1 Tablet(s) Oral daily  thiamine 100 milliGRAM(s) Oral daily      Physical Exam  General: Patient comfortable in bed   Vital Signs Last 12 Hrs  T(F): 97.9 (05-08-23 @ 12:11), Max: 97.9 (05-08-23 @ 12:11)  HR: 91 (05-08-23 @ 12:11) (84 - 91)  BP: 156/61 (05-08-23 @ 12:11) (132/66 - 156/61)  BP(mean): --  RR: 18 (05-08-23 @ 12:11) (17 - 18)  SpO2: 95% (05-08-23 @ 12:11) (95% - 95%)    CVS: S1S2   Respiratory: No wheezing, no crepitations  GI: Abdomen soft, bowel sounds positive  Musculoskeletal:  moves all extremities  : Voiding        Chief complaint  Patient is a 88y old  Female who presents with a chief complaint of SOB/GARRISON, swelling of hands/legs/face (08 May 2023 11:02)         Labs and Fingersticks  CAPILLARY BLOOD GLUCOSE      POCT Blood Glucose.: 202 mg/dL (08 May 2023 13:13)  POCT Blood Glucose.: 166 mg/dL (08 May 2023 11:19)  POCT Blood Glucose.: 73 mg/dL (08 May 2023 06:33)  POCT Blood Glucose.: 256 mg/dL (07 May 2023 23:51)  POCT Blood Glucose.: 358 mg/dL (07 May 2023 17:49)      Anion Gap, Serum: 16 (05-08 @ 12:13)  Anion Gap, Serum: 16 (05-07 @ 10:38)      Calcium, Total Serum: 9.1 (05-08 @ 12:13)  Calcium, Total Serum: 9.1 (05-07 @ 10:38)          05-08    141  |  102  |  103<H>  ----------------------------<  146<H>  4.1   |  23  |  2.35<H>    Ca    9.1      08 May 2023 12:13  Phos  3.0     05-07  Mg     2.3     05-07                          9.1    24.03 )-----------( 220      ( 08 May 2023 12:13 )             28.5     Medications  MEDICATIONS  (STANDING):  aspirin  chewable 81 milliGRAM(s) Oral daily  dextrose 5% + sodium chloride 0.45%. 1000 milliLiter(s) (50 mL/Hr) IV Continuous <Continuous>  dextrose 5%. 1000 milliLiter(s) (100 mL/Hr) IV Continuous <Continuous>  dextrose 5%. 1000 milliLiter(s) (50 mL/Hr) IV Continuous <Continuous>  dextrose 50% Injectable 25 Gram(s) IV Push once  dextrose 50% Injectable 12.5 Gram(s) IV Push once  dextrose 50% Injectable 25 Gram(s) IV Push once  glucagon  Injectable 1 milliGRAM(s) IntraMuscular once  heparin   Injectable 5000 Unit(s) SubCutaneous every 12 hours  hydrALAZINE Injectable 7.5 milliGRAM(s) IV Push every 6 hours  insulin lispro (ADMELOG) corrective regimen sliding scale   SubCutaneous every 6 hours  latanoprost 0.005% Ophthalmic Solution 1 Drop(s) Both EYES at bedtime  metoprolol tartrate Injectable 5 milliGRAM(s) IV Push every 6 hours  Nephro-penny 1 Tablet(s) Oral daily  thiamine 100 milliGRAM(s) Oral daily      Physical Exam  General: Patient comfortable in bed   Vital Signs Last 12 Hrs  T(F): 97.9 (05-08-23 @ 12:11), Max: 97.9 (05-08-23 @ 12:11)  HR: 91 (05-08-23 @ 12:11) (84 - 91)  BP: 156/61 (05-08-23 @ 12:11) (132/66 - 156/61)  BP(mean): --  RR: 18 (05-08-23 @ 12:11) (17 - 18)  SpO2: 95% (05-08-23 @ 12:11) (95% - 95%)    CVS: S1S2   Respiratory: No wheezing, no crepitations  GI: Abdomen soft, bowel sounds positive  Musculoskeletal:  moves all extremities  : Voiding

## 2023-05-08 NOTE — PROGRESS NOTE ADULT - ASSESSMENT
Assessment  DMT2: 88y Female with DM T2 with hyperglycemia, A1C7.1% , was on insulin at home sliding scale, now on bolus insulin with coverage, blood sugars are fluctuating. Tube feeds via NG tube. NPO currently, pending PEG placement   Dysphagia: NG tube on tube feeds, GI eval for PEG, pending optimization.   HTN: on antihypertensive medications, monitored, asymptomatic.  HLD: on statin, diet controlled.  TSH 0.68 Euthyroid        Discussed plan and management with Dr Angel Mackenzie NP - TEAMS  Marianne Ortiz MD  Cell: [phone]  Office: [phone]      Assessment  DMT2: 88y Female with DM T2 with hyperglycemia, A1C7.1% , was on insulin at home sliding scale, now on bolus insulin with coverage, blood sugars  are fluctuating. Tube feeds via NG tube. NPO currently, pending PEG placement   Dysphagia: NG tube on tube feeds, GI eval for PEG, pending optimization.   HTN: on antihypertensive medications, monitored, asymptomatic.  HLD: on statin, diet controlled.  TSH 0.68 Euthyroid        Discussed plan and management with Dr Angel Mackenzie NP - TEAMS  Marianne Ortiz MD  Cell: [phone]  Office: [phone]

## 2023-05-09 NOTE — PROGRESS NOTE ADULT - PROBLEM SELECTOR PLAN 1
Pt not compliant with SLP eval, with high aspiration risk, severe dysphagia, electrolyte derangements  -Failed several swallowing evals, SLP continues to recommend NPO  -Family discussion about GOC 5/2 and 5/3: requesting PEG  -NGT placed  -BMP BID, monitor for refeeding syndrome  -Replete electrolytes aggressively  -Leukocytosis initially improved, then rebound increase, now improving again after starting empiric cefepime, although infectious source is unclear given negative UA clear CXR- could be early asp pna vs. infected wound  - f/u wound care re-eval  -PEG tube placement postponsed pending blood cultures negative

## 2023-05-09 NOTE — PROGRESS NOTE ADULT - PROBLEM SELECTOR PLAN 1
Will continue current insulin regimen for now.   Holding NPH , NPO status.   Will continue monitoring  blood sugars, will Follow up.

## 2023-05-09 NOTE — PROGRESS NOTE ADULT - SUBJECTIVE AND OBJECTIVE BOX
[facility]   [phone]  RENAL FOLLOW UP NOTE  --------------------------------------------------------------------------------  Chief Complaint:    24 hour events/subjective:    patient seen and examined.   appears comfortable    PAST HISTORY  --------------------------------------------------------------------------------  No significant changes to PMH, PSH, FHx, SHx, unless otherwise noted    ALLERGIES & MEDICATIONS  --------------------------------------------------------------------------------  Allergies    No Known Allergies  Allergy Status Unknown    Intolerances      Standing Inpatient Medications  aspirin  chewable 81 milliGRAM(s) Oral daily  cefepime   IVPB      cefepime   IVPB 2000 milliGRAM(s) IV Intermittent every 24 hours  dextrose 5% + sodium chloride 0.45%. 1000 milliLiter(s) IV Continuous <Continuous>  dextrose 5%. 1000 milliLiter(s) IV Continuous <Continuous>  dextrose 5%. 1000 milliLiter(s) IV Continuous <Continuous>  dextrose 50% Injectable 25 Gram(s) IV Push once  dextrose 50% Injectable 25 Gram(s) IV Push once  dextrose 50% Injectable 12.5 Gram(s) IV Push once  glucagon  Injectable 1 milliGRAM(s) IntraMuscular once  heparin   Injectable 5000 Unit(s) SubCutaneous every 12 hours  hydrALAZINE 10 milliGRAM(s) Oral every 8 hours  insulin lispro (ADMELOG) corrective regimen sliding scale   SubCutaneous every 6 hours  latanoprost 0.005% Ophthalmic Solution 1 Drop(s) Both EYES at bedtime  metoprolol tartrate Injectable 5 milliGRAM(s) IV Push every 6 hours  Nephro-penny 1 Tablet(s) Oral daily  thiamine 100 milliGRAM(s) Oral daily    PRN Inpatient Medications  dextrose Oral Gel 15 Gram(s) Oral once PRN      REVIEW OF SYSTEMS  --------------------------------------------------------------------------------    patient is unable to give ROS    VITALS/PHYSICAL EXAM  --------------------------------------------------------------------------------  T(C): 36.6 (05-09-23 @ 11:39), Max: 36.9 (05-08-23 @ 21:24)  HR: 79 (05-09-23 @ 15:30) (79 - 98)  BP: 168/61 (05-09-23 @ 15:30) (132/57 - 168/61)  RR: 18 (05-09-23 @ 15:30) (18 - 18)  SpO2: 98% (05-09-23 @ 15:30) (98% - 100%)  Wt(kg): --        05-08-23 @ 07:01  -  05-09-23 @ 07:00  --------------------------------------------------------  IN: 600 mL / OUT: 775 mL / NET: -175 mL      Physical Exam:  	              Gen: ill appearing elderly F, On O2    	Pulm: decrease bs, +coarse bs, no wheezing  	CV: no JVD. RRR, S1S2; no rub  	Abd: +BS, soft, nontender/nondistended, +NGT  	: No bladder distention   	UE: Warm, no cyanosis  no clubbing,  no edema;  	LE: Warm, no cyanosis  no clubbing, no edema    LABS/STUDIES  --------------------------------------------------------------------------------              8.4    22.81 >-----------<  240      [05-09-23 @ 10:44]              26.8     143  |  104  |  104  ----------------------------<  199      [05-09-23 @ 10:44]  3.9   |  24  |  1.94        Ca     9.1     [05-09-23 @ 10:44]            Creatinine Trend:  SCr 1.94 [05-09 @ 10:44]  SCr 2.35 [05-08 @ 12:13]  SCr 2.66 [05-07 @ 10:38]  SCr 2.97 [05-06 @ 10:03]  SCr 3.21 [05-05 @ 18:14]              Urinalysis - [05-09-23 @ 01:26]      Color Light Yellow / Appearance Clear / SG 1.016 / pH 5.5      Gluc Negative / Ketone Negative  / Bili Negative / Urobili Negative       Blood Trace / Protein 100 mg/dL / Leuk Est Negative / Nitrite Negative      RBC 4 / WBC 2 / Hyaline 3 / Gran  / Sq Epi  / Non Sq Epi  / Bacteria Negative      TSH 0.68      [05-04-23 @ 11:38]  Lipid: chol 216, TG 80, HDL 49, LDL --      [08-08-22 @ 06:31]

## 2023-05-09 NOTE — PROGRESS NOTE ADULT - ASSESSMENT
88yoF w/ PMHx of advanced dementia (at B/L patient is bedbound, nonverbal, though able to tolerate PO diet - per family, patient has hearty appetite), HTN, HLD, IDDM, who presents from home where she lives with her , brought in by her daughter (son is at her bedside during this evaluation) w/ complaints of daughter noticing the patient has been having difficulty breathing over the past few days in setting of notable increased swelling of the patient's hands, feet, and face. Of note, patient was recently hospitalized at [facility] for aspiration PNA (and found to have demand ischemia) when she presented with similar complaints and was found to be hypoxic; at the time, S+S eval was recommended, but per documentation at the end of Jan 2023,      1- TOÑITO   2- CHF   3- HTN  4- hypernatremia  5- PNA        toñito in setting of chf as well as more likely due to contrast nephropathy and possible pneumonia   creatinine is slowly improving   urinary retention requiring intermittent catheterization,.   hypernatremia is improved with free water, continue 200 cc q8h  bumex hold on hold to re-eval in near future during hosp   her echo reveals mod to severe range AS  she is not a dialysis candidate in setting of her advance dementia   hydralazine 10 mg po tid and increase as BP tolerates  anemia, trend hgb   O2 supplementation as needed  strict I/O  trend creatinine and electrolytes daily  plan for peg placement, now on hold for worsening leukocytosis. blood cx pending. restarted on cefepime 2G daily.

## 2023-05-09 NOTE — PROGRESS NOTE ADULT - SUBJECTIVE AND OBJECTIVE BOX
Chief complaint  Patient is a 88y old  Female who presents with a chief complaint of SOB/GARRISON, swelling of hands/legs/face (09 May 2023 13:40)         Labs and Fingersticks  CAPILLARY BLOOD GLUCOSE      POCT Blood Glucose.: 246 mg/dL (09 May 2023 12:48)  POCT Blood Glucose.: 274 mg/dL (09 May 2023 06:05)  POCT Blood Glucose.: 243 mg/dL (08 May 2023 23:52)  POCT Blood Glucose.: 210 mg/dL (08 May 2023 17:12)      Anion Gap, Serum: 15 (05-09 @ 10:44)  Anion Gap, Serum: 16 (05-08 @ 12:13)      Calcium, Total Serum: 9.1 (05-09 @ 10:44)  Calcium, Total Serum: 9.1 (05-08 @ 12:13)          05-09    143  |  104  |  104<H>  ----------------------------<  199<H>  3.9   |  24  |  1.94<H>    Ca    9.1      09 May 2023 10:44                          8.4    22.81 )-----------( 240      ( 09 May 2023 10:44 )             26.8     Medications  MEDICATIONS  (STANDING):  aspirin  chewable 81 milliGRAM(s) Oral daily  cefepime   IVPB      cefepime   IVPB 2000 milliGRAM(s) IV Intermittent every 24 hours  dextrose 5% + sodium chloride 0.45%. 1000 milliLiter(s) (50 mL/Hr) IV Continuous <Continuous>  dextrose 5%. 1000 milliLiter(s) (50 mL/Hr) IV Continuous <Continuous>  dextrose 5%. 1000 milliLiter(s) (100 mL/Hr) IV Continuous <Continuous>  dextrose 50% Injectable 25 Gram(s) IV Push once  dextrose 50% Injectable 12.5 Gram(s) IV Push once  dextrose 50% Injectable 25 Gram(s) IV Push once  glucagon  Injectable 1 milliGRAM(s) IntraMuscular once  heparin   Injectable 5000 Unit(s) SubCutaneous every 12 hours  hydrALAZINE 10 milliGRAM(s) Oral every 8 hours  insulin lispro (ADMELOG) corrective regimen sliding scale   SubCutaneous every 6 hours  latanoprost 0.005% Ophthalmic Solution 1 Drop(s) Both EYES at bedtime  metoprolol tartrate Injectable 5 milliGRAM(s) IV Push every 6 hours  Nephro-penny 1 Tablet(s) Oral daily  thiamine 100 milliGRAM(s) Oral daily      Physical Exam  General: Patient comfortable in bed  Vital Signs Last 12 Hrs  T(F): 97.9 (05-09-23 @ 11:39), Max: 97.9 (05-09-23 @ 11:39)  HR: 79 (05-09-23 @ 15:30) (79 - 98)  BP: 168/61 (05-09-23 @ 15:30) (132/57 - 168/61)  BP(mean): --  RR: 18 (05-09-23 @ 15:30) (18 - 18)  SpO2: 98% (05-09-23 @ 15:30) (98% - 99%)    CVS: S1S2   Respiratory: No wheezing, no crepitations  GI: Abdomen soft, bowel sounds positive  Musculoskeletal:  moves all extremities  : Voiding            Chief complaint  Patient is a 88y old  Female who presents with a chief complaint of SOB/GARRISON, swelling of hands/legs/face (09 May 2023 13:40)         Labs and Fingersticks  CAPILLARY BLOOD GLUCOSE      POCT Blood Glucose.: 246 mg/dL (09 May 2023 12:48)  POCT Blood Glucose.: 274 mg/dL (09 May 2023 06:05)  POCT Blood Glucose.: 243 mg/dL (08 May 2023 23:52)  POCT Blood Glucose.: 210 mg/dL (08 May 2023 17:12)      Anion Gap, Serum: 15 (05-09 @ 10:44)  Anion Gap, Serum: 16 (05-08 @ 12:13)      Calcium, Total Serum: 9.1 (05-09 @ 10:44)  Calcium, Total Serum: 9.1 (05-08 @ 12:13)          05-09    143  |  104  |  104<H>  ----------------------------<  199<H>  3.9   |  24  |  1.94<H>    Ca    9.1      09 May 2023 10:44                          8.4    22.81 )-----------( 240      ( 09 May 2023 10:44 )             26.8     Medications  MEDICATIONS  (STANDING):  aspirin  chewable 81 milliGRAM(s) Oral daily  cefepime   IVPB      cefepime   IVPB 2000 milliGRAM(s) IV Intermittent every 24 hours  dextrose 5% + sodium chloride 0.45%. 1000 milliLiter(s) (50 mL/Hr) IV Continuous <Continuous>  dextrose 5%. 1000 milliLiter(s) (50 mL/Hr) IV Continuous <Continuous>  dextrose 5%. 1000 milliLiter(s) (100 mL/Hr) IV Continuous <Continuous>  dextrose 50% Injectable 25 Gram(s) IV Push once  dextrose 50% Injectable 12.5 Gram(s) IV Push once  dextrose 50% Injectable 25 Gram(s) IV Push once  glucagon  Injectable 1 milliGRAM(s) IntraMuscular once  heparin   Injectable 5000 Unit(s) SubCutaneous every 12 hours  hydrALAZINE 10 milliGRAM(s) Oral every 8 hours  insulin lispro (ADMELOG) corrective regimen sliding scale   SubCutaneous every 6 hours  latanoprost 0.005% Ophthalmic Solution 1 Drop(s) Both EYES at bedtime  metoprolol tartrate Injectable 5 milliGRAM(s) IV Push every 6 hours  Nephro-penny 1 Tablet(s) Oral daily  thiamine 100 milliGRAM(s) Oral daily      Physical Exam  General: Patient comfortable in bed  Vital Signs Last 12 Hrs  T(F): 97.9 (05-09-23 @ 11:39), Max: 97.9 (05-09-23 @ 11:39)  HR: 79 (05-09-23 @ 15:30) (79 - 98)  BP: 168/61 (05-09-23 @ 15:30) (132/57 - 168/61)  BP(mean): --  RR: 18 (05-09-23 @ 15:30) (18 - 18)  SpO2: 98% (05-09-23 @ 15:30) (98% - 99%)    CVS: S1S2   Respiratory: No wheezing, no crepitations  GI: Abdomen soft, bowel sounds positive  Musculoskeletal:  moves all extremities  : Voiding

## 2023-05-09 NOTE — PROGRESS NOTE ADULT - SUBJECTIVE AND OBJECTIVE BOX
Subjective: Patient seen and examined. No new events except as noted.     REVIEW OF SYSTEMS:  Unable to obtain       MEDICATIONS:  MEDICATIONS  (STANDING):  aspirin  chewable 81 milliGRAM(s) Oral daily  cefepime   IVPB      cefepime   IVPB 2000 milliGRAM(s) IV Intermittent every 24 hours  dextrose 5% + sodium chloride 0.45%. 1000 milliLiter(s) (50 mL/Hr) IV Continuous <Continuous>  dextrose 5%. 1000 milliLiter(s) (100 mL/Hr) IV Continuous <Continuous>  dextrose 5%. 1000 milliLiter(s) (50 mL/Hr) IV Continuous <Continuous>  dextrose 50% Injectable 25 Gram(s) IV Push once  dextrose 50% Injectable 25 Gram(s) IV Push once  dextrose 50% Injectable 12.5 Gram(s) IV Push once  glucagon  Injectable 1 milliGRAM(s) IntraMuscular once  heparin   Injectable 5000 Unit(s) SubCutaneous every 12 hours  hydrALAZINE 10 milliGRAM(s) Oral every 8 hours  insulin lispro (ADMELOG) corrective regimen sliding scale   SubCutaneous every 6 hours  latanoprost 0.005% Ophthalmic Solution 1 Drop(s) Both EYES at bedtime  metoprolol tartrate Injectable 5 milliGRAM(s) IV Push every 6 hours  Nephro-penny 1 Tablet(s) Oral daily  thiamine 100 milliGRAM(s) Oral daily      PHYSICAL EXAM:  T(C): 36.6 (05-09-23 @ 11:39), Max: 36.9 (05-08-23 @ 21:24)  HR: 82 (05-09-23 @ 11:39) (82 - 98)  BP: 132/57 (05-09-23 @ 11:39) (132/57 - 166/62)  RR: 18 (05-09-23 @ 11:39) (18 - 18)  SpO2: 99% (05-09-23 @ 11:39) (99% - 100%)  Wt(kg): --  I&O's Summary    08 May 2023 07:01  -  09 May 2023 07:00  --------------------------------------------------------  IN: 600 mL / OUT: 775 mL / NET: -175 mL            Appearance: NAD non verbal 	  HEENT: Normal oral mucosa, PERRL, EOMI	  Lymphatic: No lymphadenopathy +NGT   Cardiovascular: Normal S1 S2, No JVD, No murmurs  Respiratory: decreased bs, on NC   Psychiatry: A & O x 0  Gastrointestinal:  Soft, Non-tender, + BS	  Skin: No rashes, No ecchymoses, No cyanosis	  Neurologic: Non-focal  Extremities: decreased  range of motion, No  edema  Vascular: Peripheral pulses palpable 2+ bilaterally      LABS:    CARDIAC MARKERS:                                8.4    22.81 )-----------( 240      ( 09 May 2023 10:44 )             26.8     05-09    143  |  104  |  104<H>  ----------------------------<  199<H>  3.9   |  24  |  1.94<H>    Ca    9.1      09 May 2023 10:44      proBNP:   Lipid Profile:   HgA1c:   TSH:     3          TELEMETRY: 	    ECG:  	  RADIOLOGY: < from: Xray Chest 1 View- PORTABLE-Routine (Xray Chest 1 View- PORTABLE-Routine .) (05.08.23 @ 15:52) >    ACC: [id number] EXAM:  XR CHEST PORTABLE ROUTINE 1V   ORDERED BY: LUIS KOCH     PROCEDURE DATE:  05/08/2023          INTERPRETATION:  Chest one view    HISTORY: Leukocytosis    COMPARISON STUDY: 5/6/2023    Frontal expiratory view of the chest shows the heart to be normal in   size. Nasogastric tube reaches the stomach.    The lungs are clear and there is no evidence of pneumothorax nor pleural   effusion.    IMPRESSION:  No active pulmonary disease.        Thank you for the courtesy of this referral.    --- End of Report ---      < end of copied text >  Urinalysis (05.09.23 @ 01:26)   pH Urine: 5.5  Glucose Qualitative, Urine: Negative  Blood, Urine: Trace  Color: Light Yellow  Urine Appearance: Clear  Bilirubin: Negative  Ketone - Urine: Negative  Specific Gravity: 1.016  Protein, Urine: 100 mg/dL  Urobilinogen: Negative  Nitrite: Negative  Leukocyte Esterase Concentration: Negative      DIAGNOSTIC TESTING:  [ ] Echocardiogram:  [ ]  Catheterization:  [ ] Stress Test:    OTHER:

## 2023-05-09 NOTE — PROGRESS NOTE ADULT - ASSESSMENT
Alternate MRN: [id number]    88yoF w/ PMHx of advanced dementia (at B/L patient is bedbound, nonverbal, though able to tolerate PO diet - per family, patient has hearty appetite), HTN, HLD, IDDM, brought in by family for difficulty breathing over the past few days in setting of notable increased swelling of the patient's hands, feet, and face; a/w hypoxic respiratory failure 2/2 aspiration PNA and acute decompensated diastolic heart failure, with course c/b significant TOÑITO, poor oral intake now s/p NGT and pending PEG.

## 2023-05-09 NOTE — PROGRESS NOTE ADULT - SUBJECTIVE AND OBJECTIVE BOX
Kobi De MD  Division of Hospital Medicine  Available on MS teams until 7pm  If no response or off-hours, page [phone]  -------------------------------------    Patient is a 88y old  Female who presents with a chief complaint of SOB/GARRISON, swelling of hands/legs/face (09 May 2023 15:31)    SUBJECTIVE / OVERNIGHT EVENTS: none acute  ADDITIONAL REVIEW OF SYSTEMS: pt nonverbal, grunts in response to tactile stimuli. Per daughter over the phone, pt had trouble urinating yesterday. No fevers/chills. ros otherwise limited.     MEDICATIONS  (STANDING):  aspirin  chewable 81 milliGRAM(s) Oral daily  cefepime   IVPB      cefepime   IVPB 2000 milliGRAM(s) IV Intermittent every 24 hours  dextrose 5% + sodium chloride 0.45%. 1000 milliLiter(s) (50 mL/Hr) IV Continuous <Continuous>  dextrose 5%. 1000 milliLiter(s) (50 mL/Hr) IV Continuous <Continuous>  dextrose 5%. 1000 milliLiter(s) (100 mL/Hr) IV Continuous <Continuous>  dextrose 50% Injectable 25 Gram(s) IV Push once  dextrose 50% Injectable 25 Gram(s) IV Push once  dextrose 50% Injectable 12.5 Gram(s) IV Push once  glucagon  Injectable 1 milliGRAM(s) IntraMuscular once  heparin   Injectable 5000 Unit(s) SubCutaneous every 12 hours  hydrALAZINE 10 milliGRAM(s) Oral every 8 hours  insulin lispro (ADMELOG) corrective regimen sliding scale   SubCutaneous every 6 hours  latanoprost 0.005% Ophthalmic Solution 1 Drop(s) Both EYES at bedtime  metoprolol tartrate Injectable 5 milliGRAM(s) IV Push every 6 hours  Nephro-penny 1 Tablet(s) Oral daily  thiamine 100 milliGRAM(s) Oral daily    MEDICATIONS  (PRN):  dextrose Oral Gel 15 Gram(s) Oral once PRN Blood Glucose LESS THAN 70 milliGRAM(s)/deciliter      CAPILLARY BLOOD GLUCOSE      POCT Blood Glucose.: 246 mg/dL (09 May 2023 12:48)  POCT Blood Glucose.: 274 mg/dL (09 May 2023 06:05)  POCT Blood Glucose.: 243 mg/dL (08 May 2023 23:52)  POCT Blood Glucose.: 210 mg/dL (08 May 2023 17:12)    I&O's Summary    08 May 2023 07:01  -  09 May 2023 07:00  --------------------------------------------------------  IN: 600 mL / OUT: 775 mL / NET: -175 mL        PHYSICAL EXAM:  Vital Signs Last 24 Hrs  T(C): 36.6 (09 May 2023 11:39), Max: 36.9 (08 May 2023 21:24)  T(F): 97.9 (09 May 2023 11:39), Max: 98.5 (08 May 2023 21:24)  HR: 79 (09 May 2023 15:30) (79 - 98)  BP: 168/61 (09 May 2023 15:30) (132/57 - 168/61)  BP(mean): --  RR: 18 (09 May 2023 15:30) (18 - 18)  SpO2: 98% (09 May 2023 15:30) (98% - 100%)    Parameters below as of 09 May 2023 15:30  Patient On (Oxygen Delivery Method): room air      CONSTITUTIONAL: NAD, frail  EYES: PERRLA; conjunctiva and sclera clear  ENMT: MMM, NTG  NECK: Supple  RESPIRATORY: Normal respiratory effort; CTAB  CARDIOVASCULAR: RRR, no JVD, no peripheral edema   ABDOMEN: Nontender to palpation, normoactive BS, no guarding/rigidity  MUSCLOSKELETAL:  no clubbing/cyanosis, no joint swelling or tenderness to palpation  PSYCH: nonverbal, grunts, unable to assess  NEUROLOGY: unable to assess  SKIN: No rashes; no palpable lesions    LABS:                        8.4    22.81 )-----------( 240      ( 09 May 2023 10:44 )             26.8     05    143  |  104  |  104<H>  ----------------------------<  199<H>  3.9   |  24  |  1.94<H>    Ca    9.1      09 May 2023 10:44            Urinalysis Basic - ( 09 May 2023 01:26 )    Color: Light Yellow / Appearance: Clear / S.016 / pH: x  Gluc: x / Ketone: Negative  / Bili: Negative / Urobili: Negative   Blood: x / Protein: 100 mg/dL / Nitrite: Negative   Leuk Esterase: Negative / RBC: 4 /hpf / WBC 2 /HPF   Sq Epi: x / Non Sq Epi: x / Bacteria: Negative          RADIOLOGY & ADDITIONAL TESTS:  Results Reviewed:   Imaging Personally Reviewed:  Electrocardiogram Personally Reviewed:    COORDINATION OF CARE:  Care Discussed with Consultants/Other Providers [Y/N]: GI team  Prior or Outpatient Records Reviewed [Y/N]:

## 2023-05-09 NOTE — CHART NOTE - NSCHARTNOTEFT_GEN_A_CORE
PEG post-poned today due to WBC elevation yesterday. Pending results from bl cx x2 (5/8).    -If bl cx neg and improving leukocytosis can plan for EGD + PEG tomorrow or later this week.  -OK to resume TF today.  -Hold TF at midnight.  -please order labs (CBC, CMP, INR) for 6 AM so that results will be available early tomorrow morning; replete lytes and transfuse as needed    Radha Taveras MD  GI Fellow, PGY-5  Available via [employer] Teams    NON-URGENT CONSULTS:  Please email [email or web address]  AT NIGHT AND ON WEEKENDS:  Contact on-call GI fellow via answering service ([phone]) from 5pm-8am and on weekends/holidays  MONDAY-FRIDAY 8AM-5PM:  Pager# [phone] ([facility]) or [phone] ([facility])  GI Phone# [phone] ([facility])

## 2023-05-09 NOTE — PROGRESS NOTE ADULT - ASSESSMENT
Assessment  DMT2: 88y Female with DM T2 with hyperglycemia, A1C7.1% , was on insulin at home sliding scale, now on bolus insulin with coverage, blood sugars  are fluctuating. Tube feeds via NG tube. NPO currently, pending PEG placement   Dysphagia: NG tube on tube feeds, GI eval for PEG, pending optimization.   HTN: on antihypertensive medications, monitored, asymptomatic.  HLD: on statin, diet controlled.  TSH 0.68 Euthyroid        Discussed plan and management with Dr Angel Mackenzie NP - TEAMS  Marianne Ortiz MD  Cell: [phone]  Office: [phone]      Assessment  DMT2: 88y Female with DM T2 with hyperglycemia, A1C7.1% , was on insulin at home sliding scale, now on bolus insulin with coverage, blood sugars  are fluctuating. Tube feeds via NG tube. NPO currently, off tube feeds, pending PEG placement   Dysphagia: NG tube was on tube feeds, GI eval for PEG, pending optimization.   HTN: on antihypertensive medications, monitored, asymptomatic.  HLD: on statin, diet controlled.  TSH 0.68 Euthyroid        Discussed plan and management with Dr Angel Mackenzie NP - TEAMS  Marianne Ortiz MD  Cell: [phone]  Office: [phone]      Assessment  DMT2: 88y Female with DM T2 with hyperglycemia, A1C7.1% , was on insulin at home sliding scale, now on bolus insulin with coverage, blood sugars  are fluctuating.  Tube feeds via NG tube. NPO currently, off tube feeds, pending PEG placement   Dysphagia: NG tube was on tube feeds, GI eval for PEG, pending optimization.   HTN: on antihypertensive medications, monitored, asymptomatic.  HLD: on statin, diet controlled.  TSH 0.68 Euthyroid        Discussed plan and management with Dr Angel Mackenzie NP - TEAMS  Marianne Ortiz MD  Cell: [phone]  Office: [phone]

## 2023-05-09 NOTE — PROGRESS NOTE ADULT - PROBLEM SELECTOR PLAN 2
2/2 combination of pulm edema and aspiration PNA, improving  - now weaned off supplemental O2  - resumed cefepime empirically- antitipcate 5 day course    #leukocytosis- initially improving, then increased, now improving after resuming cefepime  - pt more sleepy today, possible new infection  - aspiration vs. infected wound  - no signs of phlebitis/cellulitis discernable  - f/u wound care, blood cultures  - cefepime empirically added back on

## 2023-05-10 NOTE — PROGRESS NOTE ADULT - PROBLEM SELECTOR PLAN 1
Will continue current insulin regimen for now.   On NPH 6 units q 6 hr.   Continue sliding scale q6 coverage.   Will continue monitoring  blood sugars, will Follow up.

## 2023-05-10 NOTE — PROGRESS NOTE ADULT - SUBJECTIVE AND OBJECTIVE BOX
Chief complaint  Patient is a 88y old  Female who presents with a chief complaint of SOB/GARRISON, swelling of hands/legs/face (10 May 2023 08:51)         Labs and Fingersticks  CAPILLARY BLOOD GLUCOSE      POCT Blood Glucose.: 305 mg/dL (10 May 2023 11:40)  POCT Blood Glucose.: 412 mg/dL (10 May 2023 06:25)  POCT Blood Glucose.: 512 mg/dL (10 May 2023 00:22)  POCT Blood Glucose.: 527 mg/dL (10 May 2023 00:20)  POCT Blood Glucose.: 242 mg/dL (09 May 2023 17:30)      Anion Gap, Serum: 15 (05-10 @ 10:41)  Anion Gap, Serum: 15 (05-09 @ 10:44)      Calcium, Total Serum: 8.7 (05-10 @ 10:41)  Calcium, Total Serum: 9.1 (05-09 @ 10:44)          05-10    143  |  105  |  90<H>  ----------------------------<  291<H>  3.4<L>   |  23  |  1.81<H>    Ca    8.7      10 May 2023 10:41                          8.2    25.25 )-----------( 227      ( 10 May 2023 10:41 )             25.8     Medications  MEDICATIONS  (STANDING):  aspirin  chewable 81 milliGRAM(s) Oral daily  carvedilol 6.25 milliGRAM(s) Oral every 12 hours  cefepime   IVPB      cefepime   IVPB 2000 milliGRAM(s) IV Intermittent every 24 hours  dextrose 5%. 1000 milliLiter(s) (50 mL/Hr) IV Continuous <Continuous>  dextrose 5%. 1000 milliLiter(s) (100 mL/Hr) IV Continuous <Continuous>  dextrose 50% Injectable 12.5 Gram(s) IV Push once  dextrose 50% Injectable 25 Gram(s) IV Push once  dextrose 50% Injectable 25 Gram(s) IV Push once  glucagon  Injectable 1 milliGRAM(s) IntraMuscular once  heparin   Injectable 5000 Unit(s) SubCutaneous every 12 hours  hydrALAZINE 20 milliGRAM(s) Oral every 8 hours  insulin lispro (ADMELOG) corrective regimen sliding scale   SubCutaneous three times a day before meals  insulin NPH human recombinant 6 Unit(s) SubCutaneous every 6 hours  latanoprost 0.005% Ophthalmic Solution 1 Drop(s) Both EYES at bedtime  Nephro-penny 1 Tablet(s) Oral daily  thiamine 100 milliGRAM(s) Oral daily      Physical Exam  General: Patient comfortable in bed   Vital Signs Last 12 Hrs  T(F): 97.9 (05-10-23 @ 12:45), Max: 98.1 (05-10-23 @ 04:07)  HR: 89 (05-10-23 @ 12:45) (63 - 89)  BP: 165/63 (05-10-23 @ 12:45) (165/63 - 194/63)  BP(mean): --  RR: 18 (05-10-23 @ 12:45) (18 - 18)  SpO2: 100% (05-10-23 @ 12:45) (98% - 100%)    CVS: S1S2   Respiratory: No wheezing, no crepitations  GI: Abdomen soft, bowel sounds positive +NG tube   Musculoskeletal:  moves all extremities  : Voiding        Chief complaint  Patient is a 88y old  Female who presents with a chief complaint of SOB/GARRISON, swelling of hands/legs/face (10 May 2023 08:51)         Labs and Fingersticks  CAPILLARY BLOOD GLUCOSE      POCT Blood Glucose.: 305 mg/dL (10 May 2023 11:40)  POCT Blood Glucose.: 412 mg/dL (10 May 2023 06:25)  POCT Blood Glucose.: 512 mg/dL (10 May 2023 00:22)  POCT Blood Glucose.: 527 mg/dL (10 May 2023 00:20)  POCT Blood Glucose.: 242 mg/dL (09 May 2023 17:30)      Anion Gap, Serum: 15 (05-10 @ 10:41)  Anion Gap, Serum: 15 (05-09 @ 10:44)      Calcium, Total Serum: 8.7 (05-10 @ 10:41)  Calcium, Total Serum: 9.1 (05-09 @ 10:44)          05-10    143  |  105  |  90<H>  ----------------------------<  291<H>  3.4<L>   |  23  |  1.81<H>    Ca    8.7      10 May 2023 10:41                          8.2    25.25 )-----------( 227      ( 10 May 2023 10:41 )             25.8     Medications  MEDICATIONS  (STANDING):  aspirin  chewable 81 milliGRAM(s) Oral daily  carvedilol 6.25 milliGRAM(s) Oral every 12 hours  cefepime   IVPB      cefepime   IVPB 2000 milliGRAM(s) IV Intermittent every 24 hours  dextrose 5%. 1000 milliLiter(s) (50 mL/Hr) IV Continuous <Continuous>  dextrose 5%. 1000 milliLiter(s) (100 mL/Hr) IV Continuous <Continuous>  dextrose 50% Injectable 12.5 Gram(s) IV Push once  dextrose 50% Injectable 25 Gram(s) IV Push once  dextrose 50% Injectable 25 Gram(s) IV Push once  glucagon  Injectable 1 milliGRAM(s) IntraMuscular once  heparin   Injectable 5000 Unit(s) SubCutaneous every 12 hours  hydrALAZINE 20 milliGRAM(s) Oral every 8 hours  insulin lispro (ADMELOG) corrective regimen sliding scale   SubCutaneous three times a day before meals  insulin NPH human recombinant 6 Unit(s) SubCutaneous every 6 hours  latanoprost 0.005% Ophthalmic Solution 1 Drop(s) Both EYES at bedtime  Nephro-penny 1 Tablet(s) Oral daily  thiamine 100 milliGRAM(s) Oral daily      Physical Exam  General: Patient comfortable in bed   Vital Signs Last 12 Hrs  T(F): 97.9 (05-10-23 @ 12:45), Max: 98.1 (05-10-23 @ 04:07)  HR: 89 (05-10-23 @ 12:45) (63 - 89)  BP: 165/63 (05-10-23 @ 12:45) (165/63 - 194/63)  BP(mean): --  RR: 18 (05-10-23 @ 12:45) (18 - 18)  SpO2: 100% (05-10-23 @ 12:45) (98% - 100%)    CVS: S1S2   Respiratory: No wheezing, no crepitations  GI: Abdomen soft, bowel sounds positive +NG tube   Musculoskeletal:  moves all extremities  : Voiding

## 2023-05-10 NOTE — CONSULT NOTE ADULT - ASSESSMENT
88yoF w/ PMHx of advanced dementia (at B/L patient is bedbound, nonverbal, though able to tolerate PO diet - per family, patient has hearty appetite), HTN, HLD, IDDM, brought in by family for difficulty breathing over the past few days in setting of notable increased swelling of the patient's hands, feet, and face; a/w hypoxic respiratory failure 2/2 aspiration PNA and acute decompensated diastolic heart failure, with course c/b significant TOÑITO, poor oral intake now s/p NGT and pending PEG.    Wound Consult requested to assist w/ management of Sacral unstageable pressure injury      Buttocks/ Sacrum TRIAD BID and prn soiling        Continue w/ attends under pads and Pericare as per protocol  Abx per Medicine/ ID  Moisturize intact skin w/ SWEEN cream BID  Nutrition optimization in pt w/ Severe Protein Calorie Malnutrition,         encourage high quality protein, MVI & Vit C to promote wound healing  Hyperglycemia -  ADA diet  & FS w/ ISS,  Continue turning and positioning w/ offloading assistive devices as per protocol  Waffle Cushion to chair when oob to chair  Continue w/ low air loss pressure redistribution bed surface   Pt will need Group 2 mattress on hospital bed and ROHO cushion for wheel chair upon discharge home  Care as per medicine, will follow w/ you  Upon discharge f/u as outpatient at [facility] [address] [place] [address] [phone]  D/w team & RN & attng  Thank you for this consult  Jazzmine Herrera PA-C [id number]  Nights/ Weekends/ Holidays please call:  General Surgery Consult pager [phone]) for emergencies  Wound PT for multilayer leg wrapping or VAC issues (x [phone])   I spent 55minutes face to face w/ this pt of which more than 50% of the time was spent counseling & coordinating care of this pt.

## 2023-05-10 NOTE — PROGRESS NOTE ADULT - SUBJECTIVE AND OBJECTIVE BOX
[facility]   [phone]  RENAL FOLLOW UP NOTE  --------------------------------------------------------------------------------  Chief Complaint:    24 hour events/subjective:    seen   non interactive     PAST HISTORY  --------------------------------------------------------------------------------  No significant changes to PMH, PSH, FHx, SHx, unless otherwise noted    ALLERGIES & MEDICATIONS  --------------------------------------------------------------------------------  Allergies    No Known Allergies  Allergy Status Unknown    Intolerances      Standing Inpatient Medications  aspirin  chewable 81 milliGRAM(s) Oral daily  carvedilol 6.25 milliGRAM(s) Oral every 12 hours  cefepime   IVPB      cefepime   IVPB 2000 milliGRAM(s) IV Intermittent every 24 hours  dextrose 5%. 1000 milliLiter(s) IV Continuous <Continuous>  dextrose 5%. 1000 milliLiter(s) IV Continuous <Continuous>  dextrose 50% Injectable 25 Gram(s) IV Push once  dextrose 50% Injectable 25 Gram(s) IV Push once  dextrose 50% Injectable 12.5 Gram(s) IV Push once  glucagon  Injectable 1 milliGRAM(s) IntraMuscular once  heparin   Injectable 5000 Unit(s) SubCutaneous every 12 hours  hydrALAZINE 20 milliGRAM(s) Oral every 8 hours  insulin lispro (ADMELOG) corrective regimen sliding scale   SubCutaneous three times a day before meals  insulin NPH human recombinant 6 Unit(s) SubCutaneous every 6 hours  latanoprost 0.005% Ophthalmic Solution 1 Drop(s) Both EYES at bedtime  Nephro-penny 1 Tablet(s) Oral daily  thiamine 100 milliGRAM(s) Oral daily    PRN Inpatient Medications  dextrose Oral Gel 15 Gram(s) Oral once PRN      REVIEW OF SYSTEMS  --------------------------------------------------------------------------------        VITALS/PHYSICAL EXAM  --------------------------------------------------------------------------------  T(C): 36.4 (05-10-23 @ 20:44), Max: 36.9 (05-10-23 @ 01:17)  HR: 90 (05-10-23 @ 20:44) (63 - 94)  BP: 165/63 (05-10-23 @ 20:44) (160/61 - 194/63)  RR: 18 (05-10-23 @ 20:44) (18 - 18)  SpO2: 100% (05-10-23 @ 20:44) (98% - 100%)  Wt(kg): --        05-09-23 @ 07:01  -  05-10-23 @ 07:00  --------------------------------------------------------  IN: 0 mL / OUT: 800 mL / NET: -800 mL      Physical Exam:  	           Gen: ill appearing elderly F, On O2    	Pulm: decrease bs, +coarse bs, no wheezing  	CV: no JVD. RRR, S1S2; no rub  	Abd: +BS, soft, nontender/nondistended, +NGT  	: No bladder distention   	UE: Warm, no cyanosis  no clubbing,  no edema;  	LE: Warm, no cyanosis  no clubbing, no edema      LABS/STUDIES  --------------------------------------------------------------------------------              8.2    25.25 >-----------<  227      [05-10-23 @ 10:41]              25.8     143  |  105  |  90  ----------------------------<  291      [05-10-23 @ 10:41]  3.4   |  23  |  1.81        Ca     8.7     [05-10-23 @ 10:41]      PT/INR: PT 12.9 , INR 1.12       [05-10-23 @ 10:41]      Creatinine Trend:  SCr 1.81 [05-10 @ 10:41]  SCr 1.94 [05-09 @ 10:44]  SCr 2.35 [05-08 @ 12:13]  SCr 2.66 [05-07 @ 10:38]  SCr 2.97 [05-06 @ 10:03]              Urinalysis - [05-09-23 @ 01:26]      Color Light Yellow / Appearance Clear / SG 1.016 / pH 5.5      Gluc Negative / Ketone Negative  / Bili Negative / Urobili Negative       Blood Trace / Protein 100 mg/dL / Leuk Est Negative / Nitrite Negative      RBC 4 / WBC 2 / Hyaline 3 / Gran  / Sq Epi  / Non Sq Epi  / Bacteria Negative      TSH 0.68      [05-04-23 @ 11:38]  Lipid: chol 216, TG 80, HDL 49, LDL --      [08-08-22 @ 06:31]

## 2023-05-10 NOTE — PROGRESS NOTE ADULT - SUBJECTIVE AND OBJECTIVE BOX
Subjective: Patient seen and examined. No new events except as noted.     REVIEW OF SYSTEMS:  Unable to obtain      MEDICATIONS:  MEDICATIONS  (STANDING):  aspirin  chewable 81 milliGRAM(s) Oral daily  cefepime   IVPB 2000 milliGRAM(s) IV Intermittent every 24 hours  cefepime   IVPB      dextrose 5% + sodium chloride 0.45%. 1000 milliLiter(s) (50 mL/Hr) IV Continuous <Continuous>  dextrose 5%. 1000 milliLiter(s) (50 mL/Hr) IV Continuous <Continuous>  dextrose 5%. 1000 milliLiter(s) (100 mL/Hr) IV Continuous <Continuous>  dextrose 50% Injectable 25 Gram(s) IV Push once  dextrose 50% Injectable 25 Gram(s) IV Push once  dextrose 50% Injectable 12.5 Gram(s) IV Push once  glucagon  Injectable 1 milliGRAM(s) IntraMuscular once  heparin   Injectable 5000 Unit(s) SubCutaneous every 12 hours  hydrALAZINE 10 milliGRAM(s) Oral every 8 hours  latanoprost 0.005% Ophthalmic Solution 1 Drop(s) Both EYES at bedtime  metoprolol tartrate Injectable 5 milliGRAM(s) IV Push every 6 hours  Nephro-penny 1 Tablet(s) Oral daily  thiamine 100 milliGRAM(s) Oral daily      PHYSICAL EXAM:  T(C): 36.7 (05-10-23 @ 04:07), Max: 36.9 (05-10-23 @ 01:17)  HR: 85 (05-10-23 @ 04:07) (79 - 94)  BP: 169/72 (05-10-23 @ 04:07) (132/57 - 175/63)  RR: 18 (05-10-23 @ 04:07) (18 - 18)  SpO2: 98% (05-10-23 @ 04:07) (98% - 99%)  Wt(kg): --  I&O's Summary    09 May 2023 07:01  -  10 May 2023 07:00  --------------------------------------------------------  IN: 0 mL / OUT: 800 mL / NET: -800 mL            Appearance: NAD non verbal 	  HEENT: Normal oral mucosa, PERRL, EOMI	  Lymphatic: No lymphadenopathy +NGT   Cardiovascular: Normal S1 S2, No JVD, No murmurs  Respiratory: decreased bs, on NC   Psychiatry: A & O x 0  Gastrointestinal:  Soft, Non-tender, + BS	  Skin: No rashes, No ecchymoses, No cyanosis	  Neurologic: Non-focal  Extremities: decreased  range of motion, No  edema  Vascular: Peripheral pulses palpable 2+ bilaterally        LABS:    CARDIAC MARKERS:        Culture - Blood (05.08.23 @ 17:16)   Specimen Source: .Blood Blood-Venous  Culture Results:   No growth to date.Culture - Blood (05.08.23 @ 17:16)   Specimen Source: .Blood Blood-Peripheral  Culture Results:   No growth to date.                        8.4    22.81 )-----------( 240      ( 09 May 2023 10:44 )             26.8     05-09    143  |  104  |  104<H>  ----------------------------<  199<H>  3.9   |  24  |  1.94<H>    Ca    9.1      09 May 2023 10:44      proBNP:   Lipid Profile:   HgA1c:   TSH:     3          TELEMETRY: 	    ECG:  	  RADIOLOGY:   DIAGNOSTIC TESTING:  [ ] Echocardiogram:  [ ]  Catheterization:  [ ] Stress Test:    OTHER:

## 2023-05-10 NOTE — PROGRESS NOTE ADULT - PROBLEM SELECTOR PLAN 2
2/2 combination of pulm edema and aspiration PNA, improving  - now weaned off supplemental O2  - resumed cefepime empirically- antitipcate 5 day course    #leukocytosis- initially improving, no increasing again on empiric cefepime  - f/u CT c/a/p, urine culture as above  - blood dx negative  - no signs of phlebitis/cellulitis discernable  - cefepime empirically added back on  - if above workup negative would consult ID for further recs

## 2023-05-10 NOTE — PROGRESS NOTE ADULT - ASSESSMENT
Assessment  DMT2: 88y Female with DM T2 with hyperglycemia, A1C7.1% , was on insulin at home sliding scale, now on bolus insulin with coverage, blood sugars  are fluctuating.  Tube feeds via NG tube. NPO currently, was off tube feeds, pending PEG placement. Now glucose running elevated.   Dysphagia: NG tube was on tube feeds, GI eval for PEG, pending optimization.   HTN: on antihypertensive medications, monitored, asymptomatic.  HLD: on statin, diet controlled.  TSH 0.68 Euthyroid        Discussed plan and management with Dr Angel Mackenzie NP - TEAMS  Marianne Ortiz MD  Cell: [phone]  Office: [phone]      Assessment  DMT2: 88y Female with DM T2 with hyperglycemia, A1C7.1% , was on insulin at home sliding scale, now on bolus insulin with coverage, blood sugars are fluctuating.  Tube feeds via NG tube. NPO currently, was off tube feeds, pending PEG placement. Now glucose running elevated.   Dysphagia: NG tube was on tube feeds, GI eval for PEG, pending optimization.   HTN: on antihypertensive medications, monitored, asymptomatic.  HLD: on statin, diet controlled.  TSH 0.68 Euthyroid        Discussed plan and management with Dr Angel Mackenzie NP - TEAMS  Marianne Ortiz MD  Cell: [phone]  Office: [phone]

## 2023-05-10 NOTE — PROGRESS NOTE ADULT - PROBLEM SELECTOR PLAN 1
#[id number] LVM via Latvian speaking  with review and recommendations from Dr Snyder and number to call with questions.   Dee Zarate RN on 11/22/2021 at 1:49 PM      ECHO reviewed  stable    Monitor UO and Cr

## 2023-05-10 NOTE — PROGRESS NOTE ADULT - SUBJECTIVE AND OBJECTIVE BOX
Kobi De MD  Division of Hospital Medicine  Available on MS teams until 7pm  If no response or off-hours, page [phone]  -------------------------------------    Patient is a 88y old  Female who presents with a chief complaint of SOB/GARRISON, swelling of hands/legs/face (10 May 2023 15:12)    SUBJECTIVE / OVERNIGHT EVENTS: none acute  ADDITIONAL REVIEW OF SYSTEMS: pt nonverbal, grunts to tactile stimuli, unable to assess ROS    MEDICATIONS  (STANDING):  aspirin  chewable 81 milliGRAM(s) Oral daily  carvedilol 6.25 milliGRAM(s) Oral every 12 hours  cefepime   IVPB      cefepime   IVPB 2000 milliGRAM(s) IV Intermittent every 24 hours  dextrose 5%. 1000 milliLiter(s) (100 mL/Hr) IV Continuous <Continuous>  dextrose 5%. 1000 milliLiter(s) (50 mL/Hr) IV Continuous <Continuous>  dextrose 50% Injectable 25 Gram(s) IV Push once  dextrose 50% Injectable 25 Gram(s) IV Push once  dextrose 50% Injectable 12.5 Gram(s) IV Push once  glucagon  Injectable 1 milliGRAM(s) IntraMuscular once  heparin   Injectable 5000 Unit(s) SubCutaneous every 12 hours  hydrALAZINE 20 milliGRAM(s) Oral every 8 hours  insulin lispro (ADMELOG) corrective regimen sliding scale   SubCutaneous three times a day before meals  insulin NPH human recombinant 6 Unit(s) SubCutaneous every 6 hours  latanoprost 0.005% Ophthalmic Solution 1 Drop(s) Both EYES at bedtime  Nephro-penny 1 Tablet(s) Oral daily  thiamine 100 milliGRAM(s) Oral daily    MEDICATIONS  (PRN):  dextrose Oral Gel 15 Gram(s) Oral once PRN Blood Glucose LESS THAN 70 milliGRAM(s)/deciliter      CAPILLARY BLOOD GLUCOSE      POCT Blood Glucose.: 305 mg/dL (10 May 2023 11:40)  POCT Blood Glucose.: 412 mg/dL (10 May 2023 06:25)  POCT Blood Glucose.: 512 mg/dL (10 May 2023 00:22)  POCT Blood Glucose.: 527 mg/dL (10 May 2023 00:20)  POCT Blood Glucose.: 242 mg/dL (09 May 2023 17:30)    I&O's Summary    09 May 2023 07:01  -  10 May 2023 07:00  --------------------------------------------------------  IN: 0 mL / OUT: 800 mL / NET: -800 mL        PHYSICAL EXAM:  Vital Signs Last 24 Hrs  T(C): 36.6 (10 May 2023 12:45), Max: 36.9 (10 May 2023 01:17)  T(F): 97.9 (10 May 2023 12:45), Max: 98.4 (10 May 2023 01:17)  HR: 89 (10 May 2023 12:45) (63 - 94)  BP: 165/63 (10 May 2023 12:45) (156/64 - 194/63)  BP(mean): --  RR: 18 (10 May 2023 12:45) (18 - 18)  SpO2: 100% (10 May 2023 12:45) (98% - 100%)    Parameters below as of 10 May 2023 12:45  Patient On (Oxygen Delivery Method): nasal cannula  O2 Flow (L/min): 1.5    CONSTITUTIONAL: NAD, frail  EYES: PERRLA; conjunctiva and sclera clear  ENMT: MMM, +NGT  NECK: Supple  RESPIRATORY: Normal respiratory effort; CTAB  CARDIOVASCULAR: RRR, no JVD, no peripheral edema   ABDOMEN: slightly distended, soft, Nontender to palpation, normoactive BS, no guarding/rigidity  MUSCLOSKELETAL:  no clubbing/cyanosis, no joint swelling or tenderness to palpation  PSYCH: unable to assess  NEUROLOGY: unable to assess  SKIN: No rashes; no palpable lesions    LABS:                        8.2    25.25 )-----------( 227      ( 10 May 2023 10:41 )             25.8     05-10    143  |  105  |  90<H>  ----------------------------<  291<H>  3.4<L>   |  23  |  1.81<H>    Ca    8.7      10 May 2023 10:41      PT/INR - ( 10 May 2023 10:41 )   PT: 12.9 sec;   INR: 1.12 ratio               Urinalysis Basic - ( 09 May 2023 01:26 )    Color: Light Yellow / Appearance: Clear / S.016 / pH: x  Gluc: x / Ketone: Negative  / Bili: Negative / Urobili: Negative   Blood: x / Protein: 100 mg/dL / Nitrite: Negative   Leuk Esterase: Negative / RBC: 4 /hpf / WBC 2 /HPF   Sq Epi: x / Non Sq Epi: x / Bacteria: Negative        Culture - Blood (collected 08 May 2023 17:16)  Source: .Blood Blood-Venous  Preliminary Report (09 May 2023 22:02):    No growth to date.    Culture - Blood (collected 08 May 2023 17:16)  Source: .Blood Blood-Peripheral  Preliminary Report (09 May 2023 22:02):    No growth to date.        RADIOLOGY & ADDITIONAL TESTS:  Results Reviewed:   Imaging Personally Reviewed:  Electrocardiogram Personally Reviewed:    COORDINATION OF CARE:  Care Discussed with Consultants/Other Providers [Y/N]:  Prior or Outpatient Records Reviewed [Y/N]:

## 2023-05-10 NOTE — PROGRESS NOTE ADULT - ASSESSMENT
88yoF w/ PMHx of advanced dementia (at B/L patient is bedbound, nonverbal, though able to tolerate PO diet - per family, patient has hearty appetite), HTN, HLD, IDDM, who presents from home where she lives with her , brought in by her daughter (son is at her bedside during this evaluation) w/ complaints of daughter noticing the patient has been having difficulty breathing over the past few days in setting of notable increased swelling of the patient's hands, feet, and face. Of note, patient was recently hospitalized at [facility] for aspiration PNA (and found to have demand ischemia) when she presented with similar complaints and was found to be hypoxic; at the time, S+S eval was recommended, but per documentation at the end of Jan 2023,      1- TOÑITO   2- CHF   3- HTN  4- hypernatremia  5- PNA        toñito in setting of chf as well as more likely due to contrast nephropathy and possible pneumonia   creatinine is slowly improving   urinary retention requiring intermittent catheterization,.   hypernatremia is improved with free water, continue 200 cc q8h  bumex hold on hold to re-eval in near future    her echo reveals mod to severe range AS  she is not a dialysis candidate in setting of her advance dementia   hydralazine 10 mg po tid and increase as BP tolerates  anemia, trend hgb   O2 supplementation   strict I/O  trend creatinine and electrolytes daily  plan for peg placement, now on hold for worsening leukocytosis. restarted on cefepime 2G daily. readjust cefepime dose based on renal function to 500 mg - 1 g daily and readjust if renal function improves further

## 2023-05-10 NOTE — CONSULT NOTE ADULT - SUBJECTIVE AND OBJECTIVE BOX
Wound SURGERY CONSULT NOTE    HPI:  Elvia MRN = [id number]  88yoF w/ PMHx of advanced dementia (at B/L patient is bedbound, nonverbal, though able to tolerate PO diet - per family, patient has hearty appetite), HTN, HLD, IDDM, who presents from home where she lives with her , brought in by her daughter (son is at her bedside during this evaluation) w/ complaints of daughter noticing the patient has been having difficulty breathing over the past few days in setting of notable increased swelling of the patient's hands, feet, and face. Of note, patient was recently hospitalized at [facility] for aspiration PNA (and found to have demand ischemia) when she presented with similar complaints and was found to be hypoxic; at the time, S+S eval was recommended, but per documentation at the end of Jan 2023, pt's daughter declined formal/objective evaluation, and instead, understanding all risks associated with aspiration, preferred to have patient continue on her typical dysphagia diet (puree/finely cut up food). Currently, patient is calm, sleeping, though she is arousable, and becomes agitated with attempts at physical examination. Per documentation, patient's daughter notes that the patient has been eating and drinking normally but that the patient does cough a lot.     PMD Dr Joleen Olsen [phone] does home visits    ED Presenting Vitals: 99.3F, 102bpm, 204/67 --> 180/75, 24br/m, 94% on RA   ED Course: s/p Lovenox 30mg SC x1, IV Zosyn 3.375g x1, IV Azithromycin 500mg x1, Lispro 6unit x1, Lasix 20mg IVP x3, Ativan 0.5mg IVP x1, Haldol 1mg IVP x2, Metoprolol 12.5mg PO x1, Lopressor 5mg IVP x2; RRT called for hypoxia and tachycardia -- pt found to be in ?new Afib w/ RVR (23 Apr 2023 16:04)        N/V/D,  BM/ Flatus,   NGT,     palp/ sob/dyspnea/ cp,       F/C/S  Wound consult requested by team to assist w/ management of      wound/ pressure injury.   Pt (unable to)  c/o pain, drainage, odor, color change,  or worsening swelling. Offloading and pericare initiated upon admission as pt Increasingly sedentary 2/2 to illness. Pt is Incontinent of urine & stool. (+)castillo/ ostomy.   No h/o bites, scratches, falls, trauma.  Pt seen by Wound RN  CAVILON Advance/  Lashonda,TRIAD/ Lamonte/ medihoney/ Allevyn foam/ dakins/ Adaptic/ DSD recommended used at home/ while awaiting consult.  Appetite good/ decreased.  weight loss.  S&S / RD consult appreciated All questions asked and answered to pt's and family's expressed understanding and satisfaction.    Current Diet: Diet, NPO with Tube Feed:   Tube Feeding Modality: Nasogastric  Suplena with Carb Steady (SUPLENA)  Total Volume for 24 Hours (mL): 840  Continuous  Starting Tube Feed Rate mL per Hour: 35  Until Goal Tube Feed Rate (mL per Hour): 35  Tube Feed Duration (in Hours): 24  Tube Feed Start Time: 10:00 (05-05-23 @ 10:53)      PAST MEDICAL & SURGICAL HISTORY:  Diabetes Mellitus, Type 2      Glaucoma      Hyperlipidemia      HTN (Hypertension)      Diabetic retinopathy      Dementia      Cataract      Diabetes mellitus      HTN (hypertension)      HLD (hyperlipidemia)      HTN (hypertension)      Diabetes mellitus      Dementia      S/P Carpal Tunnel Release  right wrist 5/6/2009      No significant past surgical history      S/P eye surgery          REVIEW OF SYSTEMS: Pt unable to offer  General/ Breast/ Skin/Vasc/ Neuro/ MSK: see HPI  All other systems negative    MEDICATIONS  (STANDING):  aspirin  chewable 81 milliGRAM(s) Oral daily  carvedilol 6.25 milliGRAM(s) Oral every 12 hours  cefepime   IVPB      cefepime   IVPB 2000 milliGRAM(s) IV Intermittent every 24 hours  dextrose 5%. 1000 milliLiter(s) (50 mL/Hr) IV Continuous <Continuous>  dextrose 5%. 1000 milliLiter(s) (100 mL/Hr) IV Continuous <Continuous>  dextrose 50% Injectable 25 Gram(s) IV Push once  dextrose 50% Injectable 25 Gram(s) IV Push once  dextrose 50% Injectable 12.5 Gram(s) IV Push once  glucagon  Injectable 1 milliGRAM(s) IntraMuscular once  heparin   Injectable 5000 Unit(s) SubCutaneous every 12 hours  hydrALAZINE 20 milliGRAM(s) Oral every 8 hours  insulin lispro (ADMELOG) corrective regimen sliding scale   SubCutaneous three times a day before meals  insulin NPH human recombinant 6 Unit(s) SubCutaneous every 6 hours  latanoprost 0.005% Ophthalmic Solution 1 Drop(s) Both EYES at bedtime  Nephro-penny 1 Tablet(s) Oral daily  thiamine 100 milliGRAM(s) Oral daily    MEDICATIONS  (PRN):  dextrose Oral Gel 15 Gram(s) Oral once PRN Blood Glucose LESS THAN 70 milliGRAM(s)/deciliter      Allergies    No Known Allergies  Allergy Status Unknown    Intolerances        SOCIAL HISTORY:  / /single/ ; (+)HHA/ lives in SNF; Former smoker, No current/ Denies smoking, ETOH, drugs    FAMILY HISTORY:  Family history of bipolar disorder (Child)    No pertinent family history in first degree relatives    No pertinent family history in first degree relatives     no h/o PVD or wound healing or skin/ significant problems    PHYSICAL EXAM:  Vital Signs Last 24 Hrs  T(C): 36.6 (10 May 2023 12:45), Max: 36.9 (10 May 2023 01:17)  T(F): 97.9 (10 May 2023 12:45), Max: 98.4 (10 May 2023 01:17)  HR: 89 (10 May 2023 12:45) (63 - 94)  BP: 165/63 (10 May 2023 12:45) (156/64 - 194/63)  BP(mean): --  RR: 18 (10 May 2023 12:45) (18 - 18)  SpO2: 100% (10 May 2023 12:45) (98% - 100%)    Parameters below as of 10 May 2023 12:45  Patient On (Oxygen Delivery Method): nasal cannula  O2 Flow (L/min): 1.5      NAD, Guarded but stable,  A&Ox3/ Alert/ Confused  cachectic/ thin, MO/ Obese, frail,  WD/ WN/ WG,  Disheveled  Total Care Sport/ Versa Care P500 / Envella Progressa bed     HEENT:  NC/AT, PERRL, EOMI, sclera clear, mucosa moist, throat clear, trachea midline, neck supple, trach  Respiratory: nonlabored w/ equal chest rise  Gastrointestinal: soft NT/ND (+)BS  (+)PEG (+)ostomy (+)NGT  : (+)castillo/ purewick/ condom cath  Neurology:  weakened strength & sensation grossly intact, paraesthesia  nonverbal, no follow commands, paraplegic  Psych: calm/ appropriate/ flat affect/ easily agitated/ restless/ anxious/ difficult to assess  Musculoskeletal:  limited stiff / p/FROM, no deformities/ contractures  Vascular: BLE equally warm/ cool,  no cyanosis, clubbing, edema nor acute ischemia           >LE //BLE edema equal           BLE DP/PT pulses palpable          BLE hemosiderin staining/ varicose veins  Skin:  moist w/ good turgor  thin, dry, pale, frail,  ecchymosis w/o hematoma  blistering  or serosanguinous drainage  No odor, erythema, increased warmth, tenderness, induration, fluctuance, nor crepitus    LABS/ CULTURES/ RADIOLOGY:                        8.2    25.25 )-----------( 227      ( 10 May 2023 10:41 )             25.8       143  |  105  |  90  ----------------------------<  291      [05-10-23 @ 10:41]  3.4   |  23  |  1.81        Ca     8.7     [05-10-23 @ 10:41]      Phos  3.0     [05-07-23 @ 10:38]      PT/INR: PT 12.9 , INR 1.12       [05-10-23 @ 10:41]        A1C with Estimated Average Glucose Result: 7.1 % (04-24-23 @ 17:32)  A1C with Estimated Average Glucose Result: 7.4 % (01-30-23 @ 06:54)        Culture - Blood (collected 05-08-23 @ 17:16)  Source: .Blood Blood-Venous  Preliminary Report (05-09-23 @ 22:02):    No growth to date.    Culture - Blood (collected 05-08-23 @ 17:16)  Source: .Blood Blood-Peripheral  Preliminary Report (05-09-23 @ 22:02):    No growth to date.                      A/P:    Wound Consult requested to assist w/ management of    BLE elevation & Compression  Consider JAMES/PVR, Duplex, Xray, A/P BLE CT or MRI  Abx per Medicine/ ID  Moisturize intact skin w/ SWEEN cream BID  Nutrition Consult for optimization in pt w/ Severe Protein Calorie Malnutrition,        Inadequate PO intake, & Increased nutritional needs            encourage high quality protein, annetta/ prosource, MVI & Vit C to promote wound healing  Hyperglycemia - improving w/ ADA diet and Lantus/ NPH & FS w/ ISS, consider Endo Consult, consider HgA1c  Anemia- improving w/ transfusions, Fe studies, protonix  Continue turning and positioning w/ offloading assistive devices as per protocol  Buttocks/ Sacrum Lashonda/ TRIAD BID /CAVILON ADVANCE TIW and prn soiling        Continue w/ attends under pads and Pericare w/ castillo/ condom cath maintenance / purewick care as per protocol  Waffle Cushion to chair when oob to chair  Continue w/ low air loss pressure redistribution bed surface   Pt will need Group 2 mattress on hospital bed and ROHO cushion for wheel chair upon discharge home  Care as per medicine, will follow w/ you/ remain available as requested  Upon discharge f/u as outpatient at [facility] [address] [place] [address] [phone]  Seen w/ attng & RN and D/w team & RN  Thank you for this consult  Jazzmine Herrera PA-C [id number]  Nights/ Weekends/ Holidays please call:  General Surgery Consult pager [phone]) for emergencies  Wound PT for multilayer leg wrapping or VAC issues (x [phone])      Wound SURGERY CONSULT NOTE    HPI:  Elvia MRN = [id number]  88yoF w/ PMHx of advanced dementia (at B/L patient is bedbound, nonverbal, though able to tolerate PO diet - per family, patient has hearty appetite), HTN, HLD, IDDM, who presents from home where she lives with her , brought in by her daughter (son is at her bedside during this evaluation) w/ complaints of daughter noticing the patient has been having difficulty breathing over the past few days in setting of notable increased swelling of the patient's hands, feet, and face. Of note, patient was recently hospitalized at [facility] for aspiration PNA (and found to have demand ischemia) when she presented with similar complaints and was found to be hypoxic; at the time, S+S eval was recommended, but per documentation at the end of Jan 2023, pt's daughter declined formal/objective evaluation, and instead, understanding all risks associated with aspiration, preferred to have patient continue on her typical dysphagia diet (puree/finely cut up food). Currently, patient is calm, sleeping, though she is arousable, and becomes agitated with attempts at physical examination. Per documentation, patient's daughter notes that the patient has been eating and drinking normally but that the patient does cough a lot.     PMD Dr Joleen Olsen [phone] does home visits    ED Presenting Vitals: 99.3F, 102bpm, 204/67 --> 180/75, 24br/m, 94% on RA   ED Course: s/p Lovenox 30mg SC x1, IV Zosyn 3.375g x1, IV Azithromycin 500mg x1, Lispro 6unit x1, Lasix 20mg IVP x3, Ativan 0.5mg IVP x1, Haldol 1mg IVP x2, Metoprolol 12.5mg PO x1, Lopressor 5mg IVP x2; RRT called for hypoxia and tachycardia -- pt found to be in ?new Afib w/ RVR     Wound consult requested by team to assist w/ management of sacral pressure injury.   Pt unable to  c/o pain, drainage, odor, color change,  or worsening swelling. Offloading and pericare initiated upon admission as pt Increasingly sedentary 2/2 to illness. Pt is Incontinent of urine & stool.  No h/o bites, scratches, falls, trauma.  Pt seen by Wound RN and TRIAD recommended while awaiting consult.  RD consult appreciated     Current Diet: Diet, NPO with Tube Feed:   Tube Feeding Modality: Nasogastric  Suplena with Carb Steady (SUPLENA)  Total Volume for 24 Hours (mL): 840  Continuous  Starting Tube Feed Rate mL per Hour: 35  Until Goal Tube Feed Rate (mL per Hour): 35  Tube Feed Duration (in Hours): 24  Tube Feed Start Time: 10:00 (05-05-23 @ 10:53)      PAST MEDICAL & SURGICAL HISTORY:  Diabetes Mellitus, Type 2    Glaucoma    Hyperlipidemia    HTN (Hypertension)    Diabetic retinopathy    Dementia    Cataract    S/P Carpal Tunnel Release  right wrist 5/6/2009    S/P eye surgery      REVIEW OF SYSTEMS: Pt unable to offer    MEDICATIONS  (STANDING):  aspirin  chewable 81 milliGRAM(s) Oral daily  carvedilol 6.25 milliGRAM(s) Oral every 12 hours  cefepime   IVPB 2000 milliGRAM(s) IV Intermittent every 24 hours  dextrose 5%. 1000 milliLiter(s) (50 mL/Hr) IV Continuous <Continuous>  dextrose 5%. 1000 milliLiter(s) (100 mL/Hr) IV Continuous <Continuous>  dextrose 50% Injectable 25 Gram(s) IV Push once  dextrose 50% Injectable 25 Gram(s) IV Push once  dextrose 50% Injectable 12.5 Gram(s) IV Push once  glucagon  Injectable 1 milliGRAM(s) IntraMuscular once  heparin   Injectable 5000 Unit(s) SubCutaneous every 12 hours  hydrALAZINE 20 milliGRAM(s) Oral every 8 hours  insulin lispro (ADMELOG) corrective regimen sliding scale   SubCutaneous three times a day before meals  insulin NPH human recombinant 6 Unit(s) SubCutaneous every 6 hours  latanoprost 0.005% Ophthalmic Solution 1 Drop(s) Both EYES at bedtime  Nephro-penny 1 Tablet(s) Oral daily  thiamine 100 milliGRAM(s) Oral daily    MEDICATIONS  (PRN):  dextrose Oral Gel 15 Gram(s) Oral once PRN Blood Glucose LESS THAN 70 milliGRAM(s)/deciliter        Allergy Status Unknown    SOCIAL HISTORY:  /+)HHA, No smoking, ETOH, drugs    FAMILY HISTORY:F bipolar disorder (Child)         no h/o PVD or wound healing or skin problems    PHYSICAL EXAM:  Vital Signs Last 24 Hrs  T(C): 36.6 (10 May 2023 12:45), Max: 36.9 (10 May 2023 01:17)  T(F): 97.9 (10 May 2023 12:45), Max: 98.4 (10 May 2023 01:17)  HR: 89 (10 May 2023 12:45) (63 - 94)  BP: 165/63 (10 May 2023 12:45) (156/64 - 194/63)  BP(mean): --  RR: 18 (10 May 2023 12:45) (18 - 18)  SpO2: 100% (10 May 2023 12:45) (98% - 100%)    Parameters below as of 10 May 2023 12:45  Patient On (Oxygen Delivery Method): nasal cannula  O2 Flow (L/min): 1.5      NAD,  Alert, thin, frail,  WD/ WN/ WG,   Versa Care P500 bed  HEENT:  NC/AT, sclera clear, mucosa moist, throat clear, trachea midline, neck supple  Respiratory: nonlabored w/ equal chest rise  Gastrointestinal: soft NT/ND  (+)PEG  Neurology:  nonverbal, no follow commands, paraplegic  Psych: calm/ appropriate  Musculoskeletal:   pROM, no deformities/ contractures  Vascular: BLE equally warm,  no cyanosis, clubbing, edema nor acute ischemia  Skin: thin, dry, pale, frail,  ecchymosis w/o hematoma  Sacral unstageable w/ soft eschar w/ halo of denuded skin  no blistering  or drainage  No odor, erythema, increased warmth, tenderness, induration, fluctuance, nor crepitus    LABS/ CULTURES/ RADIOLOGY:                        8.2    25.25 )-----------( 227      ( 10 May 2023 10:41 )             25.8       143  |  105  |  90  ----------------------------<  291      [05-10-23 @ 10:41]  3.4   |  23  |  1.81        Ca     8.7     [05-10-23 @ 10:41]      Phos  3.0     [05-07-23 @ 10:38]      PT/INR: PT 12.9 , INR 1.12       [05-10-23 @ 10:41]      A1C with Estimated Average Glucose Result: 7.1 % (04-24-23 @ 17:32)  A1C with Estimated Average Glucose Result: 7.4 % (01-30-23 @ 06:54)        Culture - Blood (collected 05-08-23 @ 17:16)  Source: .Blood Blood-Venous  Preliminary Report (05-09-23 @ 22:02):    No growth to date.    Culture - Blood (collected 05-08-23 @ 17:16)  Source: .Blood Blood-Peripheral  Preliminary Report (05-09-23 @ 22:02):    No growth to date.

## 2023-05-10 NOTE — PROGRESS NOTE ADULT - PROBLEM SELECTOR PLAN 5
Pt has been on ISS while NPO, however FS markedly elevated, c/f starvation ketoacidosis  - Despite insulin adjustmets, hyperglycemia persists;  - Endocrine consulted  - stopped D5 fluids  - PEG placement once sugars < 200

## 2023-05-10 NOTE — PROGRESS NOTE ADULT - PROBLEM SELECTOR PLAN 1
Pt not compliant with SLP eval, with high aspiration risk, severe dysphagia, electrolyte derangements  -Failed several swallowing evals, SLP continues to recommend NPO  -Family discussion about GOC 5/2 and 5/3: requesting PEG  -NGT placed  -Replete electrolytes aggressively  -Leukocytosis initially improved, then rebound increase, now improving again after starting empiric cefepime, although infectious source is unclear given negative UA clear CXR- would repeat CT c/a/p, urine culture  - blood cx negative  - pt on cefepime empirically  - f/u wound care re-eval- no obvious purulence of source of infection documented  -PEG tube placement postponsed pending improvement in fingersticks

## 2023-05-10 NOTE — PROGRESS NOTE ADULT - ASSESSMENT
Alternate MRN: [id number]    88yoF w/ PMHx of advanced dementia (at B/L patient is bedbound, nonverbal, though able to tolerate PO diet - per family, patient has hearty appetite), HTN, HLD, IDDM, brought in by family for difficulty breathing over the past few days in setting of notable increased swelling of the patient's hands, feet, and face; a/w hypoxic respiratory failure 2/2 aspiration PNA and acute decompensated diastolic heart failure, with course c/b significant TOÑITO, poor oral intake now s/p NGT and pending PEG.

## 2023-05-10 NOTE — CHART NOTE - NSCHARTNOTEFT_GEN_A_CORE
Patient not currently optimized for endoscopic intervention today given elevated blood glucose levels in the 400-500 range. Will defer any endoscopic intervention today and reschedule EGD to Thursday 5/11/2023     Recommendations:  -Management of hyperglycemia per primary team   -Ok to resume tube feeds today. Please keep NPO after MN for tentative EGD+/-PEG tube placement on 5/11/2023 if medically optimized   -Please order labs (CBC, CMP, INR) for 6 AM so that results will be available early tomorrow morning; replete lytes and transfuse as needed    Heber Nails, PGY-4  Gastroenterology/Hepatology Fellow  Available on [employer] Teams   [phone] (Long Range Pager)  [phone] (Short Range Pager [facility])    After 5pm, please contact the on-call GI fellow. [phone]

## 2023-05-11 NOTE — PROGRESS NOTE ADULT - SUBJECTIVE AND OBJECTIVE BOX
Kobi De MD  Division of Hospital Medicine  Available on MS teams until 7pm  If no response or off-hours, page [phone]  -------------------------------------    Patient is a 88y old  Female who presents with a chief complaint of SOB/GARRISON, swelling of hands/legs/face (11 May 2023 16:12)    SUBJECTIVE / OVERNIGHT EVENTS: none acute  ADDITIONAL REVIEW OF SYSTEMS: pt nonverbal, grunts to tactile stimuli. appears comfortable.    MEDICATIONS  (STANDING):  aspirin  chewable 81 milliGRAM(s) Oral daily  carvedilol 6.25 milliGRAM(s) Oral every 12 hours  cefepime   IVPB      cefepime   IVPB 2000 milliGRAM(s) IV Intermittent every 24 hours  dextrose 5%. 1000 milliLiter(s) (100 mL/Hr) IV Continuous <Continuous>  dextrose 5%. 1000 milliLiter(s) (50 mL/Hr) IV Continuous <Continuous>  dextrose 50% Injectable 25 Gram(s) IV Push once  dextrose 50% Injectable 25 Gram(s) IV Push once  dextrose 50% Injectable 12.5 Gram(s) IV Push once  glucagon  Injectable 1 milliGRAM(s) IntraMuscular once  heparin   Injectable 5000 Unit(s) SubCutaneous every 12 hours  hydrALAZINE 20 milliGRAM(s) Oral every 8 hours  insulin lispro (ADMELOG) corrective regimen sliding scale   SubCutaneous three times a day before meals  insulin NPH human recombinant 7 Unit(s) SubCutaneous every 6 hours  latanoprost 0.005% Ophthalmic Solution 1 Drop(s) Both EYES at bedtime  Nephro-epnny 1 Tablet(s) Oral daily  thiamine 100 milliGRAM(s) Oral daily    MEDICATIONS  (PRN):  dextrose Oral Gel 15 Gram(s) Oral once PRN Blood Glucose LESS THAN 70 milliGRAM(s)/deciliter      CAPILLARY BLOOD GLUCOSE      POCT Blood Glucose.: 103 mg/dL (11 May 2023 13:07)  POCT Blood Glucose.: 217 mg/dL (11 May 2023 08:53)  POCT Blood Glucose.: 274 mg/dL (11 May 2023 06:02)  POCT Blood Glucose.: 344 mg/dL (11 May 2023 00:10)  POCT Blood Glucose.: 326 mg/dL (10 May 2023 17:00)    I&O's Summary    10 May 2023 07:01  -  11 May 2023 07:00  --------------------------------------------------------  IN: 340 mL / OUT: 600 mL / NET: -260 mL    11 May 2023 07:01  -  11 May 2023 16:20  --------------------------------------------------------  IN: 0 mL / OUT: 450 mL / NET: -450 mL        PHYSICAL EXAM:  Vital Signs Last 24 Hrs  T(C): 36.3 (11 May 2023 11:47), Max: 36.7 (10 May 2023 16:58)  T(F): 97.4 (11 May 2023 11:47), Max: 98 (10 May 2023 16:58)  HR: 90 (11 May 2023 11:47) (82 - 90)  BP: 165/62 (11 May 2023 11:47) (160/61 - 175/70)  BP(mean): 96 (11 May 2023 11:47) (96 - 96)  RR: 18 (11 May 2023 11:47) (18 - 18)  SpO2: 99% (11 May 2023 11:47) (99% - 100%)    Parameters below as of 11 May 2023 11:47  Patient On (Oxygen Delivery Method): nasal cannula      CONSTITUTIONAL: NAD  EYES: PERRLA; conjunctiva and sclera clear  ENMT: NGT  NECK: Supple  RESPIRATORY: Normal respiratory effort; CTAB  CARDIOVASCULAR: RRR, no JVD, no peripheral edema   ABDOMEN: Nontender to palpation, normoactive BS, no guarding/rigidity  MUSCLOSKELETAL:  no clubbing/cyanosis, no joint swelling or tenderness to palpation  PSYCH:nonverbal, grunting  NEUROLOGY: unable to assess  SKIN: No rashes; no palpable lesions    LABS:                        8.7    19.67 )-----------( 223      ( 11 May 2023 09:16 )             27.3     05-11    146<H>  |  109<H>  |  83<H>  ----------------------------<  194<H>  4.7   |  23  |  1.61<H>    Ca    9.1      11 May 2023 09:16      PT/INR - ( 11 May 2023 09:16 )   PT: 13.7 sec;   INR: 1.18 ratio                   Culture - Blood (collected 08 May 2023 17:16)  Source: .Blood Blood-Venous  Preliminary Report (09 May 2023 22:02):    No growth to date.    Culture - Blood (collected 08 May 2023 17:16)  Source: .Blood Blood-Peripheral  Preliminary Report (09 May 2023 22:02):    No growth to date.        RADIOLOGY & ADDITIONAL TESTS:  Results Reviewed:   Imaging Personally Reviewed:  Electrocardiogram Personally Reviewed:    COORDINATION OF CARE:  Care Discussed with Consultants/Other Providers [Y/N]: GI  Prior or Outpatient Records Reviewed [Y/N]:

## 2023-05-11 NOTE — PROGRESS NOTE ADULT - ASSESSMENT
88yoF w/ PMHx of advanced dementia (at B/L patient is bedbound, nonverbal, though able to tolerate PO diet - per family, patient has hearty appetite), HTN, HLD, IDDM, who presents from home where she lives with her , brought in by her daughter (son is at her bedside during this evaluation) w/ complaints of daughter noticing the patient has been having difficulty breathing over the past few days in setting of notable increased swelling of the patient's hands, feet, and face. Of note, patient was recently hospitalized at [facility] for aspiration PNA (and found to have demand ischemia) when she presented with similar complaints and was found to be hypoxic; at the time, S+S eval was recommended, but per documentation at the end of Jan 2023,      1- TOÑITO   2- CHF   3- HTN  4- hypernatremia  5- PNA        toñito in setting of chf as well as more likely due to contrast nephropathy and possible pneumonia   creatinine is slowly improving   urinary retention requiring intermittent catheterization,.   hypernatremia is improved with free water, continue 200 cc q8h  bumex hold on hold to re-eval in near future    her echo reveals mod to severe range AS  she is not a dialysis candidate in setting of her advance dementia   hydralazine 10 mg po tid and increase as BP tolerates  anemia, trend hgb   O2 supplementation   strict I/O  trend creatinine and electrolytes daily  plan for peg placement, now on hold for worsening leukocytosis. restarted on cefepime 2G daily. readjust cefepime dose based on renal function to 500 mg - 1 g daily and readjust if renal function improves further

## 2023-05-11 NOTE — PROGRESS NOTE ADULT - PROBLEM SELECTOR PLAN 2
2/2 combination of pulm edema and aspiration PNA, improving  - now weaned off supplemental O2  - resumed cefepime empirically- antitipcate 5 day course    #leukocytosis- improving on cefepime  - f/u CT c/a/p negative  - f/u urine cx  - blood dx negative  - no signs of phlebitis/cellulitis discernable  - cefepime empirically added back on  - if urine culture negative may stop abx

## 2023-05-11 NOTE — PROGRESS NOTE ADULT - ASSESSMENT
Alternate MRN: [id number]    88yoF w/ PMHx of advanced dementia (at B/L patient is bedbound, nonverbal, though able to tolerate PO diet - per family, patient has hearty appetite), HTN, HLD, IDDM, brought in by family for difficulty breathing over the past few days in setting of notable increased swelling of the patient's hands, feet, and face; a/w hypoxic respiratory failure 2/2 aspiration PNA and acute decompensated diastolic heart failure, with course c/b significant TOÑITO, poor oral intake now s/p NGT and pending PEG.

## 2023-05-11 NOTE — PRE PROCEDURE NOTE - PRE PROCEDURE EVALUATION
Attending Physician: Dr. Sorensen                         Procedure: EGD and PEG Tube Placement   Indication for Procedure: Malnutrition   ________________________________________________________  PAST MEDICAL & SURGICAL HISTORY:  Diabetes Mellitus, Type 2  Glaucoma  Hyperlipidemia  HTN (Hypertension)  Diabetic retinopathy  Dementia  Cataract  Diabetes mellitus  HTN (hypertension)  HLD (hyperlipidemia)  Dementia  S/P Carpal Tunnel Release  right wrist 5/6/2009  S/P eye surgery    ALLERGIES:  No Known Allergies    HOME MEDICATIONS:  Admelog 100 units/mL injectable solution: 1-4 unit(s) injectable 3 times a day (with meals)  amLODIPine 10 mg oral tablet: 1 tab(s) orally once a day  amLODIPine 10 mg oral tablet: 1 tab(s) orally once a day  aspirin 81 mg oral delayed release tablet: 1 tab(s) orally once a day  aspirin 81 mg oral delayed release tablet: 1 tab(s) orally once a day  aspirin 81 mg oral tablet: 1 tab(s) orally once a day  atorvastatin 80 mg oral tablet: 1 tab(s) orally once a day  Combigan 0.2%-0.5% ophthalmic solution: 1 drop(s) in each affected eye 2 times a day  escitalopram 10 mg oral tablet: 1 tab(s) orally once a day  HumaLOG 100 units/mL injectable solution: injectable as needed as per daughter  hydroCHLOROthiazide 12.5 mg oral capsule: 1 tab(s) orally once a day  hydroCHLOROthiazide 12.5 mg oral capsule: 1 cap(s) orally once a day  Lipitor 80 mg oral tablet: 1 tab(s) orally once a day  losartan 100 mg oral tablet: 1 tab(s) orally once a day  losartan 100 mg oral tablet: 1 tab(s) orally once a day  Lumigan 0.01% ophthalmic solution: 1 drop(s) in each affected eye once a day  Melatonin 10 mg oral capsule: 1 cap(s) orally once a day (at bedtime)  QUEtiapine 50 mg oral tablet: 1 tab(s) orally once a day, As Needed  ramelteon 8 mg oral tablet: 1 tab(s) orally once a day (at bedtime), As Needed  ramelteon 8 mg oral tablet: 1 tab(s) orally once a day (at bedtime)  SEROquel XR 50 mg oral tablet, extended release: 1 tab(s) orally once a day    AICD/PPM: [ ] yes   [X] no    PERTINENT LAB DATA:                        8.7    19.67 )-----------( 223      ( 11 May 2023 09:16 )             27.3     05-11    146<H>  |  109<H>  |  83<H>  ----------------------------<  194<H>  4.7   |  23  |  1.61<H>    Ca    9.1      11 May 2023 09:16      PT/INR - ( 11 May 2023 09:16 )   PT: 13.7 sec;   INR: 1.18 ratio                     PHYSICAL EXAMINATION:    T(C): 36.3  HR: 90  BP: 165/62  RR: 18  SpO2: 99%    Constitutional: NAD    Neck:  NG tube in placement   Respiratory: CTAB/L  Cardiovascular: S1 and S2  Gastrointestinal: BS+, soft, NT/ND  Extremities: No peripheral edema  Neurological: A/O x 1-2        COMMENTS:    The patient is a suitable candidate for the planned procedure unless box checked [ ]  No, explain:

## 2023-05-11 NOTE — PROGRESS NOTE ADULT - SUBJECTIVE AND OBJECTIVE BOX
Chief complaint  Patient is a 88y old  Female who presents with a chief complaint of SOB/GARRISON, swelling of hands/legs/face (11 May 2023 09:37)         Labs and Fingersticks  CAPILLARY BLOOD GLUCOSE      POCT Blood Glucose.: 103 mg/dL (11 May 2023 13:07)  POCT Blood Glucose.: 217 mg/dL (11 May 2023 08:53)  POCT Blood Glucose.: 274 mg/dL (11 May 2023 06:02)  POCT Blood Glucose.: 344 mg/dL (11 May 2023 00:10)  POCT Blood Glucose.: 326 mg/dL (10 May 2023 17:00)      Anion Gap, Serum: 14 (05-11 @ 09:16)  Anion Gap, Serum: 15 (05-10 @ 10:41)      Calcium, Total Serum: 9.1 (05-11 @ 09:16)  Calcium, Total Serum: 8.7 (05-10 @ 10:41)          05-11    146<H>  |  109<H>  |  83<H>  ----------------------------<  194<H>  4.7   |  23  |  1.61<H>    Ca    9.1      11 May 2023 09:16                          8.7    19.67 )-----------( 223      ( 11 May 2023 09:16 )             27.3     Medications  MEDICATIONS  (STANDING):  aspirin  chewable 81 milliGRAM(s) Oral daily  carvedilol 6.25 milliGRAM(s) Oral every 12 hours  cefepime   IVPB      cefepime   IVPB 2000 milliGRAM(s) IV Intermittent every 24 hours  dextrose 5%. 1000 milliLiter(s) (50 mL/Hr) IV Continuous <Continuous>  dextrose 5%. 1000 milliLiter(s) (100 mL/Hr) IV Continuous <Continuous>  dextrose 50% Injectable 25 Gram(s) IV Push once  dextrose 50% Injectable 12.5 Gram(s) IV Push once  dextrose 50% Injectable 25 Gram(s) IV Push once  glucagon  Injectable 1 milliGRAM(s) IntraMuscular once  heparin   Injectable 5000 Unit(s) SubCutaneous every 12 hours  hydrALAZINE 20 milliGRAM(s) Oral every 8 hours  insulin lispro (ADMELOG) corrective regimen sliding scale   SubCutaneous three times a day before meals  insulin NPH human recombinant 7 Unit(s) SubCutaneous every 6 hours  latanoprost 0.005% Ophthalmic Solution 1 Drop(s) Both EYES at bedtime  Nephro-penny 1 Tablet(s) Oral daily  thiamine 100 milliGRAM(s) Oral daily      Physical Exam  Vital Signs Last 12 Hrs  T(F): 97.4 (05-11-23 @ 11:47), Max: 97.5 (05-11-23 @ 04:51)  HR: 90 (05-11-23 @ 11:47) (90 - 90)  BP: 165/62 (05-11-23 @ 11:47) (165/62 - 175/70)  BP(mean): 96 (05-11-23 @ 11:47) (96 - 96)  RR: 18 (05-11-23 @ 11:47) (18 - 18)  SpO2: 99% (05-11-23 @ 11:47) (99% - 99%)    CVS: S1S2   Respiratory: No wheezing, no crepitations  GI: Abdomen soft, bowel sounds positive +NG tube  Musculoskeletal:  moves all extremities  : Voiding        Chief complaint  Patient is a 88y old  Female who presents with a chief complaint of SOB/GARRISON, swelling of hands/legs/face (11 May 2023 09:37)         Labs and Fingersticks  CAPILLARY BLOOD GLUCOSE      POCT Blood Glucose.: 103 mg/dL (11 May 2023 13:07)  POCT Blood Glucose.: 217 mg/dL (11 May 2023 08:53)  POCT Blood Glucose.: 274 mg/dL (11 May 2023 06:02)  POCT Blood Glucose.: 344 mg/dL (11 May 2023 00:10)  POCT Blood Glucose.: 326 mg/dL (10 May 2023 17:00)      Anion Gap, Serum: 14 (05-11 @ 09:16)  Anion Gap, Serum: 15 (05-10 @ 10:41)      Calcium, Total Serum: 9.1 (05-11 @ 09:16)  Calcium, Total Serum: 8.7 (05-10 @ 10:41)          05-11    146<H>  |  109<H>  |  83<H>  ----------------------------<  194<H>  4.7   |  23  |  1.61<H>    Ca    9.1      11 May 2023 09:16                          8.7    19.67 )-----------( 223      ( 11 May 2023 09:16 )             27.3     Medications  MEDICATIONS  (STANDING):  aspirin  chewable 81 milliGRAM(s) Oral daily  carvedilol 6.25 milliGRAM(s) Oral every 12 hours  cefepime   IVPB      cefepime   IVPB 2000 milliGRAM(s) IV Intermittent every 24 hours  dextrose 5%. 1000 milliLiter(s) (50 mL/Hr) IV Continuous <Continuous>  dextrose 5%. 1000 milliLiter(s) (100 mL/Hr) IV Continuous <Continuous>  dextrose 50% Injectable 25 Gram(s) IV Push once  dextrose 50% Injectable 12.5 Gram(s) IV Push once  dextrose 50% Injectable 25 Gram(s) IV Push once  glucagon  Injectable 1 milliGRAM(s) IntraMuscular once  heparin   Injectable 5000 Unit(s) SubCutaneous every 12 hours  hydrALAZINE 20 milliGRAM(s) Oral every 8 hours  insulin lispro (ADMELOG) corrective regimen sliding scale   SubCutaneous three times a day before meals  insulin NPH human recombinant 7 Unit(s) SubCutaneous every 6 hours  latanoprost 0.005% Ophthalmic Solution 1 Drop(s) Both EYES at bedtime  Nephro-penny 1 Tablet(s) Oral daily  thiamine 100 milliGRAM(s) Oral daily      Physical Exam  Vital Signs Last 12 Hrs  T(F): 97.4 (05-11-23 @ 11:47), Max: 97.5 (05-11-23 @ 04:51)  HR: 90 (05-11-23 @ 11:47) (90 - 90)  BP: 165/62 (05-11-23 @ 11:47) (165/62 - 175/70)  BP(mean): 96 (05-11-23 @ 11:47) (96 - 96)  RR: 18 (05-11-23 @ 11:47) (18 - 18)  SpO2: 99% (05-11-23 @ 11:47) (99% - 99%)    CVS: S1S2   Respiratory: No wheezing, no crepitations  GI: Abdomen soft, bowel sounds positive +NG tube  Musculoskeletal:  moves all extremities  : Voiding

## 2023-05-11 NOTE — PROGRESS NOTE ADULT - PROBLEM SELECTOR PLAN 1
Will continue current insulin regimen for now.   Increase NPH to 7 units q 6 hr.   Continue sliding scale q6 coverage.   HOLD NPH if Tube feeds are held.   Will continue monitoring  blood sugars, will Follow up.

## 2023-05-11 NOTE — PROGRESS NOTE ADULT - PROBLEM SELECTOR PLAN 1
Pt not compliant with SLP eval, with high aspiration risk, severe dysphagia, electrolyte derangements  -Failed several swallowing evals, SLP continues to recommend NPO  -Family discussion about GOC 5/2 and 5/3: requesting PEG  -NGT placed  -Replete electrolytes aggressively  -Leukocytosis initially improved, then rebound increase, now improving again after starting empiric cefepime, although infectious source is unclear given negative UA clear CXR- would repeat CT c/a/p, urine culture  - blood cx negative  - pt on cefepime empirically  - f/u wound care re-eval- no obvious purulence of source of infection documented  -PEG tube placement planned for 5/11

## 2023-05-11 NOTE — PROGRESS NOTE ADULT - SUBJECTIVE AND OBJECTIVE BOX
Subjective: Patient seen and examined. No new events except as noted.   hyperglycemic yesterday     REVIEW OF SYSTEMS:    Unable to obtain      MEDICATIONS:  MEDICATIONS  (STANDING):  aspirin  chewable 81 milliGRAM(s) Oral daily  carvedilol 6.25 milliGRAM(s) Oral every 12 hours  cefepime   IVPB      cefepime   IVPB 2000 milliGRAM(s) IV Intermittent every 24 hours  dextrose 5%. 1000 milliLiter(s) (50 mL/Hr) IV Continuous <Continuous>  dextrose 5%. 1000 milliLiter(s) (100 mL/Hr) IV Continuous <Continuous>  dextrose 50% Injectable 25 Gram(s) IV Push once  dextrose 50% Injectable 12.5 Gram(s) IV Push once  dextrose 50% Injectable 25 Gram(s) IV Push once  glucagon  Injectable 1 milliGRAM(s) IntraMuscular once  heparin   Injectable 5000 Unit(s) SubCutaneous every 12 hours  hydrALAZINE 20 milliGRAM(s) Oral every 8 hours  insulin lispro (ADMELOG) corrective regimen sliding scale   SubCutaneous three times a day before meals  insulin NPH human recombinant 7 Unit(s) SubCutaneous every 6 hours  latanoprost 0.005% Ophthalmic Solution 1 Drop(s) Both EYES at bedtime  Nephro-penny 1 Tablet(s) Oral daily  thiamine 100 milliGRAM(s) Oral daily      PHYSICAL EXAM:  T(C): 36.4 (05-11-23 @ 04:51), Max: 36.7 (05-10-23 @ 16:58)  HR: 90 (05-11-23 @ 04:51) (82 - 90)  BP: 175/70 (05-11-23 @ 04:51) (160/61 - 175/70)  RR: 18 (05-11-23 @ 04:51) (18 - 18)  SpO2: 99% (05-11-23 @ 04:51) (99% - 100%)  Wt(kg): --  I&O's Summary    10 May 2023 07:01  -  11 May 2023 07:00  --------------------------------------------------------  IN: 340 mL / OUT: 600 mL / NET: -260 mL        Appearance: NAD non verbal 	  HEENT: Normal oral mucosa, PERRL, EOMI	  Lymphatic: No lymphadenopathy +NGT   Cardiovascular: Normal S1 S2, No JVD, No murmurs  Respiratory: decreased bs, on NC   Psychiatry: A & O x 0  Gastrointestinal:  Soft, Non-tender, + BS	  Skin: No rashes, No ecchymoses, No cyanosis	  Neurologic: Non-focal  Extremities: decreased  range of motion, No  edema  Vascular: Peripheral pulses palpable 2+ bilaterally      LABS:    CARDIAC MARKERS:    Culture - Blood (05.08.23 @ 17:16)   Specimen Source: .Blood Blood-Venous  Culture Results:   No growth to date.    Culture - Blood (05.08.23 @ 17:16)   Specimen Source: .Blood Blood-Peripheral  Culture Results:   No growth to date.                                8.2    25.25 )-----------( 227      ( 10 May 2023 10:41 )             25.8     05-10    143  |  105  |  90<H>  ----------------------------<  291<H>  3.4<L>   |  23  |  1.81<H>    Ca    8.7      10 May 2023 10:41      proBNP:   Lipid Profile:   HgA1c:   TSH:     3          TELEMETRY: 	    ECG:  	  RADIOLOGY:   DIAGNOSTIC TESTING:  [ ] Echocardiogram:  [ ]  Catheterization:  [ ] Stress Test:    OTHER:

## 2023-05-11 NOTE — PROGRESS NOTE ADULT - SUBJECTIVE AND OBJECTIVE BOX
[facility]   [phone]  RENAL FOLLOW UP NOTE  --------------------------------------------------------------------------------  Chief Complaint:    24 hour events/subjective:    patient seen and examined.   not interactive    PAST HISTORY  --------------------------------------------------------------------------------  No significant changes to PMH, PSH, FHx, SHx, unless otherwise noted    ALLERGIES & MEDICATIONS  --------------------------------------------------------------------------------  Allergies    No Known Allergies  Allergy Status Unknown    Intolerances      Standing Inpatient Medications  aspirin  chewable 81 milliGRAM(s) Oral daily  carvedilol 6.25 milliGRAM(s) Oral every 12 hours  cefepime   IVPB 2000 milliGRAM(s) IV Intermittent every 24 hours  cefepime   IVPB      dextrose 5%. 1000 milliLiter(s) IV Continuous <Continuous>  dextrose 5%. 1000 milliLiter(s) IV Continuous <Continuous>  dextrose 50% Injectable 25 Gram(s) IV Push once  dextrose 50% Injectable 12.5 Gram(s) IV Push once  dextrose 50% Injectable 25 Gram(s) IV Push once  glucagon  Injectable 1 milliGRAM(s) IntraMuscular once  heparin   Injectable 5000 Unit(s) SubCutaneous every 12 hours  hydrALAZINE 20 milliGRAM(s) Oral every 8 hours  insulin lispro (ADMELOG) corrective regimen sliding scale   SubCutaneous three times a day before meals  insulin NPH human recombinant 7 Unit(s) SubCutaneous every 6 hours  latanoprost 0.005% Ophthalmic Solution 1 Drop(s) Both EYES at bedtime  Nephro-penny 1 Tablet(s) Oral daily  thiamine 100 milliGRAM(s) Oral daily    PRN Inpatient Medications  dextrose Oral Gel 15 Gram(s) Oral once PRN      REVIEW OF SYSTEMS  --------------------------------------------------------------------------------    patient is unable to give ROS    VITALS/PHYSICAL EXAM  --------------------------------------------------------------------------------  T(C): 36.3 (05-11-23 @ 11:47), Max: 36.7 (05-10-23 @ 16:58)  HR: 90 (05-11-23 @ 11:47) (82 - 90)  BP: 165/62 (05-11-23 @ 11:47) (160/61 - 175/70)  RR: 18 (05-11-23 @ 11:47) (18 - 18)  SpO2: 99% (05-11-23 @ 11:47) (99% - 100%)  Wt(kg): --        05-10-23 @ 07:01  -  05-11-23 @ 07:00  --------------------------------------------------------  IN: 340 mL / OUT: 600 mL / NET: -260 mL    05-11-23 @ 07:01  -  05-11-23 @ 16:13  --------------------------------------------------------  IN: 0 mL / OUT: 450 mL / NET: -450 mL      Physical Exam:  	              Gen: ill appearing elderly F, On O2    	Pulm: decrease bs, +coarse bs, no wheezing  	CV: no JVD. RRR, S1S2; no rub  	Abd: +BS, soft, nontender/nondistended, +NGT  	: No bladder distention   	UE: Warm, no cyanosis  no clubbing,  no edema;  	LE: Warm, no cyanosis  no clubbing, no edema    LABS/STUDIES  --------------------------------------------------------------------------------              8.7    19.67 >-----------<  223      [05-11-23 @ 09:16]              27.3     146  |  109  |  83  ----------------------------<  194      [05-11-23 @ 09:16]  4.7   |  23  |  1.61        Ca     9.1     [05-11-23 @ 09:16]      PT/INR: PT 13.7 , INR 1.18       [05-11-23 @ 09:16]      Creatinine Trend:  SCr 1.61 [05-11 @ 09:16]  SCr 1.81 [05-10 @ 10:41]  SCr 1.94 [05-09 @ 10:44]  SCr 2.35 [05-08 @ 12:13]  SCr 2.66 [05-07 @ 10:38]              Urinalysis - [05-09-23 @ 01:26]      Color Light Yellow / Appearance Clear / SG 1.016 / pH 5.5      Gluc Negative / Ketone Negative  / Bili Negative / Urobili Negative       Blood Trace / Protein 100 mg/dL / Leuk Est Negative / Nitrite Negative      RBC 4 / WBC 2 / Hyaline 3 / Gran  / Sq Epi  / Non Sq Epi  / Bacteria Negative      TSH 0.68      [05-04-23 @ 11:38]  Lipid: chol 216, TG 80, HDL 49, LDL --      [08-08-22 @ 06:31]

## 2023-05-11 NOTE — PROGRESS NOTE ADULT - ASSESSMENT
Assessment  DMT2: 88y Female with DM T2 with hyperglycemia, A1C7.1% , was on insulin at home sliding scale, now on bolus insulin with coverage, blood sugars are fluctuating.  Tube feeds via NG tube. NPO currently, was off tube feeds, pending PEG placement. Now glucose remains elevated.   Dysphagia: NG tube was on tube feeds, GI eval for PEG, pending optimization.   HTN: on antihypertensive medications, monitored, asymptomatic.  HLD: on statin, diet controlled.  TSH 0.68 Euthyroid        Discussed plan and management with Dr Angel Mackenzie NP - TEAMS  Marianne Ortiz MD  Cell: [phone]  Office: [phone]      Assessment  DMT2: 88y Female with DM T2 with hyperglycemia, A1C7.1% , was on insulin at home sliding scale, now on bolus insulin with coverage, blood sugars are fluctuating. Tube feeds via NG tube. NPO currently, was off tube feeds, pending PEG placement. Now glucose remains elevated.   Dysphagia: NG tube was on tube feeds, GI eval for PEG, pending optimization.   HTN: on antihypertensive medications, monitored, asymptomatic.  HLD: on statin, diet controlled.  TSH 0.68 Euthyroid        Discussed plan and management with Dr Angel Mackenzie NP - TEAMS  Marianne Ortiz MD  Cell: [phone]  Office: [phone]

## 2023-05-12 NOTE — PROGRESS NOTE ADULT - PROBLEM SELECTOR PLAN 1
Pt not compliant with SLP eval, with high aspiration risk, severe dysphagia, electrolyte derangements  -Failed several swallowing evals, SLP continues to recommend NPO  -Family discussion about GOC 5/2 and 5/3: requesting PEG  -NGT placed  -Replete electrolytes aggressively  -Leukocytosis initially improved, then rebound increase, now improving again after starting empiric cefepime, although infectious source is unclear given negative UA clear CXR- would repeat CT c/a/p, urine culture  - blood cx negative  - pt on cefepime empirically  - f/u wound care re-eval- no obvious purulence of source of infection documented  -PEG tube placement aborted by GI due to proximal esophagitis and risk of bleeding with pulling through internal bumper; recommend IR placement- IR planning for next wednesday- will need to try to expeditie.

## 2023-05-12 NOTE — PROGRESS NOTE ADULT - SUBJECTIVE AND OBJECTIVE BOX
Kobi De MD  Division of Hospital Medicine  Available on MS teams until 7pm  If no response or off-hours, page [phone]  -------------------------------------    Patient is a 88y old  Female who presents with a chief complaint of SOB/GARRISON, swelling of hands/legs/face (12 May 2023 13:47)      SUBJECTIVE / OVERNIGHT EVENTS: non eacute  ADDITIONAL REVIEW OF SYSTEMS: pt nonverbal, grunts    MEDICATIONS  (STANDING):  aspirin  chewable 81 milliGRAM(s) Oral daily  carvedilol 6.25 milliGRAM(s) Oral every 12 hours  cefepime   IVPB      cefepime   IVPB 2000 milliGRAM(s) IV Intermittent every 24 hours  dextrose 5%. 1000 milliLiter(s) (100 mL/Hr) IV Continuous <Continuous>  dextrose 5%. 1000 milliLiter(s) (50 mL/Hr) IV Continuous <Continuous>  dextrose 50% Injectable 25 Gram(s) IV Push once  dextrose 50% Injectable 25 Gram(s) IV Push once  dextrose 50% Injectable 12.5 Gram(s) IV Push once  glucagon  Injectable 1 milliGRAM(s) IntraMuscular once  heparin   Injectable 5000 Unit(s) SubCutaneous every 12 hours  hydrALAZINE 20 milliGRAM(s) Oral every 8 hours  insulin lispro (ADMELOG) corrective regimen sliding scale   SubCutaneous every 6 hours  insulin NPH human recombinant 7 Unit(s) SubCutaneous every 6 hours  latanoprost 0.005% Ophthalmic Solution 1 Drop(s) Both EYES at bedtime  Nephro-penny 1 Tablet(s) Oral daily  sodium chloride 0.9%. 1000 milliLiter(s) (75 mL/Hr) IV Continuous <Continuous>  thiamine 100 milliGRAM(s) Oral daily    MEDICATIONS  (PRN):  bisacodyl 5 milliGRAM(s) Oral every 12 hours PRN Constipation  dextrose Oral Gel 15 Gram(s) Oral once PRN Blood Glucose LESS THAN 70 milliGRAM(s)/deciliter      CAPILLARY BLOOD GLUCOSE      POCT Blood Glucose.: 151 mg/dL (12 May 2023 12:45)  POCT Blood Glucose.: 177 mg/dL (12 May 2023 09:09)  POCT Blood Glucose.: 152 mg/dL (12 May 2023 06:23)  POCT Blood Glucose.: 164 mg/dL (12 May 2023 02:05)    I&O's Summary    11 May 2023 07:01  -  12 May 2023 07:00  --------------------------------------------------------  IN: 0 mL / OUT: 1150 mL / NET: -1150 mL    12 May 2023 07:01  -  12 May 2023 16:43  --------------------------------------------------------  IN: 0 mL / OUT: 300 mL / NET: -300 mL        PHYSICAL EXAM:  Vital Signs Last 24 Hrs  T(C): 37.1 (12 May 2023 11:27), Max: 37.4 (12 May 2023 04:00)  T(F): 98.8 (12 May 2023 11:27), Max: 99.3 (12 May 2023 04:00)  HR: 88 (12 May 2023 11:27) (83 - 92)  BP: 163/65 (12 May 2023 11:27) (91/50 - 169/70)  BP(mean): 98 (12 May 2023 11:27) (98 - 98)  RR: 18 (12 May 2023 11:27) (13 - 18)  SpO2: 96% (12 May 2023 11:27) (95% - 100%)    Parameters below as of 12 May 2023 11:27  Patient On (Oxygen Delivery Method): nasal cannula      CONSTITUTIONAL: NAD  EYES: PERRLA; conjunctiva and sclera clear  ENMT: MMM  NECK: Supple  RESPIRATORY: Normal respiratory effort; CTAB  CARDIOVASCULAR: RRR, no JVD, no peripheral edema   ABDOMEN: Nontender to palpation, normoactive BS, no guarding/rigidity  MUSCLOSKELETAL:  no clubbing/cyanosis, no joint swelling or tenderness to palpation  PSYCH: AO x 0, unable to assess  NEUROLOGY: unable to assess  SKIN: No rashes; no palpable lesions    LABS:                        7.8    18.67 )-----------( 234      ( 12 May 2023 09:47 )             25.0     05-12    147<H>  |  110<H>  |  84<H>  ----------------------------<  159<H>  3.7   |  22  |  1.68<H>    Ca    9.0      12 May 2023 09:47      PT/INR - ( 11 May 2023 09:16 )   PT: 13.7 sec;   INR: 1.18 ratio                     RADIOLOGY & ADDITIONAL TESTS:  Results Reviewed:   Imaging Personally Reviewed:  Electrocardiogram Personally Reviewed:    COORDINATION OF CARE:  Care Discussed with Consultants/Other Providers [Y/N]: IR, GI  Prior or Outpatient Records Reviewed [Y/N]:

## 2023-05-12 NOTE — PROGRESS NOTE ADULT - ASSESSMENT
Alternate MRN: [id number]    88yoF w/ PMHx of advanced dementia (at B/L patient is bedbound, nonverbal, though able to tolerate PO diet - per family, patient has hearty appetite), HTN, HLD, IDDM, brought in by family for difficulty breathing over the past few days in setting of notable increased swelling of the patient's hands, feet, and face; a/w hypoxic respiratory failure 2/2 aspiration PNA and acute decompensated diastolic heart failure, with course c/b significant TOÑITO, poor oral intake now s/p NGT and pending PEG.

## 2023-05-12 NOTE — PROGRESS NOTE ADULT - ASSESSMENT
Assessment  DMT2: 88y Female with DM T2 with hyperglycemia, A1C7.1% , was on insulin at home sliding scale, now on bolus insulin with coverage, blood sugars are fluctuating. Tube feeds via NG tube. NPO currently, was off tube feeds, pending PEG placement. Now glucose trending stable.   Dysphagia: NG tube was on tube feeds, GI/IR/Surgery eval for PEG. s/p EGD , peg tube not placed due to severe esophagitis in upper esophagus.   HTN: on antihypertensive medications, monitored, asymptomatic.  HLD: on statin, diet controlled.  TSH 0.68 Euthyroid        Discussed plan and management with Dr Angel Mackenzie NP - TEAMS  Marianne Ortiz MD  Cell: [phone]  Office: [phone]      Assessment  DMT2: 88y Female with DM T2 with hyperglycemia, A1C7.1% , was on insulin at home sliding scale, now on bolus insulin with coverage, blood sugars are fluctuating. Tube feeds via NG tube. NPO currently, was off tube feeds,  pending PEG placement. Now glucose trending stable.   Dysphagia: NG tube was on tube feeds, GI/IR/Surgery eval for PEG. s/p EGD , peg tube not placed due to severe esophagitis in upper esophagus.   HTN: on antihypertensive medications, monitored, asymptomatic.  HLD: on statin, diet controlled.  TSH 0.68 Euthyroid        Discussed plan and management with Dr Angel Mackenzie NP - TEAMS  Marianne Ortiz MD  Cell: [phone]  Office: [phone]

## 2023-05-12 NOTE — CONSULT NOTE ADULT - ASSESSMENT
Interventional Radiology    Evaluate for Procedure:     HPI:    89 y/o F w/ PMHx advanced dementia, HTN, HLD, IDDM, presented with difficulty breathing thought to be due to aspiration PNA and/or decompensated heart failure. Pt w/ poor oral intake s/p NGT and now requiring gastrostomy for long-term feeding.   GI unable to pass PEG tube endoscopically due to severe esophagitis seen on EGD. IR has been reconsulted for placement of gastrostomy tube.     Allergies: No Known Allergies    Medications (Abx/Cardiac/Anticoagulation/Blood Products)    aspirin  chewable: 81 milliGRAM(s) Oral (05-12 @ 13:02)  carvedilol: 6.25 milliGRAM(s) Oral (05-12 @ 13:02)  cefepime   IVPB: 100 mL/Hr IV Intermittent (05-10 @ 17:04)  heparin   Injectable: 5000 Unit(s) SubCutaneous (05-12 @ 05:25)  hydrALAZINE: 20 milliGRAM(s) Oral (05-12 @ 13:02)    Data:    T(C): 37.1  HR: 88  BP: 163/65  RR: 18  SpO2: 96%    -WBC 18.67 / HgB 7.8 / Hct 25.0 / Plt 234  -Na 147 / Cl 110 / BUN 84 / Glucose 159  -K 3.7 / CO2 22 / Cr 1.68  -ALT -- / Alk Phos -- / T.Bili --  -INR 1.18 / PTT --      Radiology: CT chest/abdomen/pelvis reviewed.     Assessment/Plan:   89 y/o F w/ PMHx advanced dementia, HTN, HLD, IDDM, presented with difficulty breathing thought to be due to aspiration PNA and/or decompensated heart failure. Pt w/ poor oral intake s/p NGT and now requiring gastrostomy for long-term feeding.   GI unable to pass PEG tube endoscopically due to severe esophagitis seen on EGD. IR has been reconsulted for placement of gastrostomy tube.     -- IR will plan to perform percutaneous gastrostomy placement tentatively on Wednesday, May 17, 2023  -- Please administer oral contrast solution via NG tube the night before the procedure (30 cc Omnipaque 300 + 470 cc water)   -- Then make patient NPO at midnight on 05/17/2023 (05/16/2023 11:59PM)  -- hold a.m. anticoagulation on morning of 05/17/2023  -- AM CBC, BMP, coags  -- please complete IR pre-procedure note  -- please place IR procedure request order under Dr. Mehta    --  Buster Rodriguez MD   Diagnostic Radiology Resident (PGY-3)  IR Pager: [phone] ([facility])    For questions about scheduling during appropriate work hours, call IR : [phone]    For outpatient IR Booking:   [facility]: [phone]  [facility]: [phone] Interventional Radiology    Evaluate for Procedure:     HPI:    89 y/o F w/ PMHx advanced dementia, HTN, HLD, IDDM, presented with difficulty breathing thought to be due to aspiration PNA and/or decompensated heart failure. Pt w/ poor oral intake s/p NGT and now requiring gastrostomy for long-term feeding.   GI unable to pass PEG tube endoscopically due to severe esophagitis seen on EGD. IR has been reconsulted for placement of gastrostomy tube.     Allergies: No Known Allergies    Medications (Abx/Cardiac/Anticoagulation/Blood Products)    aspirin  chewable: 81 milliGRAM(s) Oral (05-12 @ 13:02)  carvedilol: 6.25 milliGRAM(s) Oral (05-12 @ 13:02)  cefepime   IVPB: 100 mL/Hr IV Intermittent (05-10 @ 17:04)  heparin   Injectable: 5000 Unit(s) SubCutaneous (05-12 @ 05:25)  hydrALAZINE: 20 milliGRAM(s) Oral (05-12 @ 13:02)    Data:    T(C): 37.1  HR: 88  BP: 163/65  RR: 18  SpO2: 96%    -WBC 18.67 / HgB 7.8 / Hct 25.0 / Plt 234  -Na 147 / Cl 110 / BUN 84 / Glucose 159  -K 3.7 / CO2 22 / Cr 1.68  -ALT -- / Alk Phos -- / T.Bili --  -INR 1.18 / PTT --      Radiology: CT chest/abdomen/pelvis reviewed.     Assessment/Plan:   89 y/o F w/ PMHx advanced dementia, HTN, HLD, IDDM, presented with difficulty breathing thought to be due to aspiration PNA and/or decompensated heart failure. Pt w/ poor oral intake s/p NGT and now requiring gastrostomy for long-term feeding.   GI unable to pass PEG tube endoscopically due to severe esophagitis seen on EGD. IR has been reconsulted for placement of gastrostomy tube.     -- IR will plan to perform percutaneous gastrostomy placement tentatively on Wednesday, May 17, 2023.  -- Please administer oral contrast solution via NG tube the night before the procedure (30 cc Omnipaque 300 + 470 cc water)   -- Then make patient NPO at midnight on 05/17/2023 (05/16/2023 11:59PM)  -- hold a.m. anticoagulation on morning of 05/17/2023  -- AM CBC, BMP, coags  -- please complete IR pre-procedure note  -- please place IR procedure request order under Dr. Mehta    --  Buster Rodriguez MD   Diagnostic Radiology Resident (PGY-3)  IR Pager: [phone] ([facility])    For questions about scheduling during appropriate work hours, call IR : [phone]    For outpatient IR Booking:   [facility]: [phone]  [facility]: [phone]

## 2023-05-12 NOTE — PROGRESS NOTE ADULT - SUBJECTIVE AND OBJECTIVE BOX
Chief complaint  Patient is a 88y old  Female who presents with a chief complaint of SOB/GARRISON, swelling of hands/legs/face (12 May 2023 09:46)         Labs and Fingersticks  CAPILLARY BLOOD GLUCOSE      POCT Blood Glucose.: 151 mg/dL (12 May 2023 12:45)  POCT Blood Glucose.: 177 mg/dL (12 May 2023 09:09)  POCT Blood Glucose.: 152 mg/dL (12 May 2023 06:23)  POCT Blood Glucose.: 164 mg/dL (12 May 2023 02:05)      Anion Gap, Serum: 15 (05-12 @ 09:47)  Anion Gap, Serum: 14 (05-11 @ 09:16)      Calcium, Total Serum: 9.0 (05-12 @ 09:47)  Calcium, Total Serum: 9.1 (05-11 @ 09:16)          05-12    147<H>  |  110<H>  |  84<H>  ----------------------------<  159<H>  3.7   |  22  |  1.68<H>    Ca    9.0      12 May 2023 09:47                          7.8    18.67 )-----------( 234      ( 12 May 2023 09:47 )             25.0     Medications  MEDICATIONS  (STANDING):  aspirin  chewable 81 milliGRAM(s) Oral daily  carvedilol 6.25 milliGRAM(s) Oral every 12 hours  cefepime   IVPB      cefepime   IVPB 2000 milliGRAM(s) IV Intermittent every 24 hours  dextrose 5%. 1000 milliLiter(s) (100 mL/Hr) IV Continuous <Continuous>  dextrose 5%. 1000 milliLiter(s) (50 mL/Hr) IV Continuous <Continuous>  dextrose 50% Injectable 25 Gram(s) IV Push once  dextrose 50% Injectable 12.5 Gram(s) IV Push once  dextrose 50% Injectable 25 Gram(s) IV Push once  glucagon  Injectable 1 milliGRAM(s) IntraMuscular once  heparin   Injectable 5000 Unit(s) SubCutaneous every 12 hours  hydrALAZINE 20 milliGRAM(s) Oral every 8 hours  insulin lispro (ADMELOG) corrective regimen sliding scale   SubCutaneous every 6 hours  insulin NPH human recombinant 7 Unit(s) SubCutaneous every 6 hours  latanoprost 0.005% Ophthalmic Solution 1 Drop(s) Both EYES at bedtime  Nephro-penny 1 Tablet(s) Oral daily  sodium chloride 0.9%. 1000 milliLiter(s) (75 mL/Hr) IV Continuous <Continuous>  thiamine 100 milliGRAM(s) Oral daily      Physical Exam  General: Patient comfortable in bed   Vital Signs Last 12 Hrs  T(F): 98.8 (05-12-23 @ 11:27), Max: 99.3 (05-12-23 @ 04:00)  HR: 88 (05-12-23 @ 11:27) (88 - 91)  BP: 163/65 (05-12-23 @ 11:27) (154/77 - 163/65)  BP(mean): 98 (05-12-23 @ 11:27) (98 - 98)  RR: 18 (05-12-23 @ 11:27) (18 - 18)  SpO2: 96% (05-12-23 @ 11:27) (95% - 96%)    CVS: S1S2   Respiratory: No wheezing, no crepitations  GI: Abdomen soft, bowel sounds positive  Musculoskeletal:  moves all extremities  : Voiding          Chief complaint  Patient is a 88y old  Female who presents with a chief complaint of SOB/GARRISON, swelling of hands/legs/face (12 May 2023 09:46)     Labs and Fingersticks  CAPILLARY BLOOD GLUCOSE      POCT Blood Glucose.: 151 mg/dL (12 May 2023 12:45)  POCT Blood Glucose.: 177 mg/dL (12 May 2023 09:09)  POCT Blood Glucose.: 152 mg/dL (12 May 2023 06:23)  POCT Blood Glucose.: 164 mg/dL (12 May 2023 02:05)      Anion Gap, Serum: 15 (05-12 @ 09:47)  Anion Gap, Serum: 14 (05-11 @ 09:16)      Calcium, Total Serum: 9.0 (05-12 @ 09:47)  Calcium, Total Serum: 9.1 (05-11 @ 09:16)          05-12    147<H>  |  110<H>  |  84<H>  ----------------------------<  159<H>  3.7   |  22  |  1.68<H>    Ca    9.0      12 May 2023 09:47                          7.8    18.67 )-----------( 234      ( 12 May 2023 09:47 )             25.0     Medications  MEDICATIONS  (STANDING):  aspirin  chewable 81 milliGRAM(s) Oral daily  carvedilol 6.25 milliGRAM(s) Oral every 12 hours  cefepime   IVPB      cefepime   IVPB 2000 milliGRAM(s) IV Intermittent every 24 hours  dextrose 5%. 1000 milliLiter(s) (100 mL/Hr) IV Continuous <Continuous>  dextrose 5%. 1000 milliLiter(s) (50 mL/Hr) IV Continuous <Continuous>  dextrose 50% Injectable 25 Gram(s) IV Push once  dextrose 50% Injectable 12.5 Gram(s) IV Push once  dextrose 50% Injectable 25 Gram(s) IV Push once  glucagon  Injectable 1 milliGRAM(s) IntraMuscular once  heparin   Injectable 5000 Unit(s) SubCutaneous every 12 hours  hydrALAZINE 20 milliGRAM(s) Oral every 8 hours  insulin lispro (ADMELOG) corrective regimen sliding scale   SubCutaneous every 6 hours  insulin NPH human recombinant 7 Unit(s) SubCutaneous every 6 hours  latanoprost 0.005% Ophthalmic Solution 1 Drop(s) Both EYES at bedtime  Nephro-penny 1 Tablet(s) Oral daily  sodium chloride 0.9%. 1000 milliLiter(s) (75 mL/Hr) IV Continuous <Continuous>  thiamine 100 milliGRAM(s) Oral daily      Physical Exam  General: Patient comfortable in bed   Vital Signs Last 12 Hrs  T(F): 98.8 (05-12-23 @ 11:27), Max: 99.3 (05-12-23 @ 04:00)  HR: 88 (05-12-23 @ 11:27) (88 - 91)  BP: 163/65 (05-12-23 @ 11:27) (154/77 - 163/65)  BP(mean): 98 (05-12-23 @ 11:27) (98 - 98)  RR: 18 (05-12-23 @ 11:27) (18 - 18)  SpO2: 96% (05-12-23 @ 11:27) (95% - 96%)    CVS: S1S2   Respiratory: No wheezing, no crepitations  GI: Abdomen soft, bowel sounds positive  Musculoskeletal:  moves all extremities  : Voiding

## 2023-05-12 NOTE — PROGRESS NOTE ADULT - PROBLEM SELECTOR PLAN 2
2/2 combination of pulm edema and aspiration PNA, improving  - now weaned off supplemental O2  - resumed cefepime empirically- antitipcate 5 day course    #leukocytosis- improving on cefepime, anticipate 5 total day course  - f/u CT c/a/p negative  - f/u urine cx  - blood dx negative  - no signs of phlebitis/cellulitis discernable  - cefepime empirically added back on  - if urine culture negative may stop abx

## 2023-05-12 NOTE — PROGRESS NOTE ADULT - SUBJECTIVE AND OBJECTIVE BOX
[facility]   [phone]  RENAL FOLLOW UP NOTE  --------------------------------------------------------------------------------  Chief Complaint:    24 hour events/subjective:    seen earlier   non interactive     PAST HISTORY  --------------------------------------------------------------------------------  No significant changes to PMH, PSH, FHx, SHx, unless otherwise noted    ALLERGIES & MEDICATIONS  --------------------------------------------------------------------------------  Allergies    No Known Allergies    Intolerances      Standing Inpatient Medications  aspirin  chewable 81 milliGRAM(s) Oral daily  carvedilol 6.25 milliGRAM(s) Oral every 12 hours  cefepime   IVPB      cefepime   IVPB 2000 milliGRAM(s) IV Intermittent every 24 hours  dextrose 5%. 1000 milliLiter(s) IV Continuous <Continuous>  dextrose 5%. 1000 milliLiter(s) IV Continuous <Continuous>  dextrose 50% Injectable 25 Gram(s) IV Push once  dextrose 50% Injectable 25 Gram(s) IV Push once  dextrose 50% Injectable 12.5 Gram(s) IV Push once  glucagon  Injectable 1 milliGRAM(s) IntraMuscular once  heparin   Injectable 5000 Unit(s) SubCutaneous every 12 hours  hydrALAZINE 20 milliGRAM(s) Oral every 8 hours  insulin lispro (ADMELOG) corrective regimen sliding scale   SubCutaneous every 6 hours  insulin NPH human recombinant 7 Unit(s) SubCutaneous every 6 hours  latanoprost 0.005% Ophthalmic Solution 1 Drop(s) Both EYES at bedtime  Nephro-penny 1 Tablet(s) Oral daily  sodium chloride 0.9%. 1000 milliLiter(s) IV Continuous <Continuous>  thiamine 100 milliGRAM(s) Oral daily    PRN Inpatient Medications  bisacodyl 5 milliGRAM(s) Oral every 12 hours PRN  dextrose Oral Gel 15 Gram(s) Oral once PRN      REVIEW OF SYSTEMS  --------------------------------------------------------------------------------        VITALS/PHYSICAL EXAM  --------------------------------------------------------------------------------  T(C): 36.5 (05-12-23 @ 21:15), Max: 37.4 (05-12-23 @ 04:00)  HR: 85 (05-12-23 @ 21:15) (85 - 91)  BP: 148/63 (05-12-23 @ 21:15) (148/63 - 163/65)  RR: 18 (05-12-23 @ 21:15) (18 - 18)  SpO2: 97% (05-12-23 @ 21:15) (95% - 98%)  Wt(kg): --        05-11-23 @ 07:01  -  05-12-23 @ 07:00  --------------------------------------------------------  IN: 0 mL / OUT: 1150 mL / NET: -1150 mL    05-12-23 @ 07:01  -  05-12-23 @ 22:26  --------------------------------------------------------  IN: 0 mL / OUT: 300 mL / NET: -300 mL      Physical Exam:  	                  Gen: ill appearing elderly F, On O2    	Pulm: decrease bs, +coarse bs, no wheezing  	CV: no JVD. RRR, S1S2; no rub  	Abd: +BS, soft, nontender/nondistended, +NGT  	: No bladder distention   	UE: Warm, no cyanosis  no clubbing,  no edema;  	LE: Warm, no cyanosis  no clubbing, no edema    LABS/STUDIES  --------------------------------------------------------------------------------              7.8    18.67 >-----------<  234      [05-12-23 @ 09:47]              25.0     147  |  110  |  84  ----------------------------<  159      [05-12-23 @ 09:47]  3.7   |  22  |  1.68        Ca     9.0     [05-12-23 @ 09:47]      PT/INR: PT 13.7 , INR 1.18       [05-11-23 @ 09:16]      Creatinine Trend:  SCr 1.68 [05-12 @ 09:47]  SCr 1.61 [05-11 @ 09:16]  SCr 1.81 [05-10 @ 10:41]  SCr 1.94 [05-09 @ 10:44]  SCr 2.35 [05-08 @ 12:13]              Urinalysis - [05-09-23 @ 01:26]      Color Light Yellow / Appearance Clear / SG 1.016 / pH 5.5      Gluc Negative / Ketone Negative  / Bili Negative / Urobili Negative       Blood Trace / Protein 100 mg/dL / Leuk Est Negative / Nitrite Negative      RBC 4 / WBC 2 / Hyaline 3 / Gran  / Sq Epi  / Non Sq Epi  / Bacteria Negative      TSH 0.68      [05-04-23 @ 11:38]  Lipid: chol 216, TG 80, HDL 49, LDL --      [08-08-22 @ 06:31]

## 2023-05-12 NOTE — PROGRESS NOTE ADULT - PROBLEM SELECTOR PLAN 1
Will continue current insulin regimen for now.   Increased NPH to 7 units q 6 hr.   Continue sliding scale q6 coverage.   HOLD NPH if Tube feeds are held.   Will continue monitoring  blood sugars, will Follow up.

## 2023-05-12 NOTE — PROGRESS NOTE ADULT - ATTENDING COMMENTS
GI consulted for peg placement.   s/p EGD yesterday, peg tube not placed due to severe esophagitis in upper esophagus.   GI pull through technique would cause further significant mucosal trauma if attempted.   Please see report for full details     Recommend PPI BID   IR vs surgery c/s for peg tube placement     GI to sign off, please call with questions

## 2023-05-12 NOTE — CONSULT NOTE ADULT - CONSULT REQUESTED DATE/TIME
22-Apr-2023 23:12
03-May-2023 15:23
26-Apr-2023 16:54
10-May-2023 15:12
12-May-2023 13:48
23-Apr-2023 10:02
05-May-2023 14:41
06-May-2023 22:36

## 2023-05-12 NOTE — PROGRESS NOTE ADULT - ASSESSMENT
88yoF w/ PMHx of advanced dementia (at B/L patient is bedbound, nonverbal, though able to tolerate PO diet - per family, patient has hearty appetite), HTN, HLD, IDDM, who presents from home where she lives with her , brought in by her daughter (son is at her bedside during this evaluation) w/ complaints of daughter noticing the patient has been having difficulty breathing over the past few days in setting of notable increased swelling of the patient's hands, feet, and face. Of note, patient was recently hospitalized at [facility] for aspiration PNA (and found to have demand ischemia) when she presented with similar complaints and was found to be hypoxic; at the time, S+S eval was recommended, but per documentation at the end of Jan 2023,      1- TOÑITO   2- CHF   3- HTN  4- hypernatremia  5- PNA        toñito in setting of chf as well as more likely due to contrast nephropathy and possible pneumonia   creatinine is slowly improving   urinary retention requiring intermittent catheterization,.   hypernatremia is improved with free water, continue 200 cc q8h  bumex hold on hold to re-eval in near future    her echo reveals mod to severe range AS  now on ivf judicious ivf only for now monitor for chf   she is not a dialysis candidate in setting of her advance dementia   hydralazine 10 mg po tid and increase as BP tolerates  anemia, trend hgb   O2 supplementation   strict I/O  trend creatinine and electrolytes daily  plan for peg placement, now on hold for worsening leukocytosis. restarted on cefepime 2G daily. readjust cefepime dose based on renal function to 500 mg - 1 g daily and readjust if renal function improves further

## 2023-05-12 NOTE — PROGRESS NOTE ADULT - ASSESSMENT
87yo Farsi- speaking F w/ PMHx of advanced dementia (at B/L patient is bedbound, nonverbal, though PTA was able to tolerate PO diet - per family, patient had hearty appetite), HTN, HLD, IDDM, brought in by family for difficulty breathing x few days in setting of notable increased swelling of the patient's hands, feet, and face; a/w hypoxic respiratory failure 2/2 aspiration PNA and acute decompensated diastolic heart failure, with course c/b significant TOÑITO and new onset AFib (not on AC). History obtained from chart review as pt is non-verbal. Failed SLP eval 4/28 and 5/1 due to mental status and respiratory status. GI consulted for PEG eval.    #PEG eval for FTT and failed SLP evals 2/2 mental status and AHRF  #dementia (at B/L patient is bedbound, nonverbal, though PTA was able to tolerate PO diet - per family, patient had hearty appetite)- *HCP pt's daughter Petty Perez   #asp PNA + pulm edema 2/2 HF exacerbation- improving, now on RA  -Pt underwent EGD on 5/11/2023 for possible PEG tube placement. EGD notable for  LA Grade D esophagitis in the upper esophagus in addition to non-bleeding gastric ulcers with a clean ulcer base (Nam Class III) as well as multiple non-bleeding duodenal ulcers with a clean ulcer base (Nam Class III).Given severe esopohagitis in the upper esopohagus, endoscopic PEG tube   placement was deferred due to risk of bleeding into the airway. Continue PPI as outlined below. Would recommend  IR or surgery consult for consideration of G tube placement    Recommendations:  -IR or surgery consult for consideration of G tube placement  -Ok to use NG tube for feeds PRN   -PPI BID x 8 weeks follow by PO PPI QD indefienterly                                                                                                    *HCP pt's daughter Petty Perez [phone] (spoke with and updated pt's daughter yesterday re: above plan)    GI will plan to sign off at this time. Please feel free to reach out to our team with any follow up questions.   All recommendations are tentative until note is attested by attending.     Heber Nails, PGY-4  Gastroenterology/Hepatology Fellow  Available on [employer] Teams   [phone] (Long Range Pager)  [phone] (Short Range Pager [facility])    After 5pm, please contact the on-call GI fellow. [phone]   87yo Farsi- speaking F w/ PMHx of advanced dementia (at B/L patient is bedbound, nonverbal, though PTA was able to tolerate PO diet - per family, patient had hearty appetite), HTN, HLD, IDDM, brought in by family for difficulty breathing x few days in setting of notable increased swelling of the patient's hands, feet, and face; a/w hypoxic respiratory failure 2/2 aspiration PNA and acute decompensated diastolic heart failure, with course c/b significant TOÑITO and new onset AFib (not on AC). History obtained from chart review as pt is non-verbal. Failed SLP eval 4/28 and 5/1 due to mental status and respiratory status. GI consulted for PEG eval.    #PEG eval for FTT and failed SLP evals 2/2 mental status and AHRF  #dementia (at B/L patient is bedbound, nonverbal, though PTA was able to tolerate PO diet - per family, patient had hearty appetite)- *HCP pt's daughter Petty Perez   #asp PNA + pulm edema 2/2 HF exacerbation- improving, now on RA  -Pt underwent EGD on 5/11/2023 for possible PEG tube placement. EGD notable for  LA Grade D esophagitis in the upper esophagus in addition to non-bleeding gastric ulcers with a clean ulcer base (Nam Class III) as well as multiple non-bleeding duodenal ulcers with a clean ulcer base (Nam Class III).Given severe esopohagitis in the upper esopohagus, endoscopic PEG tube   placement was deferred due to risk of bleeding into the airway. Continue PPI as outlined below. Would recommend  IR or surgery consult for consideration of G tube placement    Recommendations:  -IR or surgery consult for consideration of G tube placement  -Ok to use NG tube for feeds after xray to confirm placement  -PPI BID x 8 weeks follow by PO PPI QD indefinitely                                                                                                    *HCP pt's daughter Petty Perez [phone] (spoke with and updated pt's daughter yesterday re: above plan)    GI will plan to sign off at this time. Please feel free to reach out to our team with any follow up questions.   All recommendations are tentative until note is attested by attending.     Heber Nails, PGY-4  Gastroenterology/Hepatology Fellow  Available on [employer] Teams   [phone] (Long Range Pager)  [phone] (Short Range Pager [facility])    After 5pm, please contact the on-call GI fellow. [phone]

## 2023-05-12 NOTE — PROGRESS NOTE ADULT - SUBJECTIVE AND OBJECTIVE BOX
Gastroenterology Progress Note    Interval Events:   Unable to obtain subjective from patient given underlying mental status.     Allergies:  No Known Allergies      Hospital Medications:  aspirin  chewable 81 milliGRAM(s) Oral daily  carvedilol 6.25 milliGRAM(s) Oral every 12 hours  cefepime   IVPB      cefepime   IVPB 2000 milliGRAM(s) IV Intermittent every 24 hours  dextrose 5%. 1000 milliLiter(s) IV Continuous <Continuous>  dextrose 5%. 1000 milliLiter(s) IV Continuous <Continuous>  dextrose 50% Injectable 25 Gram(s) IV Push once  dextrose 50% Injectable 12.5 Gram(s) IV Push once  dextrose 50% Injectable 25 Gram(s) IV Push once  dextrose Oral Gel 15 Gram(s) Oral once PRN  glucagon  Injectable 1 milliGRAM(s) IntraMuscular once  heparin   Injectable 5000 Unit(s) SubCutaneous every 12 hours  hydrALAZINE 20 milliGRAM(s) Oral every 8 hours  insulin lispro (ADMELOG) corrective regimen sliding scale   SubCutaneous three times a day before meals  insulin NPH human recombinant 7 Unit(s) SubCutaneous every 6 hours  latanoprost 0.005% Ophthalmic Solution 1 Drop(s) Both EYES at bedtime  Nephro-penny 1 Tablet(s) Oral daily  thiamine 100 milliGRAM(s) Oral daily      ROS: 14 point ROS negative unless otherwise state in subjective    PHYSICAL EXAM:   Vital Signs:  Vital Signs Last 24 Hrs  T(C): 37.4 (12 May 2023 04:00), Max: 37.4 (12 May 2023 04:00)  T(F): 99.3 (12 May 2023 04:00), Max: 99.3 (12 May 2023 04:00)  HR: 91 (12 May 2023 04:00) (83 - 92)  BP: 154/77 (12 May 2023 04:00) (91/50 - 169/70)  BP(mean): 96 (11 May 2023 11:47) (96 - 96)  RR: 18 (12 May 2023 04:00) (13 - 18)  SpO2: 95% (12 May 2023 04:00) (95% - 100%)    Parameters below as of 12 May 2023 04:00  Patient On (Oxygen Delivery Method): nasal cannula  O2 Flow (L/min): 1.5    GENERAL:  No acute distress  HEENT:  NG tube in place   CHEST: coarse breath sounds b/l   HEART:  RRR  ABDOMEN:  Soft, non-tender, non-distended, normoactive bowel sounds,  EXTREMITIES:  No pedal edema  NEURO:  Alert and oriented x 0    LABS:                        8.7    19.67 )-----------( 223      ( 11 May 2023 09:16 )             27.3       05-11    146<H>  |  109<H>  |  83<H>  ----------------------------<  194<H>  4.7   |  23  |  1.61<H>    Ca    9.1      11 May 2023 09:16    PT/INR - ( 11 May 2023 09:16 )   PT: 13.7 sec;   INR: 1.18 ratio      Imaging:  < from: Upper Endoscopy (05.11.23 @ 17:00) >  Findings:       LA Grade D (one or more mucosal breaks involving at least 75% of esophageal circumference)        esophagitis with no bleeding was found 15 to 19 cm from the incisors.       The exam of the esophagus was otherwise normal.       Few non-bleeding cratered gastric ulcers with a clean ulcer base (Nam Class III) were        found in the entire examined stomach. The largest lesion was 8 mm in largest dimension.       A few dispersed, small non-bleeding erosions were found in the gastric body. There were no        stigmata of recent bleeding.       Few non-bleeding cratered duodenal ulcers with a clean ulcer base (Nam Class III) were        found in the duodenal bulb. The largest lesion was 5 mm in largest dimension.                                                                                                        Impression:          - LA Grade D esophagitis in the upper esophagus.                       - Non-bleeding gastric ulcers with a clean ulcer base (Nam Class III).                       - Non-bleeding erosive gastropathy.                       - Multiple non-bleeding duodenal ulcers with a clean ulcer base (Nam                        Class III).                       - No specimens collected.                       - Given severe esopohagitis in the upper esopohagus, endoscopic PEG tube                   placement was deferred due to risk of bleeding into the airway  Recommendation:      - Return patient to [facility] for ongoing care.                       - Please obtain X/R to confirm NG tube placement prior to using            - PPI BID x 8 weeks follow by PO PPI QD indefienterly                       - IR or surgery consult for consideration of G tube placement                                                                                                      < end of copied text >           Gastroenterology Progress Note    Interval Events:   Unable to obtain subjective from patient given underlying mental status.     Allergies:  No Known Allergies      Hospital Medications:  aspirin  chewable 81 milliGRAM(s) Oral daily  carvedilol 6.25 milliGRAM(s) Oral every 12 hours  cefepime   IVPB      cefepime   IVPB 2000 milliGRAM(s) IV Intermittent every 24 hours  dextrose 5%. 1000 milliLiter(s) IV Continuous <Continuous>  dextrose 5%. 1000 milliLiter(s) IV Continuous <Continuous>  dextrose 50% Injectable 25 Gram(s) IV Push once  dextrose 50% Injectable 12.5 Gram(s) IV Push once  dextrose 50% Injectable 25 Gram(s) IV Push once  dextrose Oral Gel 15 Gram(s) Oral once PRN  glucagon  Injectable 1 milliGRAM(s) IntraMuscular once  heparin   Injectable 5000 Unit(s) SubCutaneous every 12 hours  hydrALAZINE 20 milliGRAM(s) Oral every 8 hours  insulin lispro (ADMELOG) corrective regimen sliding scale   SubCutaneous three times a day before meals  insulin NPH human recombinant 7 Unit(s) SubCutaneous every 6 hours  latanoprost 0.005% Ophthalmic Solution 1 Drop(s) Both EYES at bedtime  Nephro-penny 1 Tablet(s) Oral daily  thiamine 100 milliGRAM(s) Oral daily      ROS: 14 point ROS negative unless otherwise state in subjective    PHYSICAL EXAM:   Vital Signs:  Vital Signs Last 24 Hrs  T(C): 37.4 (12 May 2023 04:00), Max: 37.4 (12 May 2023 04:00)  T(F): 99.3 (12 May 2023 04:00), Max: 99.3 (12 May 2023 04:00)  HR: 91 (12 May 2023 04:00) (83 - 92)  BP: 154/77 (12 May 2023 04:00) (91/50 - 169/70)  BP(mean): 96 (11 May 2023 11:47) (96 - 96)  RR: 18 (12 May 2023 04:00) (13 - 18)  SpO2: 95% (12 May 2023 04:00) (95% - 100%)    Parameters below as of 12 May 2023 04:00  Patient On (Oxygen Delivery Method): nasal cannula  O2 Flow (L/min): 1.5    GENERAL:  No acute distress  HEENT:  NG tube in place   CHEST: coarse breath sounds b/l   HEART:  RRR  ABDOMEN:  Soft, non-tender, non-distended, normoactive bowel sounds,  EXTREMITIES:  No pedal edema  NEURO:  Alert and oriented x 0    LABS:                        8.7    19.67 )-----------( 223      ( 11 May 2023 09:16 )             27.3       05-11    146<H>  |  109<H>  |  83<H>  ----------------------------<  194<H>  4.7   |  23  |  1.61<H>    Ca    9.1      11 May 2023 09:16    PT/INR - ( 11 May 2023 09:16 )   PT: 13.7 sec;   INR: 1.18 ratio      Imaging:  < from: Upper Endoscopy (05.11.23 @ 17:00) >  Findings:       LA Grade D (one or more mucosal breaks involving at least 75% of esophageal circumference)        esophagitis with no bleeding was found 15 to 19 cm from the incisors.       The exam of the esophagus was otherwise normal.       Few non-bleeding cratered gastric ulcers with a clean ulcer base (Nam Class III) were        found in the entire examined stomach. The largest lesion was 8 mm in largest dimension.       A few dispersed, small non-bleeding erosions were found in the gastric body. There were no        stigmata of recent bleeding.       Few non-bleeding cratered duodenal ulcers with a clean ulcer base (Nam Class III) were        found in the duodenal bulb. The largest lesion was 5 mm in largest dimension.                                                                                                        Impression:          - LA Grade D esophagitis in the upper esophagus.                       - Non-bleeding gastric ulcers with a clean ulcer base (Nam Class III).                       - Non-bleeding erosive gastropathy.                       - Multiple non-bleeding duodenal ulcers with a clean ulcer base (Nam                        Class III).                       - No specimens collected.                       - Given severe esophagitis in the upper esopohagus, endoscopic PEG tube                   placement was deferred due to risk of bleeding into the airway  Recommendation:      - Return patient to [facility] for ongoing care.                       - Please obtain X/R to confirm NG tube placement prior to using            - PPI BID x 8 weeks follow by PO PPI QD indefienterly                       - IR or surgery consult for consideration of G tube placement                                                                                                      < end of copied text >

## 2023-05-12 NOTE — PROGRESS NOTE ADULT - SUBJECTIVE AND OBJECTIVE BOX
Subjective: Patient seen and examined. No new events except as noted.   s/p EGD   NO PEG due to esophagitis      REVIEW OF SYSTEMS:    Unable to obtain     MEDICATIONS:  MEDICATIONS  (STANDING):  aspirin  chewable 81 milliGRAM(s) Oral daily  carvedilol 6.25 milliGRAM(s) Oral every 12 hours  cefepime   IVPB      cefepime   IVPB 2000 milliGRAM(s) IV Intermittent every 24 hours  dextrose 5%. 1000 milliLiter(s) (100 mL/Hr) IV Continuous <Continuous>  dextrose 5%. 1000 milliLiter(s) (50 mL/Hr) IV Continuous <Continuous>  dextrose 50% Injectable 25 Gram(s) IV Push once  dextrose 50% Injectable 25 Gram(s) IV Push once  dextrose 50% Injectable 12.5 Gram(s) IV Push once  glucagon  Injectable 1 milliGRAM(s) IntraMuscular once  heparin   Injectable 5000 Unit(s) SubCutaneous every 12 hours  hydrALAZINE 20 milliGRAM(s) Oral every 8 hours  insulin lispro (ADMELOG) corrective regimen sliding scale   SubCutaneous three times a day before meals  insulin NPH human recombinant 7 Unit(s) SubCutaneous every 6 hours  latanoprost 0.005% Ophthalmic Solution 1 Drop(s) Both EYES at bedtime  Nephro-penny 1 Tablet(s) Oral daily  thiamine 100 milliGRAM(s) Oral daily      PHYSICAL EXAM:  T(C): 37.4 (05-12-23 @ 04:00), Max: 37.4 (05-12-23 @ 04:00)  HR: 91 (05-12-23 @ 04:00) (83 - 92)  BP: 154/77 (05-12-23 @ 04:00) (91/50 - 169/70)  RR: 18 (05-12-23 @ 04:00) (13 - 18)  SpO2: 95% (05-12-23 @ 04:00) (95% - 100%)  Wt(kg): --  I&O's Summary    11 May 2023 07:01  -  12 May 2023 07:00  --------------------------------------------------------  IN: 0 mL / OUT: 1150 mL / NET: -1150 mL          Appearance: NAD non verbal 	+NGT   HEENT: Normal oral mucosa, PERRL, EOMI	  Lymphatic: No lymphadenopathy  Cardiovascular: Normal S1 S2, No JVD, No murmurs  Respiratory: decreased bs, on NC   Psychiatry: A & O x 0  Gastrointestinal:  Soft, Non-tender, BS   Skin: No rashes, No ecchymoses, No cyanosis	  Neurologic: Non-focal  Extremities: decreased  range of motion, No  edema  Vascular: Peripheral pulses palpable 2+ bilaterally      LABS:    CARDIAC MARKERS:                                8.7    19.67 )-----------( 223      ( 11 May 2023 09:16 )             27.3     05-11    146<H>  |  109<H>  |  83<H>  ----------------------------<  194<H>  4.7   |  23  |  1.61<H>    Ca    9.1      11 May 2023 09:16      TELEMETRY: 	    ECG:  	  RADIOLOGY:   DIAGNOSTIC TESTING:  [ ] Echocardiogram:  [ ]  Catheterization:  [ ] Stress Test:    OTHER: 	  < from: Upper Endoscopy (05.11.23 @ 17:00) >    [facility]  ____________________________________________________________________________________________________  Patient Name: Benny Connor                  MRN: [id number]  Account Number: [id number]                     YOB: 1935  Room: Endoscopy Room 5                           Gender: Female  Attending MD: Star Sorensen ,                      Procedure Date No Time: 5/11/2023  ____________________________________________________________________________________________________     Procedure:           Upper GI endoscopy  Indications:         Place PEG due to impaired swallowing  Providers:           Heber Drew (Fellow)  Medicines:           Monitored Anesthesia Care  Complications:    No immediate complications.  ____________________________________________________________________________________________________  Procedure:           Pre-Anesthesia Assessment:                       - Prior to the procedure, a History and Physical was performed, and patient                        medications and allergies were reviewed. The patient is unable to give                        consent secondary to the patient being legally incompetent to consent. The                        risksand benefits of the procedure and the sedation options and risks were                        discussed with the patient's daughter. All questions were answered and                        informed consent was obtained. Patient identification and proposed procedure                        were verified by the physician, the nurse and the anesthesiologist in the                        pre-procedure area in the endoscopy suite. Mental Status Examination: alert                        and oriented. Airway Examination: normal oropharyngeal airway and neck                        mobility. Respiratory Examination: poor air movement. CV Examination: normal.                        Prophylactic Antibiotics: The patient requires prophylactic antibiotics for                        planned PEG placement. The patient received antibiotic therapy today, before                        the procedure started. Prior Anticoagulants: The patient has taken aspirin,                        last dose was day of procedure. ASA Grade Assessment: III - A patient with                        severe systemic disease. After reviewing the risks and benefits, the patient                        was deemed in satisfactory condition to undergo the procedure. The anesthesia                       plan was to use monitored anesthesia care (MAC). Immediately prior to                        administration of medications, the patient was re-assessed for adequacy to                        receive sedatives. The heart rate, respiratory rate, oxygen saturations,                        blood pressure, adequacy of pulmonary ventilation, and response to care were                        monitored throughout the procedure. The physical status of the patient was      re-assessed after the procedure.                       After obtaining informed consent, the endoscope was passed under direct                        vision. Throughout the procedure, the patient's blood pressure, pulse, and   oxygen saturations were monitored continuously. The was introduced through                        the mouth, and advanced to the second part of duodenum. The upper GI                        endoscopy was accomplished without difficulty. The patient tolerated the                        procedure well.                                                                                                        Findings:       LA Grade D (one or more mucosal breaks involving at least 75% of esophageal circumference)        esophagitis with no bleeding was found 15 to 19 cm from the incisors.       The exam of the esophagus was otherwise normal.       Few non-bleeding cratered gastric ulcers with a clean ulcer base (Nam Class III) were        found in the entire examined stomach. The largest lesion was 8 mm in largest dimension.       A few dispersed, small non-bleeding erosions were found in the gastric body. There were no        stigmata of recent bleeding.       Few non-bleeding cratered duodenal ulcers with a clean ulcer base (Nam Class III) were        found in the duodenal bulb. The largest lesion was 5 mm in largest dimension.                                                                                                        Impression:          - LA Grade D esophagitis in the upper esophagus.                       - Non-bleeding gastric ulcers with a clean ulcer base (Nam Class III).                       - Non-bleeding erosive gastropathy.                       - Multiple non-bleeding duodenal ulcers with a clean ulcer base (Nam                        Class III).                       - No specimens collected.                       - Given severe esopohagitis in the upper esopohagus, endoscopic PEG tube                   placement was deferred due to risk of bleeding into the airway  Recommendation:      - Return patient to hospital [facility] for ongoing care.                       - Please obtain X/R to confirm NG tube placement prior to using            - PPI BID x 8 weeks follow by PO PPI QD indefienterly                       - IR or surgery consult for consideration of G tube placement                                                                                                        Attending Participation:       I was present and participated during the entire procedure from insertion to removal of the        endoscope.                                                                                                          _____________  Star Sorensen,   5/11/2023 6:18:48 PM  Number of Addenda: 0    Note Initiated On: 5/11/2023 5:00 PM    < end of copied text >

## 2023-05-12 NOTE — CONSULT NOTE ADULT - CONSULT REASON
hyperglycemia
PEG eval
sacral wound
NSTEMI
Percutaneous Gastrostomy Placement
SOB   Leg swelling
TOÑITO
ng tube

## 2023-05-13 NOTE — PROGRESS NOTE ADULT - SUBJECTIVE AND OBJECTIVE BOX
Kobi De MD  Division of Hospital Medicine  Available on MS teams until 7pm  If no response or off-hours, page [phone]  -------------------------------------    Patient is a 88y old  Female who presents with a chief complaint of SOB/GARRISON, swelling of hands/legs/face (13 May 2023 13:56)      SUBJECTIVE / OVERNIGHT EVENTS: non eacute  ADDITIONAL REVIEW OF SYSTEMS: pt more interactive today although just grunts and mumbles. ros otherwise limited    MEDICATIONS  (STANDING):  aspirin  chewable 81 milliGRAM(s) Oral daily  carvedilol 6.25 milliGRAM(s) Oral every 12 hours  cefepime   IVPB      cefepime   IVPB 2000 milliGRAM(s) IV Intermittent every 24 hours  dextrose 5%. 1000 milliLiter(s) (50 mL/Hr) IV Continuous <Continuous>  dextrose 5%. 1000 milliLiter(s) (100 mL/Hr) IV Continuous <Continuous>  dextrose 50% Injectable 25 Gram(s) IV Push once  dextrose 50% Injectable 12.5 Gram(s) IV Push once  dextrose 50% Injectable 25 Gram(s) IV Push once  glucagon  Injectable 1 milliGRAM(s) IntraMuscular once  heparin   Injectable 5000 Unit(s) SubCutaneous every 12 hours  hydrALAZINE 20 milliGRAM(s) Oral every 8 hours  insulin lispro (ADMELOG) corrective regimen sliding scale   SubCutaneous every 6 hours  insulin NPH human recombinant 7 Unit(s) SubCutaneous every 6 hours  latanoprost 0.005% Ophthalmic Solution 1 Drop(s) Both EYES at bedtime  Nephro-penny 1 Tablet(s) Oral daily  thiamine 100 milliGRAM(s) Oral daily    MEDICATIONS  (PRN):  bisacodyl 5 milliGRAM(s) Oral every 12 hours PRN Constipation  dextrose Oral Gel 15 Gram(s) Oral once PRN Blood Glucose LESS THAN 70 milliGRAM(s)/deciliter      CAPILLARY BLOOD GLUCOSE      POCT Blood Glucose.: 221 mg/dL (13 May 2023 12:43)  POCT Blood Glucose.: 207 mg/dL (13 May 2023 05:21)  POCT Blood Glucose.: 217 mg/dL (13 May 2023 00:34)  POCT Blood Glucose.: 145 mg/dL (12 May 2023 17:26)    I&O's Summary    12 May 2023 07:01  -  13 May 2023 07:00  --------------------------------------------------------  IN: 0 mL / OUT: 450 mL / NET: -450 mL        PHYSICAL EXAM:  Vital Signs Last 24 Hrs  T(C): 35.8 (13 May 2023 11:58), Max: 36.8 (13 May 2023 00:35)  T(F): 96.5 (13 May 2023 11:58), Max: 98.2 (13 May 2023 00:35)  HR: 92 (13 May 2023 11:58) (85 - 92)  BP: 151/64 (13 May 2023 11:58) (148/63 - 177/64)  BP(mean): --  RR: 18 (13 May 2023 11:58) (18 - 18)  SpO2: 100% (13 May 2023 11:58) (97% - 100%)    Parameters below as of 13 May 2023 04:00  Patient On (Oxygen Delivery Method): room air      CONSTITUTIONAL: NAD  EYES: PERRLA; conjunctiva and sclera clear  ENMT: NGT  NECK: Supple  RESPIRATORY: Normal respiratory effort; CTAB  CARDIOVASCULAR: RRR, no JVD, no peripheral edema   ABDOMEN: Nontender to palpation, normoactive BS, no guarding/rigidity  MUSCLOSKELETAL:  no clubbing/cyanosis, no joint swelling or tenderness to palpation  PSYCH: A+O x 3, affect normal  NEUROLOGY: CN 2-12 are intact and symmetric; no gross sensory or motor deficits  SKIN: No rashes; no palpable lesions    LABS:                        8.3    18.90 )-----------( 237      ( 13 May 2023 10:43 )             26.6     05-13    150<H>  |  115<H>  |  88<H>  ----------------------------<  185<H>  3.6   |  22  |  1.68<H>    Ca    8.9      13 May 2023 10:43                  RADIOLOGY & ADDITIONAL TESTS:  Results Reviewed:   Imaging Personally Reviewed:  Electrocardiogram Personally Reviewed:    COORDINATION OF CARE:  Care Discussed with Consultants/Other Providers [Y/N]:  Prior or Outpatient Records Reviewed [Y/N]:

## 2023-05-13 NOTE — PROGRESS NOTE ADULT - PROBLEM SELECTOR PLAN 1
Pt not compliant with SLP eval, with high aspiration risk, severe dysphagia, electrolyte derangements  -Failed several swallowing evals, SLP continues to recommend NPO  -Family discussion about GOC 5/2 and 5/3: requesting PEG  -NGT placed  -Replete electrolytes aggressively  - f/u wound care re-eval- no obvious purulence of source of infection documented  -PEG tube placement aborted by GI due to proximal esophagitis and risk of bleeding with pulling through internal bumper; recommend IR placement- IR planning for next wednesday- will need to try to expeditie.    #hypernatremia- likely free water deficit  - would not correct with D5w given propensity for uncontrollable hyperglycemia which may delay PEG  - start free water 250cc q4 hours  - cont trending BMP daily

## 2023-05-13 NOTE — PROGRESS NOTE ADULT - ASSESSMENT
88yoF w/ PMHx of advanced dementia (at B/L patient is bedbound, nonverbal, though able to tolerate PO diet - per family, patient has hearty appetite), HTN, HLD, IDDM, who presents from home where she lives with her , brought in by her daughter (son is at her bedside during this evaluation) w/ complaints of daughter noticing the patient has been having difficulty breathing over the past few days in setting of notable increased swelling of the patient's hands, feet, and face. Of note, patient was recently hospitalized at [facility] for aspiration PNA (and found to have demand ischemia) when she presented with similar complaints and was found to be hypoxic; at the time, S+S eval was recommended, but per documentation at the end of Jan 2023,      1- TOÑITO   2- CHF   3- HTN  4- hypernatremia  5- PNA        toñito in setting of chf as well as more likely due to contrast nephropathy and possible pneumonia   creatinine is slowly improving  but now seems to have reached a plateau   urinary retention requiring intermittent catheterization,.   hypernatremia persists increase free water 250 cc q 4   her echo reveals mod to severe range AS  she is not a dialysis candidate in setting of her advance dementia   hydralazine 25 mg po tid   anemia, trend hgb   O2 supplementation   strict I/O  trend creatinine and electrolytes daily  plan for peg placement, now on hold for worsening leukocytosis. restarted on cefepime 2G daily. readjust cefepime dose based on renal function to 500 mg - 1 g daily and readjust if renal function improves further

## 2023-05-13 NOTE — PROGRESS NOTE ADULT - PROBLEM SELECTOR PLAN 1
Will continue current insulin regimen for now.   HOLD NPH if Tube feeds are held.   Will continue monitoring  blood sugars, will Follow up.

## 2023-05-13 NOTE — PROGRESS NOTE ADULT - PROBLEM SELECTOR PLAN 2
2/2 combination of pulm edema and aspiration PNA, improving  - now weaned off supplemental O2  - stop cefepime    #leukocytosis- s/p course of cefepime, no clear infectious source found  - f/u CT c/a/p negative  - f/u urine cx- neg  - blood dx negative  - no signs of phlebitis/cellulitis discernable  - wound care evaluated wound, no clear signs of infection  - keep trending

## 2023-05-13 NOTE — PROGRESS NOTE ADULT - SUBJECTIVE AND OBJECTIVE BOX
Subjective: Patient seen and examined. No new events except as noted.     REVIEW OF SYSTEMS:  Unable to obtain       MEDICATIONS:  MEDICATIONS  (STANDING):  aspirin  chewable 81 milliGRAM(s) Oral daily  carvedilol 6.25 milliGRAM(s) Oral every 12 hours  dextrose 5%. 1000 milliLiter(s) (100 mL/Hr) IV Continuous <Continuous>  dextrose 5%. 1000 milliLiter(s) (50 mL/Hr) IV Continuous <Continuous>  dextrose 50% Injectable 25 Gram(s) IV Push once  dextrose 50% Injectable 12.5 Gram(s) IV Push once  dextrose 50% Injectable 25 Gram(s) IV Push once  glucagon  Injectable 1 milliGRAM(s) IntraMuscular once  heparin   Injectable 5000 Unit(s) SubCutaneous every 12 hours  hydrALAZINE 20 milliGRAM(s) Oral every 8 hours  insulin lispro (ADMELOG) corrective regimen sliding scale   SubCutaneous every 6 hours  insulin NPH human recombinant 7 Unit(s) SubCutaneous every 6 hours  latanoprost 0.005% Ophthalmic Solution 1 Drop(s) Both EYES at bedtime  Nephro-penny 1 Tablet(s) Oral daily  thiamine 100 milliGRAM(s) Oral daily      PHYSICAL EXAM:  T(C): 36.8 (05-13-23 @ 20:58), Max: 36.8 (05-13-23 @ 00:35)  HR: 86 (05-13-23 @ 20:58) (86 - 92)  BP: 156/67 (05-13-23 @ 20:58) (151/64 - 177/64)  RR: 18 (05-13-23 @ 20:58) (18 - 18)  SpO2: 100% (05-13-23 @ 20:58) (98% - 100%)  Wt(kg): --  I&O's Summary    12 May 2023 07:01  -  13 May 2023 07:00  --------------------------------------------------------  IN: 0 mL / OUT: 450 mL / NET: -450 mL        Appearance: NAD non verbal 	+NGT   HEENT: Normal oral mucosa, PERRL, EOMI	  Lymphatic: No lymphadenopathy  Cardiovascular: Normal S1 S2, No JVD, No murmurs  Respiratory: decreased bs, on NC   Psychiatry: A & O x 0  Gastrointestinal:  Soft, Non-tender, BS   Skin: No rashes, No ecchymoses, No cyanosis	  Neurologic: Non-focal  Extremities: decreased  range of motion, No  edema  Vascular: Peripheral pulses palpable 2+ bilaterally  LABS:    CARDIAC MARKERS:                                8.3    18.90 )-----------( 237      ( 13 May 2023 10:43 )             26.6     05-13    150<H>  |  115<H>  |  88<H>  ----------------------------<  185<H>  3.6   |  22  |  1.68<H>    Ca    8.9      13 May 2023 10:43              TELEMETRY: 	    ECG:  	  RADIOLOGY:   DIAGNOSTIC TESTING:  [ ] Echocardiogram:  [ ]  Catheterization:  [ ] Stress Test:    OTHER:

## 2023-05-13 NOTE — PROGRESS NOTE ADULT - ASSESSMENT
Assessment  DMT2: 88y Female with DM T2 with hyperglycemia, A1C7.1% , was on insulin at home sliding scale, now on NPH  with coverage, blood sugars are trending up, remains on Tube feeds via NG tube.  Dysphagia: NG tube was on tube feeds, GI/IR/Surgery eval for PEG. s/p EGD , peg tube not placed due to severe esophagitis in upper esophagus.   HTN: on antihypertensive medications, monitored, asymptomatic.  HLD: on statin, diet controlled.  TSH 0.68 Euthyroid          Marianne Ortiz MD  Cell: [phone]  Office: [phone]

## 2023-05-13 NOTE — PROGRESS NOTE ADULT - SUBJECTIVE AND OBJECTIVE BOX
[facility]   [phone]  RENAL FOLLOW UP NOTE  --------------------------------------------------------------------------------  Chief Complaint:    24 hour events/subjective:    seen earlier   non interactive     PAST HISTORY  --------------------------------------------------------------------------------  No significant changes to PMH, PSH, FHx, SHx, unless otherwise noted    ALLERGIES & MEDICATIONS  --------------------------------------------------------------------------------  Allergies    No Known Allergies    Intolerances      Standing Inpatient Medications  aspirin  chewable 81 milliGRAM(s) Oral daily  carvedilol 6.25 milliGRAM(s) Oral every 12 hours  dextrose 5%. 1000 milliLiter(s) IV Continuous <Continuous>  dextrose 5%. 1000 milliLiter(s) IV Continuous <Continuous>  dextrose 50% Injectable 25 Gram(s) IV Push once  dextrose 50% Injectable 25 Gram(s) IV Push once  dextrose 50% Injectable 12.5 Gram(s) IV Push once  glucagon  Injectable 1 milliGRAM(s) IntraMuscular once  heparin   Injectable 5000 Unit(s) SubCutaneous every 12 hours  hydrALAZINE 20 milliGRAM(s) Oral every 8 hours  insulin lispro (ADMELOG) corrective regimen sliding scale   SubCutaneous every 6 hours  insulin NPH human recombinant 7 Unit(s) SubCutaneous every 6 hours  latanoprost 0.005% Ophthalmic Solution 1 Drop(s) Both EYES at bedtime  Nephro-penny 1 Tablet(s) Oral daily  thiamine 100 milliGRAM(s) Oral daily    PRN Inpatient Medications  bisacodyl 5 milliGRAM(s) Oral every 12 hours PRN  dextrose Oral Gel 15 Gram(s) Oral once PRN      REVIEW OF SYSTEMS  --------------------------------------------------------------------------------    VITALS/PHYSICAL EXAM  --------------------------------------------------------------------------------  T(C): 36.8 (05-13-23 @ 20:58), Max: 36.8 (05-13-23 @ 00:35)  HR: 86 (05-13-23 @ 20:58) (86 - 92)  BP: 156/67 (05-13-23 @ 20:58) (151/64 - 177/64)  RR: 18 (05-13-23 @ 20:58) (18 - 18)  SpO2: 100% (05-13-23 @ 20:58) (98% - 100%)  Wt(kg): --        05-12-23 @ 07:01  -  05-13-23 @ 07:00  --------------------------------------------------------  IN: 0 mL / OUT: 450 mL / NET: -450 mL      Physical Exam:  	              Gen: ill appearing elderly F, On O2    	Pulm: decrease bs, + ronchi  bs, no wheezing  	CV: no JVD. RRR, S1S2; no rub  	Abd: +BS, soft, nontender/nondistended, +NGT  	: No bladder distention   	UE: Warm, no cyanosis  no clubbing,  no edema;  	LE: Warm, no cyanosis  no clubbing, no edema      LABS/STUDIES  --------------------------------------------------------------------------------              8.3    18.90 >-----------<  237      [05-13-23 @ 10:43]              26.6     150  |  115  |  88  ----------------------------<  185      [05-13-23 @ 10:43]  3.6   |  22  |  1.68        Ca     8.9     [05-13-23 @ 10:43]            Creatinine Trend:  SCr 1.68 [05-13 @ 10:43]  SCr 1.68 [05-12 @ 09:47]  SCr 1.61 [05-11 @ 09:16]  SCr 1.81 [05-10 @ 10:41]  SCr 1.94 [05-09 @ 10:44]              Urinalysis - [05-09-23 @ 01:26]      Color Light Yellow / Appearance Clear / SG 1.016 / pH 5.5      Gluc Negative / Ketone Negative  / Bili Negative / Urobili Negative       Blood Trace / Protein 100 mg/dL / Leuk Est Negative / Nitrite Negative      RBC 4 / WBC 2 / Hyaline 3 / Gran  / Sq Epi  / Non Sq Epi  / Bacteria Negative      TSH 0.68      [05-04-23 @ 11:38]  Lipid: chol 216, TG 80, HDL 49, LDL --      [08-08-22 @ 06:31]

## 2023-05-13 NOTE — PROGRESS NOTE ADULT - ASSESSMENT
Alternate MRN: [id number]    88yoF w/ PMHx of advanced dementia (at B/L patient is bedbound, nonverbal, though able to tolerate PO diet - per family, patient has hearty appetite), HTN, HLD, IDDM, brought in by family for difficulty breathing over the past few days in setting of notable increased swelling of the patient's hands, feet, and face; a/w hypoxic respiratory failure 2/2 aspiration PNA and acute decompensated diastolic heart failure, with course c/b significant TOÑITO, poor oral intake now s/p NGT and pending PEG.

## 2023-05-14 NOTE — CHART NOTE - NSCHARTNOTEFT_GEN_A_CORE
Nutrition Follow Up Note  Patient seen for: Nutrition Consult for Tube Feeding received and appreciated.     Chart reviewed, events noted. "88yoF w/ PMHx of advanced dementia (at B/L patient is bedbound, nonverbal, though able to tolerate PO diet - per family, patient has hearty appetite), HTN, HLD, IDDM, brought in by family for difficulty breathing over the past few days in setting of notable increased swelling of the patient's hands, feet, and face; a/w hypoxic respiratory failure 2/2 aspiration PNA and acute decompensated diastolic heart failure, with course c/b significant TOÑITO, poor oral intake now s/p NGT and pending PEG."    Interim Events:  - Per Nephrology: "toñito in setting of chf as well as more likely due to contrast nephropathy and possible pneumonia".   - Per Nephrology: "hypernatremia persists increase free water 250 cc q 4"  - Plan for PEG placement, now on hold for worsening leukocytosis.     Source: [] Patient       [x] EMR        [] RN        [] Family at bedside       [X] Other:    -If unable to interview patient: [] Trach/Vent/BiPAP  [x] Disoriented/confused/inappropriate to interview    Diet Order:   Diet, NPO with Tube Feed:   Tube Feeding Modality: Nasogastric  Suplena with Carb Steady (SUPLENA)  Total Volume for 24 Hours (mL): 1440  Continuous  Starting Tube Feed Rate {mL per Hour}: 20  Increase Tube Feed Rate by (mL): 10     Every 6 hours  Until Goal Tube Feed Rate (mL per Hour): 60  Tube Feed Duration (in Hours): 24  Tube Feed Start Time: 10:00 (23)    Current EN regimen provides: 1440ml formula, 2592 kcal (EXCESSIVE), 64.4 g protein, 1045ml free water    - Is current order appropriate/adequate? [] Yes  [x]  No: Current Renal formula is not appropriate; see EN recommendations below  -->Suplena is a very protein restricted, very high kcal, highly concentrated formula.   --> Pt noted with renal electrolytes WDL, hyperglycemia, worsening hypernatremia  --> CHANGE to GLUCERNA1.2 for greater free water and better BG management    PO intake :  not applicable    Nutrition-related concerns:  - Renal; TOÑITO on CKD; worsening hypernatremia; free water ordered 250ml q4 hrs  - BG managed with NPH 9 units q 6hrs, ISS q6 hrs  - Supplementation: Nephro-penny, thiamine    GI:    Last BM documented: .   Bowel Regimen? [ x] Yes: bisacodyl    Weights:  Drug Dosing Weight  Height (cm): discrepancy in HIE: 59-61 inches  DOSING Weight (kg): 43.1 (2023 01:06)  BMI (kg/m2): 23.8 (2023 01:06) ? accuracy  IBW:   Daily Weight in k.9 (), Weight in k.4 (), Weight in k.3 (), Weight in k.5 (05-10), Weight in k.7 ()    Nutritionally Pertinent Medications:  MEDICATIONS  (STANDING):  aspirin  chewable 81 milliGRAM(s) Oral daily  carvedilol 6.25 milliGRAM(s) Oral every 12 hours  dextrose 5%. 1000 milliLiter(s) (50 mL/Hr) IV Continuous <Continuous>  dextrose 5%. 1000 milliLiter(s) (100 mL/Hr) IV Continuous <Continuous>  dextrose 50% Injectable 25 Gram(s) IV Push once  dextrose 50% Injectable 25 Gram(s) IV Push once  dextrose 50% Injectable 12.5 Gram(s) IV Push once  glucagon  Injectable 1 milliGRAM(s) IntraMuscular once  heparin   Injectable 5000 Unit(s) SubCutaneous every 12 hours  hydrALAZINE 20 milliGRAM(s) Oral every 8 hours  insulin lispro (ADMELOG) corrective regimen sliding scale   SubCutaneous every 6 hours  insulin NPH human recombinant 9 Unit(s) SubCutaneous every 6 hours  latanoprost 0.005% Ophthalmic Solution 1 Drop(s) Both EYES at bedtime  Nephro-penny 1 Tablet(s) Oral daily  thiamine 100 milliGRAM(s) Oral daily    MEDICATIONS  (PRN):  bisacodyl 5 milliGRAM(s) Oral every 12 hours PRN Constipation  dextrose Oral Gel 15 Gram(s) Oral once PRN Blood Glucose LESS THAN 70 milliGRAM(s)/deciliter    Nutritionally Pertinent Labs:   @ 10:43: Sodium 150<H>, Potassium 3.6, Calcium 8.9, Magnesium --, Phosphorus --, BUN 88<H>, Creatinine 1.68<H>, Glucose 185<H>  Hemoglobin : 8.3 g/dL  Hematocrit : 26.6 %    A1C with Estimated Average Glucose Result: 7.1 % (23 @ 17:32)  A1C with Estimated Average Glucose Result: 7.4 % (23 @ 06:54)    POCT Blood Glucose.: 323 mg/dL (23 @ 05:56)  POCT Blood Glucose.: 277 mg/dL (23 @ 23:50)  POCT Blood Glucose.: 268 mg/dL (23 @ 17:50)  POCT Blood Glucose.: 221 mg/dL (23 @ 12:43)     Skin per nursing documentation: Unstageable sacrum  Edema (): 3+ left/right hand    Estimated Nutrition Needs: based on dosing wt 43.1kg  Energy: 1977-1103 calories (28-34 kcal/kg)  Protein: 52-60 grams (1.2-1.5 g/kg)    Previous Nutrition Diagnosis: Severe Malnutrition  Nutrition Diagnosis is: [x] ongoing; addressed with EN, plan for PEG    New Nutrition Diagnosis: Increased Nutrient Needs (protein, micronutrients) related to increased physiologic demand of stress factor and wound healing as evidenced by pt with pressure injury and acute illness    Nutrition Care Plan:  [x] In Progress  [] Achieved  [] Not applicable    Nutrition Interventions:     Education Provided:       [] Yes:  [x] No: not appropriate       Recommendations:      1. Change to EN formula for diabetes, with greater free water: Glucerna1.2 @ 45ml/hr x 24 hrs to provide 1080ml formula, 1296 kcal (30 kcal/kg), 65 g protein (1.5 g/kg), 869ml free water; based on dosing wt 43.1kg.  2. Defer additional free water to team, in setting of hypernatremia  3. Continue Nephro-penny to promote wound healing; continue thiamine for malnutrition/refeeding risk    Monitoring and Evaluation:   Continue to monitor nutritional intake, tolerance to diet prescription, weights, labs, skin integrity.  RD remains available upon request and will follow up per protocol,    Sarah Bhandari, MS COTTON CDN  [facility]  Available on TEAMS (preferred)  Pager #[phone] Nutrition Follow Up Note  Patient seen for: Nutrition Consult for Tube Feeding received and appreciated.     Chart reviewed, events noted. "88yoF w/ PMHx of advanced dementia (at B/L patient is bedbound, nonverbal, though able to tolerate PO diet - per family, patient has hearty appetite), HTN, HLD, IDDM, brought in by family for difficulty breathing over the past few days in setting of notable increased swelling of the patient's hands, feet, and face; a/w hypoxic respiratory failure 2/2 aspiration PNA and acute decompensated diastolic heart failure, with course c/b significant TOÑITO, poor oral intake now s/p NGT and pending PEG."    Interim Events:  - Per Nephrology: "toñito in setting of chf as well as more likely due to contrast nephropathy and possible pneumonia".   - Per Nephrology: "hypernatremia persists increase free water 250 cc q 4"  - Plan for PEG placement, now on hold for worsening leukocytosis.     Source: [] Patient       [x] EMR        [] RN        [] Family at bedside       [X] Other:    -If unable to interview patient: [] Trach/Vent/BiPAP  [x] Disoriented/confused/inappropriate to interview    Diet Order:   Diet, NPO with Tube Feed:   Tube Feeding Modality: Nasogastric  Suplena with Carb Steady (SUPLENA)  Total Volume for 24 Hours (mL): 1440  Continuous  Starting Tube Feed Rate {mL per Hour}: 20  Increase Tube Feed Rate by (mL): 10     Every 6 hours  Until Goal Tube Feed Rate (mL per Hour): 60  Tube Feed Duration (in Hours): 24  Tube Feed Start Time: 10:00 (23)    Current pump rate: 60ml/hr  Current EN regimen provides: 1440ml formula, 2592 kcal (EXCESSIVE), 64.4 g protein, 1045ml free water    - Is current order appropriate/adequate? [] Yes  [x]  No: Current Renal formula is not appropriate; see EN recommendations below  -->Suplena is a very protein restricted, very high kcal, highly concentrated formula.   --> Pt noted with renal electrolytes WDL, hyperglycemia, worsening hypernatremia  --> CHANGE to GLUCERNA1.2 for greater free water and better BG management    PO intake :  not applicable    Nutrition-related concerns:  - Renal; TOÑITO on CKD; worsening hypernatremia; free water ordered 250ml q4 hrs  - BG managed with NPH 9 units q 6hrs, ISS q6 hrs  - Supplementation: Nephro-penny, thiamine    GI:    Last BM documented: .   Bowel Regimen? [ x] Yes: bisacodyl    Weights:  Height (cm): discrepancy in HIE: 59-61 inches  DOSING Weight (kg): 43.1 (2023 01:06)  BMI (kg/m2): 23.8 (2023 01:06) ? accuracy  Daily Weight in k.9 (), Weight in k.4 (), Weight in k.3 (), Weight in k.5 (05-10), Weight in k.7 ()  Bed scale wt taken by RD (): 56 pounds/ 25.4kg ? accuracy    Nutritionally Pertinent Medications:  MEDICATIONS  (STANDING):  aspirin  chewable 81 milliGRAM(s) Oral daily  carvedilol 6.25 milliGRAM(s) Oral every 12 hours  dextrose 5%. 1000 milliLiter(s) (50 mL/Hr) IV Continuous <Continuous>  dextrose 5%. 1000 milliLiter(s) (100 mL/Hr) IV Continuous <Continuous>  dextrose 50% Injectable 25 Gram(s) IV Push once  dextrose 50% Injectable 25 Gram(s) IV Push once  dextrose 50% Injectable 12.5 Gram(s) IV Push once  glucagon  Injectable 1 milliGRAM(s) IntraMuscular once  heparin   Injectable 5000 Unit(s) SubCutaneous every 12 hours  hydrALAZINE 20 milliGRAM(s) Oral every 8 hours  insulin lispro (ADMELOG) corrective regimen sliding scale   SubCutaneous every 6 hours  insulin NPH human recombinant 9 Unit(s) SubCutaneous every 6 hours  latanoprost 0.005% Ophthalmic Solution 1 Drop(s) Both EYES at bedtime  Nephro-penny 1 Tablet(s) Oral daily  thiamine 100 milliGRAM(s) Oral daily    MEDICATIONS  (PRN):  bisacodyl 5 milliGRAM(s) Oral every 12 hours PRN Constipation  dextrose Oral Gel 15 Gram(s) Oral once PRN Blood Glucose LESS THAN 70 milliGRAM(s)/deciliter    Nutritionally Pertinent Labs:   @ 10:43: Sodium 150<H>, Potassium 3.6, Calcium 8.9, Magnesium --, Phosphorus --, BUN 88<H>, Creatinine 1.68<H>, Glucose 185<H>  Hemoglobin : 8.3 g/dL  Hematocrit : 26.6 %    A1C with Estimated Average Glucose Result: 7.1 % (23 @ 17:32)  A1C with Estimated Average Glucose Result: 7.4 % (23 @ 06:54)    POCT Blood Glucose.: 323 mg/dL (23 @ 05:56)  POCT Blood Glucose.: 277 mg/dL (23 @ 23:50)  POCT Blood Glucose.: 268 mg/dL (23 @ 17:50)  POCT Blood Glucose.: 221 mg/dL (23 @ 12:43)     Skin per nursing documentation: Unstageable sacrum  Edema (): 3+ left/right hand    Estimated Nutrition Needs: based on dosing wt 43.1kg  Energy: 7240-1134 calories (28-34 kcal/kg)  Protein: 52-60 grams (1.2-1.5 g/kg)    Previous Nutrition Diagnosis: Severe Malnutrition  Nutrition Diagnosis is: [x] ongoing; addressed with EN, plan for PEG    New Nutrition Diagnosis: Increased Nutrient Needs (protein, micronutrients) related to increased physiologic demand of stress factor and wound healing as evidenced by pt with pressure injury and acute illness    Nutrition Care Plan:  [x] In Progress  [] Achieved  [] Not applicable    Nutrition Interventions:     Education Provided:       [] Yes:  [x] No: not appropriate       Recommendations:      1. Change to EN formula for diabetes, with greater free water: Glucerna1.2 @ 45ml/hr x 24 hrs to provide 1080ml formula, 1296 kcal (30 kcal/kg), 65 g protein (1.5 g/kg), 869ml free water; based on dosing wt 43.1kg.  2. Defer additional free water to team, in setting of hypernatremia  3. Continue Nephro-penny to promote wound healing; continue thiamine for malnutrition/refeeding risk    Monitoring and Evaluation:   Continue to monitor nutritional intake, tolerance to diet prescription, weights, labs, skin integrity.  RD remains available upon request and will follow up per protocol,    Sarah Bhandari, MS YURY CDN [facility] CNSC  Available on TEAMS (preferred)  Pager #[phone] Nutrition Follow Up Note  Patient seen for: Nutrition Consult for Tube Feeding received and appreciated.     Chart reviewed, events noted. "88yoF w/ PMHx of advanced dementia (at B/L patient is bedbound, nonverbal, though able to tolerate PO diet - per family, patient has hearty appetite), HTN, HLD, IDDM, brought in by family for difficulty breathing over the past few days in setting of notable increased swelling of the patient's hands, feet, and face; a/w hypoxic respiratory failure 2/2 aspiration PNA and acute decompensated diastolic heart failure, with course c/b significant TOÑITO, poor oral intake now s/p NGT and pending PEG."    Interim Events:  - Per Nephrology: "toñito in setting of chf as well as more likely due to contrast nephropathy and possible pneumonia".   - Per Nephrology: "hypernatremia persists increase free water 250 cc q 4"  - Plan for PEG placement, now on hold for worsening leukocytosis.     Source: [] Patient       [x] EMR        [x] RN        [] Family at bedside       [X] Other:    -If unable to interview patient: [] Trach/Vent/BiPAP  [x] Disoriented/confused/inappropriate to interview    Diet Order:   Diet, NPO with Tube Feed:   Tube Feeding Modality: Nasogastric  Suplena with Carb Steady (SUPLENA)  Total Volume for 24 Hours (mL): 1440  Continuous  Starting Tube Feed Rate {mL per Hour}: 20  Increase Tube Feed Rate by (mL): 10     Every 6 hours  Until Goal Tube Feed Rate (mL per Hour): 60  Tube Feed Duration (in Hours): 24  Tube Feed Start Time: 10:00 (23)    Current pump rate: 60ml/hr   --> spoke to RN; reduced rate to 45ml   --> messaged MD to change rate and formula (60ml/hr is excessive)    Current EN regimen provides: 1440ml formula, 2592 kcal (EXCESSIVE), 64.4 g protein, 1045ml free water    - Is current order appropriate/adequate? [] Yes  [x]  No: Current Renal formula is not appropriate; see EN recommendations below  -->Suplena is a very protein restricted, very high kcal, highly concentrated formula.   --> Pt noted with renal electrolytes WDL, hyperglycemia, worsening hypernatremia  --> CHANGE to GLUCERNA1.2 for greater free water and better BG management; REDUCE RATE to 45 ml/hr    PO intake :  not applicable    Nutrition-related concerns:  - Renal; TOÑITO on CKD; worsening hypernatremia; free water ordered 250ml q4 hrs  - BG managed with NPH 9 units q 6hrs, ISS q6 hrs  - Supplementation: Nephro-penny, thiamine    GI:    Last BM documented: .   Bowel Regimen? [ x] Yes: bisacodyl    Weights:  Height (cm): discrepancy in HIE: 59-61 inches  DOSING Weight (kg): 43.1 (2023 01:06)  BMI (kg/m2): 23.8 (2023 01:06) ? accuracy  Daily Weight in k.9 (-14), Weight in k.4 (-13), Weight in k.3 (-11), Weight in k.5 (-10), Weight in k.7 ()  Bed scale wt taken by RD (): 56 pounds/ 25.4kg ? accuracy    Nutritionally Pertinent Medications:  MEDICATIONS  (STANDING):  aspirin  chewable 81 milliGRAM(s) Oral daily  carvedilol 6.25 milliGRAM(s) Oral every 12 hours  dextrose 5%. 1000 milliLiter(s) (50 mL/Hr) IV Continuous <Continuous>  dextrose 5%. 1000 milliLiter(s) (100 mL/Hr) IV Continuous <Continuous>  dextrose 50% Injectable 25 Gram(s) IV Push once  dextrose 50% Injectable 25 Gram(s) IV Push once  dextrose 50% Injectable 12.5 Gram(s) IV Push once  glucagon  Injectable 1 milliGRAM(s) IntraMuscular once  heparin   Injectable 5000 Unit(s) SubCutaneous every 12 hours  hydrALAZINE 20 milliGRAM(s) Oral every 8 hours  insulin lispro (ADMELOG) corrective regimen sliding scale   SubCutaneous every 6 hours  insulin NPH human recombinant 9 Unit(s) SubCutaneous every 6 hours  latanoprost 0.005% Ophthalmic Solution 1 Drop(s) Both EYES at bedtime  Nephro-penny 1 Tablet(s) Oral daily  thiamine 100 milliGRAM(s) Oral daily    MEDICATIONS  (PRN):  bisacodyl 5 milliGRAM(s) Oral every 12 hours PRN Constipation  dextrose Oral Gel 15 Gram(s) Oral once PRN Blood Glucose LESS THAN 70 milliGRAM(s)/deciliter    Nutritionally Pertinent Labs:   @ 10:43: Sodium 150<H>, Potassium 3.6, Calcium 8.9, Magnesium --, Phosphorus --, BUN 88<H>, Creatinine 1.68<H>, Glucose 185<H>  Hemoglobin : 8.3 g/dL  Hematocrit : 26.6 %    A1C with Estimated Average Glucose Result: 7.1 % (23 @ 17:32)  A1C with Estimated Average Glucose Result: 7.4 % (23 @ 06:54)    POCT Blood Glucose.: 323 mg/dL (23 @ 05:56)  POCT Blood Glucose.: 277 mg/dL (23 @ 23:50)  POCT Blood Glucose.: 268 mg/dL (23 @ 17:50)  POCT Blood Glucose.: 221 mg/dL (23 @ 12:43)     Skin per nursing documentation: Unstageable sacrum  Edema (): 3+ left/right hand    Estimated Nutrition Needs: based on dosing wt 43.1kg  Energy: 1699-1370 calories (28-34 kcal/kg)  Protein: 52-60 grams (1.2-1.5 g/kg)    Previous Nutrition Diagnosis: Severe Malnutrition  Nutrition Diagnosis is: [x] ongoing; addressed with EN, plan for PEG    New Nutrition Diagnosis: Increased Nutrient Needs (protein, micronutrients) related to increased physiologic demand of stress factor and wound healing as evidenced by pt with pressure injury and acute illness    Nutrition Care Plan:  [x] In Progress  [] Achieved  [] Not applicable    Nutrition Interventions:     Education Provided:       [] Yes:  [x] No: not appropriate       Recommendations:      1. Change to EN formula for diabetes, with greater free water: Glucerna1.2 @ 45ml/hr x 24 hrs to provide 1080ml formula, 1296 kcal (30 kcal/kg), 65 g protein (1.5 g/kg), 869ml free water; based on dosing wt 43.1kg.  2. Defer additional free water to team, in setting of hypernatremia  3. Continue Nephro-penny to promote wound healing; continue thiamine for malnutrition/refeeding risk    Monitoring and Evaluation:   Continue to monitor nutritional intake, tolerance to diet prescription, weights, labs, skin integrity.  RD remains available upon request and will follow up per protocol,    Sarah Bhandari MS RD CDN  CNSC  Available on TEAMS (preferred)  Pager #[phone]

## 2023-05-14 NOTE — PROGRESS NOTE ADULT - ASSESSMENT
Assessment  DMT2: 88y Female with DM T2 with hyperglycemia, A1C7.1% , was on insulin at home sliding scale, now on NPH  with coverage, blood sugars are still elevated with increasing requirements of NPH, remains on Tube feeds via NG tube.  Dysphagia: NG tube was on tube feeds, GI/IR/Surgery eval for PEG. s/p EGD , peg tube not placed due to severe esophagitis in upper esophagus.   HTN: on antihypertensive medications, monitored, asymptomatic.  HLD: on statin, diet controlled.  TSH 0.68 Euthyroid            Discussed plan and management with Dr Angel Mackenzie NP - TEAMS  Marianne Ortiz MD  Cell: [phone]  Office: [phone]    Assessment  DMT2: 88y Female with DM T2 with hyperglycemia, A1C7.1% , was on insulin at home sliding scale, now on NPH  with coverage, blood sugars are still elevated with increasing requirements of NPH, remains on  Tube feeds via NG tube.  Dysphagia: NG tube was on tube feeds, GI/IR/Surgery eval for PEG. s/p EGD , peg tube not placed due to severe esophagitis in upper esophagus.   HTN: on antihypertensive medications, monitored, asymptomatic.  HLD: on statin, diet controlled.  TSH 0.68 Euthyroid            Discussed plan and management with Dr Angel Mackenzie NP - TEAMS  Marianne Ortiz MD  Cell: [phone]  Office: [phone]

## 2023-05-14 NOTE — PROGRESS NOTE ADULT - PROBLEM SELECTOR PLAN 5
Pt has been on ISS while NPO, however FS markedly elevated  - Despite insulin adjustmets, hyperglycemia persists;  - Ongoing Endocrine recs appreciated; cont NPH  - stopped D5 fluids  - PEG placement once sugars < 200  - Tube feeds changed to glucerna

## 2023-05-14 NOTE — PROGRESS NOTE ADULT - PROBLEM SELECTOR PLAN 1
Pt not compliant with SLP eval, with high aspiration risk, severe dysphagia, electrolyte derangements  -Failed several swallowing evals, SLP continues to recommend NPO  -Family discussion about GOC 5/2 and 5/3: requesting PEG  -NGT placed  -Replete electrolytes aggressively  - f/u wound care re-eval- no obvious purulence of source of infection documented  -PEG tube placement aborted by GI due to proximal esophagitis and risk of bleeding with pulling through internal bumper; recommend IR placement- IR planning for next wednesday- will need to try to expeditie.    #hypernatremia- likely free water deficit  - would not correct with D5w given propensity for uncontrollable hyperglycemia which may delay PEG  - cont free water boluses  - cont trending BMP daily

## 2023-05-14 NOTE — PROGRESS NOTE ADULT - SUBJECTIVE AND OBJECTIVE BOX
Chief complaint  Patient is a 88y old  Female who presents with a chief complaint of SOB/GARRISON, swelling of hands/legs/face (14 May 2023 13:35)         Labs and Fingersticks  CAPILLARY BLOOD GLUCOSE      POCT Blood Glucose.: 344 mg/dL (14 May 2023 12:55)  POCT Blood Glucose.: 323 mg/dL (14 May 2023 05:56)  POCT Blood Glucose.: 277 mg/dL (13 May 2023 23:50)  POCT Blood Glucose.: 268 mg/dL (13 May 2023 17:50)      Anion Gap, Serum: 13 (05-13 @ 10:43)      Calcium, Total Serum: 8.9 (05-13 @ 10:43)          05-13    150<H>  |  115<H>  |  88<H>  ----------------------------<  185<H>  3.6   |  22  |  1.68<H>    Ca    8.9      13 May 2023 10:43                          8.3    18.90 )-----------( 237      ( 13 May 2023 10:43 )             26.6     Medications  MEDICATIONS  (STANDING):  aspirin  chewable 81 milliGRAM(s) Oral daily  carvedilol 6.25 milliGRAM(s) Oral every 12 hours  dextrose 5%. 1000 milliLiter(s) (100 mL/Hr) IV Continuous <Continuous>  dextrose 5%. 1000 milliLiter(s) (50 mL/Hr) IV Continuous <Continuous>  dextrose 50% Injectable 25 Gram(s) IV Push once  dextrose 50% Injectable 12.5 Gram(s) IV Push once  dextrose 50% Injectable 25 Gram(s) IV Push once  glucagon  Injectable 1 milliGRAM(s) IntraMuscular once  heparin   Injectable 5000 Unit(s) SubCutaneous every 12 hours  hydrALAZINE 20 milliGRAM(s) Oral every 8 hours  insulin lispro (ADMELOG) corrective regimen sliding scale   SubCutaneous every 6 hours  insulin NPH human recombinant 9 Unit(s) SubCutaneous every 6 hours  latanoprost 0.005% Ophthalmic Solution 1 Drop(s) Both EYES at bedtime  Nephro-penny 1 Tablet(s) Oral daily  thiamine 100 milliGRAM(s) Oral daily      Physical Exam  General: Patient comfortable in bed   Vital Signs Last 12 Hrs  T(F): 100.7 (05-14-23 @ 12:12), Max: 100.7 (05-14-23 @ 12:12)  HR: 96 (05-14-23 @ 12:12) (91 - 96)  BP: 175/61 (05-14-23 @ 12:12) (168/54 - 175/61)  BP(mean): --  RR: 18 (05-14-23 @ 12:12) (18 - 18)  SpO2: 100% (05-14-23 @ 12:12) (98% - 100%)    CVS: S1S2   Respiratory: No wheezing, no crepitations  GI: Abdomen soft, bowel sounds positive  Musculoskeletal:  moves all extremities         Chief complaint  Patient is a 88y old  Female who presents with a chief complaint of SOB/GARRISON, swelling of hands/legs/face (14 May 2023 13:35)     Labs and Fingersticks  CAPILLARY BLOOD GLUCOSE      POCT Blood Glucose.: 344 mg/dL (14 May 2023 12:55)  POCT Blood Glucose.: 323 mg/dL (14 May 2023 05:56)  POCT Blood Glucose.: 277 mg/dL (13 May 2023 23:50)  POCT Blood Glucose.: 268 mg/dL (13 May 2023 17:50)      Anion Gap, Serum: 13 (05-13 @ 10:43)      Calcium, Total Serum: 8.9 (05-13 @ 10:43)          05-13    150<H>  |  115<H>  |  88<H>  ----------------------------<  185<H>  3.6   |  22  |  1.68<H>    Ca    8.9      13 May 2023 10:43                          8.3    18.90 )-----------( 237      ( 13 May 2023 10:43 )             26.6     Medications  MEDICATIONS  (STANDING):  aspirin  chewable 81 milliGRAM(s) Oral daily  carvedilol 6.25 milliGRAM(s) Oral every 12 hours  dextrose 5%. 1000 milliLiter(s) (100 mL/Hr) IV Continuous <Continuous>  dextrose 5%. 1000 milliLiter(s) (50 mL/Hr) IV Continuous <Continuous>  dextrose 50% Injectable 25 Gram(s) IV Push once  dextrose 50% Injectable 12.5 Gram(s) IV Push once  dextrose 50% Injectable 25 Gram(s) IV Push once  glucagon  Injectable 1 milliGRAM(s) IntraMuscular once  heparin   Injectable 5000 Unit(s) SubCutaneous every 12 hours  hydrALAZINE 20 milliGRAM(s) Oral every 8 hours  insulin lispro (ADMELOG) corrective regimen sliding scale   SubCutaneous every 6 hours  insulin NPH human recombinant 9 Unit(s) SubCutaneous every 6 hours  latanoprost 0.005% Ophthalmic Solution 1 Drop(s) Both EYES at bedtime  Nephro-penny 1 Tablet(s) Oral daily  thiamine 100 milliGRAM(s) Oral daily      Physical Exam  General: Patient comfortable in bed   Vital Signs Last 12 Hrs  T(F): 100.7 (05-14-23 @ 12:12), Max: 100.7 (05-14-23 @ 12:12)  HR: 96 (05-14-23 @ 12:12) (91 - 96)  BP: 175/61 (05-14-23 @ 12:12) (168/54 - 175/61)  BP(mean): --  RR: 18 (05-14-23 @ 12:12) (18 - 18)  SpO2: 100% (05-14-23 @ 12:12) (98% - 100%)    CVS: S1S2   Respiratory: No wheezing, no crepitations  GI: Abdomen soft, bowel sounds positive  Musculoskeletal:  moves all extremities

## 2023-05-14 NOTE — PROGRESS NOTE ADULT - PROBLEM SELECTOR PLAN 2
2/2 combination of pulm edema and aspiration PNA, resolved  #leukocytosis- s/p course of cefepime, no clear infectious source found  - now weaned off supplemental O2

## 2023-05-14 NOTE — PROGRESS NOTE ADULT - ASSESSMENT
Alternate MRN: [id number]    88yoF w/ PMHx of advanced dementia (at B/L patient is bedbound, nonverbal, though able to tolerate PO diet - per family, patient has hearty appetite), HTN, HLD, IDDM, brought in by family for difficulty breathing over the past few days in setting of notable increased swelling of the patient's hands, feet, and face; a/w hypoxic respiratory failure 2/2 aspiration PNA and acute decompensated diastolic heart failure, with course c/b significant TOÑITO, poor oral intake now s/p NGT and pending PEG.

## 2023-05-14 NOTE — PROGRESS NOTE ADULT - SUBJECTIVE AND OBJECTIVE BOX
[facility]   [phone]  RENAL FOLLOW UP NOTE  --------------------------------------------------------------------------------  Chief Complaint:    24 hour events/subjective:    non interactive     PAST HISTORY  --------------------------------------------------------------------------------  No significant changes to PMH, PSH, FHx, SHx, unless otherwise noted    ALLERGIES & MEDICATIONS  --------------------------------------------------------------------------------  Allergies    No Known Allergies    Intolerances      Standing Inpatient Medications  aspirin  chewable 81 milliGRAM(s) Oral daily  carvedilol 6.25 milliGRAM(s) Oral every 12 hours  dextrose 5%. 1000 milliLiter(s) IV Continuous <Continuous>  dextrose 5%. 1000 milliLiter(s) IV Continuous <Continuous>  dextrose 50% Injectable 25 Gram(s) IV Push once  dextrose 50% Injectable 25 Gram(s) IV Push once  dextrose 50% Injectable 12.5 Gram(s) IV Push once  glucagon  Injectable 1 milliGRAM(s) IntraMuscular once  heparin   Injectable 5000 Unit(s) SubCutaneous every 12 hours  hydrALAZINE 20 milliGRAM(s) Oral every 8 hours  insulin lispro (ADMELOG) corrective regimen sliding scale   SubCutaneous every 6 hours  insulin NPH human recombinant 9 Unit(s) SubCutaneous every 6 hours  latanoprost 0.005% Ophthalmic Solution 1 Drop(s) Both EYES at bedtime  Nephro-penny 1 Tablet(s) Oral daily  thiamine 100 milliGRAM(s) Oral daily    PRN Inpatient Medications  acetaminophen   Oral Liquid .. 650 milliGRAM(s) Oral every 6 hours PRN  bisacodyl 5 milliGRAM(s) Oral every 12 hours PRN  dextrose Oral Gel 15 Gram(s) Oral once PRN      REVIEW OF SYSTEMS  --------------------------------------------------------------------------------    not giving ros     VITALS/PHYSICAL EXAM  --------------------------------------------------------------------------------  T(C): 37.1 (05-14-23 @ 16:33), Max: 38.2 (05-14-23 @ 12:12)  HR: 92 (05-14-23 @ 16:33) (86 - 96)  BP: 155/68 (05-14-23 @ 16:33) (155/68 - 175/61)  RR: 18 (05-14-23 @ 16:33) (18 - 18)  SpO2: 100% (05-14-23 @ 16:33) (98% - 100%)  Wt(kg): --        05-13-23 @ 07:01  -  05-14-23 @ 07:00  --------------------------------------------------------  IN: 0 mL / OUT: 320 mL / NET: -320 mL    05-14-23 @ 07:01  -  05-14-23 @ 20:47  --------------------------------------------------------  IN: 0 mL / OUT: 200 mL / NET: -200 mL      Physical Exam:  	    	              Gen: ill appearing elderly F, On O2    	Pulm: decrease bs, + ronchi  bs, no wheezing  	CV: no JVD. RRR, S1S2; no rub  	Abd: +BS, soft, nontender/nondistended, +NGT  	: No bladder distention   	UE: Warm, no cyanosis  no clubbing,  no edema;  	LE: Warm, no cyanosis  no clubbing, no edema    LABS/STUDIES  --------------------------------------------------------------------------------              8.3    18.90 >-----------<  237      [05-13-23 @ 10:43]              26.6     150  |  115  |  88  ----------------------------<  185      [05-13-23 @ 10:43]  3.6   |  22  |  1.68        Ca     8.9     [05-13-23 @ 10:43]            Creatinine Trend:  SCr 1.68 [05-13 @ 10:43]  SCr 1.68 [05-12 @ 09:47]  SCr 1.61 [05-11 @ 09:16]  SCr 1.81 [05-10 @ 10:41]  SCr 1.94 [05-09 @ 10:44]              Urinalysis - [05-09-23 @ 01:26]      Color Light Yellow / Appearance Clear / SG 1.016 / pH 5.5      Gluc Negative / Ketone Negative  / Bili Negative / Urobili Negative       Blood Trace / Protein 100 mg/dL / Leuk Est Negative / Nitrite Negative      RBC 4 / WBC 2 / Hyaline 3 / Gran  / Sq Epi  / Non Sq Epi  / Bacteria Negative      TSH 0.68      [05-04-23 @ 11:38]  Lipid: chol 216, TG 80, HDL 49, LDL --      [08-08-22 @ 06:31]

## 2023-05-14 NOTE — PROGRESS NOTE ADULT - ASSESSMENT
88yoF w/ PMHx of advanced dementia (at B/L patient is bedbound, nonverbal, though able to tolerate PO diet - per family, patient has hearty appetite), HTN, HLD, IDDM, who presents from home where she lives with her , brought in by her daughter (son is at her bedside during this evaluation) w/ complaints of daughter noticing the patient has been having difficulty breathing over the past few days in setting of notable increased swelling of the patient's hands, feet, and face. Of note, patient was recently hospitalized at [facility] for aspiration PNA (and found to have demand ischemia) when she presented with similar complaints and was found to be hypoxic; at the time, S+S eval was recommended, but per documentation at the end of Jan 2023,      1- TOÑITO   2- CHF   3- HTN  4- hypernatremia  5- PNA        toñito in setting of chf as well as more likely due to contrast nephropathy and possible pneumonia   creatinine is slowly improving  but now seems to have reached a plateau   urinary retention requiring intermittent catheterization,.   hypernatremia persists increased free water 250 cc q 4  may need to give d5w  her echo reveals mod to severe range AS  she is not a dialysis candidate in setting of her advance dementia   hydralazine 25 mg po tid   anemia, trend hgb   O2 supplementation   strict I/O  trend creatinine and electrolytes daily  plan for peg placement, completed abx for pna

## 2023-05-14 NOTE — PROGRESS NOTE ADULT - PROBLEM SELECTOR PLAN 1
Will increase NPH to 9 units q 6.  HOLD NPH if Tube feeds are held.   Will continue monitoring  blood sugars, will Follow up.

## 2023-05-14 NOTE — PROGRESS NOTE ADULT - SUBJECTIVE AND OBJECTIVE BOX
[facility] Division of Hospital Medicine  Mleissa Hong MD  Pager (M-F, 8A-5P): [phone]  Other Times:  [phone]    -------------------------------------    Patient is a 88y old  Female who presents with a chief complaint of SOB/GARRISON, swelling of hands/legs/face (14 May 2023 13:56)      SUBJECTIVE / OVERNIGHT EVENTS: no acute events  ADDITIONAL REVIEW OF SYSTEMS: ros otherwise limited due to underling dementia    MEDICATIONS  (STANDING):  aspirin  chewable 81 milliGRAM(s) Oral daily  carvedilol 6.25 milliGRAM(s) Oral every 12 hours  dextrose 5%. 1000 milliLiter(s) (50 mL/Hr) IV Continuous <Continuous>  dextrose 5%. 1000 milliLiter(s) (100 mL/Hr) IV Continuous <Continuous>  dextrose 50% Injectable 25 Gram(s) IV Push once  dextrose 50% Injectable 25 Gram(s) IV Push once  dextrose 50% Injectable 12.5 Gram(s) IV Push once  glucagon  Injectable 1 milliGRAM(s) IntraMuscular once  heparin   Injectable 5000 Unit(s) SubCutaneous every 12 hours  hydrALAZINE 20 milliGRAM(s) Oral every 8 hours  insulin lispro (ADMELOG) corrective regimen sliding scale   SubCutaneous every 6 hours  insulin NPH human recombinant 9 Unit(s) SubCutaneous every 6 hours  latanoprost 0.005% Ophthalmic Solution 1 Drop(s) Both EYES at bedtime  Nephro-penny 1 Tablet(s) Oral daily  thiamine 100 milliGRAM(s) Oral daily    MEDICATIONS  (PRN):  acetaminophen   Oral Liquid .. 650 milliGRAM(s) Oral every 6 hours PRN Mild Pain (1 - 3)  bisacodyl 5 milliGRAM(s) Oral every 12 hours PRN Constipation  dextrose Oral Gel 15 Gram(s) Oral once PRN Blood Glucose LESS THAN 70 milliGRAM(s)/deciliter      PHYSICAL EXAM:  T(C): 38.2 (05-14-23 @ 12:12), Max: 38.2 (05-14-23 @ 12:12)  T(F): 100.7 (05-14-23 @ 12:12), Max: 100.7 (05-14-23 @ 12:12)  HR: 96 (05-14-23 @ 12:12) (86 - 96)  BP: 175/61 (05-14-23 @ 12:12) (156/67 - 175/61)  RR: 18 (05-14-23 @ 12:12) (18 - 18)  SpO2: 100% (05-14-23 @ 12:12) (98% - 100%)  Wt(kg): --    CONSTITUTIONAL: NAD, frail appearing elderly woman, cachectic  EYES: PERRLA; conjunctiva and sclera clear  ENMT: NGT  RESPIRATORY: Normal respiratory effort; CTAB  CARDIOVASCULAR: RRR, no JVD, no peripheral edema   ABDOMEN: Nontender to palpation, normoactive BS, no guarding/rigidity  MUSCULOSKELETAL  no clubbing/cyanosis, no joint swelling or tenderness to palpation  PSYCH: A+O x 0  SKIN: No rashes; no palpable lesions    LABS: reviewed                               8.3    18.90 )-----------( 237      ( 13 May 2023 10:43 )             26.6       05-13    150<H>  |  115<H>  |  88<H>  ----------------------------<  185<H>  3.6   |  22  |  1.68<H>    Ca    8.9      13 May 2023 10:43                              CAPILLARY BLOOD GLUCOSE      POCT Blood Glucose.: 344 mg/dL (14 May 2023 12:55)

## 2023-05-15 NOTE — PROGRESS NOTE ADULT - PROBLEM SELECTOR PLAN 1
Will decrease NPH to 6 units q 6.  HOLD NPH if Tube feeds are held.   Will continue monitoring  blood sugars, will Follow up.

## 2023-05-15 NOTE — PROGRESS NOTE ADULT - SUBJECTIVE AND OBJECTIVE BOX
[facility] Division of Hospital Medicine  Melissa Hong MD  Pager (M-F, 8A-5P): [phone]  Other Times:  [phone]    -------------------------------------    Patient is a 88y old  Female who presents with a chief complaint of SOB/GARRISON, swelling of hands/legs/face (14 May 2023 13:56)      SUBJECTIVE / OVERNIGHT EVENTS: no acute events  ADDITIONAL REVIEW OF SYSTEMS: ros otherwise limited due to underling dementia    MEDICATIONS  (STANDING):  aspirin  chewable 81 milliGRAM(s) Oral daily  carvedilol 12.5 milliGRAM(s) Oral every 12 hours  dextrose 5%. 1000 milliLiter(s) (50 mL/Hr) IV Continuous <Continuous>  dextrose 5%. 1000 milliLiter(s) (100 mL/Hr) IV Continuous <Continuous>  dextrose 50% Injectable 25 Gram(s) IV Push once  dextrose 50% Injectable 25 Gram(s) IV Push once  dextrose 50% Injectable 12.5 Gram(s) IV Push once  glucagon  Injectable 1 milliGRAM(s) IntraMuscular once  heparin   Injectable 5000 Unit(s) SubCutaneous every 12 hours  hydrALAZINE 25 milliGRAM(s) Oral every 8 hours  insulin lispro (ADMELOG) corrective regimen sliding scale   SubCutaneous every 6 hours  insulin NPH human recombinant 6 Unit(s) SubCutaneous every 6 hours  latanoprost 0.005% Ophthalmic Solution 1 Drop(s) Both EYES at bedtime  Nephro-penny 1 Tablet(s) Oral daily  thiamine 100 milliGRAM(s) Oral daily    MEDICATIONS  (PRN):  acetaminophen   Oral Liquid .. 650 milliGRAM(s) Oral every 6 hours PRN Mild Pain (1 - 3)  bisacodyl 5 milliGRAM(s) Oral every 12 hours PRN Constipation  dextrose Oral Gel 15 Gram(s) Oral once PRN Blood Glucose LESS THAN 70 milliGRAM(s)/deciliter  /deciliter      PHYSICAL EXAM:  T(C): 37.3 (05-15-23 @ 12:30), Max: 37.6 (05-15-23 @ 04:32)  T(F): 99.1 (05-15-23 @ 12:30), Max: 99.7 (05-15-23 @ 04:32)  HR: 84 (05-15-23 @ 12:30) (84 - 92)  BP: 144/60 (05-15-23 @ 12:30) (144/60 - 175/64)  RR: 18 (05-15-23 @ 12:30) (18 - 18)  SpO2: 100% (05-15-23 @ 12:30) (98% - 100%)  Wt(kg): --    CONSTITUTIONAL: NAD, frail appearing elderly woman, cachectic  EYES: PERRLA; conjunctiva and sclera clear  ENMT: NGT  RESPIRATORY: Normal respiratory effort; diffuse rhonchi  CARDIOVASCULAR: RRR, no JVD, no peripheral edema   ABDOMEN: Nontender to palpation, normoactive BS, no guarding/rigidity  MUSCULOSKELETAL  no clubbing/cyanosis, no joint swelling or tenderness to palpation  PSYCH: A+O x 0  SKIN: No rashes; no palpable lesions    LABS: reviewed                                            CAPILLARY BLOOD GLUCOSE      POCT Blood Glucose.: 72 mg/dL (15 May 2023 12:05)

## 2023-05-15 NOTE — PROGRESS NOTE ADULT - ASSESSMENT
88yoF w/ PMHx of advanced dementia (at B/L patient is bedbound, nonverbal, though able to tolerate PO diet - per family, patient has hearty appetite), HTN, HLD, IDDM, who presents from home where she lives with her , brought in by her daughter (son is at her bedside during this evaluation) w/ complaints of daughter noticing the patient has been having difficulty breathing over the past few days in setting of notable increased swelling of the patient's hands, feet, and face. Of note, patient was recently hospitalized at [facility] for aspiration PNA (and found to have demand ischemia) when she presented with similar complaints and was found to be hypoxic; at the time, S+S eval was recommended, but per documentation at the end of Jan 2023,        1- TOÑITO   2- CHF   3- HTN  4- hypernatremia  5- PNA      TOÑITO in setting of chf as well as more likely due to contrast nephropathy and possible pneumonia   creatinine is slowly improving but now seems to have reached a plateau   urinary retention requiring intermittent catheterization.   hypernatremia persists, free water 250 cc q 4, last sodium level 5/13.  her echo reveals mod to severe range AS  she is not a dialysis candidate in setting of her advance dementia   hydralazine 25 mg po tid   cardvedilol 12.5 mg BID  anemia, trend hgb   O2 supplementation   strict I/O  trend creatinine and electrolytes daily  plan for peg placement, completed abx for pna

## 2023-05-15 NOTE — PROGRESS NOTE ADULT - SUBJECTIVE AND OBJECTIVE BOX
[facility]   [phone]  RENAL FOLLOW UP NOTE  --------------------------------------------------------------------------------  Chief Complaint:    24 hour events/subjective:    patient seen and examined.   not interactive    PAST HISTORY  --------------------------------------------------------------------------------  No significant changes to PMH, PSH, FHx, SHx, unless otherwise noted    ALLERGIES & MEDICATIONS  --------------------------------------------------------------------------------  Allergies    No Known Allergies    Intolerances      Standing Inpatient Medications  aspirin  chewable 81 milliGRAM(s) Oral daily  carvedilol 12.5 milliGRAM(s) Oral every 12 hours  dextrose 5%. 1000 milliLiter(s) IV Continuous <Continuous>  dextrose 5%. 1000 milliLiter(s) IV Continuous <Continuous>  dextrose 50% Injectable 25 Gram(s) IV Push once  dextrose 50% Injectable 12.5 Gram(s) IV Push once  dextrose 50% Injectable 25 Gram(s) IV Push once  glucagon  Injectable 1 milliGRAM(s) IntraMuscular once  heparin   Injectable 5000 Unit(s) SubCutaneous every 12 hours  hydrALAZINE 25 milliGRAM(s) Oral every 8 hours  insulin lispro (ADMELOG) corrective regimen sliding scale   SubCutaneous every 6 hours  insulin NPH human recombinant 6 Unit(s) SubCutaneous every 6 hours  latanoprost 0.005% Ophthalmic Solution 1 Drop(s) Both EYES at bedtime  Nephro-penny 1 Tablet(s) Oral daily  thiamine 100 milliGRAM(s) Oral daily    PRN Inpatient Medications  acetaminophen   Oral Liquid .. 650 milliGRAM(s) Oral every 6 hours PRN  bisacodyl 5 milliGRAM(s) Oral every 12 hours PRN  dextrose Oral Gel 15 Gram(s) Oral once PRN      REVIEW OF SYSTEMS  --------------------------------------------------------------------------------    patient is unable to give ROS    VITALS/PHYSICAL EXAM  --------------------------------------------------------------------------------  T(C): 37.3 (05-15-23 @ 12:30), Max: 37.6 (05-15-23 @ 04:32)  HR: 84 (05-15-23 @ 12:30) (84 - 92)  BP: 144/60 (05-15-23 @ 12:30) (144/60 - 175/64)  RR: 18 (05-15-23 @ 12:30) (18 - 18)  SpO2: 100% (05-15-23 @ 12:30) (98% - 100%)  Wt(kg): --        05-14-23 @ 07:01  -  05-15-23 @ 07:00  --------------------------------------------------------  IN: 0 mL / OUT: 420 mL / NET: -420 mL    05-15-23 @ 07:01  -  05-15-23 @ 14:20  --------------------------------------------------------  IN: 0 mL / OUT: 200 mL / NET: -200 mL      Physical Exam:  	              Gen: ill appearing elderly F, On O2    	Pulm: decrease bs, + ronchi  bs, no wheezing  	CV: no JVD. RRR, S1S2; no rub  	Abd: +BS, soft, nontender/nondistended, +NGT  	: No bladder distention   	UE: Warm, no cyanosis  no clubbing,  no edema;  	LE: Warm, no cyanosis  no clubbing, no edema    LABS/STUDIES  --------------------------------------------------------------------------------          Creatinine Trend:  SCr 1.68 [05-13 @ 10:43]  SCr 1.68 [05-12 @ 09:47]  SCr 1.61 [05-11 @ 09:16]  SCr 1.81 [05-10 @ 10:41]  SCr 1.94 [05-09 @ 10:44]              Urinalysis - [05-09-23 @ 01:26]      Color Light Yellow / Appearance Clear / SG 1.016 / pH 5.5      Gluc Negative / Ketone Negative  / Bili Negative / Urobili Negative       Blood Trace / Protein 100 mg/dL / Leuk Est Negative / Nitrite Negative      RBC 4 / WBC 2 / Hyaline 3 / Gran  / Sq Epi  / Non Sq Epi  / Bacteria Negative      TSH 0.68      [05-04-23 @ 11:38]  Lipid: chol 216, TG 80, HDL 49, LDL --      [08-08-22 @ 06:31]

## 2023-05-15 NOTE — PROGRESS NOTE ADULT - ASSESSMENT
Alternate MRN: [id number]    88yoF w/ PMHx of advanced dementia (at B/L patient is bedbound, nonverbal, though able to tolerate PO diet - per family, patient has hearty appetite), HTN, HLD, IDDM, brought in by family for difficulty breathing over the past few days in setting of notable increased swelling of the patient's hands, feet, and face; a/w hypoxic respiratory failure 2/2 aspiration PNA and acute decompensated diastolic heart failure, with course c/b significant TOÑITO, poor oral intake now s/p NGT and pending PEG.

## 2023-05-15 NOTE — PROGRESS NOTE ADULT - ASSESSMENT
Assessment  DMT2: 88y Female with DM T2 with hyperglycemia, A1C 7.1%, was on insulin at home sliding scale, now on NPH  with coverage, blood sugars are now down trending, will be NPO for upcoming PEG.   Dysphagia: NG tube was on tube feeds, GI/IR/Surgery eval for PEG. s/p EGD , peg tube not placed due to severe esophagitis in upper esophagus.   HTN: on antihypertensive medications, monitored, asymptomatic.  HLD: on statin, diet controlled.  TSH 0.68 Euthyroid            Discussed plan and management with Dr Angel Mackenzie NP - TEAMS  Marianne Ortiz MD  Cell: [phone]  Office: [phone]    Assessment  DMT2: 88y Female with DM T2 with hyperglycemia, A1C 7.1%, was on insulin at home sliding scale, now on NPH  with coverage, blood sugars are now down trending,  will be NPO for upcoming PEG.   Dysphagia: NG tube was on tube feeds, GI/IR/Surgery eval for PEG. s/p EGD , peg tube not placed due to severe esophagitis in upper esophagus.   HTN: on antihypertensive medications, monitored, asymptomatic.  HLD: on statin, diet controlled.  TSH 0.68 Euthyroid            Discussed plan and management with Dr Angel Mackenzie NP - TEAMS  Marianne Ortiz MD  Cell: [phone]  Office: [phone]

## 2023-05-15 NOTE — PROGRESS NOTE ADULT - SUBJECTIVE AND OBJECTIVE BOX
Subjective: Patient seen and examined. No new events except as noted.     REVIEW OF SYSTEMS:    Unable to obtain     MEDICATIONS:  MEDICATIONS  (STANDING):  aspirin  chewable 81 milliGRAM(s) Oral daily  carvedilol 12.5 milliGRAM(s) Oral every 12 hours  dextrose 5%. 1000 milliLiter(s) (100 mL/Hr) IV Continuous <Continuous>  dextrose 5%. 1000 milliLiter(s) (50 mL/Hr) IV Continuous <Continuous>  dextrose 50% Injectable 25 Gram(s) IV Push once  dextrose 50% Injectable 12.5 Gram(s) IV Push once  dextrose 50% Injectable 25 Gram(s) IV Push once  glucagon  Injectable 1 milliGRAM(s) IntraMuscular once  heparin   Injectable 5000 Unit(s) SubCutaneous every 12 hours  hydrALAZINE 25 milliGRAM(s) Oral every 8 hours  insulin lispro (ADMELOG) corrective regimen sliding scale   SubCutaneous every 6 hours  insulin NPH human recombinant 9 Unit(s) SubCutaneous every 6 hours  latanoprost 0.005% Ophthalmic Solution 1 Drop(s) Both EYES at bedtime  Nephro-penny 1 Tablet(s) Oral daily  thiamine 100 milliGRAM(s) Oral daily      PHYSICAL EXAM:  T(C): 37.6 (05-15-23 @ 04:32), Max: 38.2 (05-14-23 @ 12:12)  HR: 87 (05-15-23 @ 04:32) (85 - 96)  BP: 175/64 (05-15-23 @ 04:32) (151/58 - 175/64)  RR: 18 (05-15-23 @ 04:32) (18 - 18)  SpO2: 98% (05-15-23 @ 04:32) (98% - 100%)  Wt(kg): --  I&O's Summary    14 May 2023 07:01  -  15 May 2023 07:00  --------------------------------------------------------  IN: 0 mL / OUT: 420 mL / NET: -420 mL    15 May 2023 07:01  -  15 May 2023 10:06  --------------------------------------------------------  IN: 0 mL / OUT: 200 mL / NET: -200 mL            Appearance: NAD non verbal 	+NGT   HEENT: Normal oral mucosa, PERRL, EOMI	  Lymphatic: No lymphadenopathy  Cardiovascular: Normal S1 S2, No JVD, No murmurs  Respiratory: decreased bs, on NC   Psychiatry: A & O x 0  Gastrointestinal:  Soft, Non-tender, BS   Skin: No rashes, No ecchymoses, No cyanosis	  Neurologic: Non-focal  Extremities: decreased  range of motion, No  edema  Vascular: Peripheral pulses palpable 2+ bilaterally      LABS:    CARDIAC MARKERS:                                8.3    18.90 )-----------( 237      ( 13 May 2023 10:43 )             26.6     05-13    150<H>  |  115<H>  |  88<H>  ----------------------------<  185<H>  3.6   |  22  |  1.68<H>    Ca    8.9      13 May 2023 10:43              TELEMETRY: 	    ECG:  	  RADIOLOGY:   DIAGNOSTIC TESTING:  [ ] Echocardiogram:  [ ]  Catheterization:  [ ] Stress Test:    OTHER:

## 2023-05-15 NOTE — PROGRESS NOTE ADULT - SUBJECTIVE AND OBJECTIVE BOX
Chief complaint  Patient is a 88y old  Female who presents with a chief complaint of SOB/GARRISON, swelling of hands/legs/face (15 May 2023 10:06)         Labs and Fingersticks  CAPILLARY BLOOD GLUCOSE      POCT Blood Glucose.: 72 mg/dL (15 May 2023 12:05)  POCT Blood Glucose.: 239 mg/dL (15 May 2023 06:58)  POCT Blood Glucose.: 81 mg/dL (15 May 2023 06:16)  POCT Blood Glucose.: 76 mg/dL (15 May 2023 06:14)  POCT Blood Glucose.: 330 mg/dL (14 May 2023 23:50)  POCT Blood Glucose.: 260 mg/dL (14 May 2023 18:08)                        Medications  MEDICATIONS  (STANDING):  aspirin  chewable 81 milliGRAM(s) Oral daily  carvedilol 12.5 milliGRAM(s) Oral every 12 hours  dextrose 5%. 1000 milliLiter(s) (50 mL/Hr) IV Continuous <Continuous>  dextrose 5%. 1000 milliLiter(s) (100 mL/Hr) IV Continuous <Continuous>  dextrose 50% Injectable 25 Gram(s) IV Push once  dextrose 50% Injectable 25 Gram(s) IV Push once  dextrose 50% Injectable 12.5 Gram(s) IV Push once  glucagon  Injectable 1 milliGRAM(s) IntraMuscular once  heparin   Injectable 5000 Unit(s) SubCutaneous every 12 hours  hydrALAZINE 25 milliGRAM(s) Oral every 8 hours  insulin lispro (ADMELOG) corrective regimen sliding scale   SubCutaneous every 6 hours  insulin NPH human recombinant 9 Unit(s) SubCutaneous every 6 hours  latanoprost 0.005% Ophthalmic Solution 1 Drop(s) Both EYES at bedtime  Nephro-penny 1 Tablet(s) Oral daily  thiamine 100 milliGRAM(s) Oral daily      Physical Exam  General: Patient comfortable in bed   Vital Signs Last 12 Hrs  T(F): 99.1 (05-15-23 @ 12:30), Max: 99.7 (05-15-23 @ 04:32)  HR: 84 (05-15-23 @ 12:30) (84 - 87)  BP: 144/60 (05-15-23 @ 12:30) (144/60 - 175/64)  BP(mean): --  RR: 18 (05-15-23 @ 12:30) (18 - 18)  SpO2: 100% (05-15-23 @ 12:30) (98% - 100%)    CVS: S1S2   Respiratory: No wheezing, no crepitations  GI: Abdomen soft, bowel sounds positive  Musculoskeletal:  moves all extremities  : Voiding          Chief complaint  Patient is a 88y old  Female who presents with a chief complaint of SOB/GARRISON, swelling of hands/legs/face (15 May 2023 10:06)     Labs and Fingersticks  CAPILLARY BLOOD GLUCOSE      POCT Blood Glucose.: 72 mg/dL (15 May 2023 12:05)  POCT Blood Glucose.: 239 mg/dL (15 May 2023 06:58)  POCT Blood Glucose.: 81 mg/dL (15 May 2023 06:16)  POCT Blood Glucose.: 76 mg/dL (15 May 2023 06:14)  POCT Blood Glucose.: 330 mg/dL (14 May 2023 23:50)  POCT Blood Glucose.: 260 mg/dL (14 May 2023 18:08)                        Medications  MEDICATIONS  (STANDING):  aspirin  chewable 81 milliGRAM(s) Oral daily  carvedilol 12.5 milliGRAM(s) Oral every 12 hours  dextrose 5%. 1000 milliLiter(s) (50 mL/Hr) IV Continuous <Continuous>  dextrose 5%. 1000 milliLiter(s) (100 mL/Hr) IV Continuous <Continuous>  dextrose 50% Injectable 25 Gram(s) IV Push once  dextrose 50% Injectable 25 Gram(s) IV Push once  dextrose 50% Injectable 12.5 Gram(s) IV Push once  glucagon  Injectable 1 milliGRAM(s) IntraMuscular once  heparin   Injectable 5000 Unit(s) SubCutaneous every 12 hours  hydrALAZINE 25 milliGRAM(s) Oral every 8 hours  insulin lispro (ADMELOG) corrective regimen sliding scale   SubCutaneous every 6 hours  insulin NPH human recombinant 9 Unit(s) SubCutaneous every 6 hours  latanoprost 0.005% Ophthalmic Solution 1 Drop(s) Both EYES at bedtime  Nephro-penny 1 Tablet(s) Oral daily  thiamine 100 milliGRAM(s) Oral daily      Physical Exam  General: Patient comfortable in bed   Vital Signs Last 12 Hrs  T(F): 99.1 (05-15-23 @ 12:30), Max: 99.7 (05-15-23 @ 04:32)  HR: 84 (05-15-23 @ 12:30) (84 - 87)  BP: 144/60 (05-15-23 @ 12:30) (144/60 - 175/64)  BP(mean): --  RR: 18 (05-15-23 @ 12:30) (18 - 18)  SpO2: 100% (05-15-23 @ 12:30) (98% - 100%)    CVS: S1S2   Respiratory: No wheezing, no crepitations  GI: Abdomen soft, bowel sounds positive  Musculoskeletal:  moves all extremities  : Voiding

## 2023-05-15 NOTE — PROGRESS NOTE ADULT - PROBLEM SELECTOR PLAN 1
Pt not compliant with SLP eval, with high aspiration risk, severe dysphagia, electrolyte derangements  -Failed several swallowing evals, SLP continues to recommend NPO  -Family discussion about GOC 5/2 and 5/3: requesting PEG  -NGT placed  -Replete electrolytes aggressively  - f/u wound care re-eval- no obvious purulence of source of infection documented  -PEG tube placement aborted by GI due to proximal esophagitis and risk of bleeding with pulling through internal bumper; recommend IR placement- IR planning for tomorrow    #hypernatremia- likely free water deficit  - would not correct with D5w given propensity for uncontrollable hyperglycemia which may delay PEG  - cont free water boluses  - cont trending BMP daily

## 2023-05-16 NOTE — PROGRESS NOTE ADULT - PROBLEM SELECTOR PLAN 3
CT Angio Chest - negative for PE  IV Antibiotics for PNA  now on HFNC s/p hypoxic event 4/26  aspiration precautions   supplemental oxygen as needed SPO > 92%  DVT ppx
CT Angio Chest - negative for PE  completed IV Antibiotics for PNA  Supplemental oxygen   aspiration precautions   supplemental oxygen as needed SPO > 92%  Plan for PEG   acceptable cardiac risk to proceed
CT Angio Chest - negative for PE  completed IV Antibiotics for PNA  Supplemental oxygen   aspiration precautions   supplemental oxygen as needed SPO > 92%  Plan for PEG   acceptable cardiac risk to proceed
pt hypoxic on admission with tachypnea, noted with pulm congestion on imaging.   - s/p IV diuresis, now held  - c/t closely monitor pt's respiratory status, fluid status, renal function (s/p contrast), and electrolytes  - continue telemetry monitoring  - TTE reviewed and noted - normal LV systolic function, new valvular disease (mod-sev AS, mod MR)  - c/t monitor O2 sat  - appreciate cardiology recs
CT Angio Chest - negative for PE  IV Antibiotics for PNA  now on HFNC s/p hypoxic event 4/26  aspiration precautions   supplemental oxygen as needed SPO > 92%  DVT ppx
worsening Cr, now to 4.5 (baseline from prior admission ~1)  - renal following - suspect likely contrast induced with possible component of lasix induced  - on bumex gtt, making urine now, monitor strict I/Os  - mild hypoK from diuresis, replete and c/t monitor BMP BID for now   - renal US - no obstruction   - trend Cr closely   - renally dose medications, avoid nephrotoxins  - nephrology recs appreciated
CT Angio Chest - negative for PE  completed IV Antibiotics for PNA  Supplemental oxygen   aspiration precautions   supplemental oxygen as needed SPO > 92%  Plan for PEG deferred for now given severe esophatgitis  CXR and UA negative, WBC trending down. No clear sign of infection   Bcx thus far negative
CT Angio Chest - negative for PE  completed IV Antibiotics for PNA  Supplemental oxygen   aspiration precautions   supplemental oxygen as needed SPO > 92%  Plan for PEG   acceptable cardiac risk to proceed
RISS.
continue tube feeds as per primary team recs. Hold NPH injection when off tube feeds.  pending PEG
continue tube feeds as per primary team recs. Hold NPH injection when off tube feeds.  pending PEG
pt hypoxic on admission with tachypnea, noted with pulm congestion on imaging. Now improved.   - s/p IV diuresis, now held  - c/t closely monitor pt's respiratory status, fluid status, renal function (s/p contrast), and electrolytes  - continue telemetry monitoring  - TTE reviewed and noted - normal LV systolic function, new valvular disease (mod-sev AS, mod MR)  - c/t monitor O2 sat  - appreciate cardiology recs
CT Angio Chest - negative for PE  IV Antibiotics for PNA  Supplemental oxygen   aspiration precautions   supplemental oxygen as needed SPO > 92%  DVT ppx
CT Angio Chest - negative for PE  completed IV Antibiotics for PNA  Supplemental oxygen   aspiration precautions   supplemental oxygen as needed SPO > 92%  Plan for PEG   acceptable cardiac risk to proceed
CT Angio Chest - negative for PE  completed IV Antibiotics for PNA  Supplemental oxygen   aspiration precautions   supplemental oxygen as needed SPO > 92%  Plan for PEG deferred for now given severe esophatgitis  CXR and UA negative, WBC trending down. No clear sign of infection   Bcx thus far negative
Cr as high as 4.75 (baseline from prior admission ~1) - now improving   - renal following - suspect likely 2/2 HF and contrast induced   - s/p bumex gtt with good UOP - now with slight hypernatremia, d/w renal - will switch to bumex 2mg IVP daily for now, c/t monitor I/Os   - renal US - no obstruction   - trend Cr closely   - renally dose medications, avoid nephrotoxins  - nephrology recs appreciated
continue tube feeds as per primary team recs. Hold NPH injection when off tube feeds.
continue tube feeds as per primary team recs. Hold NPH injection when off tube feeds.  pending PEG   EGD peg tube not placed due to severe esophagitis in upper esophagus.
pt hypoxic on admission with tachypnea, noted with pulm congestion on imaging. Now improved.   - s/p IV diuresis, now held  - c/t closely monitor pt's respiratory status, fluid status, renal function (s/p contrast), and electrolytes  - continue telemetry monitoring  - TTE reviewed and noted - normal LV systolic function, new valvular disease (mod-sev AS, mod MR)  - c/t monitor O2 sat  - appreciate cardiology recs
worsening Cr, now to 4. baseline from prior admission ~1  - may be 2/2 PNA/aspiration vs lasix vs dehydration   - check urine lytes  - check renal US  - continue to monitor off lasix, trend Cr closely   - renally dose medications, avoid nephrotoxins  - nephrology consulted, f/u recs
pt hypoxic on admission with tachypnea, noted with pulm congestion on imaging.   - s/p IV diuresis, now held  - c/t closely monitor pt's respiratory status, fluid status, renal function (s/p contrast), and electrolytes  - continue telemetry monitoring  - TTE reviewed and noted - normal LV systolic function, new valvular disease (mod-sev AS, mod MR)  - c/t monitor O2 sat  - appreciate cardiology recs
continue tube feeds as per primary team recs. Hold NPH injection when off tube feeds.
continue tube feeds as per primary team recs. Hold NPH injection when off tube feeds.  pending PEG
continue tube feeds as per primary team recs. Hold NPH injection when off tube feeds.  pending PEG   EGD peg tube not placed due to severe esophagitis in upper esophagus.
continue tube feeds as per primary team recs. Hold NPH injection when off tube feeds.  pending PEG  s/p EGD peg tube not placed due to severe esophagitis in upper esophagus.
CT Angio Chest - negative for PE  IV Antibiotics for PNA  now on HFNC s/p hypoxic event 4/26  aspiration precautions   supplemental oxygen as needed SPO > 92%  DVT ppx
etiology likely related to elevated aspiration risk w/ dementia and advanced dementia but will treat for acute (non-severe) bacterial pneumonia in setting of recent hospitalization within the last 90 days  - c/w ceftriaxone and azithromycin for now   - BCx pending, will f/u   - MRSA negative; f/u legionella and S pneumo Ag (pending)  - monitor QTc (on admission = 498)
CT Angio Chest - negative for PE  IV Antibiotics for PNA  Supplemental oxygen   aspiration precautions   supplemental oxygen as needed SPO > 92%  DVT ppx
continue tube feeds as per primary team recs. Hold NPH injection when off tube feeds.  pending PEG
continue tube feeds as per primary team recs. Hold NPH injection when off tube feeds.  pending PEG  s/p EGD peg tube not placed due to severe esophagitis in upper esophagus.
pt hypoxic on admission with tachypnea, noted with pulm congestion on imaging. Now improved.   - s/p IV diuresis, now held  - c/t closely monitor pt's respiratory status, fluid status, renal function (s/p contrast), and electrolytes  - continue telemetry monitoring  - TTE reviewed and noted - normal LV systolic function, new valvular disease (mod-sev AS, mod MR)  - c/t monitor O2 sat  - appreciate cardiology recs
CT Angio Chest - negative for PE  IV Antibiotics for PNA  Supplemental oxygen   aspiration precautions   supplemental oxygen as needed SPO > 92%  DVT ppx
Cr as high as 4.75 (baseline from prior admission ~1) - now improving   - renal following - suspect likely 2/2 HF and contrast induced   - s/p bumex gtt with good UOP - now with slight hypernatremia, d/w renal - will switch to bumex 2mg IVP daily for now, c/t monitor I/Os   - renal US - no obstruction   - trend Cr closely   - renally dose medications, avoid nephrotoxins  - nephrology recs appreciated
continue tube feeds as per primary team recs. Hold NPH injection when off tube feeds.  pending PEG  s/p EGD peg tube not placed due to severe esophagitis in upper esophagus.
CT Angio Chest - negative for PE  completed IV Antibiotics for PNA  Supplemental oxygen   aspiration precautions   supplemental oxygen as needed SPO > 92%  Plan for PEG   acceptable cardiac risk to proceed  CXR and UA negative, WBC trending down. No clear sign of infection   Bcx thus far negative
Check CT chest ideally with contrast (PE protocol)   aspiration precautions   Started on Abx   Supplemental oxygen as needed SPO > 92%  DVT ppx.
pt hypoxic on admission with tachypnea, noted with pulm congestion on imaging.   - s/p IV diuresis, now held  - c/t closely monitor pt's respiratory status, fluid status, renal function (s/p contrast), and electrolytes  - continue telemetry monitoring  - TTE reviewed and noted - normal LV systolic function, new valvular disease (mod-sev AS, mod MR)  - c/t monitor O2 sat  - appreciate cardiology recs
pt hypoxic on admission with tachypnea, noted with pulm congestion on imaging.   - s/p IV diuresis, now held  - c/t closely monitor pt's respiratory status, fluid status, renal function (s/p contrast), and electrolytes  - continue telemetry monitoring  - TTE reviewed and noted - normal LV systolic function, new valvular disease (mod-sev AS, mod MR)  - c/t monitor O2 sat  - appreciate cardiology recs
CT Angio Chest - negative for PE  completed IV Antibiotics for PNA  Supplemental oxygen   aspiration precautions   supplemental oxygen as needed SPO > 92%  Plan for PEG   acceptable cardiac risk to proceed  CXR and UA negative, WBC trending down. No clear sign of infection   check Bcx
CT Angio Chest - negative for PE  completed IV Antibiotics for PNA  Supplemental oxygen   aspiration precautions   supplemental oxygen as needed SPO > 92%  Plan for PEG deferred for now given severe esophatgitis  CXR and UA negative, WBC trending down. No clear sign of infection   Bcx thus far negative
pt hypoxic on admission with tachypnea, noted with pulm congestion on imaging. Now improved.   - s/p IV diuresis, now held  - c/t closely monitor pt's respiratory status, fluid status, renal function (s/p contrast), and electrolytes  - continue telemetry monitoring  - TTE reviewed and noted - normal LV systolic function, new valvular disease (mod-sev AS, mod MR)  - c/t monitor O2 sat  - appreciate cardiology recs
CT Angio Chest - negative for PE  IV Antibiotics for PNA  Supplemental oxygen   aspiration precautions   supplemental oxygen as needed SPO > 92%  DVT ppx
CT Angio Chest - negative for PE  completed IV Antibiotics for PNA  Supplemental oxygen   aspiration precautions   supplemental oxygen as needed SPO > 92%  Plan for PEG   acceptable cardiac risk to proceed  CXR and UA negative, WBC trending down. No clear sign of infection   Bcx thus far negative
pt hypoxic on admission with tachypnea, noted with pulm congestion on imaging. Now improved.   - s/p IV diuresis, now held  - c/t closely monitor pt's respiratory status, fluid status, renal function (s/p contrast), and electrolytes  - continue telemetry monitoring  - TTE reviewed and noted - normal LV systolic function, new valvular disease (mod-sev AS, mod MR)  - c/t monitor O2 sat  - appreciate cardiology recs
pt hypoxic on admission with tachypnea, noted with pulm congestion on imaging. Now improved.   - s/p IV diuresis, now held  - c/t closely monitor pt's respiratory status, fluid status, renal function (s/p contrast), and electrolytes  - continue telemetry monitoring  - TTE reviewed and noted - normal LV systolic function, new valvular disease (mod-sev AS, mod MR)  - c/t monitor O2 sat  - appreciate cardiology recs
worsening Cr, now to 4.5 (baseline from prior admission ~1)  - renal following - suspect likely contrast induced with possible component of lasix induced  - no significant UOP overnight   - d/w Dr Jordan - will start bumex gtt, strict I/Os   - renal US - no obstruction   - trend Cr closely   - renally dose medications, avoid nephrotoxins  - nephrology recs appreciated
Hb 7.4 today, drop from ~8 yesterday - unclear etiology, no e/o bleeding noted on exam   - check FOBT  - maintain active type and screen  - repeat CBC this evening   - transfuse for Hb <7
pt hypoxic on admission with tachypnea, noted with pulm congestion on imaging. Now improved.   - s/p IV diuresis, now held  - c/t closely monitor pt's respiratory status, fluid status, renal function (s/p contrast), and electrolytes  - continue telemetry monitoring  - TTE reviewed and noted - normal LV systolic function, new valvular disease (mod-sev AS, mod MR)  - c/t monitor O2 sat  - appreciate cardiology recs
worsening Cr, now to 4.75 (baseline from prior admission ~1)  - renal following - suspect likely contrast induced with possible component of lasix induced and dehydration   - minimal urine on bladder scan - c/t check bladder scans   - d/w Dr Jordan - will give some gentle IVF today, if needed, can consider lasix after if evidence of volume overload   - renal US - no obstruction   - continue to monitor off diuretics, trend Cr closely   - renally dose medications, avoid nephrotoxins  - nephrology recs appreciated
Cr as high as 4.75 (baseline from prior admission ~1) - now improving   - renal following - suspect likely 2/2 HF and contrast induced   - s/p bumex gtt with good UOP - now with slight hypernatremia, d/w renal - will switch to bumex 2mg IVP daily for now, c/t monitor I/Os   - renal US - no obstruction   - trend Cr closely   - renally dose medications, avoid nephrotoxins  - nephrology recs appreciated

## 2023-05-16 NOTE — PROGRESS NOTE ADULT - SUBJECTIVE AND OBJECTIVE BOX
[facility]   [phone]  RENAL FOLLOW UP NOTE  --------------------------------------------------------------------------------  Chief Complaint:    24 hour events/subjective:    patient seen and examined.   not interactive    PAST HISTORY  --------------------------------------------------------------------------------  No significant changes to PMH, PSH, FHx, SHx, unless otherwise noted    ALLERGIES & MEDICATIONS  --------------------------------------------------------------------------------  Allergies    No Known Allergies    Intolerances      Standing Inpatient Medications  aspirin  chewable 81 milliGRAM(s) Oral daily  carvedilol 12.5 milliGRAM(s) Oral every 12 hours  dextrose 5%. 1000 milliLiter(s) IV Continuous <Continuous>  dextrose 5%. 1000 milliLiter(s) IV Continuous <Continuous>  dextrose 50% Injectable 25 Gram(s) IV Push once  dextrose 50% Injectable 12.5 Gram(s) IV Push once  dextrose 50% Injectable 25 Gram(s) IV Push once  glucagon  Injectable 1 milliGRAM(s) IntraMuscular once  heparin   Injectable 5000 Unit(s) SubCutaneous every 12 hours  hydrALAZINE 25 milliGRAM(s) Oral every 8 hours  insulin lispro (ADMELOG) corrective regimen sliding scale   SubCutaneous every 6 hours  insulin NPH human recombinant 6 Unit(s) SubCutaneous every 6 hours  latanoprost 0.005% Ophthalmic Solution 1 Drop(s) Both EYES at bedtime  Nephro-penny 1 Tablet(s) Oral daily  thiamine 100 milliGRAM(s) Oral daily    PRN Inpatient Medications  acetaminophen   Oral Liquid .. 650 milliGRAM(s) Oral every 6 hours PRN  bisacodyl 5 milliGRAM(s) Oral every 12 hours PRN  dextrose Oral Gel 15 Gram(s) Oral once PRN      REVIEW OF SYSTEMS  --------------------------------------------------------------------------------    patient is unable to give ROS    VITALS/PHYSICAL EXAM  --------------------------------------------------------------------------------  T(C): 37.1 (05-16-23 @ 12:12), Max: 37.1 (05-16-23 @ 00:39)  HR: 80 (05-16-23 @ 12:12) (74 - 80)  BP: 164/89 (05-16-23 @ 12:12) (143/57 - 166/63)  RR: 19 (05-16-23 @ 12:12) (18 - 19)  SpO2: 96% (05-16-23 @ 12:12) (96% - 99%)  Wt(kg): --        05-15-23 @ 07:01  -  05-16-23 @ 07:00  --------------------------------------------------------  IN: 240 mL / OUT: 750 mL / NET: -510 mL      Physical Exam:  	              Gen: ill appearing elderly F, On O2    	Pulm: decrease bs, + ronchi  bs, no wheezing  	CV: no JVD. RRR, S1S2; no rub  	Abd: +BS, soft, nontender/nondistended, +NGT  	: No bladder distention   	UE: Warm, no cyanosis  no clubbing,  no edema;  	LE: Warm, no cyanosis  no clubbing, no edema    LABS/STUDIES  --------------------------------------------------------------------------------              7.5    15.73 >-----------<  250      [05-16-23 @ 07:05]              24.0     141  |  108  |  77  ----------------------------<  86      [05-16-23 @ 07:06]  3.4   |  23  |  1.55        Ca     8.4     [05-16-23 @ 07:06]      PT/INR: PT 13.1 , INR 1.14       [05-16-23 @ 07:06]      Creatinine Trend:  SCr 1.55 [05-16 @ 07:06]  SCr 1.68 [05-13 @ 10:43]  SCr 1.68 [05-12 @ 09:47]  SCr 1.61 [05-11 @ 09:16]  SCr 1.81 [05-10 @ 10:41]              Urinalysis - [05-09-23 @ 01:26]      Color Light Yellow / Appearance Clear / SG 1.016 / pH 5.5      Gluc Negative / Ketone Negative  / Bili Negative / Urobili Negative       Blood Trace / Protein 100 mg/dL / Leuk Est Negative / Nitrite Negative      RBC 4 / WBC 2 / Hyaline 3 / Gran  / Sq Epi  / Non Sq Epi  / Bacteria Negative      TSH 0.68      [05-04-23 @ 11:38]  Lipid: chol 216, TG 80, HDL 49, LDL --      [08-08-22 @ 06:31]

## 2023-05-16 NOTE — PROGRESS NOTE ADULT - PROBLEM SELECTOR PROBLEM 4
Hypokalemia
Diabetes
TOÑITO (acute kidney injury)
Diabetes
Diabetes
TOÑITO (acute kidney injury)
Dementia
Diabetes
TOÑITO (acute kidney injury)
Anemia
Diabetes
TOÑITO (acute kidney injury)
Diabetes
Anemia
Diabetes
TOÑITO (acute kidney injury)
Diabetes
TOÑITO (acute kidney injury)
Anemia
Diabetes
Dysphagia
Anemia
Diabetes
TOÑITO (acute kidney injury)
Anemia
TOÑITO (acute kidney injury)
Anemia
Anemia

## 2023-05-16 NOTE — PROGRESS NOTE ADULT - NUTRITIONAL ASSESSMENT
This patient has been assessed with a concern for Malnutrition and has been determined to have a diagnosis/diagnoses of Severe protein-calorie malnutrition and Underweight (BMI < 19).    This patient is being managed with:   Diet NPO with Tube Feed-  Tube Feeding Modality: Nasogastric  Suplena with Carb Steady (SUPLENA)  Total Volume for 24 Hours (mL): 840  Continuous  Starting Tube Feed Rate {mL per Hour}: 35  Until Goal Tube Feed Rate (mL per Hour): 35  Tube Feed Duration (in Hours): 24  Tube Feed Start Time: 10:00  Entered: May  5 2023 10:53AM  
This patient has been assessed with a concern for Malnutrition and has been determined to have a diagnosis/diagnoses of Severe protein-calorie malnutrition and Underweight (BMI < 19).    This patient is being managed with:   Diet NPO after Midnight-     NPO Start Date: 15-May-2023   NPO Start Time: 23:59  Entered: May 15 2023 12:54PM    Diet NPO with Tube Feed-  Tube Feeding Modality: Nasogastric  Glucerna 1.2 Chandra (GLUCERNARTH)  Total Volume for 24 Hours (mL): 1080  Continuous  Starting Tube Feed Rate {mL per Hour}: 20  Increase Tube Feed Rate by (mL): 10     Every 6 hours  Until Goal Tube Feed Rate (mL per Hour): 45  Tube Feed Duration (in Hours): 24  Tube Feed Start Time: 19:00  Entered: May 14 2023  6:58PM  
This patient has been assessed with a concern for Malnutrition and has been determined to have a diagnosis/diagnoses of Severe protein-calorie malnutrition and Underweight (BMI < 19).    This patient is being managed with:   Diet NPO-  NPO for Procedure/Test     NPO Start Date: 07-May-2023   NPO Start Time: 23:59  Except Medications  Entered: May  5 2023  6:19PM    Diet NPO with Tube Feed-  Tube Feeding Modality: Nasogastric  Suplena with Carb Steady (SUPLENA)  Total Volume for 24 Hours (mL): 840  Continuous  Starting Tube Feed Rate {mL per Hour}: 35  Until Goal Tube Feed Rate (mL per Hour): 35  Tube Feed Duration (in Hours): 24  Tube Feed Start Time: 10:00  Entered: May  5 2023 10:53AM  
This patient has been assessed with a concern for Malnutrition and has been determined to have a diagnosis/diagnoses of Severe protein-calorie malnutrition and Underweight (BMI < 19).    This patient is being managed with:   Diet NPO after Midnight-     NPO Start Date: 10-May-2023   NPO Start Time: 23:59  Except Medications  Entered: May 10 2023  4:53PM    Diet NPO with Tube Feed-  Tube Feeding Modality: Nasogastric  Suplena with Carb Steady (SUPLENA)  Total Volume for 24 Hours (mL): 840  Continuous  Starting Tube Feed Rate {mL per Hour}: 35  Until Goal Tube Feed Rate (mL per Hour): 35  Tube Feed Duration (in Hours): 24  Tube Feed Start Time: 10:00  Entered: May  5 2023 10:53AM  
This patient has been assessed with a concern for Malnutrition and has been determined to have a diagnosis/diagnoses of Severe protein-calorie malnutrition and Underweight (BMI < 19).    This patient is being managed with:   Diet NPO after Midnight-     NPO Start Date: 08-May-2023   NPO Start Time: 23:59  Entered: May  8 2023  3:34PM    Diet NPO after Midnight-     NPO Start Date: 07-May-2023   NPO Start Time: 23:59  Entered: May  7 2023  7:11PM    Diet NPO-  NPO for Procedure/Test     NPO Start Date: 07-May-2023   NPO Start Time: 23:59  Except Medications  Entered: May  5 2023  6:19PM    Diet NPO with Tube Feed-  Tube Feeding Modality: Nasogastric  Suplena with Carb Steady (SUPLENA)  Total Volume for 24 Hours (mL): 840  Continuous  Starting Tube Feed Rate {mL per Hour}: 35  Until Goal Tube Feed Rate (mL per Hour): 35  Tube Feed Duration (in Hours): 24  Tube Feed Start Time: 10:00  Entered: May  5 2023 10:53AM  
This patient has been assessed with a concern for Malnutrition and has been determined to have a diagnosis/diagnoses of Severe protein-calorie malnutrition and Underweight (BMI < 19).    This patient is being managed with:   Diet NPO with Tube Feed-  Tube Feeding Modality: Nasogastric  Suplena with Carb Steady (SUPLENA)  Total Volume for 24 Hours (mL): 1440  Continuous  Starting Tube Feed Rate {mL per Hour}: 20  Increase Tube Feed Rate by (mL): 10     Every 6 hours  Until Goal Tube Feed Rate (mL per Hour): 60  Tube Feed Duration (in Hours): 24  Tube Feed Start Time: 10:00  Entered: May 12 2023  4:53PM  
This patient has been assessed with a concern for Malnutrition and has been determined to have a diagnosis/diagnoses of Severe protein-calorie malnutrition and Underweight (BMI < 19).    This patient is being managed with:   Diet NPO with Tube Feed-  Tube Feeding Modality: Nasogastric  Suplena with Carb Steady (SUPLENA)  Total Volume for 24 Hours (mL): 840  Continuous  Starting Tube Feed Rate {mL per Hour}: 35  Until Goal Tube Feed Rate (mL per Hour): 35  Tube Feed Duration (in Hours): 24  Tube Feed Start Time: 10:00  Entered: May  5 2023 10:53AM  
This patient has been assessed with a concern for Malnutrition and has been determined to have a diagnosis/diagnoses of Severe protein-calorie malnutrition and Underweight (BMI < 19).    This patient is being managed with:   Diet NPO after Midnight-     NPO Start Date: 15-May-2023   NPO Start Time: 23:59  Entered: May 15 2023 12:54PM    Diet NPO with Tube Feed-  Tube Feeding Modality: Nasogastric  Glucerna 1.2 Chandra (GLUCERNARTH)  Total Volume for 24 Hours (mL): 1080  Continuous  Starting Tube Feed Rate {mL per Hour}: 20  Increase Tube Feed Rate by (mL): 10     Every 6 hours  Until Goal Tube Feed Rate (mL per Hour): 45  Tube Feed Duration (in Hours): 24  Tube Feed Start Time: 19:00  Entered: May 14 2023  6:58PM  
This patient has been assessed with a concern for Malnutrition and has been determined to have a diagnosis/diagnoses of Severe protein-calorie malnutrition and Underweight (BMI < 19).    This patient is being managed with:   Diet NPO with Tube Feed-  Tube Feeding Modality: Nasogastric  Suplena with Carb Steady (SUPLENA)  Total Volume for 24 Hours (mL): 840  Continuous  Starting Tube Feed Rate {mL per Hour}: 10  Increase Tube Feed Rate by (mL): 10     Every 24 hours  Until Goal Tube Feed Rate (mL per Hour): 35  Tube Feed Duration (in Hours): 24  Tube Feed Start Time: 21:00  Prosource Gelatein 20 Sugar Free     Qty per Day:  2  Entered: May  3 2023  7:09PM  
This patient has been assessed with a concern for Malnutrition and has been determined to have a diagnosis/diagnoses of Severe protein-calorie malnutrition and Underweight (BMI < 19).    This patient is being managed with:   Diet NPO with Tube Feed-  Tube Feeding Modality: Nasogastric  Suplena with Carb Steady (SUPLENA)  Total Volume for 24 Hours (mL): 840  Continuous  Starting Tube Feed Rate {mL per Hour}: 35  Until Goal Tube Feed Rate (mL per Hour): 35  Tube Feed Duration (in Hours): 24  Tube Feed Start Time: 10:00  Entered: May  5 2023 10:53AM  
This patient has been assessed with a concern for Malnutrition and has been determined to have a diagnosis/diagnoses of Severe protein-calorie malnutrition and Underweight (BMI < 19).    This patient is being managed with:   Diet NPO with Tube Feed-  Tube Feeding Modality: Nasogastric  Suplena with Carb Steady (SUPLENA)  Total Volume for 24 Hours (mL): 1440  Continuous  Starting Tube Feed Rate {mL per Hour}: 20  Increase Tube Feed Rate by (mL): 10     Every 6 hours  Until Goal Tube Feed Rate (mL per Hour): 60  Tube Feed Duration (in Hours): 24  Tube Feed Start Time: 10:00  Entered: May 12 2023  4:53PM  
This patient has been assessed with a concern for Malnutrition and has been determined to have a diagnosis/diagnoses of Severe protein-calorie malnutrition and Underweight (BMI < 19).    This patient is being managed with:   Diet NPO after Midnight-     NPO Start Date: 10-May-2023   NPO Start Time: 23:59  Except Medications  Entered: May 10 2023  4:53PM    Diet NPO with Tube Feed-  Tube Feeding Modality: Nasogastric  Suplena with Carb Steady (SUPLENA)  Total Volume for 24 Hours (mL): 840  Continuous  Starting Tube Feed Rate {mL per Hour}: 35  Until Goal Tube Feed Rate (mL per Hour): 35  Tube Feed Duration (in Hours): 24  Tube Feed Start Time: 10:00  Entered: May  5 2023 10:53AM  
This patient has been assessed with a concern for Malnutrition and has been determined to have a diagnosis/diagnoses of Severe protein-calorie malnutrition and Underweight (BMI < 19).    This patient is being managed with:   Diet NPO-  NPO for Procedure/Test     NPO Start Date: 07-May-2023   NPO Start Time: 23:59  Except Medications  Entered: May  5 2023  6:19PM    Diet NPO with Tube Feed-  Tube Feeding Modality: Nasogastric  Suplena with Carb Steady (SUPLENA)  Total Volume for 24 Hours (mL): 840  Continuous  Starting Tube Feed Rate {mL per Hour}: 35  Until Goal Tube Feed Rate (mL per Hour): 35  Tube Feed Duration (in Hours): 24  Tube Feed Start Time: 10:00  Entered: May  5 2023 10:53AM  
This patient has been assessed with a concern for Malnutrition and has been determined to have a diagnosis/diagnoses of Severe protein-calorie malnutrition and Underweight (BMI < 19).    This patient is being managed with:   Diet NPO-  Entered: Apr 28 2023  3:12PM  
This patient has been assessed with a concern for Malnutrition and has been determined to have a diagnosis/diagnoses of Severe protein-calorie malnutrition and Underweight (BMI < 19).    This patient is being managed with:   Diet NPO-  Entered: Apr 28 2023  3:12PM

## 2023-05-16 NOTE — PROGRESS NOTE ADULT - PROBLEM SELECTOR PLAN 9
Cont hydralazine and metoprolol IV, goal sbp <160
Cont hydralazine and metoprolol IV, goal sbp <160    Plan discussed with and agreed on by daughter Petty via telephone, who will relay updates to her brother.
Cont hydralazine and metoprolol IV, goal sbp <160    Plan discussed with and agreed on by daughter Petty via telephone, who will relay updates to her brother.  Petty states she would not want patient to undergo all of these aggressive measures as she has no quality of life, but defers decisions to brother, who wants to pursue all measures due to Restorationism reasons.
goal sbp <160  Hydralazine and coreg increased
Cont hydralazine and metoprolol IV, goal sbp <160    Plan discussed with and agreed on by daughter Petty via telephone, who will relay updates to her brother.  Petty states she would not want patient to undergo all of these aggressive measures as she has no quality of life, but defers decisions to brother, who wants to pursue all measures due to Restorationism reasons.
Cont current BP management, goal sbp <160
Cont hydralazine and metoprolol IV, goal sbp <160
- stool burden noted on imaging  - start senna, Miralax. consider suppository if no BM   - monitor stool count and UOP
- stool burden noted on imaging  - start senna, Miralax. consider suppository if no BM   - monitor stool count and UOP
goal sbp <160  Hydralazine and coreg increased
Cont hydralazine and metoprolol IV, goal sbp <160    Plan discussed with and agreed on by daughter Petty via telephone.
- stool burden noted on imaging  - start senna, Miralax. consider suppository if no BM   - monitor stool count and UOP
- stool burden noted on imaging  - start senna, Miralax. consider suppository if no BM   - monitor stool count and UOP
Cont hydralazine and metoprolol IV, goal sbp <160
- stool burden noted on imaging  - start senna, Miralax. consider suppository if no BM   - monitor stool count and UOP
Cont hydralazine and metoprolol IV, goal sbp <160
Cont hydralazine and metoprolol IV, goal sbp <160    Plan discussed with and agreed on by daughter Petty via telephone, who will relay updates to her brother.  Petty states she would not want patient to undergo all of these aggressive measures as she has no quality of life, but defers decisions to brother, who wants to pursue all measures due to Taoism reasons.
- stool burden noted on imaging  - start senna, Miralax. consider suppository if no BM   - monitor stool count and UOP
on sliding scale as needed while at home   - FS elevated above goal currently   - c/w admelog 2units with meals, c/w low sliding scale   - adjust as needed
Cont hydralazine and metoprolol IV, goal sbp <160    Plan discussed with and agreed on by daughter Petty via telephone, who will relay updates to her brother.  Petty states she would not want patient to undergo all of these aggressive measures as she has no quality of life, but defers decisions to brother, who wants to pursue all measures due to Restorationist reasons.
Cont hydralazine and metoprolol IV, goal sbp <160
- stool burden noted on imaging  - start senna, Miralax. consider suppository if no BM   - monitor stool count and UOP

## 2023-05-16 NOTE — PROGRESS NOTE ADULT - PROBLEM SELECTOR PROBLEM 9
Hypertension
Constipation
Hypertension
Constipation
Diabetes mellitus with hyperglycemia
Constipation
Constipation
Hypertension
Constipation
Hypertension
Hypertension
Constipation
Hypertension
Hypertension
Constipation
Hypertension

## 2023-05-16 NOTE — PROGRESS NOTE ADULT - ASSESSMENT
88yoF w/ PMHx of advanced dementia (at B/L patient is bedbound, nonverbal, though able to tolerate PO diet - per family, patient has hearty appetite), HTN, HLD, IDDM, who presents from home where she lives with her , brought in by her daughter (son is at her bedside during this evaluation) w/ complaints of daughter noticing the patient has been having difficulty breathing over the past few days in setting of notable increased swelling of the patient's hands, feet, and face. Of note, patient was recently hospitalized at [facility] for aspiration PNA (and found to have demand ischemia) when she presented with similar complaints and was found to be hypoxic; at the time, S+S eval was recommended, but per documentation at the end of Jan 2023,        1- TOÑITO   2- CHF   3- HTN  4- hypernatremia  5- PNA      TOÑITO in setting of chf as well as more likely due to contrast nephropathy and possible pneumonia   creatinine is slowly improving but now seems to have reached a plateau   urinary retention requiring intermittent catheterization.   hypernatremia improved with free water, decrease to 250 q8h. now on hold due to npo for peg  kcl supplementation  her echo reveals mod to severe range AS  she is not a dialysis candidate in setting of her advance dementia   hydralazine 25 mg po tid   cardvedilol 12.5 mg BID  anemia, trend hgb   O2 supplementation   strict I/O  trend creatinine and electrolytes daily  plan for peg placement, completed abx for pna

## 2023-05-16 NOTE — PROGRESS NOTE ADULT - NS ATTEND AMEND GEN_ALL_CORE FT
Seen, examined with, formulated plan with and  agree with above as scribed by NP Romeo [Mary Jane]       TOÑITO   CHF     cr improving   diuretics hold for today  and re evaluate in am   cont d5w for hypernatremia   strict I/O  O2 supplement
Seen, examined with, formulated plan with and  agree with above as scribed by NP Romeo [Mary Jane]     TOÑITO   HTN   CHF    continues to improve cr slowly   free water 200 cc q8   hydralazine 10 mg tid ngt   diurese prn
Seen, examined with, formulated plan with and  agree with above as scribed by NP Romeo [Mary Jane]     non interactive   on O2  + NGT  heart RRR   lungs decrease bs   ext no edema       TOÑITO   hypokalemia  hypernatremia   chf     cr still high with high bun   na is improving   free water 250 cc q4   supplement kcl   hold diuretics today
Seen, examined with, formulated plan with and  agree with above as scribed by NP Romeo [Mary Jane]     roberto   chf   hypernatremia     cont O2   bumex 2 mg iv daily   hypernatremia d5w@20 cc/hr for now
Seen, examined with, formulated plan with and  agree with above as scribed by NP Romeo [Mary Jane]     roberto   hypernatremia     cr improving   free water to resume after peg   adjust cefepime dose based on renal function please
Seen, examined with, formulated plan with and  agree with above as scribed by NP Romeo [Mary Jane]   tube feeds have been started   lungs ronchi   heart RRR  ext no edema    TOÑITO   hypernatremia   HTN     cr still elevated   cont free water 250 cc q 4 recheck na   cont hydralazine and coreg
Seen, examined with, formulated plan with and  agree with above as scribed by NP Romeo [Mary Jane]     roberto persists and it is quite high cr for this pt   she is not hd candidate   na is improving   decrease free water as of am to 200 cc q 8
Chart, labs, vitals, radiology reviewed. Above H&P reviewed and edited where appropriate. Agree with history and physical exam. Agree with assessment and plan. I reviewed the overnight course of events and discussed the care with the patient/ family.  All the decisions in assessment and plan are solely made by me..

## 2023-05-16 NOTE — PROGRESS NOTE ADULT - REASON FOR ADMISSION
SOB/GARRISON, swelling of hands/legs/face

## 2023-05-16 NOTE — PROGRESS NOTE ADULT - TIME BILLING
Advanced care planning was discussed with family.  Advanced care planning forms were reviewed and discussed as appropriate.  Differential diagnosis and plan of care discussed family  after the evaluation.   Pain assessed and judicious use of narcotics when appropriate was discussed.  Importance of Fall and aspiration prevention discussed.  Counseling on Smoking and Alcohol cessation was offered when appropriate.  Counseling on Diet, exercise, and medication compliance was done.
- preparing to see the patient (eg, review of tests, documents)  - obtaining and/or reviewing separately obtained history  - performing a medically appropriate examination and/or evaluation  - counseling and educating the patient/family/caregiver  - ordering medications, tests, or procedures  - referring and communicating with other health care professionals  - documenting clinical information in the electronic or other health record  - independently interpreting results and communicating results to the patient/family/caregiver  - care coordination
Advanced care planning was discussed with family.  Advanced care planning forms were reviewed and discussed as appropriate.  Differential diagnosis and plan of care discussed family  after the evaluation.   Pain assessed and judicious use of narcotics when appropriate was discussed.  Importance of Fall and aspiration prevention discussed.  Counseling on Smoking and Alcohol cessation was offered when appropriate.  Counseling on Diet, exercise, and medication compliance was done.
- preparing to see the patient (eg, review of tests, documents)  - obtaining and/or reviewing separately obtained history  - performing a medically appropriate examination and/or evaluation  - counseling and educating the patient/family/caregiver  - ordering medications, tests, or procedures  - referring and communicating with other health care professionals  - documenting clinical information in the electronic or other health record  - independently interpreting results and communicating results to the patient/family/caregiver  - care coordination

## 2023-05-16 NOTE — PROGRESS NOTE ADULT - PROBLEM SELECTOR PROBLEM 7
Atrial fibrillation with RVR
Dysphagia
Atrial fibrillation with RVR
Atrial fibrillation with RVR
Acute pneumonia
Atrial fibrillation with RVR
Atrial fibrillation with RVR
Acute pneumonia
Acute pneumonia
Atrial fibrillation with RVR
Acute pneumonia
Advanced dementia
Atrial fibrillation with RVR
Atrial fibrillation with RVR
Acute pneumonia
Acute pneumonia
Atrial fibrillation with RVR
Acute pneumonia

## 2023-05-16 NOTE — PROGRESS NOTE ADULT - ASSESSMENT
Alternate MRN: [id number]    88yoF w/ PMHx of advanced dementia (at B/L patient is bedbound, nonverbal, though able to tolerate PO diet - per family, patient has hearty appetite), HTN, HLD, IDDM, brought in by family for difficulty breathing over the past few days in setting of notable increased swelling of the patient's hands, feet, and face; a/w hypoxic respiratory failure 2/2 aspiration PNA and acute decompensated diastolic heart failure, with course c/b significant TOÑIOT, poor oral intake now s/p NGT and pending PEG.

## 2023-05-16 NOTE — PROGRESS NOTE ADULT - PROBLEM SELECTOR PROBLEM 2
Dementia
Dementia
TOÑITO (acute kidney injury)
Acute on chronic diastolic heart failure
Dementia
TOÑITO (acute kidney injury)
Acute on chronic diastolic heart failure
Acute respiratory failure with hypoxia
Dementia
TOÑITO (acute kidney injury)
TOÑITO (acute kidney injury)
Acute on chronic diastolic heart failure
Acute respiratory failure with hypoxia
TOÑITO (acute kidney injury)
TOÑITO (acute kidney injury)
Dementia
TOÑITO (acute kidney injury)
Acute respiratory failure with hypoxia
Dementia
TOÑITO (acute kidney injury)
Acute respiratory failure with hypoxia
Dementia
Symptoms of pneumonia
TOÑITO (acute kidney injury)
Dementia
TOÑITO (acute kidney injury)
Acute on chronic diastolic heart failure
Acute respiratory failure with hypoxia
TOÑITO (acute kidney injury)
Dementia
Acute respiratory failure with hypoxia
TOÑITO (acute kidney injury)
Acute on chronic diastolic heart failure
TOÑITO (acute kidney injury)
TOÑITO (acute kidney injury)
Acute respiratory failure with hypoxia
Atrial fibrillation with RVR
TOÑITO (acute kidney injury)
TOÑITO (acute kidney injury)
Acute on chronic diastolic heart failure
TOÑITO (acute kidney injury)
Acute respiratory failure with hypoxia
TOÑITO (acute kidney injury)
Acute respiratory failure with hypoxia
Acute on chronic diastolic heart failure
Acute respiratory failure with hypoxia
Acute respiratory failure with hypoxia

## 2023-05-16 NOTE — PROGRESS NOTE ADULT - PROBLEM SELECTOR PLAN 7
etiology likely related to elevated aspiration risk w/ dementia   - abx changed to cefepime as noted in above plan   - BCx NGTD   - MRSA negative  - monitor QTc (on admission = 498)
etiology likely related to elevated aspiration risk w/ dementia   - abx changed to cefepime as noted in above plan   - BCx NGTD   - MRSA negative; f/u legionella and S pneumo Ag (pending)  - monitor QTc (on admission = 498)
noted with Afib w RVR on admission, unclear if new onset Afib. Had recurrent Afib with RVR overnight 4/28, converted to sinus.   - etiology likely related to underlying infection and fluid overload state  - metoprolol 5mg Q6H IV for now while NPO
etiology likely related to elevated aspiration risk w/ dementia   - abx changed to cefepime as noted in above plan   - BCx NGTD   - MRSA negative  - monitor QTc (on admission = 498)
etiology likely related to elevated aspiration risk w/ dementia   - abx changed to cefepime as noted in above plan   - BCx NGTD   - MRSA negative  - monitor QTc (on admission = 498)
etiology likely related to elevated aspiration risk w/ dementia   - abx changed to cefepime as noted in above plan   - BCx NGTD   - MRSA negative; f/u legionella and S pneumo Ag (pending)  - monitor QTc (on admission = 498)
noted with Afib w RVR on admission, unclear if new onset Afib. Had recurrent Afib with RVR overnight 4/28, converted to sinus.   - etiology likely related to underlying infection and fluid overload state
noted with Afib w RVR on admission, unclear if new onset Afib. Had recurrent Afib with RVR overnight 4/28, converted to sinus.   - etiology likely related to underlying infection and fluid overload state  - metoprolol 5mg Q6H IV for now while NPO
etiology likely related to elevated aspiration risk w/ dementia   - abx changed to cefepime as noted in above plan   - BCx NGTD   - MRSA negative  - monitor QTc (on admission = 498)
noted with Afib w RVR on admission, unclear if new onset Afib. Had recurrent Afib with RVR overnight 4/28, converted to sinus.   - etiology likely related to underlying infection and fluid overload state
noted with Afib w RVR on admission, unclear if new onset Afib. Had recurrent Afib with RVR overnight 4/28, converted to sinus.   - etiology likely related to underlying infection and fluid overload state  - metoprolol 5mg Q6H IV for now while NPO
noted with Afib w RVR on admission, unclear if new onset Afib. Had recurrent Afib with RVR overnight 4/28, converted to sinus.   - etiology likely related to underlying infection and fluid overload state  - metoprolol 5mg Q6H IV for now while NPO
- w/ functional quadriplegia and high risk for behavioral disturbance  - encouraged family to be at bedside to help with re-orientation  - monitor QTC s/p Haldol/Ativan in ED  - GOC to be clarified amongst patient's family and with HCP (pt's )
noted with Afib w RVR on admission, unclear if new onset Afib. Had recurrent Afib with RVR overnight 4/28, converted to sinus.   - etiology likely related to underlying infection and fluid overload state  - metoprolol 5mg Q6H IV for now while NPO
etiology likely related to elevated aspiration risk w/ dementia   - abx changed to cefepime as noted in above plan   - BCx NGTD   - MRSA negative  - monitor QTc (on admission = 498)
noted with Afib w RVR on admission, unclear if new onset Afib. Had recurrent Afib with RVR overnight 4/28, converted to sinus.   - etiology likely related to underlying infection and fluid overload state  - metoprolol 5mg Q6H IV for now while NPO
noted with Afib w RVR on admission, unclear if new onset Afib. Had recurrent Afib with RVR overnight 4/28, converted to sinus.   - etiology likely related to underlying infection and fluid overload state  - metoprolol 5mg Q6H IV for now while NPO
- S+S eval requested but per son at bedside, family will discuss wishes re: continued evaluation vs. feeds to remain as is understanding elevated aspiration risks and subsequent risks with aspiration events -- for now, they wish to c/w diet as pt passed bedside dysphagia screen
noted with Afib w RVR on admission, unclear if new onset Afib. Had recurrent Afib with RVR overnight 4/28, converted to sinus.   - etiology likely related to underlying infection and fluid overload state  - metoprolol 5mg Q6H IV for now while NPO
noted with Afib w RVR on admission, unclear if new onset Afib. Had recurrent Afib with RVR overnight 4/28, converted to sinus.   - etiology likely related to underlying infection and fluid overload state
noted with Afib w RVR on admission, unclear if new onset Afib. Had recurrent Afib with RVR overnight 4/28, converted to sinus.   - etiology likely related to underlying infection and fluid overload state

## 2023-05-16 NOTE — PROGRESS NOTE ADULT - PROBLEM SELECTOR PLAN 4
Cr as high as 4.75 (baseline from prior admission ~1) - now improving   - renal following - suspect likely 2/2 HF and contrast induced   - s/p bumex as above  - renal US - no obstruction   - trend Cr closely   - renally dose medications, avoid nephrotoxins  - nephrology recs appreciated
RISS
Hb fluctuating, 7.8 today   - maintain active type and screen  - trend CBC closely   - transfuse for Hb <7
Cr as high as 4.75 (baseline from prior admission ~1) - now improving   - renal following - suspect likely 2/2 HF and contrast induced   - s/p bumex as above  - renal US - no obstruction   - trend Cr closely   - renally dose medications, avoid nephrotoxins  - nephrology recs appreciated
Hb fluctuating, 7.2 today   - maintain active type and screen  - trend CBC closely   - transfuse for Hb <7
RISS
Hb fluctuating, 7.8 today   - maintain active type and screen  - trend CBC closely   - transfuse for Hb <7
RISS
- S+S eval requested but per son at bedside, family will discuss wishes re: continued evaluation vs. feeds to remain as is understanding elevated aspiration risks and subsequent risks with aspiration events -- for now, they wish to c/w diet as pt passed bedside dysphagia screen
Cr as high as 4.75 (baseline from prior admission ~1) - now improving   - renal following - suspect likely 2/2 HF and contrast induced   - s/p bumex as above  - renal US - no obstruction   - trend Cr closely   - renally dose medications, avoid nephrotoxins  - nephrology recs appreciated
RISS
aspiration precautions.
RISS
Hb fluctuating, 7.8 today   - maintain active type and screen  - trend CBC closely   - transfuse for Hb <7
Cr as high as 4.75 (baseline from prior admission ~1) - now improving   - renal following - suspect likely 2/2 HF and contrast induced   - s/p bumex as above  - renal US - no obstruction   - trend Cr closely   - renally dose medications, avoid nephrotoxins  - nephrology recs appreciated
RISS
RISS
Cr as high as 4.75 (baseline from prior admission ~1) - now improving   - renal following - suspect likely 2/2 HF and contrast induced   - s/p bumex as above  - renal US - no obstruction   - trend Cr closely   - renally dose medications, avoid nephrotoxins  - nephrology recs appreciated
RISS
Cr as high as 4.75 (baseline from prior admission ~1) - now improving   - renal following - suspect likely 2/2 HF and contrast induced   - s/p bumex as above  - renal US - no obstruction   - trend Cr closely   - renally dose medications, avoid nephrotoxins  - nephrology recs appreciated
Hb fluctuating, 7.5 today   - maintain active type and screen  - trend CBC closely   - transfuse for Hb <7
RISS
RISS
Cr as high as 4.75 (baseline from prior admission ~1) - now improving   - renal following - suspect likely 2/2 HF and contrast induced   - s/p bumex as above  - renal US - no obstruction   - trend Cr closely   - renally dose medications, avoid nephrotoxins  - nephrology recs appreciated
Hb fluctuating, 7.5 today   - maintain active type and screen  - trend CBC closely   - transfuse for Hb <7
RISS
RISS
RISS.
Cr as high as 4.75 (baseline from prior admission ~1) - now improving   - renal following - suspect likely 2/2 HF and contrast induced   - s/p bumex as above  - renal US - no obstruction   - trend Cr closely   - renally dose medications, avoid nephrotoxins  - nephrology recs appreciated
hypoK 2.7, likely 2/2 diuresis   - replete with PO and IV  - recheck BMP this evening   - c/t trend
Cr as high as 4.75 (baseline from prior admission ~1) - now improving   - renal following - suspect likely 2/2 HF and contrast induced   - s/p bumex as above  - renal US - no obstruction   - trend Cr closely   - renally dose medications, avoid nephrotoxins  - nephrology recs appreciated
Hb fluctuating, 8.4 today   - maintain active type and screen  - trend CBC closely   - transfuse for Hb <7

## 2023-05-16 NOTE — PROGRESS NOTE ADULT - PROBLEM SELECTOR PROBLEM 5
Constipation
Dementia
Diabetes mellitus with hyperglycemia
Diabetes mellitus with hyperglycemia
Dementia
Diabetes mellitus with hyperglycemia
Diabetes mellitus with hyperglycemia
Dementia
Diabetes mellitus with hyperglycemia
Dementia
Diabetes mellitus with hyperglycemia
Dementia
Hypokalemia
Dementia
Dementia
Diabetes mellitus with hyperglycemia
Dementia
Diabetes mellitus with hyperglycemia
Dementia
Diabetes mellitus with hyperglycemia
Hypokalemia
Diabetes mellitus with hyperglycemia
Hypokalemia
Hypokalemia
Atrial fibrillation with RVR
Diabetes mellitus with hyperglycemia
Hypokalemia

## 2023-05-16 NOTE — PROGRESS NOTE ADULT - PROVIDER SPECIALTY LIST ADULT
Endocrinology
Gastroenterology
Hospitalist
Nephrology
Cardiology
Gastroenterology
Nephrology
Cardiology
Endocrinology
Hospitalist
Nephrology
Endocrinology
Hospitalist
Endocrinology
Cardiology
Endocrinology
Endocrinology
Cardiology
Endocrinology
Hospitalist
Cardiology
Cardiology
Hospitalist
Cardiology
Endocrinology
Endocrinology
Hospitalist
Endocrinology
Hospitalist
Cardiology
Endocrinology
Cardiology
Hospitalist
Hospitalist
Cardiology
Hospitalist
Hospitalist
Cardiology
Hospitalist
Hospitalist
Cardiology
Hospitalist
Cardiology
Hospitalist
Cardiology
Hospitalist

## 2023-05-16 NOTE — PROGRESS NOTE ADULT - PROBLEM SELECTOR PROBLEM 1
Severe protein-calorie malnutrition
Severe protein-calorie malnutrition
Diabetes
Acute on chronic diastolic heart failure
Acute on chronic diastolic heart failure
Diabetes
Acute respiratory failure with hypoxia
Diabetes
Acute on chronic diastolic heart failure
Acute on chronic diastolic heart failure
Severe protein-calorie malnutrition
Severe protein-calorie malnutrition
Acute on chronic diastolic heart failure
Diabetes
Heart failure, diastolic, acute on chronic
Diabetes
Acute on chronic diastolic heart failure
Acute on chronic diastolic heart failure
Diabetes
Acute respiratory failure with hypoxia
Diabetes
Diabetes
Acute on chronic diastolic heart failure
Acute respiratory failure with hypoxia
Diabetes
Acute on chronic diastolic heart failure
Severe protein-calorie malnutrition
Severe protein-calorie malnutrition
Acute on chronic diastolic heart failure
Severe protein-calorie malnutrition
Acute on chronic diastolic heart failure
Acute respiratory failure with hypoxia
Acute on chronic diastolic heart failure
Severe protein-calorie malnutrition
Severe protein-calorie malnutrition
Acute on chronic diastolic heart failure
Acute respiratory failure with hypoxia
Acute on chronic diastolic heart failure
Severe protein-calorie malnutrition
Acute respiratory failure with hypoxia
Acute on chronic diastolic heart failure
Acute respiratory failure with hypoxia
Severe protein-calorie malnutrition
Severe protein-calorie malnutrition

## 2023-05-16 NOTE — PROGRESS NOTE ADULT - ASSESSMENT
Assessment  DMT2: 88y Female with DM T2 with hyperglycemia, A1C 7.1%, was on insulin at home sliding scale, now on NPH  with coverage, blood sugars are now down trending,  will be NPO for upcoming PEG with IR.    Dysphagia: NG tube was on tube feeds, GI/IR/Surgery eval for PEG. s/p EGD , peg tube not placed due to severe esophagitis in upper esophagus. Now IR eval for PEG  HTN: on antihypertensive medications, monitored, asymptomatic.  HLD: on statin, diet controlled.  TSH 0.68 Euthyroid            Discussed plan and management with Dr Angel Mackenzie NP - TEAMS  Marianne Ortiz MD  Cell: [phone]  Office: [phone]    Assessment  DMT2: 88y Female with DM T2 with hyperglycemia, A1C 7.1%, was on insulin at home sliding scale, now on NPH  with coverage, blood sugars are now down trending, will be NPO for upcoming PEG with IR.    Dysphagia: NG tube was on tube feeds, GI/IR/Surgery eval for PEG. s/p EGD , peg tube not placed due to severe esophagitis in upper esophagus. Now IR eval for PEG  HTN: on antihypertensive medications, monitored, asymptomatic.  HLD: on statin, diet controlled.  TSH 0.68 Euthyroid            Discussed plan and management with Dr Angel Mackenzie NP - TEAMS  Marianne Ortiz MD  Cell: [phone]  Office: [phone]

## 2023-05-16 NOTE — PROGRESS NOTE ADULT - PROBLEM SELECTOR PROBLEM 6
Anemia
Anemia
Atrial fibrillation with RVR
Anemia
Atrial fibrillation with RVR
Anemia
Atrial fibrillation with RVR
Anemia
Diabetes mellitus with hyperglycemia
Atrial fibrillation with RVR
Atrial fibrillation with RVR
Anemia
Atrial fibrillation with RVR
Acute pneumonia
Atrial fibrillation with RVR

## 2023-05-16 NOTE — PROGRESS NOTE ADULT - PROBLEM SELECTOR PROBLEM 3
Dysphagia
Symptoms of pneumonia
Dysphagia
Acute on chronic diastolic heart failure
Symptoms of pneumonia
Acute on chronic diastolic heart failure
Symptoms of pneumonia
Acute on chronic diastolic heart failure
Acute on chronic diastolic heart failure
Dysphagia
Symptoms of pneumonia
TOÑITO (acute kidney injury)
Anemia
Dysphagia
Symptoms of pneumonia
TOÑITO (acute kidney injury)
Acute on chronic diastolic heart failure
Dysphagia
Acute on chronic diastolic heart failure
Acute on chronic diastolic heart failure
Diabetes
Dysphagia
Dysphagia
Symptoms of pneumonia
Symptoms of pneumonia
Acute on chronic diastolic heart failure
Acute pneumonia
Symptoms of pneumonia
TOÑITO (acute kidney injury)
Symptoms of pneumonia
TOÑITO (acute kidney injury)
Acute on chronic diastolic heart failure
Symptoms of pneumonia
Acute on chronic diastolic heart failure
TOÑITO (acute kidney injury)
Acute on chronic diastolic heart failure
TOÑITO (acute kidney injury)
Acute on chronic diastolic heart failure
TOÑITO (acute kidney injury)

## 2023-05-16 NOTE — PROGRESS NOTE ADULT - SUBJECTIVE AND OBJECTIVE BOX
[facility] Division of Hospital Medicine  Melissa Hong MD  Pager (M-F, 8A-5P): [phone]  Other Times:  [phone]    -------------------------------------    Patient is a 88y old  Female who presents with a chief complaint of SOB/GARRISON, swelling of hands/legs/face (16 May 2023 13:56)      SUBJECTIVE / OVERNIGHT EVENTS: no acute events  ADDITIONAL REVIEW OF SYSTEMS: ros otherwise limited due to underling dementia    MEDICATIONS  (STANDING):  aspirin  chewable 81 milliGRAM(s) Oral daily  carvedilol 12.5 milliGRAM(s) Oral every 12 hours  dextrose 5%. 1000 milliLiter(s) (50 mL/Hr) IV Continuous <Continuous>  dextrose 5%. 1000 milliLiter(s) (100 mL/Hr) IV Continuous <Continuous>  dextrose 50% Injectable 25 Gram(s) IV Push once  dextrose 50% Injectable 25 Gram(s) IV Push once  dextrose 50% Injectable 12.5 Gram(s) IV Push once  glucagon  Injectable 1 milliGRAM(s) IntraMuscular once  heparin   Injectable 5000 Unit(s) SubCutaneous every 12 hours  hydrALAZINE 25 milliGRAM(s) Oral every 8 hours  insulin lispro (ADMELOG) corrective regimen sliding scale   SubCutaneous every 6 hours  insulin NPH human recombinant 6 Unit(s) SubCutaneous every 6 hours  latanoprost 0.005% Ophthalmic Solution 1 Drop(s) Both EYES at bedtime  Nephro-penny 1 Tablet(s) Oral daily  thiamine 100 milliGRAM(s) Oral daily    MEDICATIONS  (PRN):  acetaminophen   Oral Liquid .. 650 milliGRAM(s) Oral every 6 hours PRN Mild Pain (1 - 3)  bisacodyl 5 milliGRAM(s) Oral every 12 hours PRN Constipation  dextrose Oral Gel 15 Gram(s) Oral once PRN Blood Glucose LESS THAN 70 milliGRAM(s)/deciliter  /deciliter      PHYSICAL EXAM:  T(C): 37.1 (05-16-23 @ 12:12), Max: 37.1 (05-16-23 @ 00:39)  T(F): 98.7 (05-16-23 @ 12:12), Max: 98.7 (05-16-23 @ 00:39)  HR: 80 (05-16-23 @ 12:12) (74 - 80)  BP: 164/89 (05-16-23 @ 12:12) (143/57 - 166/63)  RR: 19 (05-16-23 @ 12:12) (18 - 19)  SpO2: 96% (05-16-23 @ 12:12) (96% - 99%)  Wt(kg): --    CONSTITUTIONAL: NAD, frail appearing elderly woman, cachectic  EYES: PERRLA; conjunctiva and sclera clear  ENMT: NGT  RESPIRATORY: Normal respiratory effort; diffuse rhonchi  CARDIOVASCULAR: RRR, no JVD, no peripheral edema   ABDOMEN: Nontender to palpation, normoactive BS, no guarding/rigidity  MUSCULOSKELETAL  no clubbing/cyanosis, no joint swelling or tenderness to palpation  PSYCH: A+O x 0  SKIN: No rashes; no palpable lesions    LABS: reviewed                                    7.5    15.73 )-----------( 250      ( 16 May 2023 07:05 )             24.0       05-16    141  |  108  |  77<H>  ----------------------------<  86  3.4<L>   |  23  |  1.55<H>    Ca    8.4      16 May 2023 07:06                    PT/INR - ( 16 May 2023 07:06 )   PT: 13.1 sec;   INR: 1.14 ratio                   CAPILLARY BLOOD GLUCOSE      POCT Blood Glucose.: 137 mg/dL (16 May 2023 12:19)                                        CAPILLARY BLOOD GLUCOSE      POCT Blood Glucose.: 72 mg/dL (15 May 2023 12:05)

## 2023-05-16 NOTE — PROGRESS NOTE ADULT - SUBJECTIVE AND OBJECTIVE BOX
Subjective: Patient seen and examined. No new events except as noted.     REVIEW OF SYSTEMS:  Unable to obtain      MEDICATIONS:  MEDICATIONS  (STANDING):  aspirin  chewable 81 milliGRAM(s) Oral daily  carvedilol 12.5 milliGRAM(s) Oral every 12 hours  dextrose 5%. 1000 milliLiter(s) (50 mL/Hr) IV Continuous <Continuous>  dextrose 5%. 1000 milliLiter(s) (100 mL/Hr) IV Continuous <Continuous>  dextrose 50% Injectable 25 Gram(s) IV Push once  dextrose 50% Injectable 25 Gram(s) IV Push once  dextrose 50% Injectable 12.5 Gram(s) IV Push once  glucagon  Injectable 1 milliGRAM(s) IntraMuscular once  heparin   Injectable 5000 Unit(s) SubCutaneous every 12 hours  hydrALAZINE 25 milliGRAM(s) Oral every 8 hours  insulin lispro (ADMELOG) corrective regimen sliding scale   SubCutaneous every 6 hours  insulin NPH human recombinant 6 Unit(s) SubCutaneous every 6 hours  latanoprost 0.005% Ophthalmic Solution 1 Drop(s) Both EYES at bedtime  Nephro-penny 1 Tablet(s) Oral daily  thiamine 100 milliGRAM(s) Oral daily      PHYSICAL EXAM:  T(C): 36.6 (05-16-23 @ 04:47), Max: 37.3 (05-15-23 @ 12:30)  HR: 77 (05-16-23 @ 04:47) (74 - 84)  BP: 166/63 (05-16-23 @ 04:47) (143/57 - 166/63)  RR: 18 (05-16-23 @ 04:47) (18 - 18)  SpO2: 98% (05-16-23 @ 04:47) (98% - 100%)  Wt(kg): --  I&O's Summary    15 May 2023 07:01  -  16 May 2023 07:00  --------------------------------------------------------  IN: 240 mL / OUT: 750 mL / NET: -510 mL          Appearance: NAD non verbal 	+NGT   HEENT: Normal oral mucosa, PERRL, EOMI	  Lymphatic: No lymphadenopathy  Cardiovascular: Normal S1 S2, No JVD, No murmurs  Respiratory: decreased bs, on NC   Psychiatry: A & O x 0  Gastrointestinal:  Soft, Non-tender, BS   Skin: No rashes, No ecchymoses, No cyanosis	  Neurologic: Non-focal  Extremities: decreased  range of motion, No  edema  Vascular: Peripheral pulses palpable 2+ bilaterally        LABS:    CARDIAC MARKERS:                                7.5    15.73 )-----------( 250      ( 16 May 2023 07:05 )             24.0     05-16    141  |  108  |  77<H>  ----------------------------<  86  3.4<L>   |  23  |  1.55<H>    Ca    8.4      16 May 2023 07:06            TELEMETRY: 	    ECG:  	  RADIOLOGY:   DIAGNOSTIC TESTING:  [ ] Echocardiogram:  [ ]  Catheterization:  [ ] Stress Test:    OTHER:

## 2023-05-16 NOTE — PROGRESS NOTE ADULT - PROBLEM SELECTOR PLAN 1
Pt not compliant with SLP eval, with high aspiration risk, severe dysphagia, electrolyte derangements  -Failed several swallowing evals, SLP continues to recommend NPO  -Family discussion about GOC 5/2 and 5/3: requesting PEG  -NGT placed  -Replete electrolytes aggressively  - f/u wound care re-eval- no obvious purulence of source of infection documented  -PEG tube placement aborted by GI due to proximal esophagitis and risk of bleeding with pulling through internal bumper; recommend IR placement- IR planning for today    #hypernatremia- likely free water deficit  - would not correct with D5w given propensity for uncontrollable hyperglycemia which may delay PEG  - cont free water boluses  - cont trending BMP daily

## 2023-05-16 NOTE — PROGRESS NOTE ADULT - PROBLEM SELECTOR PLAN 8
c/b toxic metabolic encephalopathy 2/2 hospital delirium, uremia, electrolyte derangements from poor oral intake and severe illness  - baseline pt is nonverbal  - encouraged family to be at bedside to help with re-orientation  - delirium precautions/avoid deliriogenic meds  - ongoing GOC conversation - family decline hospice referral at this point due to Oriental orthodox reasons
c/b toxic metabolic encephalopathy 2/2 hospital delirium, uremia, electrolyte derangements from poor oral intake and severe illness  - baseline pt is nonverbal  - encouraged family to be at bedside to help with re-orientation  - delirium precautions/avoid deliriogenic meds  - ongoing GOC conversation - family decline hospice referral at this point
c/w pureed diet for now   S/S eval appreciated, pending MBS
c/b toxic metabolic encephalopathy 2/2 hospital delirium, uremia, electrolyte derangements from poor oral intake and severe illness  - baseline pt is nonverbal  - encouraged family to be at bedside to help with re-orientation  - delirium precautions/avoid deliriogenic meds  - ongoing GOC conversation - family decline hospice referral at this point due to Sikhism reasons
c/w pureed diet for now   S/S eval appreciated, pending MBS
c/w pureed diet for now   S/S eval pending
NPO for now   S/S eval appreciated, still recommending NPO as of 5/1  Ongoing family discussion about NGT vs PEG vs pleasure feeds as above
c/b toxic metabolic encephalopathy 2/2 hospital delirium, uremia, electrolyte derangements from poor oral intake and severe illness  - baseline pt is nonverbal  - encouraged family to be at bedside to help with re-orientation  - delirium precautions/avoid deliriogenic meds  - ongoing GOC conversation - family decline hospice referral at this point
c/w pureed diet for now   S/S eval appreciated, pending MBS
c/b toxic metabolic encephalopathy 2/2 hospital delirium, uremia, electrolyte derangements from poor oral intake and severe illness  - baseline pt is nonverbal  - encouraged family to be at bedside to help with re-orientation  - delirium precautions/avoid deliriogenic meds  - ongoing GOC conversation - family decline hospice referral at this point
c/b toxic metabolic encephalopathy 2/2 hospital delirium, uremia, electrolyte derangements from poor oral intake and severe illness  - baseline pt is nonverbal  - encouraged family to be at bedside to help with re-orientation  - delirium precautions/avoid deliriogenic meds  - ongoing GOC conversation - family decline hospice referral at this point
- stool burden noted on imaging  - start senna, Miralax. consider suppository if no BM   - monitor stool count and UOP
c/b toxic metabolic encephalopathy 2/2 hospital delirium, uremia, electrolyte derangements from poor oral intake and severe illness  - baseline pt is nonverbal  - encouraged family to be at bedside to help with re-orientation  - delirium precautions/avoid deliriogenic meds  - ongoing GOC conversation - family decline hospice referral at this point due to Oriental orthodox reasons
c/b toxic metabolic encephalopathy 2/2 hospital delirium, uremia, electrolyte derangements from poor oral intake and severe illness  - baseline pt is nonverbal  - encouraged family to be at bedside to help with re-orientation  - delirium precautions/avoid deliriogenic meds  - ongoing GOC conversation - family decline hospice referral at this point due to Jainism reasons
NPO for now   S/S eval appreciated, pending MBS once off HFNC
NPO for now   S/S eval appreciated, pending MBS study now that pt is off HFNC

## 2023-05-16 NOTE — PROGRESS NOTE ADULT - PROBLEM SELECTOR PROBLEM 8
Advanced dementia
Advanced dementia
Constipation
Dysphagia
Advanced dementia
Dysphagia
Dysphagia
Advanced dementia
Dysphagia
Dysphagia
Advanced dementia
Dysphagia
Dysphagia
Advanced dementia
Advanced dementia

## 2023-05-16 NOTE — PROGRESS NOTE ADULT - SUBJECTIVE AND OBJECTIVE BOX
Chief complaint  Patient is a 88y old  Female who presents with a chief complaint of SOB/GARRISON, swelling of hands/legs/face (16 May 2023 13:00)         Labs and Fingersticks  CAPILLARY BLOOD GLUCOSE      POCT Blood Glucose.: 137 mg/dL (16 May 2023 12:19)  POCT Blood Glucose.: 104 mg/dL (16 May 2023 05:36)  POCT Blood Glucose.: 160 mg/dL (16 May 2023 00:01)  POCT Blood Glucose.: 135 mg/dL (15 May 2023 22:03)  POCT Blood Glucose.: 111 mg/dL (15 May 2023 17:27)      Anion Gap, Serum: 10 (05-16 @ 07:06)      Calcium, Total Serum: 8.4 (05-16 @ 07:06)          05-16    141  |  108  |  77<H>  ----------------------------<  86  3.4<L>   |  23  |  1.55<H>    Ca    8.4      16 May 2023 07:06                          7.5    15.73 )-----------( 250      ( 16 May 2023 07:05 )             24.0     Medications  MEDICATIONS  (STANDING):  aspirin  chewable 81 milliGRAM(s) Oral daily  carvedilol 12.5 milliGRAM(s) Oral every 12 hours  dextrose 5%. 1000 milliLiter(s) (100 mL/Hr) IV Continuous <Continuous>  dextrose 5%. 1000 milliLiter(s) (50 mL/Hr) IV Continuous <Continuous>  dextrose 50% Injectable 25 Gram(s) IV Push once  dextrose 50% Injectable 25 Gram(s) IV Push once  dextrose 50% Injectable 12.5 Gram(s) IV Push once  glucagon  Injectable 1 milliGRAM(s) IntraMuscular once  heparin   Injectable 5000 Unit(s) SubCutaneous every 12 hours  hydrALAZINE 25 milliGRAM(s) Oral every 8 hours  insulin lispro (ADMELOG) corrective regimen sliding scale   SubCutaneous every 6 hours  insulin NPH human recombinant 6 Unit(s) SubCutaneous every 6 hours  latanoprost 0.005% Ophthalmic Solution 1 Drop(s) Both EYES at bedtime  Nephro-penny 1 Tablet(s) Oral daily  thiamine 100 milliGRAM(s) Oral daily      Physical Exam  General: Patient comfortable in bed   Vital Signs Last 12 Hrs  T(F): 98.7 (05-16-23 @ 12:12), Max: 98.7 (05-16-23 @ 12:07)  HR: 80 (05-16-23 @ 12:12) (77 - 80)  BP: 164/89 (05-16-23 @ 12:12) (164/69 - 166/63)  BP(mean): --  RR: 19 (05-16-23 @ 12:12) (18 - 19)  SpO2: 96% (05-16-23 @ 12:12) (96% - 98%)    CVS: S1S2   Respiratory: No wheezing, no crepitations  GI: Abdomen soft, bowel sounds positive  Musculoskeletal:  moves all extremities  : Voiding        Chief complaint  Patient is a 88y old  Female who presents with a chief complaint of SOB/GARRISON, swelling of hands/legs/face (16 May 2023 13:00)         Labs and Fingersticks  CAPILLARY BLOOD GLUCOSE      POCT Blood Glucose.: 137 mg/dL (16 May 2023 12:19)  POCT Blood Glucose.: 104 mg/dL (16 May 2023 05:36)  POCT Blood Glucose.: 160 mg/dL (16 May 2023 00:01)  POCT Blood Glucose.: 135 mg/dL (15 May 2023 22:03)  POCT Blood Glucose.: 111 mg/dL (15 May 2023 17:27)      Anion Gap, Serum: 10 (05-16 @ 07:06)      Calcium, Total Serum: 8.4 (05-16 @ 07:06)          05-16    141  |  108  |  77<H>  ----------------------------<  86  3.4<L>   |  23  |  1.55<H>    Ca    8.4      16 May 2023 07:06                          7.5    15.73 )-----------( 250      ( 16 May 2023 07:05 )             24.0     Medications  MEDICATIONS  (STANDING):  aspirin  chewable 81 milliGRAM(s) Oral daily  carvedilol 12.5 milliGRAM(s) Oral every 12 hours  dextrose 5%. 1000 milliLiter(s) (100 mL/Hr) IV Continuous <Continuous>  dextrose 5%. 1000 milliLiter(s) (50 mL/Hr) IV Continuous <Continuous>  dextrose 50% Injectable 25 Gram(s) IV Push once  dextrose 50% Injectable 25 Gram(s) IV Push once  dextrose 50% Injectable 12.5 Gram(s) IV Push once  glucagon  Injectable 1 milliGRAM(s) IntraMuscular once  heparin   Injectable 5000 Unit(s) SubCutaneous every 12 hours  hydrALAZINE 25 milliGRAM(s) Oral every 8 hours  insulin lispro (ADMELOG) corrective regimen sliding scale   SubCutaneous every 6 hours  insulin NPH human recombinant 6 Unit(s) SubCutaneous every 6 hours  latanoprost 0.005% Ophthalmic Solution 1 Drop(s) Both EYES at bedtime  Nephro-penny 1 Tablet(s) Oral daily  thiamine 100 milliGRAM(s) Oral daily      Physical Exam  General: Patient comfortable in bed   Vital Signs Last 12 Hrs  T(F): 98.7 (05-16-23 @ 12:12), Max: 98.7 (05-16-23 @ 12:07)  HR: 80 (05-16-23 @ 12:12) (77 - 80)  BP: 164/89 (05-16-23 @ 12:12) (164/69 - 166/63)  BP(mean): --  RR: 19 (05-16-23 @ 12:12) (18 - 19)  SpO2: 96% (05-16-23 @ 12:12) (96% - 98%)    CVS: S1S2   Respiratory: No wheezing, no crepitations  GI: Abdomen soft, bowel sounds positive  Musculoskeletal:  moves all extremities  : Voiding

## 2023-05-16 NOTE — PROGRESS NOTE ADULT - PROBLEM SELECTOR PLAN 6
Hb fluctuating, 7.8 today   - maintain active type and screen  - trend CBC closely   - transfuse for Hb <7
noted with Afib w RVR on admission, unclear if new onset Afib  - etiology likely related to underlying infection and fluid overload state  - started on lopressor 12.5mg BID with improved rates --> now in sinus
noted with Afib w RVR on admission, unclear if new onset Afib. Had recurrent Afib with RVR overnight 4/28, converted to sinus.   - etiology likely related to underlying infection and fluid overload state  - started on lopressor 12.5mg BID with improved rates --> now back in sinus
Hb fluctuating, 7.8 today   - maintain active type and screen  - trend CBC closely   - transfuse for Hb <7
Hb fluctuating  - maintain active type and screen  - trend CBC closely   - transfuse for Hb <7
Hb fluctuating, 7.8 today   - maintain active type and screen  - trend CBC closely   - transfuse for Hb <7
Hb fluctuating, 7.8 today   - maintain active type and screen  - trend CBC closely   - transfuse for Hb <7
noted with Afib w RVR on admission, unclear if new onset Afib. Had recurrent Afib with RVR overnight 4/28, converted to sinus.   - etiology likely related to underlying infection and fluid overload state  - started on lopressor 12.5mg BID with improved rates --> now back in sinus
Hb fluctuating, 7.8 today   - maintain active type and screen  - trend CBC closely   - transfuse for Hb <7
noted with Afib w RVR on admission, unclear if new onset Afib. Had recurrent Afib with RVR overnight 4/28, converted to sinus.   - etiology likely related to underlying infection and fluid overload state  - started on lopressor 12.5mg BID with improved rates --> now back in sinus
Hb fluctuating  - maintain active type and screen  - trend CBC closely   - transfuse for Hb <7
on sliding scale as needed while at home   - FS elevated above goal currently   - start on admelog 2units with meals, c/w low sliding scale   - adjust as needed
Hb fluctuating, 7.8 today   - maintain active type and screen  - trend CBC closely   - transfuse for Hb <7
noted with Afib w RVR on admission, unclear if new onset Afib. Had recurrent Afib with RVR overnight 4/28, converted to sinus.   - etiology likely related to underlying infection and fluid overload state  - started on lopressor 12.5mg BID with improved rates --> now back in sinus
Hb fluctuating  - maintain active type and screen  - trend CBC closely   - transfuse for Hb <7
Hb fluctuating  - maintain active type and screen  - trend CBC closely   - transfuse for Hb <7
etiology likely related to elevated aspiration risk w/ dementia and advanced dementia but will treat for acute (non-severe) bacterial pneumonia in setting of recent hospitalization within the last 90 days  - c/w ceftriaxone and azithromycin for now, plan for 5d total   - BCx NGTD   - MRSA negative; f/u legionella and S pneumo Ag (pending)  - monitor QTc (on admission = 498)
noted with Afib w RVR on admission, unclear if new onset Afib. Had Afib with RVR overnight as well, converted to sinus early this morning   - etiology likely related to underlying infection and fluid overload state  - started on lopressor 12.5mg BID with improved rates --> now back in sinus
Hb fluctuating, 7.8 today   - maintain active type and screen  - trend CBC closely   - transfuse for Hb <7
Hb fluctuating, 7.8 today   - maintain active type and screen  - trend CBC closely   - transfuse for Hb <7
noted with Afib w RVR on admission, unclear if new onset Afib  - etiology likely related to underlying infection and fluid overload state  - started on lopressor 12.5mg BID with improved rates --> now in sinus

## 2023-05-16 NOTE — PRE PROCEDURE NOTE - PRE PROCEDURE EVALUATION
Interventional Radiology    HPI: 88yoF w/ PMHx of advanced dementia (at B/L patient is bedbound, nonverbal, though able to tolerate PO diet - per family, patient has hearty appetite), HTN, HLD, IDDM, brought in by family for difficulty breathing over the past few days in setting of notable increased swelling of the patient's hands, feet, and face; a/w hypoxic respiratory failure 2/2 aspiration PNA and acute decompensated diastolic heart failure, with course c/b significant TOÑITO, poor oral intake now s/p NGT and pending gastrostomy.    Allergies: No Known Allergies    Medications (Abx/Cardiac/Anticoagulation/Blood Products)    aspirin  chewable: 81 milliGRAM(s) Oral (05-15 @ 12:07)  carvedilol: 12.5 milliGRAM(s) Oral (05-16 @ 06:15)  carvedilol: 6.25 milliGRAM(s) Oral (05-15 @ 06:12)  heparin   Injectable: 5000 Unit(s) SubCutaneous (05-16 @ 06:14)  hydrALAZINE: 20 milliGRAM(s) Oral (05-15 @ 06:12)  hydrALAZINE: 25 milliGRAM(s) Oral (05-16 @ 06:14)    Data:    43.1  T(C): 37.1  HR: 80  BP: 164/89  RR: 19  SpO2: 96%    Exam  General: No acute distress  Chest: Non labored breathing  Abdomen: Non-distended  Extremities: No swelling, warm    -WBC 15.73 / HgB 7.5 / Hct 24.0 / Plt 250  -Na 141 / Cl 108 / BUN 77 / Glucose 86  -K 3.4 / CO2 23 / Cr 1.55  -ALT -- / Alk Phos -- / T.Bili --  -INR1.14    Imaging: Relevant imaging reviewed.     Plan: 88y Female presents for percutaneous gastrostomy tube placement.   -Risks/Benefits/alternatives explained with the patient and/or healthcare proxy and witnessed informed consent obtained.

## 2023-05-16 NOTE — PROGRESS NOTE ADULT - PROBLEM SELECTOR PLAN 1
Will continue NPH 6 units q 6.  Will adjust dosing once tube feeds are at goal rate  HOLD NPH if Tube feeds are held.   Will continue monitoring  blood sugars, will Follow up.

## 2023-05-16 NOTE — PROGRESS NOTE ADULT - NSPROGADDITIONALINFOA_GEN_ALL_CORE
The necessity of the time spent during the encounter on this date of service was due to:   - Ordering, reviewing, and interpreting labs, testing, and imaging.  - Independently obtaining a review of systems and performing a physical exam  - Reviewing prior hospitalization and where necessary, outpatient records.  - Counselling and educating patient and family regarding interpretation of aforementioned items and plan of care.    Time-based billing (NON-critical care). Total minutes spent: 54
The necessity of the time spent during the encounter on this date of service was due to:   - Ordering, reviewing, and interpreting labs, testing, and imaging.  - Independently obtaining a review of systems and performing a physical exam  - Reviewing prior hospitalization and where necessary, outpatient records.  - Counselling and educating patient and family regarding interpretation of aforementioned items and plan of care.    Time-based billing (NON-critical care). Total minutes spent: 65
pt is high risk given ongoing hypoxia and worsening renal failure
The necessity of the time spent during the encounter on this date of service was due to:   - Ordering, reviewing, and interpreting labs, testing, and imaging.  - Independently obtaining a review of systems and performing a physical exam  - Reviewing prior hospitalization and where necessary, outpatient records.  - Counselling and educating patient and family regarding interpretation of aforementioned items and plan of care.    Time-based billing (NON-critical care). Total minutes spent: 55
D/W pts daughter Petty via phonecall; all questions addressed
D/W pts daughter Petty daily via phonecall; all questions/concerns addressed
The necessity of the time spent during the encounter on this date of service was due to:   - Ordering, reviewing, and interpreting labs, testing, and imaging.  - Independently obtaining a review of systems and performing a physical exam  - Reviewing prior hospitalization and where necessary, outpatient records.  - Counselling and educating patient and family regarding interpretation of aforementioned items and plan of care.    Time-based billing (NON-critical care). Total minutes spent: 45
The necessity of the time spent during the encounter on this date of service was due to:   - Ordering, reviewing, and interpreting labs, testing, and imaging.  - Independently obtaining a review of systems and performing a physical exam  - Reviewing prior hospitalization and where necessary, outpatient records.  - Counselling and educating patient and family regarding interpretation of aforementioned items and plan of care.    Time-based billing (NON-critical care). Total minutes spent: 54
The necessity of the time spent during the encounter on this date of service was due to:   - Ordering, reviewing, and interpreting labs, testing, and imaging.  - Independently obtaining a review of systems and performing a physical exam  - Reviewing prior hospitalization and where necessary, outpatient records.  - Counselling and educating patient and family regarding interpretation of aforementioned items and plan of care.    Time-based billing (NON-critical care). Total minutes spent: 45
high risk give hypoxia, renal failure
D/W pts daughter Petty daily via phonecall; all questions/concerns addressed
Dispo: pending PEG tube likely monday, improvement of hyperglycemia and electrolyte abnormalities
d/w dtr Petty at length     16 minutes spent discussing advanced care planning
pt is high risk given ongoing hypoxia and renal failure, requiring bumex gtt and twice daily BMP
pt is high risk given worsening hypoxic respiratory failure and renal failure, now on bumex drip

## 2023-05-17 NOTE — CHART NOTE - NSCHARTNOTEFT_GEN_A_CORE
Notified by RN; pt Telemetry monitor demonstrating asystole.  Pt seen and evaluated at bedside with RN  Pt absent spontaneous breath sounds, no S1/S2 auscultated, no palpable radial, femoral, or carotid pulses bilaterally.  TELE strip printed out; initially bradycardic to 40's, then had 2 separate pauses, followed by asystole.  Pt pronounced at 2:38am  Family contacted by writer (Pt's daughter Petty Perez [phone]); emotional and educational support provided. Family en route to [facility] contacted by writer [phone]  Endorsed to RN  Will contact attending in AM Notified by RN; pt Telemetry monitor demonstrating asystole.  Pt seen and evaluated at bedside with RN  Pt absent spontaneous breath sounds, no S1/S2 auscultated, no palpable radial, femoral, or carotid pulses bilaterally.  TELE strip printed out; initially bradycardic to 40's, then had 2 separate pauses, followed by asystole.  Pt pronounced at 2:38am  Family contacted by writer (Pt's daughter Petty Perez [phone]); emotional and educational support provided. Family en route to [facility] contacted by writer [phone]; discussed at length, not accepted as an ME case.  Endorsed to RN  Will contact attending in AM Notified by RN; pt Telemetry monitor demonstrating asystole.  Pt seen and evaluated at bedside with RN  Pt absent spontaneous breath sounds, no S1/S2 auscultated, no palpable radial, femoral, or carotid pulses bilaterally.  TELE strip printed out; initially bradycardic to 40's, then had 2 separate pauses, followed by asystole.  Pt pronounced at 2:38am  Family contacted by writer (Pt's daughter Petty Perez [phone]); emotional and educational support provided. Family en route to hospital  [facility] contacted by writer [phone]; discussed at length, not accepted as an ME case.  Endorsed to RN  Hospitalist in Charge Dr. Harvey contacted by writer. Notified by RN; pt Telemetry monitor demonstrating asystole.  Pt seen and evaluated at bedside with RN  Pt absent spontaneous breath sounds, no S1/S2 auscultated, no palpable radial, femoral, or carotid pulses bilaterally.  TELE strip printed out; initially bradycardic to 40's, then had 2 separate pauses, followed by asystole.  Pt pronounced at 2:38am  Family contacted by writer (Pt's daughter Petty Perez [phone]); emotional and educational support provided. Family en route to hospital  [facility] contacted by writer [phone]; discussed at length, not accepted as an ME case.  Endorsed to RN  Hospitalist in Charge Dr. Harvey contacted by writer; made aware of death

## 2023-05-17 NOTE — PROVIDER CONTACT NOTE (CHANGE IN STATUS NOTIFICATION) - SITUATION
Tele notified that HR dropped to 40's. When assessing pt at bedside, unable to obtain vitals and no respirations noted. Tele then notified of asystole

## 2023-05-17 NOTE — DISCHARGE NOTE FOR THE EXPIRED PATIENT - HOSPITAL COURSE
88yoF w/ PMHx of advanced dementia (at B/L patient is bedbound, nonverbal, though able to tolerate PO diet - per family, patient has hearty appetite), HTN, HLD, IDDM, who presents from home where she lives with her , brought in by her daughter (son is at her bedside during this evaluation) w/ complaints of daughter noticing the patient has been having difficulty breathing over the past few days in setting of notable increased swelling of the patient's hands, feet, and face. Of note, patient was recently hospitalized at [facility] for aspiration PNA (and found to have demand ischemia) when she presented with similar complaints and was found to be hypoxic; at the time, S+S eval was recommended, but per documentation at the end of Jan 2023, pt's daughter declined formal/objective evaluation, and instead, understanding all risks associated with aspiration, preferred to have patient continue on her typical dysphagia diet (puree/finely cut up food). Currently, patient is calm, sleeping, though she is arousable, and becomes agitated with attempts at physical examination. Per documentation, patient's daughter notes that the patient has been eating and drinking normally but that the patient does cough a lot.     Admitted: 4/23/2023     Problem/Plan - 1:  ·  Problem: Heart failure, diastolic, acute on chronic.   - Lasix 40mg IV qd     Problem/Plan - 2:  ·  Problem: Atrial fibrillation with RVR.   - Lopressor 12.5mg BID     Problem/Plan - 3:  ·  Problem: Acute pneumonia.   - Abx w/ IV Ceftriaxone and IV Azithromycin for now     Problem/Plan - 4:  ·  Problem: Dysphagia.   - Speech and Swallow requested  - Family deciding between evaluation vs risks of recurrent aspiration risks with feeding.     Problem/Plan - 5:  ·  Problem: Constipation.   - Bowel regimen started    4/23- Cardiology Consult; NSTEMI Type 2 in setting of demand 2ry to fluid overload, pneumonia on CTA, negative for PE. Lasix 40mg IV qd. Defer DAPT load and hep gtt as do not suspect acute plaque rupture at this time. hstrop peaked at 71. Continue aspirin 81 and atorva 80.  4/23: RRT called for hypoxia and tachycardia. Patient came back from CTA chest and had tachycardia (HR 140s) and hypoxia (70s-80s on room air). Lopressor 5mg IV x1, and Lasix 20mg IV x1 given. Pt previously on NRM SpO2 100%, down-titrated to 6LNC O2 saturation 95%, wean as tolerated.  4/24: Started on Lopressor 12.5mg BID for A-fib, Azithromycin/Rocephin for Aspiration PNA  4/25: Unclear as to GOC regarding family wishes for pleasure feeds versus formal bedside evaluation. As per documentation, patient is reportedly coughing during eating yet passed RN dysphagia screen.  4/25: Diuretics d/c, TOÑITO with Cr 3, trend Cr  4/26:  88yoF w/ PMHx of advanced dementia (at B/L patient is bedbound, nonverbal, though able to tolerate PO diet - per family, patient has hearty appetite), HTN, HLD, IDDM, who presents from home where she lives with her , brought in by her daughter (son is at her bedside during this evaluation) w/ complaints of daughter noticing the patient has been having difficulty breathing over the past few days in setting of notable increased swelling of the patient's hands, feet, and face. Of note, patient was recently hospitalized at [facility] for aspiration PNA (and found to have demand ischemia) when she presented with similar complaints and was found to be hypoxic; at the time, S+S eval was recommended, but per documentation at the end of Jan 2023, pt's daughter declined formal/objective evaluation, and instead, understanding all risks associated with aspiration, preferred to have patient continue on her typical dysphagia diet (puree/finely cut up food). Currently, patient is calm, sleeping, though she is arousable, and becomes agitated with attempts at physical examination. Per documentation, patient's daughter notes that the patient has been eating and drinking normally but that the patient does cough a lot.     Admitted: 4/23/2023     Problem/Plan - 1:  ·  Problem: Heart failure, diastolic, acute on chronic.   - Lasix 40mg IV qd     Problem/Plan - 2:  ·  Problem: Atrial fibrillation with RVR.   - Lopressor 12.5mg BID     Problem/Plan - 3:  ·  Problem: Acute pneumonia.   - Abx w/ IV Ceftriaxone and IV Azithromycin for now     Problem/Plan - 4:  ·  Problem: Dysphagia.   - Speech and Swallow requested  - Family deciding between evaluation vs risks of recurrent aspiration risks with feeding.     Problem/Plan - 5:  ·  Problem: Constipation.   - Bowel regimen started    4/23- Cardiology Consult; NSTEMI Type 2 in setting of demand 2ry to fluid overload, pneumonia on CTA, negative for PE. Lasix 40mg IV qd. Defer DAPT load and hep gtt as do not suspect acute plaque rupture at this time. hstrop peaked at 71. Continue aspirin 81 and atorva 80.  4/23: RRT called for hypoxia and tachycardia. Patient came back from CTA chest and had tachycardia (HR 140s) and hypoxia (70s-80s on room air). Lopressor 5mg IV x1, and Lasix 20mg IV x1 given. Pt previously on NRM SpO2 100%, down-titrated to 6LNC O2 saturation 95%, wean as tolerated.  4/24: Started on Lopressor 12.5mg BID for A-fib, Azithromycin/Rocephin for Aspiration PNA  4/25: Unclear as to GOC regarding family wishes for pleasure feeds versus formal bedside evaluation. As per documentation, patient is reportedly coughing during eating yet passed RN dysphagia screen. Diuretics d/c 2ry TOÑITO with Cr 3, trend Cr  4/26: Hypoxic, placed on Venti Mask, weaned down to 6LNC. No diuretics in setting of TOÑITO. Swallow Assessment performed; recommended puree diet/moderately thick liquids. Abx escalated to cefepime, HFNC in place. Nephrology Consulted; TOÑITO 2ry to CHF and ROBERTO, start Bumex.  4/27: Respiratory Distress on 6LNC; placed on NRM and then HFNC.  4/28: Pt scheduled for MBS but not medically cleared to go off unit for procedure. Bumex gtt started, not good candidate for dialysis given advanced dementia.  4/29: 4.1 Seconds of PAT with HR in 190's; asymptomatic  4/30: Replete lytes prn in setting of diuresis  5/1: Wound Ostomy Consult for incontinence associated dermatitis, B/L buttocks/sacral deep tissue injury in evolution, Bumex gtt switched to IV push daily, unable to take po meds; IV lopressor for rate control initiated. Weaned off HFNC to 6LNC. SLP recommending NPO  5/2: s/p 7 day course of Cefepime.  5/3: Started tube feeds and free water flushes 88yoF w/ PMHx of advanced dementia (at B/L patient is bedbound, nonverbal, though able to tolerate PO diet - per family, patient has hearty appetite), HTN, HLD, IDDM, who presents from home where she lives with her , brought in by her daughter (son is at her bedside during this evaluation) w/ complaints of daughter noticing the patient has been having difficulty breathing over the past few days in setting of notable increased swelling of the patient's hands, feet, and face. Of note, patient was recently hospitalized at [facility] for aspiration PNA (and found to have demand ischemia) when she presented with similar complaints and was found to be hypoxic; at the time, S+S eval was recommended, but per documentation at the end of Jan 2023, pt's daughter declined formal/objective evaluation, and instead, understanding all risks associated with aspiration, preferred to have patient continue on her typical dysphagia diet (puree/finely cut up food). Currently, patient is calm, sleeping, though she is arousable, and becomes agitated with attempts at physical examination. Per documentation, patient's daughter notes that the patient has been eating and drinking normally but that the patient does cough a lot.     Admitted: 4/23/2023     Problem/Plan - 1:  ·  Problem: Heart failure, diastolic, acute on chronic.   - Lasix 40mg IV qd     Problem/Plan - 2:  ·  Problem: Atrial fibrillation with RVR.   - Lopressor 12.5mg BID     Problem/Plan - 3:  ·  Problem: Acute pneumonia.   - Abx w/ IV Ceftriaxone and IV Azithromycin for now     Problem/Plan - 4:  ·  Problem: Dysphagia.   - Speech and Swallow requested  - Family deciding between evaluation vs risks of recurrent aspiration risks with feeding.     Problem/Plan - 5:  ·  Problem: Constipation.   - Bowel regimen started    4/23- Cardiology Consult; NSTEMI Type 2 in setting of demand 2ry to fluid overload, pneumonia on CTA, negative for PE. Lasix 40mg IV qd. Defer DAPT load and hep gtt as do not suspect acute plaque rupture at this time. hstrop peaked at 71. Continue aspirin 81 and atorva 80.  4/23: RRT called for hypoxia and tachycardia. Patient came back from CTA chest and had tachycardia (HR 140s) and hypoxia (70s-80s on room air). Lopressor 5mg IV x1, and Lasix 20mg IV x1 given. Pt previously on NRM SpO2 100%, down-titrated to 6LNC O2 saturation 95%, wean as tolerated.  4/24: Started on Lopressor 12.5mg BID for A-fib, Azithromycin/Rocephin for Aspiration PNA  4/25: Unclear as to GOC regarding family wishes for pleasure feeds versus formal bedside evaluation. As per documentation, patient is reportedly coughing during eating yet passed RN dysphagia screen. Diuretics d/c 2ry TOÑITO with Cr 3, trend Cr  4/26: Hypoxic, placed on Venti Mask, weaned down to 6LNC. No diuretics in setting of TOÑITO. Swallow Assessment performed; recommended puree diet/moderately thick liquids. Abx escalated to cefepime, HFNC in place. Nephrology Consulted; TOÑITO 2ry to CHF and ROBERTO, start Bumex.  4/27: Respiratory Distress on 6LNC; placed on NRM and then HFNC.  4/28: Pt scheduled for MBS but not medically cleared to go off unit for procedure. Bumex gtt started, not good candidate for dialysis given advanced dementia.  4/29: 4.1 Seconds of PAT with HR in 190's; asymptomatic  4/30: Replete lytes prn in setting of diuresis  5/1: Wound Ostomy Consult for incontinence associated dermatitis, B/L buttocks/sacral deep tissue injury in evolution, Bumex gtt switched to IV push daily, unable to take po meds; IV lopressor for rate control initiated. Weaned off HFNC to 6LNC. SLP recommending NPO  5/2: s/p 7 day course of Cefepime.  5/3: Started tube feeds and free water flushes. GI Consult; PEG planning once medically optimized.  5/4: GOC discussion ongoing; family declining hospice referral 88yoF w/ PMHx of advanced dementia (at B/L patient is bedbound, nonverbal, though able to tolerate PO diet - per family, patient has hearty appetite), HTN, HLD, IDDM, who presents from home where she lives with her , brought in by her daughter (son is at her bedside during this evaluation) w/ complaints of daughter noticing the patient has been having difficulty breathing over the past few days in setting of notable increased swelling of the patient's hands, feet, and face. Of note, patient was recently hospitalized at [facility] for aspiration PNA (and found to have demand ischemia) when she presented with similar complaints and was found to be hypoxic; at the time, S+S eval was recommended, but per documentation at the end of Jan 2023, pt's daughter declined formal/objective evaluation, and instead, understanding all risks associated with aspiration, preferred to have patient continue on her typical dysphagia diet (puree/finely cut up food). Currently, patient is calm, sleeping, though she is arousable, and becomes agitated with attempts at physical examination. Per documentation, patient's daughter notes that the patient has been eating and drinking normally but that the patient does cough a lot.     Admitted: 4/23/2023     Problem/Plan - 1:  ·  Problem: Heart failure, diastolic, acute on chronic.   - Lasix 40mg IV qd     Problem/Plan - 2:  ·  Problem: Atrial fibrillation with RVR.   - Lopressor 12.5mg BID     Problem/Plan - 3:  ·  Problem: Acute pneumonia.   - Abx w/ IV Ceftriaxone and IV Azithromycin for now     Problem/Plan - 4:  ·  Problem: Dysphagia.   - Speech and Swallow requested  - Family deciding between evaluation vs risks of recurrent aspiration risks with feeding.     Problem/Plan - 5:  ·  Problem: Constipation.   - Bowel regimen started    4/23- Cardiology Consult; NSTEMI Type 2 in setting of demand 2ry to fluid overload, pneumonia on CTA, negative for PE. Lasix 40mg IV qd. Defer DAPT load and hep gtt as do not suspect acute plaque rupture at this time. hstrop peaked at 71. Continue aspirin 81 and atorva 80.  4/23: RRT called for hypoxia and tachycardia. Patient came back from CTA chest and had tachycardia (HR 140s) and hypoxia (70s-80s on room air). Lopressor 5mg IV x1, and Lasix 20mg IV x1 given. Pt previously on NRM SpO2 100%, down-titrated to 6LNC O2 saturation 95%, wean as tolerated.  4/24: Started on Lopressor 12.5mg BID for A-fib, Azithromycin/Rocephin for Aspiration PNA  4/25: Unclear as to GOC regarding family wishes for pleasure feeds versus formal bedside evaluation. As per documentation, patient is reportedly coughing during eating yet passed RN dysphagia screen. Diuretics d/c 2ry TOÑITO with Cr 3, trend Cr  4/26: Hypoxic, placed on Venti Mask, weaned down to 6LNC. No diuretics in setting of TOÑITO. Swallow Assessment performed; recommended puree diet/moderately thick liquids. Abx escalated to cefepime, HFNC in place. Nephrology Consulted; TOÑITO 2ry to CHF and ROBERTO, start Bumex.  4/27: Respiratory Distress on 6LNC; placed on NRM and then HFNC.  4/28: Pt scheduled for MBS but not medically cleared to go off unit for procedure. Bumex gtt started, not good candidate for dialysis given advanced dementia.  4/29: 4.1 Seconds of PAT with HR in 190's; asymptomatic  4/30: Replete lytes prn in setting of diuresis  5/1: Wound Ostomy Consult for incontinence associated dermatitis, B/L buttocks/sacral deep tissue injury in evolution, Bumex gtt switched to IV push daily, unable to take po meds; IV lopressor for rate control initiated. Weaned off HFNC to 6LNC. SLP recommending NPO  5/2: s/p 7 day course of Cefepime.  5/3: Started tube feeds and free water flushes. GI Consult; PEG planning once medically optimized. Bumex d/c  5/4: GOC discussion ongoing; family declining hospice referral  5/5: [facility]WF for hypernatremia. Endocrinology Consult: Increased Lantus and Sliding scale   5/6: Pt pulled out NGT, [place] sump placed by ENT, stop Lantus and start NPH 6u q6hr  5/8: Empiric Cefepime restarted 2ry to Leukocytosis 88yoF w/ PMHx of advanced dementia (at B/L patient is bedbound, nonverbal, though able to tolerate PO diet - per family, patient has hearty appetite), HTN, HLD, IDDM, who presents from home where she lives with her , brought in by her daughter (son is at her bedside during this evaluation) w/ complaints of daughter noticing the patient has been having difficulty breathing over the past few days in setting of notable increased swelling of the patient's hands, feet, and face. Of note, patient was recently hospitalized at [facility] for aspiration PNA (and found to have demand ischemia) when she presented with similar complaints and was found to be hypoxic; at the time, S+S eval was recommended, but per documentation at the end of Jan 2023, pt's daughter declined formal/objective evaluation, and instead, understanding all risks associated with aspiration, preferred to have patient continue on her typical dysphagia diet (puree/finely cut up food). Currently, patient is calm, sleeping, though she is arousable, and becomes agitated with attempts at physical examination. Per documentation, patient's daughter notes that the patient has been eating and drinking normally but that the patient does cough a lot.     Admitted: 4/23/2023     Problem/Plan - 1:  ·  Problem: Heart failure, diastolic, acute on chronic.   - Lasix 40mg IV qd     Problem/Plan - 2:  ·  Problem: Atrial fibrillation with RVR.   - Lopressor 12.5mg BID     Problem/Plan - 3:  ·  Problem: Acute pneumonia.   - Abx w/ IV Ceftriaxone and IV Azithromycin for now     Problem/Plan - 4:  ·  Problem: Dysphagia.   - Speech and Swallow requested  - Family deciding between evaluation vs risks of recurrent aspiration risks with feeding.     Problem/Plan - 5:  ·  Problem: Constipation.   - Bowel regimen started    4/23- Cardiology Consult; NSTEMI Type 2 in setting of demand 2ry to fluid overload, pneumonia on CTA, negative for PE. Lasix 40mg IV qd. Defer DAPT load and hep gtt as do not suspect acute plaque rupture at this time. hstrop peaked at 71. Continue aspirin 81 and atorva 80.  4/23: RRT called for hypoxia and tachycardia. Patient came back from CTA chest and had tachycardia (HR 140s) and hypoxia (70s-80s on room air). Lopressor 5mg IV x1, and Lasix 20mg IV x1 given. Pt previously on NRM SpO2 100%, down-titrated to 6LNC O2 saturation 95%, wean as tolerated.  4/24: Started on Lopressor 12.5mg BID for A-fib, Azithromycin/Rocephin for Aspiration PNA  4/25: Unclear as to GOC regarding family wishes for pleasure feeds versus formal bedside evaluation. As per documentation, patient is reportedly coughing during eating yet passed RN dysphagia screen. Diuretics d/c 2ry TOÑITO with Cr 3, trend Cr  4/26: Hypoxic, placed on Venti Mask, weaned down to 6LNC. No diuretics in setting of TOÑITO. Swallow Assessment performed; recommended puree diet/moderately thick liquids. Abx escalated to cefepime, HFNC in place. Nephrology Consulted; TOÑITO 2ry to CHF and ROBERTO, start Bumex.  4/27: Respiratory Distress on 6LNC; placed on NRM and then HFNC.  4/28: Pt scheduled for MBS but not medically cleared to go off unit for procedure. Bumex gtt started, not good candidate for dialysis given advanced dementia.  4/29: 4.1 Seconds of PAT with HR in 190's; asymptomatic  4/30: Replete lytes prn in setting of diuresis  5/1: Wound Ostomy Consult for incontinence associated dermatitis, B/L buttocks/sacral deep tissue injury in evolution, Bumex gtt switched to IV push daily, unable to take po meds; IV lopressor for rate control initiated. Weaned off HFNC to 6LNC. SLP recommending NPO  5/2: s/p 7 day course of Cefepime.  5/3: Started tube feeds and free water flushes. GI Consult; PEG planning once medically optimized. Bumex d/c  5/4: GOC discussion ongoing; family declining hospice referral  5/5: [facility] for hypernatremia. Endocrinology Consult: Increased Lantus and Sliding scale   5/6: Pt pulled out NGT, [place] sump placed by ENT, stop Lantus and start NPH 6u q6hr  5/8: Empiric Cefepime restarted 2ry to Leukocytosis  5/10: Not currently optimized for endoscopic intervention today given elevated blood glucose levels in the 400-500 range.  88yoF w/ PMHx of advanced dementia (at B/L patient is bedbound, nonverbal, though able to tolerate PO diet - per family, patient has hearty appetite), HTN, HLD, IDDM, who presents from home where she lives with her , brought in by her daughter (son is at her bedside during this evaluation) w/ complaints of daughter noticing the patient has been having difficulty breathing over the past few days in setting of notable increased swelling of the patient's hands, feet, and face. Of note, patient was recently hospitalized at [facility] for aspiration PNA (and found to have demand ischemia) when she presented with similar complaints and was found to be hypoxic; at the time, S+S eval was recommended, but per documentation at the end of Jan 2023, pt's daughter declined formal/objective evaluation, and instead, understanding all risks associated with aspiration, preferred to have patient continue on her typical dysphagia diet (puree/finely cut up food). Currently, patient is calm, sleeping, though she is arousable, and becomes agitated with attempts at physical examination. Per documentation, patient's daughter notes that the patient has been eating and drinking normally but that the patient does cough a lot.     Admitted: 4/23/2023     Problem/Plan - 1:  ·  Problem: Heart failure, diastolic, acute on chronic.   - Lasix 40mg IV qd     Problem/Plan - 2:  ·  Problem: Atrial fibrillation with RVR.   - Lopressor 12.5mg BID     Problem/Plan - 3:  ·  Problem: Acute pneumonia.   - Abx w/ IV Ceftriaxone and IV Azithromycin for now     Problem/Plan - 4:  ·  Problem: Dysphagia.   - Speech and Swallow requested  - Family deciding between evaluation vs risks of recurrent aspiration risks with feeding.     Problem/Plan - 5:  ·  Problem: Constipation.   - Bowel regimen started    4/23- Cardiology Consult; NSTEMI Type 2 in setting of demand 2ry to fluid overload, pneumonia on CTA, negative for PE. Lasix 40mg IV qd. Defer DAPT load and hep gtt as do not suspect acute plaque rupture at this time. hstrop peaked at 71. Continue aspirin 81 and atorva 80.  4/23: RRT called for hypoxia and tachycardia. Patient came back from CTA chest and had tachycardia (HR 140s) and hypoxia (70s-80s on room air). Lopressor 5mg IV x1, and Lasix 20mg IV x1 given. Pt previously on NRM SpO2 100%, down-titrated to 6LNC O2 saturation 95%, wean as tolerated.  4/24: Started on Lopressor 12.5mg BID for A-fib, Azithromycin/Rocephin for Aspiration PNA  4/25: Unclear as to GOC regarding family wishes for pleasure feeds versus formal bedside evaluation. As per documentation, patient is reportedly coughing during eating yet passed RN dysphagia screen. Diuretics d/c 2ry TOÑITO with Cr 3, trend Cr  4/26: Hypoxic, placed on Venti Mask, weaned down to 6LNC. No diuretics in setting of TOÑITO. Swallow Assessment performed; recommended puree diet/moderately thick liquids. Abx escalated to cefepime, HFNC in place. Nephrology Consulted; TOÑITO 2ry to CHF and ROBERTO, start Bumex.  4/27: Respiratory Distress on 6LNC; placed on NRM and then HFNC.  4/28: Pt scheduled for MBS but not medically cleared to go off unit for procedure. Bumex gtt started, not good candidate for dialysis given advanced dementia.  4/29: 4.1 Seconds of PAT with HR in 190's; asymptomatic  4/30: Replete lytes prn in setting of diuresis  5/1: Wound Ostomy Consult for incontinence associated dermatitis, B/L buttocks/sacral deep tissue injury in evolution, Bumex gtt switched to IV push daily, unable to take po meds; IV lopressor for rate control initiated. Weaned off HFNC to 6LNC. SLP recommending NPO  5/2: s/p 7 day course of Cefepime.  5/3: Started tube feeds and free water flushes. GI Consult; PEG planning once medically optimized. Bumex d/c  5/4: GOC discussion ongoing; family declining hospice referral  5/5: [facility] for hypernatremia. Endocrinology Consult: Increased Lantus and Sliding scale   5/6: Pt pulled out NGT, [place] sump placed by ENT, stop Lantus and start NPH 6u q6hr  5/8: Empiric Cefepime restarted 2ry to Leukocytosis  5/10: Not currently optimized for endoscopic intervention today given elevated blood glucose levels in the 400-500 range. Endo restart NPH  5/11: Endoscopy  LA Grade D esophagitis in the upper esophagus, Non-bleeding gastric ulcers with a clean ulcer base (Nam Class III), Non-bleeding erosive gastropathy, Multiple non-bleeding duodenal ulcers with a clean ulcer base (Nam Class III). Given severe esopohagitis in the upper esopohagus, endoscopic PEG tube placement was deferred due to risk of bleeding into the airway.  5/12: IR will plan to perform percutaneous gastrostomy placement tentatively on Wednesday, May 17, 2023.   88yoF w/ PMHx of advanced dementia (at B/L patient is bedbound, nonverbal, though able to tolerate PO diet - per family, patient has hearty appetite), HTN, HLD, IDDM, who presents from home where she lives with her , brought in by her daughter (son is at her bedside during this evaluation) w/ complaints of daughter noticing the patient has been having difficulty breathing over the past few days in setting of notable increased swelling of the patient's hands, feet, and face. Of note, patient was recently hospitalized at [facility] for aspiration PNA (and found to have demand ischemia) when she presented with similar complaints and was found to be hypoxic; at the time, S+S eval was recommended, but per documentation at the end of Jan 2023, pt's daughter declined formal/objective evaluation, and instead, understanding all risks associated with aspiration, preferred to have patient continue on her typical dysphagia diet (puree/finely cut up food). Currently, patient is calm, sleeping, though she is arousable, and becomes agitated with attempts at physical examination. Per documentation, patient's daughter notes that the patient has been eating and drinking normally but that the patient does cough a lot.     Admitted: 4/23/2023     Problem/Plan - 1:  ·  Problem: Heart failure, diastolic, acute on chronic.   - Lasix 40mg IV qd     Problem/Plan - 2:  ·  Problem: Atrial fibrillation with RVR.   - Lopressor 12.5mg BID     Problem/Plan - 3:  ·  Problem: Acute pneumonia.   - Abx w/ IV Ceftriaxone and IV Azithromycin for now     Problem/Plan - 4:  ·  Problem: Dysphagia.   - Speech and Swallow requested  - Family deciding between evaluation vs risks of recurrent aspiration risks with feeding.     Problem/Plan - 5:  ·  Problem: Constipation.   - Bowel regimen started    4/23- Cardiology Consult; NSTEMI Type 2 in setting of demand 2ry to fluid overload, pneumonia on CTA, negative for PE. Lasix 40mg IV qd. Defer DAPT load and hep gtt as do not suspect acute plaque rupture at this time. hstrop peaked at 71. Continue aspirin 81 and atorva 80.  4/23: RRT called for hypoxia and tachycardia. Patient came back from CTA chest and had tachycardia (HR 140s) and hypoxia (70s-80s on room air). Lopressor 5mg IV x1, and Lasix 20mg IV x1 given. Pt previously on NRM SpO2 100%, down-titrated to 6LNC O2 saturation 95%, wean as tolerated.  4/24: Started on Lopressor 12.5mg BID for A-fib, Azithromycin/Rocephin for Aspiration PNA  4/25: Unclear as to GOC regarding family wishes for pleasure feeds versus formal bedside evaluation. As per documentation, patient is reportedly coughing during eating yet passed RN dysphagia screen. Diuretics d/c 2ry TOÑITO with Cr 3, trend Cr  4/26: Hypoxic, placed on Venti Mask, weaned down to 6LNC. No diuretics in setting of TOÑITO. Swallow Assessment performed; recommended puree diet/moderately thick liquids. Abx escalated to cefepime, HFNC in place. Nephrology Consulted; TOÑITO 2ry to CHF and ROBERTO, start Bumex.  4/27: Respiratory Distress on 6LNC; placed on NRM and then HFNC.  4/28: Pt scheduled for MBS but not medically cleared to go off unit for procedure. Bumex gtt started, not good candidate for dialysis given advanced dementia.  4/29: 4.1 Seconds of PAT with HR in 190's; asymptomatic  4/30: Replete lytes prn in setting of diuresis  5/1: Wound Ostomy Consult for incontinence associated dermatitis, B/L buttocks/sacral deep tissue injury in evolution, Bumex gtt switched to IV push daily, unable to take po meds; IV lopressor for rate control initiated. Weaned off HFNC to 6LNC. SLP recommending NPO  5/2: s/p 7 day course of Cefepime.  5/3: Started tube feeds and free water flushes. GI Consult; PEG planning once medically optimized. Bumex d/c  5/4: GOC discussion ongoing; family declining hospice referral  5/5: FWF for hypernatremia. Endocrinology Consult: Increased Lantus and Sliding scale   5/6: Pt pulled out NGT, [place] sump placed by ENT, stop Lantus and start NPH 6u q6hr  5/8: Empiric Cefepime restarted 2ry to Leukocytosis  5/10: Not currently optimized for endoscopic intervention today given elevated blood glucose levels in the 400-500 range. Endo restart NPH  5/11: Endoscopy  LA Grade D esophagitis in the upper esophagus, Non-bleeding gastric ulcers with a clean ulcer base (Nam Class III), Non-bleeding erosive gastropathy, Multiple non-bleeding duodenal ulcers with a clean ulcer base (Nam Class III). Given severe esopohagitis in the upper esopohagus, endoscopic PEG tube placement was deferred due to risk of bleeding into the airway.  5/12: IR will plan to perform percutaneous gastrostomy placement tentatively on Wednesday, May 17, 2023.  5/16: S/p IR guided PEG placement 18 Fr Gastrostomy to gravity drainage x 24 hours. Tube may be used after 24 hours.    5/17 2:30am  Notified by RN; pt Telemetry monitor demonstrating asystole.  Pt seen and evaluated at bedside with RN  Pt absent spontaneous breath sounds, no S1/S2 auscultated, no palpable radial, femoral, or carotid pulses bilaterally.  TELE strip printed out; initially bradycardic to 40's, then had 2 separate pauses, followed by asystole.  Pt pronounced at 2:38am  Family contacted by writer (Pt's daughter Petty Perez [phone]); emotional and educational support provided. Family en route to [facility] Examiner contacted by writer [phone]; discussed at length, not accepted as an ME case.  Endorsed to RN  Will contact attending in AM.

## 2023-05-17 NOTE — PROVIDER CONTACT NOTE (CHANGE IN STATUS NOTIFICATION) - BACKGROUND
Pt DNR/DNI. Admitted for CHF/AHRF. A/Ox0 at bedside but responsive to voice. PEG tube placement 5/16

## 2023-05-17 NOTE — CHART NOTE - NSCHARTNOTESELECT_GEN_ALL_CORE
Event Note
GI update/Event Note
Nutrition Services
Event Note
Event Note
Event Note/Event Note
GI Fellow/Off Service Note
GI/Event Note
Hyperglycemia/Event Note
Nutrition Services
Nutrition Services
Speech and Swallow
ngt insertion/Event Note

## 2023-06-16 NOTE — PROVIDER CONTACT NOTE (OTHER) - BACKGROUND
88yoM PMHx HTN, HLD, DM, dementia p/w difficulty breathing in the last few days with swelling in hands and feet.
88yoM PMHx HTN, HLD, DM, dementia p/w difficulty breathing in the last few days with swelling in hands and feet.
June 16, 2023    Hello, may I speak with Katlin Bronson?    My name is Melinda      I am  with [facility]  [facility]  [address] [place] [address] [phone].    Before we get started may I verify your date of birth? 2000    I am calling to officially welcome you to our practice and ask about your recent visit. Is this a good time to talk? yes    Tell me about your visit with us. What things went well?  I felt like I was heard, I was asked a lot of questions but they listened.  All the staff was extremely nice.          We're always looking for ways to make our patients' experiences even better. Do you have recommendations on ways we may improve?  no    Overall were you satisfied with your first visit to our practice? yes       I appreciate you taking the time to speak with me today. Is there anything else I can do for you? no      Thank you, and have a great day.      
dx: shortness of breath
Pt admitted with SOB/respiratory failure
dx: shortness of breath
pt comes in AHRF 2/2 CHF, aspiration PNA

## 2023-07-10 NOTE — SWALLOW BEDSIDE ASSESSMENT ADULT - SLP GENERAL OBSERVATIONS
Encountered Pt laying semi-supine in bed on 3L O2 via NC. Pt lethargic and not arousable to noxious stimuli.
Encountered Pt laying semi-supine in bed on HFNC (60% O2 concentration, 40 lpm). +b/l mittens. Per RN, Pt has been restless and agitated w/ intermittent combativeness. Pt is nonverbal and unable to follow simple commands.
Why Was The Change Made?: Please Select the Appropriate Response

## 2023-08-01 NOTE — PATIENT PROFILE ADULT - FUNCTIONAL ASSESSMENT - BASIC MOBILITY 4.
Reviewed note.     If she does not have any pain when the area is touched or with movement, then she can stop routine cindy taping. If there is still pain when it is touched our when she is using the finger, then she should continue to tape and call in 1 week with an update.     If she is not having pain to touch or with movement, I would recommend that she cindy tape when doing sports for another 3 weeks.     Please notify and let me know if questions.    1 = Total assistance

## 2023-11-16 NOTE — ED PROVIDER NOTE - NS ED MD DISPO DISCHARGE CCDA
Show Aperture Variable?: Yes Detail Level: Simple Render Post-Care Instructions In Note?: no Consent: The patient's consent was obtained including but not limited to risks of crusting, scabbing, blistering, scarring, darker or lighter pigmentary change, recurrence, incomplete removal and infection. Post-Care Instructions: I reviewed with the patient in detail post-care instructions. Patient is to wear sunprotection, and avoid picking at any of the treated lesions. Pt may apply Vaseline to crusted or scabbing areas. Duration Of Freeze Thaw-Cycle (Seconds): 0 Patient/Caregiver provided printed discharge information.

## 2023-12-13 NOTE — PROGRESS NOTE ADULT - PROBLEM SELECTOR PLAN 1
Spoke to mom. Mom stated that patient was seen in the urgent care this morning for pink eye and ear infection. Informed mom to reach out to insurance to determine provider that is in network. Advised mom to monitor for sign of infections such as pain, fever, discharge, and redness. Mom verbalized understanding.    Pt not compliant with SLP eval, with high aspiration risk, severe dysphagia, electrolyte derangements  -SLP recommends NPO  -Family discussion as above, GI consulted for PEG  -NGT to be placed today, will confirm placement and start trickle feeds  -BMP BID, monitor for refeeding syndrome  -Replete electrolytes aggressively

## 2024-04-10 NOTE — DIETITIAN INITIAL EVALUATION ADULT - FUNCTIONAL SCREEN CURRENT LEVEL: SWALLOWING (IF SCORE 2 OR MORE FOR ANY ITEM, CONSULT REHAB SERVICES), MLM)
On medications,  no chest pain, stable, monitored and followed up by primary cardiothoracic team/cardiology team.
0 = swallows foods/liquids without difficulty

## 2024-04-22 NOTE — ED PROVIDER NOTE - MEDICAL DECISION MAKING DETAILS
unable to leave VM/not setup 4/19- AC/ same 4/22- AC
MD Chinedu,Attending: pt seen. agree withabove HPI.ROS.PE. h/o similar being seen here as well. c/o severe pain upper back 3 days. No fever/chills/neuro sxs or chest sxs. for analgesia and r/o compression fracture--CT C-T spine

## 2024-04-26 NOTE — ED PROVIDER NOTE - SEPSIS ALERT QUESTION 1
Instructions:    Drink plenty of water and stay well hydrated.   Complete the course of antibiotics.   Antibiotics can cause diarrhea. Taking a probiotic daily, or eating yogurt a day may help these symptoms.  Follow up with your primary doctor for any continued symptoms.  If you develop any high fevers, or worsening symptoms, go to the ER.  
This patient was evaluated for sepsis.  At this time, a diagnosis of sepsis is not supported by the overall clinical picture.

## 2024-11-07 NOTE — PATIENT PROFILE ADULT - ABILITY TO HEAR (WITH HEARING AID OR HEARING APPLIANCE IF NORMALLY USED):
Mildly to Moderately Impaired: difficulty hearing in some environments or speaker may need to increase volume or speak distinctly Normal vision: sees adequately in most situations; can see medication labels, newsprint
